# Patient Record
Sex: MALE | URBAN - METROPOLITAN AREA
[De-identification: names, ages, dates, MRNs, and addresses within clinical notes are randomized per-mention and may not be internally consistent; named-entity substitution may affect disease eponyms.]

---

## 2023-10-25 ENCOUNTER — TELEPHONE (OUTPATIENT)
Dept: OTHER | Age: 73
End: 2023-10-25

## 2024-04-02 ENCOUNTER — LAB REQUISITION (OUTPATIENT)
Dept: LAB | Facility: HOSPITAL | Age: 74
End: 2024-04-02
Payer: MEDICARE

## 2024-04-02 DIAGNOSIS — K59.1 FUNCTIONAL DIARRHEA: ICD-10-CM

## 2024-04-02 PROCEDURE — 87493 C DIFF AMPLIFIED PROBE: CPT | Performed by: INTERNAL MEDICINE

## 2024-04-02 PROCEDURE — 87046 STOOL CULTR AEROBIC BACT EA: CPT | Performed by: INTERNAL MEDICINE

## 2024-04-02 PROCEDURE — 87427 SHIGA-LIKE TOXIN AG IA: CPT | Performed by: INTERNAL MEDICINE

## 2024-04-02 PROCEDURE — 87077 CULTURE AEROBIC IDENTIFY: CPT | Performed by: INTERNAL MEDICINE

## 2024-04-02 PROCEDURE — 87045 FECES CULTURE AEROBIC BACT: CPT | Performed by: INTERNAL MEDICINE

## 2024-04-03 LAB — C DIFF TOX B STL QL: NEGATIVE

## 2024-07-22 ENCOUNTER — APPOINTMENT (OUTPATIENT)
Dept: CT IMAGING | Facility: HOSPITAL | Age: 74
End: 2024-07-22
Attending: EMERGENCY MEDICINE
Payer: MEDICARE

## 2024-07-22 ENCOUNTER — HOSPITAL ENCOUNTER (EMERGENCY)
Facility: HOSPITAL | Age: 74
Discharge: HOME | End: 2024-07-22
Attending: EMERGENCY MEDICINE
Payer: MEDICARE

## 2024-07-22 VITALS
HEIGHT: 65 IN | RESPIRATION RATE: 18 BRPM | SYSTOLIC BLOOD PRESSURE: 187 MMHG | WEIGHT: 135 LBS | OXYGEN SATURATION: 98 % | BODY MASS INDEX: 22.49 KG/M2 | DIASTOLIC BLOOD PRESSURE: 92 MMHG | TEMPERATURE: 98 F | HEART RATE: 77 BPM

## 2024-07-22 DIAGNOSIS — S01.511A LIP LACERATION, INITIAL ENCOUNTER: ICD-10-CM

## 2024-07-22 DIAGNOSIS — S09.90XA INJURY OF HEAD, INITIAL ENCOUNTER: Primary | ICD-10-CM

## 2024-07-22 LAB
ANION GAP SERPL CALC-SCNC: 10 MMOL/L (ref 0–18)
BASOPHILS # BLD AUTO: 0.07 X10(3) UL (ref 0–0.2)
BASOPHILS NFR BLD AUTO: 1.5 %
BUN BLD-MCNC: 12 MG/DL (ref 9–23)
BUN/CREAT SERPL: 8.2 (ref 10–20)
CALCIUM BLD-MCNC: 8.4 MG/DL (ref 8.7–10.4)
CHLORIDE SERPL-SCNC: 106 MMOL/L (ref 98–112)
CO2 SERPL-SCNC: 20 MMOL/L (ref 21–32)
CREAT BLD-MCNC: 1.46 MG/DL
DEPRECATED RDW RBC AUTO: 50.1 FL (ref 35.1–46.3)
EGFRCR SERPLBLD CKD-EPI 2021: 50 ML/MIN/1.73M2 (ref 60–?)
EOSINOPHIL # BLD AUTO: 0.14 X10(3) UL (ref 0–0.7)
EOSINOPHIL NFR BLD AUTO: 3.1 %
ERYTHROCYTE [DISTWIDTH] IN BLOOD BY AUTOMATED COUNT: 17.2 % (ref 11–15)
GLUCOSE BLD-MCNC: 356 MG/DL (ref 70–99)
HCT VFR BLD AUTO: 35.7 %
HGB BLD-MCNC: 12.3 G/DL
IMM GRANULOCYTES # BLD AUTO: 0 X10(3) UL (ref 0–1)
IMM GRANULOCYTES NFR BLD: 0 %
LYMPHOCYTES # BLD AUTO: 1.63 X10(3) UL (ref 1–4)
LYMPHOCYTES NFR BLD AUTO: 35.7 %
MCH RBC QN AUTO: 27.7 PG (ref 26–34)
MCHC RBC AUTO-ENTMCNC: 34.5 G/DL (ref 31–37)
MCV RBC AUTO: 80.4 FL
MONOCYTES # BLD AUTO: 0.36 X10(3) UL (ref 0.1–1)
MONOCYTES NFR BLD AUTO: 7.9 %
NEUTROPHILS # BLD AUTO: 2.36 X10 (3) UL (ref 1.5–7.7)
NEUTROPHILS # BLD AUTO: 2.36 X10(3) UL (ref 1.5–7.7)
NEUTROPHILS NFR BLD AUTO: 51.8 %
OSMOLALITY SERPL CALC.SUM OF ELEC: 296 MOSM/KG (ref 275–295)
PLATELET # BLD AUTO: 154 10(3)UL (ref 150–450)
POTASSIUM SERPL-SCNC: 4.8 MMOL/L (ref 3.5–5.1)
RBC # BLD AUTO: 4.44 X10(6)UL
SODIUM SERPL-SCNC: 136 MMOL/L (ref 136–145)
TROPONIN I SERPL HS-MCNC: 4 NG/L
WBC # BLD AUTO: 4.6 X10(3) UL (ref 4–11)

## 2024-07-22 PROCEDURE — 85025 COMPLETE CBC W/AUTO DIFF WBC: CPT | Performed by: EMERGENCY MEDICINE

## 2024-07-22 PROCEDURE — 93005 ELECTROCARDIOGRAM TRACING: CPT

## 2024-07-22 PROCEDURE — 80048 BASIC METABOLIC PNL TOTAL CA: CPT | Performed by: EMERGENCY MEDICINE

## 2024-07-22 PROCEDURE — 12011 RPR F/E/E/N/L/M 2.5 CM/<: CPT

## 2024-07-22 PROCEDURE — 96361 HYDRATE IV INFUSION ADD-ON: CPT

## 2024-07-22 PROCEDURE — 93010 ELECTROCARDIOGRAM REPORT: CPT

## 2024-07-22 PROCEDURE — 70450 CT HEAD/BRAIN W/O DYE: CPT | Performed by: EMERGENCY MEDICINE

## 2024-07-22 PROCEDURE — 99284 EMERGENCY DEPT VISIT MOD MDM: CPT

## 2024-07-22 PROCEDURE — 70486 CT MAXILLOFACIAL W/O DYE: CPT | Performed by: EMERGENCY MEDICINE

## 2024-07-22 PROCEDURE — 99285 EMERGENCY DEPT VISIT HI MDM: CPT

## 2024-07-22 PROCEDURE — 90471 IMMUNIZATION ADMIN: CPT

## 2024-07-22 PROCEDURE — 96360 HYDRATION IV INFUSION INIT: CPT

## 2024-07-22 PROCEDURE — 84484 ASSAY OF TROPONIN QUANT: CPT | Performed by: EMERGENCY MEDICINE

## 2024-07-22 RX ORDER — AMOXICILLIN AND CLAVULANATE POTASSIUM 875; 125 MG/1; MG/1
875 TABLET, FILM COATED ORAL ONCE
Status: COMPLETED | OUTPATIENT
Start: 2024-07-22 | End: 2024-07-22

## 2024-07-22 RX ORDER — LIDOCAINE HYDROCHLORIDE 10 MG/ML
20 INJECTION, SOLUTION EPIDURAL; INFILTRATION; INTRACAUDAL; PERINEURAL ONCE
Status: COMPLETED | OUTPATIENT
Start: 2024-07-22 | End: 2024-07-22

## 2024-07-22 RX ORDER — AMOXICILLIN AND CLAVULANATE POTASSIUM 875; 125 MG/1; MG/1
1 TABLET, FILM COATED ORAL 2 TIMES DAILY
Qty: 10 TABLET | Refills: 0 | Status: ON HOLD | OUTPATIENT
Start: 2024-07-22 | End: 2024-07-28

## 2024-07-23 LAB
ATRIAL RATE: 83 BPM
P AXIS: 62 DEGREES
P-R INTERVAL: 142 MS
Q-T INTERVAL: 386 MS
QRS DURATION: 118 MS
QTC CALCULATION (BEZET): 453 MS
R AXIS: -66 DEGREES
T AXIS: 102 DEGREES
VENTRICULAR RATE: 83 BPM

## 2024-07-23 NOTE — DISCHARGE INSTRUCTIONS
The CT scan of your skull and face today showed abnormalities which may be consistent with low calcium or vitamin D levels in your bone however they recommended follow-up with your primary care doctor to further evaluate for a possible oncologic cause such as myeloma or neoplastic process.

## 2024-07-23 NOTE — ED PROVIDER NOTES
Patient Seen in: [facility] Emergency Department      History     Chief Complaint   Patient presents with    Fall     Stated Complaint:     Subjective:   HPI    75 y/o male w/ DM here after a fall while walking on the sidewalk today.  No preceeding symptoms.  No LOC.  No neck pain or HA.  No CP or dizziness.  Slightly unsteady gait w/ EMS.  No recent illness.  No fever.  No nausea.    Objective:   Past Medical History:    Diabetes (HCC)              History reviewed. No pertinent surgical history.             Social History     Socioeconomic History    Marital status: Single   Tobacco Use    Smoking status: Never    Smokeless tobacco: Never   Vaping Use    Vaping status: Never Used   Substance and Sexual Activity    Alcohol use: Never    Drug use: Never     Social Determinants of Health     Food Insecurity: Patient Declined (3/3/2024)    Received from [facility]    Hunger Vital Sign     Worried About Running Out of Food in the Last Year: Patient declined     Ran Out of Food in the Last Year: Patient declined   Transportation Needs: Patient Declined (3/3/2024)    Received from [facility]    PRAPARE - Transportation     Lack of Transportation (Medical): Patient declined     Lack of Transportation (Non-Medical): Patient declined   Housing Stability: Patient Declined (3/3/2024)    Received from [facility]    Housing Stability Vital Sign     Unable to Pay for Housing in the Last Year: Patient declined     Unstable Housing in the Last Year: Patient declined              Review of Systems    Positive for stated Chief Complaint: Fall    Other systems are as noted in HPI.  Constitutional and vital signs reviewed.      All other systems reviewed and negative except as noted above.    Physical Exam     ED Triage Vitals [07/22/24 1928]   BP (!) 161/86   Pulse 87   Resp 13   Temp 98 °F (36.7 °C)   Temp src Oral   SpO2 98 %   O2 Device        Current Vitals:   Vital  Signs  BP: (!) 161/86  Pulse: 87  Resp: 13  Temp: 98 °F (36.7 °C)  Temp src: Oral    Oxygen Therapy  SpO2: 98 %            Physical Exam    Constitutional: Oriented to person, place, and time.  Appears well-developed. No distress.   Head: Normocephalic.  As below.  Eyes: Conjunctivae are normal. Pupils are equal, round, and reactive to light.   ENT:  No obvious tooth injury/loosening.  Slightly irregular 1 cm inward tracking laceration of the right upper lip that does not grossly involve the vermilion border.  Neck: Normal range of motion. Neck supple. No post midline pain  Cardiovascular: Normal rate, regular rhythm and intact and equal distal pulses.    Pulmonary/Chest: Effort normal. No respiratory distress.   Abdominal: Soft. There is no tenderness. There is no guarding.   Musculoskeletal: Normal range of motion. No edema or tenderness.   Neurological: Alert and oriented to person, place, and time. No gross focal deficits.  No obvious speech abnormality or facial asymmetryl  Skin: Skin is warm and dry.   Psychiatric: Normal mood and affect.  Behavior is normal.   Nursing note and vitals reviewed.    Differential diagnosis includes mechanical fall, hyperglycemia, dehydration, facial fracture/laceration, head injury.      ED Course     Labs Reviewed   BASIC METABOLIC PANEL (8) - Abnormal; Notable for the following components:       Result Value    Glucose 356 (*)     CO2 20.0 (*)     Creatinine 1.46 (*)     BUN/CREA Ratio 8.2 (*)     Calcium, Total 8.4 (*)     Calculated Osmolality 296 (*)     eGFR-Cr 50 (*)     All other components within normal limits   CBC W/ DIFFERENTIAL - Abnormal; Notable for the following components:    HGB 12.3 (*)     HCT 35.7 (*)     RDW-SD 50.1 (*)     RDW 17.2 (*)     All other components within normal limits   TROPONIN I HIGH SENSITIVITY - Normal   CBC WITH DIFFERENTIAL WITH PLATELET    Narrative:     The following orders were created for panel order CBC With Differential With  Platelet.  Procedure                               Abnormality         Status                     ---------                               -----------         ------                     CBC W/ DIFFERENTIAL[[id number]]          Abnormal            Final result                 Please view results for these tests on the individual orders.   RAINBOW DRAW LAVENDER   RAINBOW DRAW LIGHT GREEN     EKG    Rate, intervals and axes as noted on EKG Report.  Rate: 83  Rhythm: Sinus Rhythm  Reading: no gross acute ischemic changes.                 CT BRAIN OR HEAD (75833)    Result Date: 7/22/2024  PROCEDURE: CT BRAIN OR HEAD (CPT=70450), 7/22/2024, 9:15 PM CT FACIAL BONES (CPT=70486), 7/22/2024, 9:15 PM  COMPARISON: None.  INDICATIONS: Weakness, fall.  TECHNIQUE: Helical CT of the head with axial coronal and sagittal reconstructions.  Helical CT of the facial bones with axial sagittal and coronal reconstructions.  CT images were obtained without contrast material.  Automated exposure control for dose reduction was used.  Dose information is transmitted to the ACR (American College of Radiology) NRDR (National Radiology Data Registry) which includes the Dose Index Registry.  FINDINGS CT HEAD:   VENTRICLES: No hydrocephalus.  EXTRA-AXIAL: No extraaxial hemorrhage.  No midline shift or herniation.  PARENCHYMA: No CT evidence of acute or subacute infarct.  No mass.  Mild generalized parenchymal volume loss.  Mild periventricular white matter hypodensity.  Gray-white matter differentiation is maintained.  SOFT TISSUES: No acute abnormality. BONES: No acute fracture.  Heterogeneous osteopenic bone marrow.   FINDINGS CT FACIAL BONES:  BONES: No acute fracture.  Heterogeneous osteopenic bone marrow. SINUSES: Clear. ORBITS: No acute abnormality. MASTOIDS: Clear. EACS: Clear. SOFT TISSUES: No fluid collection or hematoma.          CONCLUSION:   1. No acute intracranial abnormality.  No calvarial fracture.  2. No acute fracture of the facial  bones.  No acute soft tissue injury.  3. Heterogeneous bone marrow which may simply be due to marked osteopenia but metastasis or multiple myeloma is not excluded.    elm-remote     Dictated by (CST): Mango Oglesby MD on 7/22/2024 at 9:55 PM     Finalized by (CST): Mango Oglesby MD on 7/22/2024 at 9:59 PM          CT FACIAL BONES (CPT=70486)    Result Date: 7/22/2024  PROCEDURE: CT BRAIN OR HEAD (CPT=70450), 7/22/2024, 9:15 PM CT FACIAL BONES (CPT=70486), 7/22/2024, 9:15 PM  COMPARISON: None.  INDICATIONS: Weakness, fall.  TECHNIQUE: Helical CT of the head with axial coronal and sagittal reconstructions.  Helical CT of the facial bones with axial sagittal and coronal reconstructions.  CT images were obtained without contrast material.  Automated exposure control for dose reduction was used.  Dose information is transmitted to the ACR (American College of Radiology) NRDR (National Radiology Data Registry) which includes the Dose Index Registry.  FINDINGS CT HEAD:   VENTRICLES: No hydrocephalus.  EXTRA-AXIAL: No extraaxial hemorrhage.  No midline shift or herniation.  PARENCHYMA: No CT evidence of acute or subacute infarct.  No mass.  Mild generalized parenchymal volume loss.  Mild periventricular white matter hypodensity.  Gray-white matter differentiation is maintained.  SOFT TISSUES: No acute abnormality. BONES: No acute fracture.  Heterogeneous osteopenic bone marrow.   FINDINGS CT FACIAL BONES:  BONES: No acute fracture.  Heterogeneous osteopenic bone marrow. SINUSES: Clear. ORBITS: No acute abnormality. MASTOIDS: Clear. EACS: Clear. SOFT TISSUES: No fluid collection or hematoma.          CONCLUSION:   1. No acute intracranial abnormality.  No calvarial fracture.  2. No acute fracture of the facial bones.  No acute soft tissue injury.  3. Heterogeneous bone marrow which may simply be due to marked osteopenia but metastasis or multiple myeloma is not excluded.    elm-remote     Dictated by (CST): Mango Oglesby MD  on 7/22/2024 at 9:55 PM     Finalized by (CST): Mango Oglesby MD on 7/22/2024 at 9:59 PM                  [facility]                                         Medical Decision Making  Patient's workup reassuring.  Blood pressure still little elevated.  No focal deficits.  Laceration repair of the lip as below.  Recommended ice and Tylenol.  Continue his other medications.  Follow-up with his doctor for wound check and come back with any worsening or change.            Procedure: Laceration Repair  Verbal consent was obtained from the patient. The laceration was anesthetized in the usual fashion with 1% plain lidocaine. The wound was cleaned, draped and explored and there were no deep structures involved.  No tendon injury was identified. The wound was repaired with 4 simple interrupted 5-0 Vicryl sutures with loose wound edge approximation. The wound repair was simple. The procedure was performed by myself.  The wound was dressed by emergency department staff.    Problems Addressed:  Injury of head, initial encounter: acute illness or injury  Lip laceration, initial encounter: acute illness or injury    Amount and/or Complexity of Data Reviewed  Labs: ordered. Decision-making details documented in ED Course.  Radiology: ordered and independent interpretation performed. Decision-making details documented in ED Course.     Details: By my review of the noncontrast head CT, there is no obvious evidence of intracranial bleeding, intracranial mass or midline shift.  ECG/medicine tests: ordered and independent interpretation performed. Decision-making details documented in ED Course.    Risk  OTC drugs.  Prescription drug management.  Decision regarding hospitalization.  Minor surgery with no identified risk factors.        Disposition and Plan     Clinical Impression:  1. Injury of head, initial encounter    2. Lip laceration, initial encounter         Disposition:  Discharge  7/22/2024 10:01 pm    Follow-up:  Talia Gibbs  C.PShantelle  [address]  [address]  [facility] [address]  [phone]    Call      We recommend that you schedule follow up care with a primary care provider within the next three months to obtain basic health screening including reassessment of your blood pressure.      Medications Prescribed:  Current Discharge Medication List        START taking these medications    Details   amoxicillin clavulanate 875-125 MG Oral Tab Take 1 tablet by mouth 2 (two) times daily for 5 days.  Qty: 10 tablet, Refills: 0

## 2024-07-23 NOTE — ED INITIAL ASSESSMENT (HPI)
To ED via [facility] for a mechanical fall that occurred on the sidewalk. Per EMS, patient has a laceration to his upper lip and had difficulty walking to the ambulance with EMS. Patient denies blood thinners or LOC.

## 2024-07-27 ENCOUNTER — HOSPITAL ENCOUNTER (OUTPATIENT)
Facility: HOSPITAL | Age: 74
Setting detail: OBSERVATION
Discharge: NURSING, REHAB OR LONG-TERM CARE | End: 2024-08-02
Attending: EMERGENCY MEDICINE | Admitting: INTERNAL MEDICINE
Payer: MEDICARE

## 2024-07-27 ENCOUNTER — APPOINTMENT (OUTPATIENT)
Dept: ULTRASOUND IMAGING | Facility: HOSPITAL | Age: 74
End: 2024-07-27
Attending: EMERGENCY MEDICINE
Payer: MEDICARE

## 2024-07-27 DIAGNOSIS — R53.1 WEAKNESS GENERALIZED: Primary | ICD-10-CM

## 2024-07-27 DIAGNOSIS — R29.6 RECURRENT FALLS: ICD-10-CM

## 2024-07-27 DIAGNOSIS — R74.01 TRANSAMINITIS: ICD-10-CM

## 2024-07-27 LAB
ALBUMIN SERPL-MCNC: 4.2 G/DL (ref 3.2–4.8)
ALBUMIN/GLOB SERPL: 1.4 {RATIO} (ref 1–2)
ALP LIVER SERPL-CCNC: 62 U/L
ALT SERPL-CCNC: 72 U/L
AMMONIA PLAS-MCNC: <10 UMOL/L (ref 11–32)
ANION GAP SERPL CALC-SCNC: 7 MMOL/L (ref 0–18)
AST SERPL-CCNC: 35 U/L (ref ?–34)
BASOPHILS # BLD AUTO: 0.06 X10(3) UL (ref 0–0.2)
BASOPHILS NFR BLD AUTO: 1.4 %
BILIRUB SERPL-MCNC: 1.1 MG/DL (ref 0.2–1.1)
BUN BLD-MCNC: 21 MG/DL (ref 9–23)
BUN/CREAT SERPL: 16.4 (ref 10–20)
CALCIUM BLD-MCNC: 8.9 MG/DL (ref 8.7–10.4)
CHLORIDE SERPL-SCNC: 108 MMOL/L (ref 98–112)
CO2 SERPL-SCNC: 25 MMOL/L (ref 21–32)
CREAT BLD-MCNC: 1.28 MG/DL
DEPRECATED RDW RBC AUTO: 52.1 FL (ref 35.1–46.3)
EGFRCR SERPLBLD CKD-EPI 2021: 58 ML/MIN/1.73M2 (ref 60–?)
EOSINOPHIL # BLD AUTO: 0.13 X10(3) UL (ref 0–0.7)
EOSINOPHIL NFR BLD AUTO: 3.1 %
ERYTHROCYTE [DISTWIDTH] IN BLOOD BY AUTOMATED COUNT: 17.4 % (ref 11–15)
FLUAV + FLUBV RNA SPEC NAA+PROBE: NEGATIVE
FLUAV + FLUBV RNA SPEC NAA+PROBE: NEGATIVE
GLOBULIN PLAS-MCNC: 3.1 G/DL (ref 2–3.5)
GLUCOSE BLD-MCNC: 186 MG/DL (ref 70–99)
GLUCOSE BLDC GLUCOMTR-MCNC: 207 MG/DL (ref 70–99)
HCT VFR BLD AUTO: 35.9 %
HGB BLD-MCNC: 11.8 G/DL
IMM GRANULOCYTES # BLD AUTO: 0.01 X10(3) UL (ref 0–1)
IMM GRANULOCYTES NFR BLD: 0.2 %
LYMPHOCYTES # BLD AUTO: 1.37 X10(3) UL (ref 1–4)
LYMPHOCYTES NFR BLD AUTO: 32.7 %
MCH RBC QN AUTO: 27 PG (ref 26–34)
MCHC RBC AUTO-ENTMCNC: 32.9 G/DL (ref 31–37)
MCV RBC AUTO: 82.2 FL
MONOCYTES # BLD AUTO: 0.53 X10(3) UL (ref 0.1–1)
MONOCYTES NFR BLD AUTO: 12.6 %
NEUTROPHILS # BLD AUTO: 2.09 X10 (3) UL (ref 1.5–7.7)
NEUTROPHILS # BLD AUTO: 2.09 X10(3) UL (ref 1.5–7.7)
NEUTROPHILS NFR BLD AUTO: 50 %
OSMOLALITY SERPL CALC.SUM OF ELEC: 298 MOSM/KG (ref 275–295)
PLATELET # BLD AUTO: 132 10(3)UL (ref 150–450)
POTASSIUM SERPL-SCNC: 4.4 MMOL/L (ref 3.5–5.1)
PROT SERPL-MCNC: 7.3 G/DL (ref 5.7–8.2)
RBC # BLD AUTO: 4.37 X10(6)UL
RSV RNA SPEC NAA+PROBE: NEGATIVE
SARS-COV-2 RNA RESP QL NAA+PROBE: NOT DETECTED
SODIUM SERPL-SCNC: 140 MMOL/L (ref 136–145)
TSI SER-ACNC: 3.49 MIU/ML (ref 0.55–4.78)
WBC # BLD AUTO: 4.2 X10(3) UL (ref 4–11)

## 2024-07-27 PROCEDURE — 76705 ECHO EXAM OF ABDOMEN: CPT | Performed by: EMERGENCY MEDICINE

## 2024-07-27 RX ORDER — AMLODIPINE BESYLATE 5 MG/1
5 TABLET ORAL ONCE
Status: COMPLETED | OUTPATIENT
Start: 2024-07-28 | End: 2024-07-28

## 2024-07-28 ENCOUNTER — APPOINTMENT (OUTPATIENT)
Dept: CT IMAGING | Facility: HOSPITAL | Age: 74
End: 2024-07-28
Attending: Other
Payer: MEDICARE

## 2024-07-28 ENCOUNTER — APPOINTMENT (OUTPATIENT)
Dept: GENERAL RADIOLOGY | Facility: HOSPITAL | Age: 74
End: 2024-07-28
Attending: INTERNAL MEDICINE
Payer: MEDICARE

## 2024-07-28 PROBLEM — R11.10 VOMITING: Status: RESOLVED | Noted: 2017-07-05 | Resolved: 2024-07-28

## 2024-07-28 PROBLEM — R73.9 HYPERGLYCEMIA: Status: RESOLVED | Noted: 2024-03-03 | Resolved: 2024-07-28

## 2024-07-28 PROBLEM — R26.9 ABNORMAL GAIT: Status: RESOLVED | Noted: 2024-03-03 | Resolved: 2024-07-28

## 2024-07-28 PROBLEM — I49.3 MULTIPLE PREMATURE VENTRICULAR COMPLEXES: Status: RESOLVED | Noted: 2022-09-16 | Resolved: 2024-07-28

## 2024-07-28 PROBLEM — I35.0 NONRHEUMATIC AORTIC VALVE STENOSIS: Status: RESOLVED | Noted: 2022-09-16 | Resolved: 2024-07-28

## 2024-07-28 PROBLEM — E11.9 DIABETES MELLITUS (HCC): Status: RESOLVED | Noted: 2024-07-28 | Resolved: 2024-07-28

## 2024-07-28 PROBLEM — G62.9 NEUROPATHY: Status: RESOLVED | Noted: 2017-11-06 | Resolved: 2024-07-28

## 2024-07-28 PROBLEM — R21 RASH: Status: RESOLVED | Noted: 2024-07-28 | Resolved: 2024-07-28

## 2024-07-28 PROBLEM — I15.2 HYPERTENSION ASSOCIATED WITH DIABETES (HCC): Status: RESOLVED | Noted: 2022-09-16 | Resolved: 2024-07-28

## 2024-07-28 PROBLEM — R29.6 RECURRENT FALLS: Status: ACTIVE | Noted: 2024-07-28

## 2024-07-28 PROBLEM — E11.59 HYPERTENSION ASSOCIATED WITH DIABETES (HCC): Status: RESOLVED | Noted: 2022-09-16 | Resolved: 2024-07-28

## 2024-07-28 PROBLEM — R07.9 CHEST PAIN: Status: RESOLVED | Noted: 2022-09-16 | Resolved: 2024-07-28

## 2024-07-28 PROBLEM — C90.00 MULTIPLE MYELOMA (HCC): Chronic | Status: RESOLVED | Noted: 2017-07-06 | Resolved: 2024-07-28

## 2024-07-28 PROBLEM — E11.9 TYPE 2 DIABETES MELLITUS WITHOUT COMPLICATION, WITHOUT LONG-TERM CURRENT USE OF INSULIN (HCC): Status: RESOLVED | Noted: 2017-09-17 | Resolved: 2024-07-28

## 2024-07-28 PROBLEM — I10 BENIGN ESSENTIAL HYPERTENSION: Status: RESOLVED | Noted: 2017-09-17 | Resolved: 2024-07-28

## 2024-07-28 LAB
ATRIAL RATE: 74 BPM
BILIRUB UR QL: NEGATIVE
CHOLEST SERPL-MCNC: 157 MG/DL (ref ?–200)
CLARITY UR: CLEAR
COLOR UR: COLORLESS
EST. AVERAGE GLUCOSE BLD GHB EST-MCNC: 229 MG/DL (ref 68–126)
FOLATE SERPL-MCNC: 21.5 NG/ML (ref 5.4–?)
GLUCOSE BLDC GLUCOMTR-MCNC: 105 MG/DL (ref 70–99)
GLUCOSE BLDC GLUCOMTR-MCNC: 155 MG/DL (ref 70–99)
GLUCOSE BLDC GLUCOMTR-MCNC: 215 MG/DL (ref 70–99)
GLUCOSE BLDC GLUCOMTR-MCNC: 231 MG/DL (ref 70–99)
GLUCOSE BLDC GLUCOMTR-MCNC: 292 MG/DL (ref 70–99)
GLUCOSE UR-MCNC: >1000 MG/DL
HBA1C MFR BLD: 9.6 % (ref ?–5.7)
HDLC SERPL-MCNC: 49 MG/DL (ref 40–59)
HGB UR QL STRIP.AUTO: NEGATIVE
KETONES UR-MCNC: 10 MG/DL
LDLC SERPL CALC-MCNC: 78 MG/DL (ref ?–100)
LEUKOCYTE ESTERASE UR QL STRIP.AUTO: NEGATIVE
MAGNESIUM SERPL-MCNC: 1.9 MG/DL (ref 1.6–2.6)
NITRITE UR QL STRIP.AUTO: NEGATIVE
NONHDLC SERPL-MCNC: 108 MG/DL (ref ?–130)
P AXIS: 61 DEGREES
P-R INTERVAL: 146 MS
PH UR: 6 [PH] (ref 5–8)
PHOSPHATE SERPL-MCNC: 4 MG/DL (ref 2.4–5.1)
PROT UR-MCNC: 11.7 MG/DL (ref ?–14)
PROT UR-MCNC: NEGATIVE MG/DL
Q-T INTERVAL: 388 MS
QRS DURATION: 116 MS
QTC CALCULATION (BEZET): 430 MS
R AXIS: -62 DEGREES
SP GR UR STRIP: 1.02 (ref 1–1.03)
T AXIS: 124 DEGREES
TRIGL SERPL-MCNC: 178 MG/DL (ref 30–149)
UROBILINOGEN UR STRIP-ACNC: NORMAL
VENTRICULAR RATE: 74 BPM
VIT B12 SERPL-MCNC: 415 PG/ML (ref 211–911)
VLDLC SERPL CALC-MCNC: 28 MG/DL (ref 0–30)

## 2024-07-28 PROCEDURE — 72125 CT NECK SPINE W/O DYE: CPT | Performed by: OTHER

## 2024-07-28 PROCEDURE — 72131 CT LUMBAR SPINE W/O DYE: CPT | Performed by: OTHER

## 2024-07-28 PROCEDURE — 99205 OFFICE O/P NEW HI 60 MIN: CPT | Performed by: OTHER

## 2024-07-28 PROCEDURE — 72128 CT CHEST SPINE W/O DYE: CPT | Performed by: OTHER

## 2024-07-28 PROCEDURE — 77075 RADEX OSSEOUS SURVEY COMPL: CPT | Performed by: INTERNAL MEDICINE

## 2024-07-28 RX ORDER — DEXTROSE MONOHYDRATE 25 G/50ML
50 INJECTION, SOLUTION INTRAVENOUS
Status: DISCONTINUED | OUTPATIENT
Start: 2024-07-28 | End: 2024-08-02

## 2024-07-28 RX ORDER — NICOTINE POLACRILEX 4 MG
30 LOZENGE BUCCAL
Status: DISCONTINUED | OUTPATIENT
Start: 2024-07-28 | End: 2024-08-02

## 2024-07-28 RX ORDER — LOSARTAN POTASSIUM 50 MG/1
50 TABLET ORAL DAILY
Status: DISCONTINUED | OUTPATIENT
Start: 2024-07-29 | End: 2024-08-02

## 2024-07-28 RX ORDER — INSULIN DEGLUDEC 100 U/ML
10 INJECTION, SOLUTION SUBCUTANEOUS NIGHTLY
Status: DISCONTINUED | OUTPATIENT
Start: 2024-07-28 | End: 2024-07-29

## 2024-07-28 RX ORDER — AMLODIPINE BESYLATE 5 MG/1
5 TABLET ORAL DAILY
Status: DISCONTINUED | OUTPATIENT
Start: 2024-07-28 | End: 2024-07-28

## 2024-07-28 RX ORDER — HYDRALAZINE HYDROCHLORIDE 20 MG/ML
10 INJECTION INTRAMUSCULAR; INTRAVENOUS EVERY 6 HOURS PRN
Status: DISCONTINUED | OUTPATIENT
Start: 2024-07-28 | End: 2024-08-02

## 2024-07-28 RX ORDER — LOSARTAN POTASSIUM 50 MG/1
50 TABLET ORAL DAILY
COMMUNITY

## 2024-07-28 RX ORDER — PANTOPRAZOLE SODIUM 40 MG/1
40 TABLET, DELAYED RELEASE ORAL
Status: DISCONTINUED | OUTPATIENT
Start: 2024-07-28 | End: 2024-07-28

## 2024-07-28 RX ORDER — LEVOTHYROXINE SODIUM 0.03 MG/1
25 TABLET ORAL
COMMUNITY

## 2024-07-28 RX ORDER — DULOXETIN HYDROCHLORIDE 60 MG/1
60 CAPSULE, DELAYED RELEASE ORAL DAILY
COMMUNITY

## 2024-07-28 RX ORDER — DULOXETIN HYDROCHLORIDE 30 MG/1
60 CAPSULE, DELAYED RELEASE ORAL DAILY
Status: DISCONTINUED | OUTPATIENT
Start: 2024-07-28 | End: 2024-07-28

## 2024-07-28 RX ORDER — LEVOTHYROXINE SODIUM 0.03 MG/1
25 TABLET ORAL
Status: DISCONTINUED | OUTPATIENT
Start: 2024-07-28 | End: 2024-07-28

## 2024-07-28 RX ORDER — DULOXETIN HYDROCHLORIDE 30 MG/1
60 CAPSULE, DELAYED RELEASE ORAL DAILY
Status: DISCONTINUED | OUTPATIENT
Start: 2024-07-29 | End: 2024-08-02

## 2024-07-28 RX ORDER — LEVOTHYROXINE SODIUM 0.03 MG/1
25 TABLET ORAL
Status: DISCONTINUED | OUTPATIENT
Start: 2024-07-29 | End: 2024-08-02

## 2024-07-28 RX ORDER — PANTOPRAZOLE SODIUM 40 MG/1
40 TABLET, DELAYED RELEASE ORAL
Status: DISCONTINUED | OUTPATIENT
Start: 2024-07-29 | End: 2024-08-02

## 2024-07-28 RX ORDER — EMPAGLIFLOZIN 25 MG/1
25 TABLET, FILM COATED ORAL DAILY
COMMUNITY

## 2024-07-28 RX ORDER — ACETAMINOPHEN 325 MG/1
650 TABLET ORAL EVERY 6 HOURS PRN
Status: DISCONTINUED | OUTPATIENT
Start: 2024-07-28 | End: 2024-08-02

## 2024-07-28 RX ORDER — NICOTINE POLACRILEX 4 MG
15 LOZENGE BUCCAL
Status: DISCONTINUED | OUTPATIENT
Start: 2024-07-28 | End: 2024-08-02

## 2024-07-28 RX ORDER — LOSARTAN POTASSIUM 50 MG/1
50 TABLET ORAL DAILY
Status: DISCONTINUED | OUTPATIENT
Start: 2024-07-28 | End: 2024-07-28

## 2024-07-28 NOTE — PLAN OF CARE
Patient alert, increasingly forgetful throughout shift. Tele, no calls. Blood pressure elevated, relief with scheduled meds. Occasionally incontinent, BM today, voiding. XR bones, CT spines done today. ACHS, eating well. Up with assist, does not follow commands well, frequent reorientation needed. Fall precautions in place, frequent rounding performed.     Problem: Diabetes/Glucose Control  Goal: Glucose maintained within prescribed range  Description: INTERVENTIONS:  - Monitor Blood Glucose as ordered  - Assess for signs and symptoms of hyperglycemia and hypoglycemia  - Administer ordered medications to maintain glucose within target range  - Assess barriers to adequate nutritional intake and initiate nutrition consult as needed  - Instruct patient on self management of diabetes  Outcome: Progressing     Problem: PAIN - ADULT  Goal: Verbalizes/displays adequate comfort level or patient's stated pain goal  Description: INTERVENTIONS:  - Encourage pt to monitor pain and request assistance  - Assess pain using appropriate pain scale  - Administer analgesics based on type and severity of pain and evaluate response  - Implement non-pharmacological measures as appropriate and evaluate response  - Consider cultural and social influences on pain and pain management  - Manage/alleviate anxiety  - Utilize distraction and/or relaxation techniques  - Monitor for opioid side effects  - Notify MD/LIP if interventions unsuccessful or patient reports new pain  - Anticipate increased pain with activity and pre-medicate as appropriate  Outcome: Progressing     Problem: SAFETY ADULT - FALL  Goal: Free from fall injury  Description: INTERVENTIONS:  - Assess pt frequently for physical needs  - Identify cognitive and physical deficits and behaviors that affect risk of falls.  - [place] fall precautions as indicated by assessment.  - Educate pt/family on patient safety including physical limitations  - Instruct pt to call for  assistance with activity based on assessment  - Modify environment to reduce risk of injury  - Provide assistive devices as appropriate  - Consider OT/PT consult to assist with strengthening/mobility  - Encourage toileting schedule  Outcome: Progressing

## 2024-07-28 NOTE — PLAN OF CARE
Problem: Patient Centered Care  Goal: Patient preferences are identified and integrated in the patient's plan of care  Description: Interventions:  - Provide timely, complete, and accurate information to patient/family  - Incorporate patient and family knowledge, values, beliefs, and cultural backgrounds into the planning and delivery of care  - Encourage patient/family to participate in care and decision-making at the level they choose  - Honor patient and family perspectives and choices  Outcome: Progressing    Problem: Diabetes/Glucose Control  Goal: Glucose maintained within prescribed range  Description: INTERVENTIONS:  - Monitor Blood Glucose as ordered  - Assess for signs and symptoms of hyperglycemia and hypoglycemia  - Administer ordered medications to maintain glucose within target range  - Assess barriers to adequate nutritional intake and initiate nutrition consult as needed  - Instruct patient on self management of diabetes  Outcome: Progressing    Problem: PAIN - ADULT  Goal: Verbalizes/displays adequate comfort level or patient's stated pain goal  Description: INTERVENTIONS:  - Encourage pt to monitor pain and request assistance  - Assess pain using appropriate pain scale  - Administer analgesics based on type and severity of pain and evaluate response  - Implement non-pharmacological measures as appropriate and evaluate response  - Consider cultural and social influences on pain and pain management  - Manage/alleviate anxiety  - Utilize distraction and/or relaxation techniques  - Monitor for opioid side effects  - Notify MD/LIP if interventions unsuccessful or patient reports new pain  - Anticipate increased pain with activity and pre-medicate as appropriate  Outcome: Progressing     Problem: SAFETY ADULT - FALL  Goal: Free from fall injury  Description: INTERVENTIONS:  - Assess pt frequently for physical needs  - Identify cognitive and physical deficits and behaviors that affect risk of falls.  -  [place] fall precautions as indicated by assessment.  - Educate pt/family on patient safety including physical limitations  - Instruct pt to call for assistance with activity based on assessment  - Modify environment to reduce risk of injury  - Provide assistive devices as appropriate  - Consider OT/PT consult to assist with strengthening/mobility  - Encourage toileting schedule  Outcome: Progressing   Patient admitted to unit overnight, aoX3 (forgetful) with complaints of bilateral leg cramps managed with PO tylenol. Neurology consulted.   Patient BP elevated upon arrival, however did decrease; PRN hydralazine Q6 PRN.  Patient unable to answer questions about home meds during admission.  This RN talked to patient's ex-wife Sammi, who states she will bring in the med list today.  Ex-wife did state patient lives alone and falls frequently, forgets to take his insulin and meds sometimes; social work consult added. PT barak ordered. Tele monitoring (no calls). Patient is incontinent; has primofit on with good output.  Urine sent to lab for analysis and culture. Safety precautions maintained; call light within reach and patient instructed on its use.

## 2024-07-28 NOTE — ED INITIAL ASSESSMENT (HPI)
Pt c/o generalized weakness, sts he hasn't eaten since yesterday, denies particular reason for such.   Pt also w/ sutures to upper lip from fall on 7/22.   Denies specific complaints. Denies fall today.

## 2024-07-28 NOTE — ED QUICK NOTES
Orders for admission, patient is aware of plan and ready to go upstairs. Any questions, please call ED RN Francis at extension [phone].     Patient Covid vaccination status: Fully vaccinated     COVID Test Ordered in ED: SARS-CoV-2/Flu A and B/RSV by PCR ([facility]pert)    COVID Suspicion at Admission: N/A    Running Infusions:      Mental Status/LOC at time of transport: A&Ox4    Other pertinent information:   CIWA score: N/A   NIH score:  N/A

## 2024-07-28 NOTE — ED INITIAL ASSESSMENT (HPI)
Pt a/ox4, respirations unlabored, speech clear. No focal weakness  Hx DM and HTN, per EMS noncompliant w/ meds for both.

## 2024-07-28 NOTE — ED PROVIDER NOTES
Patient Seen in: [facility] Emergency Department    History     Chief Complaint   Patient presents with    Weakness     Stated Complaint: Fall     HPI    73 yo M with PMH DM, HTN presenting with generalized weakness/malaise for some time, worse recently including fall five days ago with grossly unrevealing ED evaluation now with recurrent falls. No vomiting/diarrhea, no CP/SOB, no cough. No abdominal pain, no urinary complaints. No new meds/dosage changes.    Past Medical History:    Diabetes (HCC)       No past surgical history on file.         No family history on file.    Social History     Socioeconomic History    Marital status: Single   Tobacco Use    Smoking status: Never    Smokeless tobacco: Never   Vaping Use    Vaping status: Never Used   Substance and Sexual Activity    Alcohol use: Never    Drug use: Never     Social Determinants of Health     Food Insecurity: Patient Declined (3/3/2024)    Received from [facility]    Hunger Vital Sign     Worried About Running Out of Food in the Last Year: Patient declined     Ran Out of Food in the Last Year: Patient declined   Transportation Needs: Patient Declined (3/3/2024)    Received from [facility]    PRAPARE - Transportation     Lack of Transportation (Medical): Patient declined     Lack of Transportation (Non-Medical): Patient declined   Housing Stability: Patient Declined (3/3/2024)    Received from [facility]    Housing Stability Vital Sign     Unable to Pay for Housing in the Last Year: Patient declined     Unstable Housing in the Last Year: Patient declined       Review of Systems :  Constitutional: As per HPI  HENT: Negative for ear pain and rhinorrhea.  Eyes: Negative for discharge and visual disturbance.   Respiratory: Negative for cough and shortness of breath.    Cardiovascular: Negative for chest pain and palpitations.   Gastrointestinal: Negative for vomiting and abdominal pain.    Genitourinary: Negative for dysuria and hematuria.     Positive for stated complaint: Fall  Other systems are as noted in HPI.  Constitutional and vital signs reviewed.      All other systems reviewed and negative except as noted above.    PSFH elements reviewed from today and agreed except as otherwise stated in HPI.    Physical Exam     ED Triage Vitals [07/27/24 1941]   BP (!) 161/90   Pulse 78   Resp 18   Temp 97.7 °F (36.5 °C)   Temp src Oral   SpO2 98 %   O2 Device None (Room air)       Current:BP (!) 161/90   Pulse 78   Temp 97.7 °F (36.5 °C) (Oral)   Resp 18   Ht 165.1 cm (5' 5\")   Wt 61.7 kg   SpO2 98%   BMI 22.63 kg/m²         Physical Exam   Constitutional: No distress.   HEENT: Dry MM.  Head: Normocephalic.   Eyes: No injection. Pupils midrange and equally reactive. Full/painless extraocular movements.  No proptosis or hyphema.  Neck: Neck supple.  No midline C-spine tenderness/step-off/deformity.  No meningismus.  Cardiovascular: RRR.   Pulmonary/Chest: Effort normal. CTAB.  Abdominal: Soft.  Nontender.  Musculoskeletal: No gross deformity.  Neurological: Alert.  Oriented to person, hospital, year.  Cranial nerves II through XII grossly intact.  Bilateral upper and lower extremities with 5/5 strength proximally and distally.  Skin: Skin is warm.   Psychiatric: Cooperative.  Nursing note and vitals reviewed.        ED Course     Labs Reviewed   COMP METABOLIC PANEL (14) - Abnormal; Notable for the following components:       Result Value    Glucose 186 (*)     Calculated Osmolality 298 (*)     eGFR-Cr 58 (*)     ALT 72 (*)     AST 35 (*)     All other components within normal limits   AMMONIA, PLASMA - Abnormal; Notable for the following components:    Ammonia <10 (*)     All other components within normal limits   HEMOGLOBIN A1C - Abnormal; Notable for the following components:    HgbA1C 9.6 (*)     Estimated Average Glucose 229 (*)     All other components within normal limits   LIPID PANEL -  Abnormal; Notable for the following components:    Triglycerides 178 (*)     All other components within normal limits   POCT GLUCOSE - Abnormal; Notable for the following components:    POC Glucose  207 (*)     All other components within normal limits   POCT GLUCOSE - Abnormal; Notable for the following components:    POC Glucose  155 (*)     All other components within normal limits   CBC W/ DIFFERENTIAL - Abnormal; Notable for the following components:    HGB 11.8 (*)     HCT 35.9 (*)     RDW-SD 52.1 (*)     RDW 17.4 (*)     .0 (*)     All other components within normal limits   TSH W REFLEX TO FREE T4 - Normal   MAGNESIUM - Normal   PHOSPHORUS - Normal   SARS-COV-2/FLU A AND B/RSV BY PCR (GENEXPERT) - Normal    Narrative:     This test is intended for the qualitative detection and differentiation of SARS-CoV-2, influenza A, influenza B, and respiratory syncytial virus (RSV) viral RNA in nasopharyngeal or nares swabs from individuals suspected of respiratory viral infection consistent with COVID-19 by their healthcare provider. Signs and symptoms of respiratory viral infection due to SARS-CoV-2, influenza, and RSV can be similar.    Test performed using the Xpert Xpress SARS-CoV-2/FLU/RSV (real time RT-PCR)  assay on the GeneXpert instrument, [employer], [place] [address].   This test is being used under the Food and Drug Administration's Emergency Use Authorization.    The authorized Fact Sheet for Healthcare Providers for this assay is available upon request from the laboratory.   CBC WITH DIFFERENTIAL WITH PLATELET    Narrative:     The following orders were created for panel order CBC With Differential With Platelet.  Procedure                               Abnormality         Status                     ---------                               -----------         ------                     CBC W/ DIFFERENTIAL[[id number]]          Abnormal            Final result                 Please view results for  these tests on the individual orders.   URINALYSIS, ROUTINE   URINALYSIS WITH CULTURE REFLEX   VITAMIN B12 WITH REFLEX TO MMA   Preliminary Radiology Report  [facility], [facility]  [phone] - Phone    [facility]    NAME: REYNA NAYAK    DATE OF EXAM: 07/27/2024  Patient No:  [id number]  Physician:  BROOKLYN^SWETA ^2  YOB: 1950    Past Medical History (entered by Technologist):    Reason For Exam (entered by Technologist):  FALL  Other Notes (entered by Technologist):     Additional Information (per Vision Radiologist):      US RUQ        IMPRESSION:  No sonographic evidence of cholelithiasis or acute cholecystitis.     Common bile duct measures 6 mm.     Unremarkable sonographic appearance of the liver, right kidney, and visualized pancreas.  No free fluid.        Results faxed/electronically transmitted to the ER and radiology department at 12:35 AM ROLY Man M.D.  This report has been electronically signed and verified by the Radiologist whose name is printed above.  EKG    Rate, intervals and axes as noted on EKG Report.  Rate: 74  Rhythm: Sinus Rhythm  Reading: NSR 74 without SARIKA, unchanged from 7/22/2024           ED Course as of 07/27/24 2356  ------------------------------------------------------------  Time: 07/27 2101  Comment: Requesting water and otherwise without complaints.  ------------------------------------------------------------  Time: 07/27 2349  Comment: ED course grossly nonacute, patient with ongoing gneralized weakness and seeming difficulty with transfer despite IVF for which patient to be admitted for ongoing management.       MDM   DIFFERENTIAL DIAGNOSIS: After history and physical exam differential diagnosis includes but is not limited to anemia, electrolyte/thyroid/glycemic derangement, UTI, hepatobiliary disease.    Pulse ox: 98%:Normal on RA, as independently interpreted by myself    Medical Decision Making  Evaluation for generalized  weakness/difficulty transferring in grossly neurologically intact patient without complaints, s/p recent mechanical fall with recurrent falls. Labs as noted including nonobstructive transaminitis with nonacute US, patient with ongoing difficulty transferring despite IVF - will admit for ongoing monitoring/management with UA pending at time of admission, case d/w on call medicine Dr. Butler for admission.    Problems Addressed:  Recurrent falls: acute illness or injury  Transaminitis: acute illness or injury  Weakness generalized: acute illness or injury    Amount and/or Complexity of Data Reviewed  Independent Historian: EMS     Details: Collateral history obtained from EMS  External Data Reviewed: labs, radiology, ECG and notes.     Details: 7/22/2024 ED notes, labs, imaging, EKG reviewed  Labs: ordered. Decision-making details documented in ED Course.  Radiology: ordered and independent interpretation performed. Decision-making details documented in ED Course.     Details: US without obvious cholelithiasis as independently interpreted by myself  ECG/medicine tests: ordered and independent interpretation performed. Decision-making details documented in ED Course.  Discussion of management or test interpretation with external provider(s): Case d/w on call medicine Dr. Butler for admission    Risk  Prescription drug management.  Decision regarding hospitalization.        I was wearing at minimum a facemask and eye protection throughout this encounter with handwashing performed prior and after patient evaluation without personal hand/facial/oropharyngeal contact and gloves worn throughout encounter. See note and/or contact this provider for further PPE details.        Disposition and Plan     Clinical Impression:  1. Weakness generalized    2. Recurrent falls    3. Transaminitis        Disposition:  Admit    Follow-up:  No follow-up provider specified.    Medications Prescribed:  Current Discharge Medication  List

## 2024-07-28 NOTE — CONSULTS
[facility]  [address] [place] [address], [address]  [facility] [address]  [phone]          NEUROLOGY CONSULTATION NOTE    [facility]  part of [facility]    Report of Consultation  Joselin Graham Patient Status:  Observation     5/15/1950 MRN [id number]    Location [facility] 4W/SW/SE Attending Roberta Butler MD    Hosp Day # 0 PCP No primary care provider on file.      Date of Admission:  2024  Date of Consult:  2024  Reason for Admission/Consultation:  diffuse weakness     Requested by: Roberta Butler MD  _________________________________________________________________________________________  Chief Complaint:   Chief Complaint   Patient presents with    Weakness     HPI:  Joselin Graham is a 74 year old with a past medical history of diabetes, neuropathy, multiple myeloma, hypertension, multiple PVCs, recent admission to outside hospital ([facility]) in 2024 after he presented with ataxia, encephalopathy, and hypertension, deficits in short and long-term memory, difficulty with adherence to his Revlimid who was admitted for diffuse weakness, malaise, fall 5 days ago, recurrent falls, and inability to transfer.    Patient was seen on emergency department on 2024 after he had a fall while walking on the sidewalk.  He had no preceding symptoms per the ER note.  He had a slightly unsteady gait with the paramedics prior to arrival.    Hpi as per pt, chart review, ex-wife and her daughter. His family is in [place]. His ex-wife and her daughter are the family member's [place] the [place] who take care of him.       Endorses positional vertigo worse when he lays down;    3/2024 - when returned from [facility] he did not have his medications, did not recognize his family, did not know his name. Thought he was at [facility].  He self discontinued his PT/OT.      From Thursday pm to Friday pm he fell 3x.  His ex-wife  says he told them he got sick and fell in the kitchen.  He crawled into the bedroom  Went to the bathroom and fell there  Falling forwards;    He struck his right upper lip on monday and fell on the street;      Family asked him to stop driving in march    He is supposed to use a cane but does not  He has numbness in his feet     Friday he urinated in a trash bin  He was incontinent in the living room and bathroom     +neuropathic pain  Hands are numb         Review and summation of prior records  [facility] internal medicine note from [facility]   \"Assessment:  confusion, Generalized weakness, Gait abnormality, Hyperglycemia, Hypertensive, was give Insulin and IV bp meds in ER, Appreciate endocrinology, neurology and hematology. Increase tresiba 16 unts HS and Novolog 7 units TID with meals + ISS. borderline DKA, but now improving as per endo. MRI brain 03/04/2024 : No acute infarct, hemorrhage or mass. If negative for acute abnormalities, then nothing further from our standpoint as per neuro. oral hypoglycemics sulfonylureas, metformin and pioglitazone will be discontinued as OP also as per endo.    Awaiting discharge home pending insulin pen teaching by RN staff.     Multiple myeloma not having achieved remission - Re-staging labs: SPEP/CHRISTIN, UPEP and free light chain , re-start lenalidomide 5 mg/d & denosumab 120 mg monthly at discharge as per heme.     Nonrheumatic aortic valve stenosis   PVCs (premature ventricular contractions)   Hypertension associated with diabetes   Hypothyroidism   Restless leg syndrome   Depression   Insomnia     Plan:    LOS: 5 days          2.  Outside hospital neurology note from 3/3/2024:\"Neurology Initial Consultation Referring Provider: Dr. Gibbs Reason for consult: AMS  HPI:   73 year old male with hx of DM with neuropathy, HTN who presented with AMS. Just returned from [place]. Noted to be altered. Has not had his medication for the last couple of days - losot his  bag.   Some balance isssues. BP was quite elevated. BS was also quite elevated. +PARVEEN as well.   Noted generalized weakness, feels better today. States balance seemed off yesterday - didn't test today. Assessment:  1.) Gait abn - likely related to BP and blood sugars, but need to exclude stroke    Plan:  MRI brain. If negative for acute abnormalities, then nothing further from our standpoint. \"    3. Oncology note \" As you know, this is a pleasant 73-year-old gentleman well   known to our service with a history of multiple myeloma that   was diagnosed back in 2017, status post induction with RVD   lite regimen for 4 cycles followed by autologous stem cell   transplant in 12/2017, and he was placed on maintenance   Revlimid, the dose was decreased to 5 mg daily. The patient   is noncompliant to current treatment and apparently is having   problem remembering details. He was last seen at our office   on 11/29/2023 with stable condition. The patient has been   going back and forth to [facility] and again, he does not remember   the dates of his travel and when he came back to the [facility].   He could not give me an exact date when was his last Revlimid   dose that he took. The patient was admitted through Emergency   Room for evaluation of altered mentation and recently came   from [facility]. There is no fever, chills, nausea or vomiting.   No headache. No pain reported.    PAST MEDICAL HISTORY: As above.    ALLERGIES: NO KNOWN DRUG ALLERGIES.    MEDICATIONS AT HOME:  1. Amlodipine.  2. Amaryl  3. Glucophage.  4. Actos.  5. Protonix and supposedly Revlimid, but again the patient   is noncompliant to medication.    SOCIAL HISTORY: . No smoking. No alcohol. No   illicit drug use. He has no children and it appears that he   has limited social support.     LABORATORY DATA: White cell count 6.1, hemoglobin 10.7,   hematocrit 33.0, platelets 161. BUN is 14, creatinine 1.2.   Liver function test is normal.    ASSESSMENT:  1.  Multiple myeloma IgG lambda stage IIIA   with Durie-[place] staging status post induction   chemotherapy with RVD lite, followed by stem cell   transplant and he was placed on maintenance Revlimid   therapy with questionable compliance, especially most   recently. The patient appears to be in remission since   his last visit to our office on 11/29/2023.    RECOMMENDATIONS:  1. We will restage him with a serum protein electrophoresis  with immunofixation, 24-hour urine collection with   protein electrophoresis in addition to serum free light   chain and LDH.  2. Discussed the case extensively with medical team and   expressed concern about his mental status.\"        ROS:  Pertinent positive and negatives per HPI.  All others were reviewed and negative.    Past Medical History:    Abnormal gait    Benign essential hypertension    Cancer (HCC)    Chest pain    Formatting of this note might be different from the original.   Normal stress test 11/2021      Last Assessment & Plan:    Formatting of this note might be different from the original.   Symptoms are atypical for ischemia   Chest pain is worse with change in position      Diabetes (HCC)    Diabetes mellitus (HCC)    Diabetes mellitus (HCC)    High blood pressure    Hyperglycemia    Hypertension associated with diabetes (HCC)    Last Assessment & Plan:    Formatting of this note might be different from the original.   Blood pressure mildly elevated   Continue same medications for now   Continue to monitor readings      Multiple myeloma (HCC)    Multiple premature ventricular complexes    Formatting of this note might be different from the original.   Normal stress test 11/2021      Echo 8/2021:    1. Left ventricular ejection fraction, by visual estimation, is 60 to 65%.    2. Mild concentric left ventricular hypertrophy.    3. Normal pattern of LV diastolic filling.    4. Mild aortic valve stenosis.         Last Assessment & Plan:    Formatting of this note might  be different fro    Neuropathy    Nonrheumatic aortic valve stenosis    Formatting of this note might be different from the original.   Echo 8/2021:    1. Left ventricular ejection fraction, by visual estimation, is 60 to 65%.    2. Mild concentric left ventricular hypertrophy.    3. Normal pattern of LV diastolic filling.    4. Mild aortic valve stenosis.         Last Assessment & Plan:    Formatting of this note might be different from the original.   Mild aortic mu    Rash    Type 2 diabetes mellitus without complication, without long-term current use of insulin (HCC)    Vomiting       History reviewed. No pertinent surgical history.    History reviewed. No pertinent family history.    Social History     Socioeconomic History    Marital status: Single     Spouse name: Not on file    Number of children: Not on file    Years of education: Not on file    Highest education level: Not on file   Occupational History    Not on file   Tobacco Use    Smoking status: Never    Smokeless tobacco: Never   Vaping Use    Vaping status: Never Used   Substance and Sexual Activity    Alcohol use: Never    Drug use: Never    Sexual activity: Not on file   Other Topics Concern    Not on file   Social History Narrative    Not on file     Social Determinants of Health     Financial Resource Strain: Not on file   Food Insecurity: No Food Insecurity (7/28/2024)    Food Insecurity     Food Insecurity: Never true   Transportation Needs: No Transportation Needs (7/28/2024)    Transportation Needs     Lack of Transportation: No     Car Seat: Not on file   Physical Activity: Not on file   Stress: Not on file   Social Connections: Not on file   Housing Stability: Low Risk  (7/28/2024)    Housing Stability     Housing Instability: No     Housing Instability Emergency: Not on file     Crib or Bassinette: Not on file       Objective:    Last vitals and weight :  Vitals:    07/28/24 1353   BP: 159/89   Pulse: 77   Resp: 18   Temp: 97.8 °F (36.6  °C)     @FLOWCHS(14)@    Exam:  - General: appears older than stated age, cooperative, distracted, and no distress  - CV: Symmetric pulses.   Carotids:   - Pulmonary:No sign of respiratory distress.   Neurologic Exam  - Mental Status: Alert and attentive. Said it was 8/29/2024.  Poor insight into his deficits.  Poor safety awareness.  Cannot provide a history.  His ex-wife and her daughter provide majority of history.  Speech is spontaneous, fluent, and prosodic. Comprehension intact. Repetition intact. Phrase length and rate are normal. No paraphasic errors, neologisms, anomia, acalculia, apraxia, anosognosia, or R/L confusion. No neglect.   - Cranial Nerves: No gaze preference. Visual fields:normal Pupils are 3mm briskly constricting to 2mm and equally round and reactive to light  in a well lit room.  EOMI. No nystagmus. No ptosis. V1-V3 intact B/L to light touch.No pathological facial asymmetry. No flattening of the nasolabial fold. .  Hearing grossly intact.  Tongue midline. No atrophy or fasiculations of the tongue noted. Palate and uvula elevate symmetrically.  Shoulder shrug symmetric.  - Fundoscopic exam:normal w/o hemorrhages, exudates, or papilledema.No attenuation. No pallor.  - Motor:  normal tone, normal bulk. No interosseous wasting. No flattening of hypothenar eminences.       Right Left     Motor Strength   Deltoids 5 5  Triceps 5 4+  Biceps 5 5  Wrist Extensors 5 5   5 5   Hip Flexors 5 5   Knee extensors 5 4+  Knee flexors 3 3 - at a minimum  Plantar flexion 5 5  Dorsiflexion 5 5  Inversion 4+TO 5 5      Pronator drift: No pronator drift   Arm Rolling: No orbiting.   Finger Taps: Finger taps are symmetric in rate and amplitude.    Rapid movements: Rapid/fine movements are symmetric. As expected their dominant hand is slightly faster.   Leg Drift: none   Foot Taps: Foot taps are symmetric.      Asterixis: No asterixis noted.   Tremor:      Reflexes:    C5 C6 C7  L4 S1   R 0 0 0 0 1+   L 0 1+ 1+  0 1+   Adductor Spread: No adductor spread noted.    Frontal release signs:Not assessed.    Jaw Jerk:    Deisy's sign:absent   Nonsustained clonus: Absent   Sustained clonus: Absent   - Sensory:   Light touch: intact  Temperature:  gradient loss in all extremities   Pinprick:   Vibration:  sig gradient loss in LE  Proprioception:      Sensory level:      Romberg:   - Cerebellum: No truncal ataxia. No titubations. No dysmetria, no dysdiadochokinesis. No overshoot.   - Gait/station: Normal gait and station. Symmetric arm swing.   - Toe walking:  - Heel walking:  - Plantar response: flexor bilaterally    Inpatient Medications   pantoprazole  40 mg Oral QAM AC    insulin aspart  1-5 Units Subcutaneous TID CC    amLODIPine  5 mg Oral Daily          hydrALAzine    acetaminophen    glucose **OR** glucose **OR** glucose-vitamin C **OR** dextrose **OR** glucose **OR** glucose **OR** glucose-vitamin C      Home Medications  Current Outpatient Medications   Medication Instructions    AMLODIPINE BESYLATE 5 MG Oral Tab TAKE 1 TABLET BY MOUTH EVERY DAY( FOR HYPERTENSION)    PANTOPRAZOLE SODIUM 40 MG Oral Tab EC TAKE 1 TABLET BY MOUTH EVERY DAY IN THE MORNING ON AN EMPTY STOMACH        Data reviewed  Laboratory Data:  Lab Results   Component Value Date    WBC 4.2 07/27/2024    HGB 11.8 (L) 07/27/2024    HCT 35.9 (L) 07/27/2024    .0 (L) 07/27/2024    CREATSERUM 1.28 07/27/2024    BUN 21 07/27/2024     07/27/2024    K 4.4 07/27/2024     07/27/2024    CO2 25.0 07/27/2024     (H) 07/27/2024    CA 8.9 07/27/2024    ALB 4.2 07/27/2024    ALKPHO 62 07/27/2024    TP 7.3 07/27/2024    AST 35 (H) 07/27/2024    ALT 72 (H) 07/27/2024    TSH 3.489 07/27/2024    MG 1.9 07/27/2024    PHOS 4.0 07/27/2024    B12 415 07/28/2024     Recent Results (from the past 72 hour(s))   POCT Glucose    Collection Time: 07/27/24  7:45 PM   Result Value Ref Range    POC Glucose  207 (H) 70 - 99 mg/dL   EKG 12 Lead    Collection  Time: 07/27/24  7:47 PM   Result Value Ref Range    Ventricular rate 74 BPM    Atrial rate 74 BPM    P-R Interval 146 ms    QRS Duration 116 ms    Q-T Interval 388 ms    QTC Calculation (Bezet) 430 ms    P Axis 61 degrees    R Axis -62 degrees    T Axis 124 degrees   SARS-CoV-2/Flu A and B/RSV by PCR (GeneXpert)    Collection Time: 07/27/24  8:29 PM    Specimen: Nares; Other   Result Value Ref Range    SARS-CoV-2 (COVID-19) - (GeneXpert) Not Detected Not Detected    Influenza A by PCR Negative Negative    Influenza B by PCR Negative Negative    RSV by PCR Negative Negative   Comp Metabolic Panel (14)    Collection Time: 07/27/24  8:38 PM   Result Value Ref Range    Glucose 186 (H) 70 - 99 mg/dL    Sodium 140 136 - 145 mmol/L    Potassium 4.4 3.5 - 5.1 mmol/L    Chloride 108 98 - 112 mmol/L    CO2 25.0 21.0 - 32.0 mmol/L    Anion Gap 7 0 - 18 mmol/L    BUN 21 9 - 23 mg/dL    Creatinine 1.28 0.70 - 1.30 mg/dL    BUN/CREA Ratio 16.4 10.0 - 20.0    Calcium, Total 8.9 8.7 - 10.4 mg/dL    Calculated Osmolality 298 (H) 275 - 295 mOsm/kg    eGFR-Cr 58 (L) >=60 mL/min/1.73m2    ALT 72 (H) 10 - 49 U/L    AST 35 (H) <34 U/L    Alkaline Phosphatase 62 45 - 117 U/L    Bilirubin, Total 1.1 0.2 - 1.1 mg/dL    Total Protein 7.3 5.7 - 8.2 g/dL    Albumin 4.2 3.2 - 4.8 g/dL    Globulin  3.1 2.0 - 3.5 g/dL    A/G Ratio 1.4 1.0 - 2.0   TSH W Reflex To Free T4    Collection Time: 07/27/24  8:38 PM   Result Value Ref Range    TSH 3.489 0.550 - 4.780 mIU/mL   CBC W/ DIFFERENTIAL    Collection Time: 07/27/24  8:38 PM   Result Value Ref Range    WBC 4.2 4.0 - 11.0 x10(3) uL    RBC 4.37 3.80 - 5.80 x10(6)uL    HGB 11.8 (L) 13.0 - 17.5 g/dL    HCT 35.9 (L) 39.0 - 53.0 %    MCV 82.2 80.0 - 100.0 fL    MCH 27.0 26.0 - 34.0 pg    MCHC 32.9 31.0 - 37.0 g/dL    RDW-SD 52.1 (H) 35.1 - 46.3 fL    RDW 17.4 (H) 11.0 - 15.0 %    .0 (L) 150.0 - 450.0 10(3)uL    Neutrophil Absolute Prelim 2.09 1.50 - 7.70 x10 (3) uL    Neutrophil Absolute 2.09  1.50 - 7.70 x10(3) uL    Lymphocyte Absolute 1.37 1.00 - 4.00 x10(3) uL    Monocyte Absolute 0.53 0.10 - 1.00 x10(3) uL    Eosinophil Absolute 0.13 0.00 - 0.70 x10(3) uL    Basophil Absolute 0.06 0.00 - 0.20 x10(3) uL    Immature Granulocyte Absolute 0.01 0.00 - 1.00 x10(3) uL    Neutrophil % 50.0 %    Lymphocyte % 32.7 %    Monocyte % 12.6 %    Eosinophil % 3.1 %    Basophil % 1.4 %    Immature Granulocyte % 0.2 %   Hemoglobin A1C    Collection Time: 07/27/24  8:38 PM   Result Value Ref Range    HgbA1C 9.6 (H) <5.7 %    Estimated Average Glucose 229 (H) 68 - 126 mg/dL   Magnesium    Collection Time: 07/27/24  8:38 PM   Result Value Ref Range    Magnesium 1.9 1.6 - 2.6 mg/dL   Phosphorus    Collection Time: 07/27/24  8:38 PM   Result Value Ref Range    Phosphorus 4.0 2.4 - 5.1 mg/dL   Lipid Panel    Collection Time: 07/27/24  8:38 PM   Result Value Ref Range    Cholesterol, Total 157 <200 mg/dL    HDL Cholesterol 49 40 - 59 mg/dL    Triglycerides 178 (H) 30 - 149 mg/dL    LDL Cholesterol 78 <100 mg/dL    VLDL 28 0 - 30 mg/dL    Non HDL Chol 108 <130 mg/dL   Ammonia, Plasma    Collection Time: 07/27/24 10:19 PM   Result Value Ref Range    Ammonia <10 (L) 11 - 32 umol/L   POCT Glucose    Collection Time: 07/28/24  2:06 AM   Result Value Ref Range    POC Glucose  155 (H) 70 - 99 mg/dL   Urinalysis with Culture Reflex    Collection Time: 07/28/24  6:26 AM    Specimen: Urine, clean catch   Result Value Ref Range    Urine Color Colorless (A) Yellow    Clarity Urine Clear Clear    Spec Gravity 1.018 1.005 - 1.030    Glucose Urine >1000 (A) Normal mg/dL    Bilirubin Urine Negative Negative    Ketones Urine 10 (A) Negative mg/dL    Blood Urine Negative Negative    pH Urine 6.0 5.0 - 8.0    Protein Urine Negative Negative mg/dL    Urobilinogen Urine Normal Normal    Nitrite Urine Negative Negative    Leukocyte Esterase Urine Negative Negative    Microscopic Microscopic not indicated    Vitamin B12 with reflex to MMA     Collection Time: 07/28/24  6:48 AM   Result Value Ref Range    Vitamin B12 415 211 - 911 pg/mL   RAINBOW DRAW LAVENDER    Collection Time: 07/28/24  6:48 AM   Result Value Ref Range    Hold Lavender Auto Resulted    RAINBOW DRAW LIGHT GREEN    Collection Time: 07/28/24  6:48 AM   Result Value Ref Range    Hold Lt Green Auto Resulted    POCT Glucose    Collection Time: 07/28/24  7:19 AM   Result Value Ref Range    POC Glucose  105 (H) 70 - 99 mg/dL   Protein,Total,Urine, Random    Collection Time: 07/28/24 12:17 PM   Result Value Ref Range    Total Protein Urine Random 11.7 <14.0 mg/dL   POCT Glucose    Collection Time: 07/28/24  1:22 PM   Result Value Ref Range    POC Glucose  215 (H) 70 - 99 mg/dL     Lab Results   Component Value Date    LDL 78 07/27/2024    HDL 49 07/27/2024    TRIG 178 07/27/2024    VLDL 28 07/27/2024     HGBA1C:   Lab Results   Component Value Date    A1C 9.6 (H) 07/27/2024     (H) 07/27/2024        Test results/Imaging:   XR BONE SURVEY, COMPLETE (CPT=77075)    Result Date: 7/28/2024  CONCLUSION:  1. Diffuse lytic lesions throughout the axial and visualized appendicular skeleton.  Differential considerations include widespread lytic metastases or multiple myeloma; correlate clinically. 2. No pathologic fracture.    Dictated by (CST): Jean Medellin MD on 7/28/2024 at 1:03 PM     Finalized by (CST): Jean Medellin MD on 7/28/2024 at 1:06 PM          US GALLBLADDER (CPT=76705)    Result Date: 7/28/2024  CONCLUSION:  1.  Normal ultrasound appearance of the gallbladder.  No evidence of acute cholecystitis.  No biliary ductal dilatation. 2.  Echogenic liver compatible with fatty infiltration.   [facility] provided a preliminary report for this examination. This final report agrees with their preliminary findings.   Dictated by (CST): Jayson Au MD on 7/28/2024 at 9:03 AM     Finalized by (CST): Jayson Au MD on 7/28/2024 at 9:04 AM         EKG 12 Lead    Result Date:  7/28/2024  Normal sinus rhythm Left anterior fascicular block LVH with QRS widening ( R in aVL , Wilmer product ) Cannot rule out Septal infarct (cited on or before 22-JUL-2024) ST & T wave abnormality, consider lateral ischemia Abnormal ECG When compared with ECG of 22-JUL-2024 19:32, T wave inversion more evident in Lateral leads Confirmed by VASQUEZ DUPREE, ECTOR ([id number]) on 7/28/2024 12:19:36 PM   CT BRAIN OR HEAD (62194)    Result Date: 7/22/2024  CONCLUSION:   1. No acute intracranial abnormality.  No calvarial fracture.  2. No acute fracture of the facial bones.  No acute soft tissue injury.  3. Heterogeneous bone marrow which may simply be due to marked osteopenia but metastasis or multiple myeloma is not excluded.    elm-remote     Dictated by (CST): Mango Oglesby MD on 7/22/2024 at 9:55 PM     Finalized by (CST): Mango Oglesby MD on 7/22/2024 at 9:59 PM           Performed an independent visualization of CT from 7/22/2024, CT cervical thoracic and lumbar spine from 7/28/2024.  Imaging revealed: Agree with radiology read.    [facility] MRA HEAD  \"HISTORY: Dizziness.     COMPARISON: None     TECHNIQUE: 3D TOF.  Reformatted images were reviewed.     FINDINGS:    The distal cervical and intracranial internal carotid arteries are patent.     The anterior communicating artery is visualized     The anterior and middle cerebral arteries and visualized branches are   patent. There is no sizable posterior communicating artery.     The vertebral arteries are patent.  Right vertebral artery is dominant and   the left vertebral artery is mildly hypoplastic. Both PICA origins are   identified. The basilar, superior cerebellar, and posterior cerebral   arteries are unremarkable.     No significant stenosis or aneurysm is seen. \"    XR survey \"FINDINGS: The bones are demineralized. There are innumerable lytic lesions   throughout the axial and appendicular skeleton compatible with widespread   myelomatous involvement. Tumor burden  appears grossly stable from 4 years   ago. No fracture or pending fracture is suspected. Degenerative changes are   overall mild and preferentially affect the spine. There are scattered   vascular calcifications within the imaged soft tissues. \"         Joselin Graham is a 74 year old with a past medical history of diabetes, neuropathy, multiple myeloma, hypertension, multiple PVCs, recent admission to outside hospital ([facility]) in March 2024 after he presented with ataxia, encephalopathy, and hypertension, deficits in short and long-term memory, difficulty with adherence to his Revlimid who was admitted for diffuse weakness, malaise, fall 5 days ago, recurrent falls, and inability to transfer.  Revlimid is associated with neurotoxicity and neuropathy.    On exam he has limited insight into his deficits, poor safety awareness, and cannot provide a complete history.  He is very interested in being evaluated by rehab services so that he can be discharged.  I stressed to the patient that if he does not go to acute rehab that he will likely continue to fall and ended back in the hospital.  Explained that he was not appropriate to be discharged home at this time.  On exam he has small abrasions to his right upper lip, intact strength, and diffuse hyperreflexia.  He has typical sensory loss for neuropathy.  Etiology of falls is likely multifactorial including sensory ataxia due to peripheral neuropathy.  Based on the CT scans myelopathy is less likely.    Recent mission to [place] for encephalopathy, ataxia, memory deficits and recurrent falls  Differential Diagnosis:  Suspect that he has a sensory ataxia due to his longstanding peripheral neuropathy.  May have underlying neurodegenerative process and myelopathy  Diagnostics:  CT of the CT and L-spine; may need MRIs based on initial imaging.  Will consider lumbar puncture but of low yield.  Folate level  Thiamine level  B12 level  TSH  Therapeutics:  Neuro  checks Q4.   Fall precautions  Delirium precautions  PT OT evaluation  He will likely need acute rehab.  He may need home health services.          Education/Instructions given to: patient   Barriers to Learning:None  Content: Refer to note above. Evaluation/Outcome: Verbalized understanding    This document is not intended to support charting by exception.  Sections left blank in a completed note should be presumed not to have been done.    Disclaimer:   This record was dictated using Dragon software. There may be errors due to voice recognition problems that were not realized and corrected during the completion of the note.            Thank you.  Kaushal Miller D.O.   Vascular & General Neurology  7/28/2024  2:57 PM

## 2024-07-28 NOTE — OCCUPATIONAL THERAPY NOTE
OCCUPATIONAL THERAPY EVALUATION - INPATIENT     Room Number: 461/461-A  Evaluation Date: 7/28/2024  Type of Evaluation: Initial  Presenting Problem: 75 yo M with PMH DM, HTN presenting with generalized weakness/malaise for some time, worse recently including fall five days ago with grossly unrevealing ED evaluation now with recurrent falls    Physician Order: IP Consult to Occupational Therapy  Reason for Therapy: ADL/IADL Dysfunction and Discharge Planning    OCCUPATIONAL THERAPY ASSESSMENT   Patient is a 74 year old male admitted 7/27/2024 for freq falls and generalized weakness.  Prior to admission, patient's baseline is mod I.  Patient is currently functioning below baseline with  functional mobility, functional transfers and self care tasks .  Patient is requiring supervision as a result of the following impairments: decreased functional strength, decreased functional reach, decreased endurance, decreased muscular endurance, and decreased insight to deficits. Occupational Therapy will continue to follow for duration of hospitalization.    Patient will benefit from continued skilled OT Services to promote return to prior level of function and safety with continuous assistance and gradual rehabilitative therapy    PLAN  OT Treatment Plan: Balance activities;Energy conservation/work simplification techniques;ADL training;IADL training;Functional transfer training;UE strengthening/ROM;Endurance training;Cognitive reorientation;Patient/Family education  OT Device Recommendations: TBD    OCCUPATIONAL THERAPY MEDICAL/SOCIAL HISTORY   Problem List  Principal Problem:    Weakness generalized  Active Problems:    Recurrent falls    HOME SITUATION  Type of Home: House  Lives With: Alone  Toilet and Equipment: Standard height toilet  Shower/Tub and Equipment: Tub-shower combo  Other Equipment: Other (Comment) (Walker and wheelchair)  Drives: No    Stairs in Home: pt unable to state  Use of Assistive Device(s): Pt has a  rolling walker and w/c at home    Prior Level of [place]: mod I but ex wife reports multiple falls on a daily basis    SUBJECTIVE  \"I am fine\" - Immediately experiences LOB which req mod A from therapist to recover.     OCCUPATIONAL THERAPY EXAMINATION    OBJECTIVE  Fall Risk: High fall risk    PAIN ASSESSMENT  Ratin      COGNITION  Pt A+0 X3 but impairments in short term memory    VISION  tba      Communication: basic wants and needs    Behavioral/Emotional/Social: calm     RANGE OF MOTION   Upper extremity ROM is within functional limits     STRENGTH ASSESSMENT  Upper extremity strength is within functional limits     COORDINATION  Gross Motor: WFL   Fine Motor: WFL     ACTIVITIES OF DAILY LIVING ASSESSMENT  AM-PAC ‘6-Clicks’ Inpatient Daily Activity Short Form  How much help from another person does the patient currently need…  -   Putting on and taking off regular lower body clothing?: A Lot  -   Bathing (including washing, rinsing, drying)?: A Lot  -   Toileting, which includes using toilet, bedpan or urinal? : A Lot  -   Putting on and taking off regular upper body clothing?: A Little  -   Taking care of personal grooming such as brushing teeth?: A Little  -   Eating meals?: None    AM-PAC Score:  Score: 16  Approx Degree of Impairment: 53.32%  Standardized Score (AM-PAC Scale): 35.96  CMS Modifier (G-Code): CK    FUNCTIONAL TRANSFER ASSESSMENT  Sit to Stand: Edge of Bed  Edge of Bed: Minimal Assist    BED MOBILITY  Rolling: Minimal Assist  Sit to Supine (OT): Minimal Assist       Skilled Therapy Provided:   Role of OT, importance of using FWW, call light, functional transfers, lb dressing and grooming.     EDUCATION PROVIDED  Patient: Role of Occupational Therapy; Plan of Care; Discharge Recommendations; Adaptive Equipment Recommendations; Functional Transfer Techniques; Fall Prevention; Energy Conservation; UE HEP for Strengthening  Patient's Response to Education: Verbalized Understanding; Requires  Further Education    The patient's Approx Degree of Impairment: 53.32% has been calculated based on documentation in the [facility] '6 clicks' Inpatient Daily Activity Short Form.  Research supports that patients with this level of impairment may benefit from [facility]. Pt is not safe to discharge home alone. .  Final disposition will be made by interdisciplinary medical team.     Patient End of Session: Up in chair;Needs met;Call light within reach;RN aware of session/findings;All patient questions and concerns addressed;Alarm set    OT Goals  Patients self stated goal is: to return home     Patient will complete functional transfer with supervision   Comment:     Patient will complete toileting with supervision  Comment:     Patient will tolerate standing for 15 minutes in prep for adls with CGA   Comment:    Patient will complete item retrieval with supervision   Comment:          Goals  on: 8/10/24  Frequency: 3x a week     Patient Evaluation Complexity Level:   Occupational Profile/Medical History LOW - Brief history including review of medical or therapy records    Specific performance deficits impacting engagement in ADL/IADL LOW  1 - 3 performance deficits    Client Assessment/Performance Deficits LOW - No comorbidities nor modifications of tasks    Clinical Decision Making LOW - Analysis of occupational profile, problem-focused assessments, limited treatment options    Overall Complexity LOW     OT Session Time: 25 minutes  Self-Care Home Management:  minutes  Therapeutic Activity: 10 minutes

## 2024-07-29 PROBLEM — E11.65 UNCONTROLLED TYPE 2 DIABETES MELLITUS WITH HYPERGLYCEMIA (HCC): Status: ACTIVE | Noted: 2024-07-29

## 2024-07-29 LAB
ATRIAL RATE: 80 BPM
GLUCOSE BLDC GLUCOMTR-MCNC: 184 MG/DL (ref 70–99)
GLUCOSE BLDC GLUCOMTR-MCNC: 216 MG/DL (ref 70–99)
GLUCOSE BLDC GLUCOMTR-MCNC: 256 MG/DL (ref 70–99)
GLUCOSE BLDC GLUCOMTR-MCNC: 278 MG/DL (ref 70–99)
P AXIS: 45 DEGREES
P-R INTERVAL: 140 MS
PHOSPHATE SERPL-MCNC: 3.3 MG/DL (ref 2.4–5.1)
Q-T INTERVAL: 392 MS
QRS DURATION: 116 MS
QTC CALCULATION (BEZET): 452 MS
R AXIS: -64 DEGREES
T AXIS: 105 DEGREES
T4 FREE SERPL-MCNC: 1.1 NG/DL (ref 0.8–1.7)
TSI SER-ACNC: 5.94 MIU/ML (ref 0.55–4.78)
VENTRICULAR RATE: 80 BPM

## 2024-07-29 PROCEDURE — 99223 1ST HOSP IP/OBS HIGH 75: CPT | Performed by: INTERNAL MEDICINE

## 2024-07-29 RX ORDER — INSULIN DEGLUDEC 100 U/ML
12 INJECTION, SOLUTION SUBCUTANEOUS NIGHTLY
Status: DISCONTINUED | OUTPATIENT
Start: 2024-07-29 | End: 2024-07-30

## 2024-07-29 NOTE — CM/SW NOTE
MYRA completed and uploaded to pending [facility] referral in Aidin.    Assigned FRANKLIN Au to f/up for further DC planning needs.      Lucia Martinez, MSW, LSW [id number]

## 2024-07-29 NOTE — CM/SW NOTE
07/29/24 1400   CM/ Referral Data   Referral Source Physician   Reason for Referral Discharge planning   Informant Spouse/Significant Other;EMR;Clinical Staff Member;Patient   Medical Hx   Does patient have an established PCP? No   Patient Info   Patient's Current Mental Status at Time of Assessment Memory Impairments;Alert;Oriented   Patient's Home Environment Condo/Apt no elevator   Patient lives with Alone   Patient Status Prior to Admission   Independent with ADLs and Mobility Yes   Discharge Needs   Anticipated D/C needs Subacute rehab   Services Requested   Submitted to [facility] Yes   PASRR Level 1 Submitted Yes   Choice of Post-Acute Provider   Informed patient of right to choose their preferred provider Yes     Pt discussed during nursing rounds. Dx weakness, frequent falls at home. Home alone, independent w/o device though patient has fallen repeatedly at home prior to admission. Ex-spouse Resi is only contact that CM is able to reach as of the time of this note. Patient alert and orientated x 3 but has very poor safety awareness. Anticipated therapy need: Gradual Rehabilitative Therapy poor safety awareness. [facility] review pending. Pt stating that he will continue to work with therapy staff and will be agreeable if it's really needed at [place]. [facility] referrals sent in AIDIN. Ex spouse Tone stated that she would assist w/dc planning and [facility] choice if patient needed. Tone's email address is [email or web address]. Ins auth will be needed for [facility] prior to dc.    PASRR level 1 screen completed and uploaded to aidin referral.     Plan: [facility] pending [facility] review, facility choice, ins auth, and medical clearance.    / to remain available for support and/or discharge planning.     SUNNY Hsu    [phone]

## 2024-07-29 NOTE — CONSULTS
[facility]  part of [facility]    Report of Consultation    Joselin Graham Patient Status:  Observation    5/15/1950 MRN [id number]   Location [facility] 4W/SW/SE Attending Roberta Butler MD   Hosp Day # 0 PCP No primary care provider on file.     Date of Admission:  2024  Date of Consult:  24  Reason for Consultation:   Multiple myeloma    History of Present Illness:   Patient is a 74 year old male who was admitted to the hospital for Weakness generalized:    73 yo M with PMH DM, HTN presenting with generalized weakness/malaise for some time, worse recently including fall five days ago with grossly unrevealing ED evaluation now with recurrent falls.   Pt has H/O multiple myeloma being managed by oncologist Dr. Pitts in [facility].  Per Heme/Onc note in Mar 2024, pt has IgG lambda myeloma,  stage Durie Dresden IIIA, with normal FISH diagnosed in 2017, s/p induction RVD-lite x 4 with CR, s/p HCT in 2017 on maintenance Revlimid w using 5 mg/day remaining without progression.       Consult requested for further evaluation and management. Bone survey was performed and shows Diffuse lytic lesions throughout the axial and visualized appendicular skeleton.  Myeloma labs are ordered and pending.   No pathologic fracture. Pt is a poor historian and unable to describe events that led to current hospital admission.          Past Medical History  Past Medical History:    Abnormal gait    Benign essential hypertension    Cancer (HCC)    Chest pain    Formatting of this note might be different from the original.   Normal stress test 2021      Last Assessment & Plan:    Formatting of this note might be different from the original.   Symptoms are atypical for ischemia   Chest pain is worse with change in position      Diabetes (HCC)    Diabetes mellitus (HCC)    Diabetes mellitus (HCC)    High blood pressure    Hyperglycemia    Hypertension associated  with diabetes (HCC)    Last Assessment & Plan:    Formatting of this note might be different from the original.   Blood pressure mildly elevated   Continue same medications for now   Continue to monitor readings      Multiple myeloma (HCC)    Multiple premature ventricular complexes    Formatting of this note might be different from the original.   Normal stress test 11/2021      Echo 8/2021:    1. Left ventricular ejection fraction, by visual estimation, is 60 to 65%.    2. Mild concentric left ventricular hypertrophy.    3. Normal pattern of LV diastolic filling.    4. Mild aortic valve stenosis.         Last Assessment & Plan:    Formatting of this note might be different fro    Neuropathy    Nonrheumatic aortic valve stenosis    Formatting of this note might be different from the original.   Echo 8/2021:    1. Left ventricular ejection fraction, by visual estimation, is 60 to 65%.    2. Mild concentric left ventricular hypertrophy.    3. Normal pattern of LV diastolic filling.    4. Mild aortic valve stenosis.         Last Assessment & Plan:    Formatting of this note might be different from the original.   Mild aortic mu    Rash    Type 2 diabetes mellitus without complication, without long-term current use of insulin (HCC)    Vomiting       Past Surgical History  History reviewed. No pertinent surgical history.    Family History  History reviewed. No pertinent family history.    Social History  Social History     Socioeconomic History    Marital status: Single   Tobacco Use    Smoking status: Never    Smokeless tobacco: Never   Vaping Use    Vaping status: Never Used   Substance and Sexual Activity    Alcohol use: Never    Drug use: Never     Social Determinants of Health     Food Insecurity: No Food Insecurity (7/28/2024)    Food Insecurity     Food Insecurity: Never true   Transportation Needs: No Transportation Needs (7/28/2024)    Transportation Needs     Lack of Transportation: No   Housing Stability: Low  Risk  (7/28/2024)    Housing Stability     Housing Instability: No        Current Medications:  Current Facility-Administered Medications   Medication Dose Route Frequency    insulin degludec (Tresiba) 100 units/mL flextouch 12 Units  12 Units Subcutaneous Nightly    insulin aspart (NovoLOG) 100 Units/mL FlexPen 1-7 Units  1-7 Units Subcutaneous TID CC and HS    insulin aspart (NovoLOG) 100 Units/mL FlexPen 4 Units  4 Units Subcutaneous TID CC    hydrALAzine (Apresoline) 20 mg/mL injection 10 mg  10 mg Intravenous Q6H PRN    acetaminophen (Tylenol) tab 650 mg  650 mg Oral Q6H PRN    glucose (Dex4) 15 GM/59ML oral liquid 15 g  15 g Oral Q15 Min PRN    Or    glucose (Glutose) 40% oral gel 15 g  15 g Oral Q15 Min PRN    Or    glucose-vitamin C (Dex-4) chewable tab 4 tablet  4 tablet Oral Q15 Min PRN    Or    dextrose 50% injection 50 mL  50 mL Intravenous Q15 Min PRN    Or    glucose (Dex4) 15 GM/59ML oral liquid 30 g  30 g Oral Q15 Min PRN    Or    glucose (Glutose) 40% oral gel 30 g  30 g Oral Q15 Min PRN    Or    glucose-vitamin C (Dex-4) chewable tab 8 tablet  8 tablet Oral Q15 Min PRN    DULoxetine (Cymbalta) DR cap 60 mg  60 mg Oral Daily    levothyroxine (Synthroid) tab 25 mcg  25 mcg Oral Before breakfast    losartan (Cozaar) tab 50 mg  50 mg Oral Daily    pantoprazole (Protonix) DR tab 40 mg  40 mg Oral Before breakfast     Medications Prior to Admission   Medication Sig    DULoxetine 60 MG Oral Cap DR Particles Take 1 capsule (60 mg total) by mouth daily.    losartan 50 MG Oral Tab Take 1 tablet (50 mg total) by mouth daily.    Empagliflozin (JARDIANCE) 25 MG Oral Tab Take 25 mg by mouth daily.    levothyroxine 25 MCG Oral Tab Take 1 tablet (25 mcg total) by mouth before breakfast.    PANTOPRAZOLE SODIUM 40 MG Oral Tab EC TAKE 1 TABLET BY MOUTH EVERY DAY IN THE MORNING ON AN EMPTY STOMACH    AMLODIPINE BESYLATE 5 MG Oral Tab TAKE 1 TABLET BY MOUTH EVERY DAY( FOR HYPERTENSION)       Allergies  No Known  Allergies    Review of Systems:    REVIEW OF SYSTEMS    Constitutional Symptoms: Patient reports No fever. No rigors. No weight loss. No night sweats.  HEENT: Patient reports No blurred vision. No double vision. No hearing loss. No mouth sores.  Respiratory: Patient reports No difficulty breathing reported. No wheezing. No cough. No hemoptysis.  Cardiovascular: Patient reports No chest pain. No palpitations. No edema.  Gastrointestinal: Patient reports No nausea. No vomiting. No dysphagia. No heartburn. No abdominal pain. No diarrhea. No constipation.  No melena. No hematochezia.  Genitourinary: Patient reports No hematuria. No dysuria. No urgency. No incontinence.  Musculoskeletal: Patient reports No bone pain. No myalgia. No back pain. No arthralgia. No joint swelling.+ LE weakness  Integumentary: Patient reports No rash. No pruritis.  Neurological: Patient reports No headache. No paralysis. ++ paresthesias. No speech impairment.  Hematologic: Patient reports No excessive or spontaneous bleeding or bruising.  Mental Health: Patient reports No anxiety? no depression? no insomnia? no panic disorder.    Physical Exam:   Blood pressure (!) 137/96, pulse 93, temperature 98.2 °F (36.8 °C), temperature source Oral, resp. rate 19, height 5' 5\" (1.651 m), weight 143 lb 12.8 oz (65.2 kg), SpO2 97%.    PHYSICAL EXAM  General: Well developed, well nourished, in no acute distress. Patient appears stated age.  Skin: No abnormal skin lesions noted.  Head: Normocephalic.  Eyes: Sclerae are anicteric.  Ears, Nose, Throat, and Mouth: Normal oral mucosa and oropharynx.  Neck: Neck is supple. No cervical masses present. Trachea is midline. No thyromegaly.  Lungs: clear to auscultation bilaterally.  Cardiac: normal rate? regular rhythm. S1/S2 without murmurs.  Abdomen: Abdomen is soft, nontender, nondistended, without masses. No hepatomegaly. No splenomegaly. No ascites present. Bowel sounds active.  Hematologic/Lymphatic: No  petechiae. No purpura. No cervical EVANS.  Extremities: No edema. No cyanosis. No digital clubbing. No discoloration.  Musculoskeletal: Normal range of motion. Strength and tone are normal. Back and Spine nontender to palpation and percussion.  Neurologic: memory loss    Results:     Laboratory Data:  Lab Results   Component Value Date    WBC 4.2 07/27/2024    HGB 11.8 (L) 07/27/2024    HCT 35.9 (L) 07/27/2024    .0 (L) 07/27/2024    CREATSERUM 1.28 07/27/2024    BUN 21 07/27/2024     07/27/2024    K 4.4 07/27/2024     07/27/2024    CO2 25.0 07/27/2024     (H) 07/27/2024    CA 8.9 07/27/2024    ALB 4.2 07/27/2024    ALKPHO 62 07/27/2024    TP 7.3 07/27/2024    AST 35 (H) 07/27/2024    ALT 72 (H) 07/27/2024    TSH 3.489 07/27/2024    MG 1.9 07/27/2024    PHOS 4.0 07/27/2024    TROPHS 4 07/22/2024    B12 415 07/28/2024         Imaging:  CT SPINE THORACIC (CPT=72128)    Result Date: 7/28/2024  CONCLUSION:  1. Osseous structures demonstrate diffusely heterogeneous attenuation due to innumerable small lytic lesions compatible with the history of multiple myeloma. 2. No pathologic fracture. 3. Mild multilevel spondylosis.  No associated neural compromise.    Dictated by (CST): Jean Medellin MD on 7/28/2024 at 6:04 PM     Finalized by (CST): Jean Medellin MD on 7/28/2024 at 6:06 PM          CT SPINE LUMBAR (CPT=72131)    Result Date: 7/28/2024  CONCLUSION:  1. Incidental notation of 6 lumbar type vertebrae, which is a developmental variant. 2. Osseous structures demonstrate diffusely heterogeneous attenuation due to innumerable small lytic lesions compatible with the history of multiple myeloma. 3. No pathologic fracture. 4. Mild multilevel spondylosis with greatest involvement of the mid lumbar spine.  Significant levels detailed below:  5. L4-L5:  Mild central vertebral canal stenosis and bilateral neural foraminal narrowing. 6. L5-L6:  Bilateral neural foraminal narrowing.     Dictated  by (CST): Jean Medellin MD on 7/28/2024 at 5:55 PM     Finalized by (CST): Jean Medellin MD on 7/28/2024 at 6:00 PM          CT SPINE CERVICAL (CPT=72125)    Result Date: 7/28/2024  CONCLUSION:  1. Osseous structures demonstrate diffusely heterogeneous attenuation due to innumerable small lytic lesions compatible with the history of multiple myeloma. 2. No pathologic fracture. 3. Mild multilevel spondylosis resulting in mild central vertebral canal stenosis at C4-C5, C5-C6 and C6-C7.  Neural foraminal narrowing is also seen bilaterally at C5-C6.    Dictated by (CST): Jean Medellin MD on 7/28/2024 at 5:18 PM     Finalized by (CST): Jean Medellin MD on 7/28/2024 at 5:22 PM          XR BONE SURVEY, COMPLETE (CPT=77075)    Result Date: 7/28/2024  CONCLUSION:  1. Diffuse lytic lesions throughout the axial and visualized appendicular skeleton.  Differential considerations include widespread lytic metastases or multiple myeloma; correlate clinically. 2. No pathologic fracture.    Dictated by (CST): Jean Medellin MD on 7/28/2024 at 1:03 PM     Finalized by (CST): Jean Medellin MD on 7/28/2024 at 1:06 PM          US GALLBLADDER (CPT=76705)    Result Date: 7/28/2024  CONCLUSION:  1.  Normal ultrasound appearance of the gallbladder.  No evidence of acute cholecystitis.  No biliary ductal dilatation. 2.  Echogenic liver compatible with fatty infiltration.   [facility] provided a preliminary report for this examination. This final report agrees with their preliminary findings.   Dictated by (CST): Jayson Au MD on 7/28/2024 at 9:03 AM     Finalized by (CST): Jayson Au MD on 7/28/2024 at 9:04 AM              Impression:     1.IgG lambda myeloma:  - S/P treatment as noted in HPI.   - Pt is on maintenance Revlimid per primary oncologist.  - Myeloma labs checked on admission and pending.  - Noted bone survey findings.    2. Multiple Falls, LE weakness:  - likely multifactorial in setting of  uncontrolled DM.  - Management per Primary, neurology and Endo.      Thank you for allowing me to participate in the care of your patient.    Nuvia Shafer MD  7/29/2024

## 2024-07-29 NOTE — CONSULTS
[facility]  part of [facility]    Report of Consultation    Joselin Graham Patient Status:  Observation    5/15/1950 MRN [id number]   Location [facility] 4W/SW/SE Attending Roberta Butler MD   Hosp Day # 0 PCP No primary care provider on file.     Date of Admission:  2024   DOS is the same date the note was signed   Consulted by Roberta Butler MD  Reason for Consultation:    Hyperglycemia, uncontrolled DM      History of Present Illness:   Patient is a 74 year old male who was admitted to the hospital for Weakness generalized: Myeloma with diffuse osteolytic bone lesions.        past medical history of diabetes, neuropathy, multiple myeloma, hypertension, multiple PVCs , hypothyroid,     3/2024  admitted to outside hospital and per note had borderline DKA       He has metformin, DPP4i, JIMENEZ and SGLT2i in the chart also. For DM per notes, tresiba 16 unts HS and Novolog 7 units TID with meals + ISS.     Unlikely he was taking meds as rec' - per Rn and family, he lives alone. When I D/w pt he said yes to all those meds but could not say when last dose was. He is oriented x1 - self only          Fell x3   He is supposed to use a cane but does not  He has numbness in his feet          Last A1c value was 9.6% done 2024.    Hypothyroid on LT4 25 mcg/day     Past Medical History  Past Medical History:    Abnormal gait    Benign essential hypertension    Cancer (HCC)    Chest pain    Formatting of this note might be different from the original.   Normal stress test 2021      Last Assessment & Plan:    Formatting of this note might be different from the original.   Symptoms are atypical for ischemia   Chest pain is worse with change in position      Diabetes (HCC)    Diabetes mellitus (HCC)    Diabetes mellitus (HCC)    High blood pressure    Hyperglycemia    Hypertension associated with diabetes (HCC)    Last Assessment & Plan:    Formatting of this note might be  different from the original.   Blood pressure mildly elevated   Continue same medications for now   Continue to monitor readings      Multiple myeloma (HCC)    Multiple premature ventricular complexes    Formatting of this note might be different from the original.   Normal stress test 11/2021      Echo 8/2021:    1. Left ventricular ejection fraction, by visual estimation, is 60 to 65%.    2. Mild concentric left ventricular hypertrophy.    3. Normal pattern of LV diastolic filling.    4. Mild aortic valve stenosis.         Last Assessment & Plan:    Formatting of this note might be different fro    Neuropathy    Nonrheumatic aortic valve stenosis    Formatting of this note might be different from the original.   Echo 8/2021:    1. Left ventricular ejection fraction, by visual estimation, is 60 to 65%.    2. Mild concentric left ventricular hypertrophy.    3. Normal pattern of LV diastolic filling.    4. Mild aortic valve stenosis.         Last Assessment & Plan:    Formatting of this note might be different from the original.   Mild aortic mu    Rash    Type 2 diabetes mellitus without complication, without long-term current use of insulin (HCC)    Vomiting       Past Surgical History  History reviewed. No pertinent surgical history.  Unable to obtain d/t clinical status- confused     Family History  History reviewed. No pertinent family history.  Unable to obtain d/t clinical status- confused   Social History  Social History     Socioeconomic History    Marital status: Single   Tobacco Use    Smoking status: Never    Smokeless tobacco: Never   Vaping Use    Vaping status: Never Used   Substance and Sexual Activity    Alcohol use: Never    Drug use: Never     Social Determinants of Health     Food Insecurity: No Food Insecurity (7/28/2024)    Food Insecurity     Food Insecurity: Never true   Transportation Needs: No Transportation Needs (7/28/2024)    Transportation Needs     Lack of Transportation: No   Housing  Stability: Low Risk  (7/28/2024)    Housing Stability     Housing Instability: No           Current Medications:  Current Facility-Administered Medications   Medication Dose Route Frequency    insulin degludec (Tresiba) 100 units/mL flextouch 12 Units  12 Units Subcutaneous Nightly    insulin aspart (NovoLOG) 100 Units/mL FlexPen 1-7 Units  1-7 Units Subcutaneous TID CC and HS    insulin aspart (NovoLOG) 100 Units/mL FlexPen 4 Units  4 Units Subcutaneous TID CC    hydrALAzine (Apresoline) 20 mg/mL injection 10 mg  10 mg Intravenous Q6H PRN    acetaminophen (Tylenol) tab 650 mg  650 mg Oral Q6H PRN    glucose (Dex4) 15 GM/59ML oral liquid 15 g  15 g Oral Q15 Min PRN    Or    glucose (Glutose) 40% oral gel 15 g  15 g Oral Q15 Min PRN    Or    glucose-vitamin C (Dex-4) chewable tab 4 tablet  4 tablet Oral Q15 Min PRN    Or    dextrose 50% injection 50 mL  50 mL Intravenous Q15 Min PRN    Or    glucose (Dex4) 15 GM/59ML oral liquid 30 g  30 g Oral Q15 Min PRN    Or    glucose (Glutose) 40% oral gel 30 g  30 g Oral Q15 Min PRN    Or    glucose-vitamin C (Dex-4) chewable tab 8 tablet  8 tablet Oral Q15 Min PRN    DULoxetine (Cymbalta) DR cap 60 mg  60 mg Oral Daily    levothyroxine (Synthroid) tab 25 mcg  25 mcg Oral Before breakfast    losartan (Cozaar) tab 50 mg  50 mg Oral Daily    pantoprazole (Protonix) DR tab 40 mg  40 mg Oral Before breakfast     Medications Prior to Admission   Medication Sig    DULoxetine 60 MG Oral Cap DR Particles Take 1 capsule (60 mg total) by mouth daily.    losartan 50 MG Oral Tab Take 1 tablet (50 mg total) by mouth daily.    Empagliflozin (JARDIANCE) 25 MG Oral Tab Take 25 mg by mouth daily.    levothyroxine 25 MCG Oral Tab Take 1 tablet (25 mcg total) by mouth before breakfast.    PANTOPRAZOLE SODIUM 40 MG Oral Tab EC TAKE 1 TABLET BY MOUTH EVERY DAY IN THE MORNING ON AN EMPTY STOMACH    AMLODIPINE BESYLATE 5 MG Oral Tab TAKE 1 TABLET BY MOUTH EVERY DAY( FOR HYPERTENSION)        Allergies  No Known Allergies                Review of Systems:   All other systems are negative other than HPI    Physical Exam:   Vital Signs:  Blood pressure (!) 137/96, pulse 92, temperature 98.2 °F (36.8 °C), temperature source Oral, resp. rate 19, height 5' 5\" (1.651 m), weight 143 lb 12.8 oz (65.2 kg), SpO2 97%.     General: Awake and alert.    HENT: Eye: EOMI, normal lids, no discharge,     Neck: full range of motion  Neck/Thyroid: neck inspection: normal, No scar, No goiter   Lungs: No acute respiratory distress, non-labored respiration. Speaking full sentences  Abdomen:  not obese  MSK: Moves extremities spontaneously. full range of motion in all major joints  Neuro:speech is clear. Awake, alert, no aphasia, no facial asymmetry,   Psych: Orientated to time, place, person & situation,   Skin: Skin is dry, no obvious rashes or lesions      Results:     Laboratory Data:  Lab Results   Component Value Date    WBC 4.2 07/27/2024    HGB 11.8 (L) 07/27/2024    HCT 35.9 (L) 07/27/2024    .0 (L) 07/27/2024    CREATSERUM 1.28 07/27/2024    BUN 21 07/27/2024     07/27/2024    K 4.4 07/27/2024     07/27/2024    CO2 25.0 07/27/2024     (H) 07/27/2024    CA 8.9 07/27/2024    ALB 4.2 07/27/2024    ALKPHO 62 07/27/2024    TP 7.3 07/27/2024    AST 35 (H) 07/27/2024    ALT 72 (H) 07/27/2024    TSH 3.489 07/27/2024    MG 1.9 07/27/2024    PHOS 4.0 07/27/2024    B12 415 07/28/2024         Imaging:  CT SPINE THORACIC (CPT=72128)    Result Date: 7/28/2024  CONCLUSION:  1. Osseous structures demonstrate diffusely heterogeneous attenuation due to innumerable small lytic lesions compatible with the history of multiple myeloma. 2. No pathologic fracture. 3. Mild multilevel spondylosis.  No associated neural compromise.    Dictated by (CST): Jean Medellin MD on 7/28/2024 at 6:04 PM     Finalized by (CST): Jean Medellin MD on 7/28/2024 at 6:06 PM          CT SPINE LUMBAR (CPT=72131)    Result  Date: 7/28/2024  CONCLUSION:  1. Incidental notation of 6 lumbar type vertebrae, which is a developmental variant. 2. Osseous structures demonstrate diffusely heterogeneous attenuation due to innumerable small lytic lesions compatible with the history of multiple myeloma. 3. No pathologic fracture. 4. Mild multilevel spondylosis with greatest involvement of the mid lumbar spine.  Significant levels detailed below:  5. L4-L5:  Mild central vertebral canal stenosis and bilateral neural foraminal narrowing. 6. L5-L6:  Bilateral neural foraminal narrowing.     Dictated by (CST): Jean Medellin MD on 7/28/2024 at 5:55 PM     Finalized by (CST): Jean Medellin MD on 7/28/2024 at 6:00 PM          CT SPINE CERVICAL (CPT=72125)    Result Date: 7/28/2024  CONCLUSION:  1. Osseous structures demonstrate diffusely heterogeneous attenuation due to innumerable small lytic lesions compatible with the history of multiple myeloma. 2. No pathologic fracture. 3. Mild multilevel spondylosis resulting in mild central vertebral canal stenosis at C4-C5, C5-C6 and C6-C7.  Neural foraminal narrowing is also seen bilaterally at C5-C6.    Dictated by (CST): Jean Medellin MD on 7/28/2024 at 5:18 PM     Finalized by (CST): Jean Medellin MD on 7/28/2024 at 5:22 PM          XR BONE SURVEY, COMPLETE (CPT=77075)    Result Date: 7/28/2024  CONCLUSION:  1. Diffuse lytic lesions throughout the axial and visualized appendicular skeleton.  Differential considerations include widespread lytic metastases or multiple myeloma; correlate clinically. 2. No pathologic fracture.    Dictated by (CST): Jean Medellin MD on 7/28/2024 at 1:03 PM     Finalized by (CST): Jean Medellin MD on 7/28/2024 at 1:06 PM          US GALLBLADDER (CPT=76705)    Result Date: 7/28/2024  CONCLUSION:  1.  Normal ultrasound appearance of the gallbladder.  No evidence of acute cholecystitis.  No biliary ductal dilatation. 2.  Echogenic liver compatible with  fatty infiltration.   [facility] provided a preliminary report for this examination. This final report agrees with their preliminary findings.   Dictated by (CST): Jayson Au MD on 7/28/2024 at 9:03 AM     Finalized by (CST): Jayson Au MD on 7/28/2024 at 9:04 AM             Latest Reference Range & Units 07/28/24 13:22 07/28/24 18:21 07/28/24 20:46   POC GLUCOSE 70 - 99 mg/dL 215 (H) 231 (H) 292 (H)   (H): Data is abnormally high  Impression:     Patient Active Problem List   Diagnosis    Weakness generalized    Recurrent falls    Uncontrolled type 2 diabetes mellitus with hyperglycemia (HCC)     Myeloma with diffuse osteolytic bone lesions.   Hypothyroid on LT4 25 mcg   Uncontrolled complicated DM   Confused      . Uncontrolled complicated Diabetes Mellitus Type 2,   - Discussed importance of glycemic control  - Tresiba 12 units subcutaneous daily   - Novolog 4 units subcutaneous TID with meals  - Novolog medium correction scale ACHS   - carb controlled diet if ok to eat  - Hypoglycemia protocol      . Hypothyroid on LT4 25 mcg   - will check TFT     Thank you for allowing me to participate in the care of your patient.  D/w Pt and Rn     Michelle Calvillo MD  7/29/2024

## 2024-07-29 NOTE — PLAN OF CARE
Alert. Orientation fluctuates. Able to state that it is 2024 and that he is in [place] but unable to state where he is specifically and could not state why he was in the hospital. Walked in hallway with PT, unsteady gait. Voiding via primofit. Tolerating diet, blood sugars monitored AC/HS and insulin given per orders. Denies pain. Plan to discharge to rehab when cleared. Bed in lowest position, call light in reach, frequent rounding, nonskid footwear, fall precautions in place.

## 2024-07-29 NOTE — PROGRESS NOTES
[facility]  part of [facility]    Progress Note    Joselin Graham Patient Status:  Observation    5/15/1950 MRN [id number]   Location [facility] 4W/SW/SE Attending Roberta Butler MD   Hosp Day # 0 PCP No primary care provider on file.       Subjective:   Joselin Graham is a(n) 74 year old male Leg weakness. Myeloma with diffuse osteolytic bone lesions.    Objective:     Vitals:    24   BP: 157/84   Pulse:    Resp: 19   Temp: 98 °F (36.7 °C)       Intake/Output Summary (Last 24 hours) at 2024 2344  Last data filed at 2024 1431  Gross per 24 hour   Intake 600 ml   Output 1400 ml   Net -800 ml     Wt Readings from Last 2 Encounters:   24 143 lb 12.8 oz (65.2 kg)   24 135 lb (61.2 kg)       General appearance:  alert, appears stated age, and cooperative  Head: Normocephalic, without obvious abnormality, atraumatic  Eyes: conjunctivae/corneas clear. PERRL, EOM's intact. Fundi benign.  Neck: no adenopathy, no carotid bruit, no JVD, supple, symmetrical, trachea midline, and thyroid not enlarged, symmetric, no tenderness/mass/nodules  Pulmonary: clear to auscultation bilaterally  Cardiovascular: S1, S2 normal, no murmur, click, rub or gallop, regular rate and rhythm  Abdominal: soft, non-tender; bowel sounds normal; no masses,  no organomegaly  Extremities: extremities normal, atraumatic, no cyanosis or edema  Pulses: 2+ and symmetric  Neurologic: Grossly normal  Genital Exam: defer exam    Current Medications:    Current Facility-Administered Medications:     hydrALAzine (Apresoline) 20 mg/mL injection 10 mg, 10 mg, Intravenous, Q6H PRN    acetaminophen (Tylenol) tab 650 mg, 650 mg, Oral, Q6H PRN    glucose (Dex4) 15 GM/59ML oral liquid 15 g, 15 g, Oral, Q15 Min PRN **OR** glucose (Glutose) 40% oral gel 15 g, 15 g, Oral, Q15 Min PRN **OR** glucose-vitamin C (Dex-4) chewable tab 4 tablet, 4 tablet, Oral, Q15 Min PRN **OR** dextrose 50% injection 50 mL, 50 mL,  Intravenous, Q15 Min PRN **OR** glucose (Dex4) 15 GM/59ML oral liquid 30 g, 30 g, Oral, Q15 Min PRN **OR** glucose (Glutose) 40% oral gel 30 g, 30 g, Oral, Q15 Min PRN **OR** glucose-vitamin C (Dex-4) chewable tab 8 tablet, 8 tablet, Oral, Q15 Min PRN    [START ON 7/29/2024] DULoxetine (Cymbalta) DR cap 60 mg, 60 mg, Oral, Daily    [START ON 7/29/2024] levothyroxine (Synthroid) tab 25 mcg, 25 mcg, Oral, Before breakfast    [START ON 7/29/2024] losartan (Cozaar) tab 50 mg, 50 mg, Oral, Daily    [START ON 7/29/2024] pantoprazole (Protonix) DR tab 40 mg, 40 mg, Oral, Before breakfast    insulin degludec (Tresiba) 100 units/mL flextouch 10 Units, 10 Units, Subcutaneous, Nightly    [START ON 7/29/2024] insulin aspart (NovoLOG) 100 Units/mL FlexPen 1-7 Units, 1-7 Units, Subcutaneous, TID CC    Allergies  No Known Allergies    Assessment and Plan:     Weakness generalized      Recurrent falls        July 28, 2024  Patient is stable does not have any new neurologic deficit still is bedbound.  Patient had skeletal survey with multiple osteolytic lesions indicative of myeloma activity.  Urine Bence-Sweeney protein and urine light chains have been ordered.  Hematology consult has been ordered.  Neurologic consultation is pending as well.  Patient is not stable for discharge.  Will discuss with outpatient hematologist.    Results:     Lab Results   Component Value Date    WBC 4.2 07/27/2024    HGB 11.8 (L) 07/27/2024    HCT 35.9 (L) 07/27/2024    .0 (L) 07/27/2024     07/27/2024    K 4.4 07/27/2024     07/27/2024    CO2 25.0 07/27/2024    CREATSERUM 1.28 07/27/2024    BUN 21 07/27/2024     (H) 07/27/2024    CA 8.9 07/27/2024    ALB 4.2 07/27/2024    ALKPHO 62 07/27/2024    BILT 1.1 07/27/2024    TP 7.3 07/27/2024    AST 35 (H) 07/27/2024    ALT 72 (H) 07/27/2024    TSH 3.489 07/27/2024    MG 1.9 07/27/2024    PHOS 4.0 07/27/2024    B12 415 07/28/2024     No results found for this visit on 07/27/24.    CT  SPINE THORACIC (CPT=72128)    Result Date: 7/28/2024  CONCLUSION:  1. Osseous structures demonstrate diffusely heterogeneous attenuation due to innumerable small lytic lesions compatible with the history of multiple myeloma. 2. No pathologic fracture. 3. Mild multilevel spondylosis.  No associated neural compromise.    Dictated by (CST): Jean Medellin MD on 7/28/2024 at 6:04 PM     Finalized by (CST): Jean Medellin MD on 7/28/2024 at 6:06 PM          CT SPINE LUMBAR (CPT=72131)    Result Date: 7/28/2024  CONCLUSION:  1. Incidental notation of 6 lumbar type vertebrae, which is a developmental variant. 2. Osseous structures demonstrate diffusely heterogeneous attenuation due to innumerable small lytic lesions compatible with the history of multiple myeloma. 3. No pathologic fracture. 4. Mild multilevel spondylosis with greatest involvement of the mid lumbar spine.  Significant levels detailed below:  5. L4-L5:  Mild central vertebral canal stenosis and bilateral neural foraminal narrowing. 6. L5-L6:  Bilateral neural foraminal narrowing.     Dictated by (CST): Jean Medellin MD on 7/28/2024 at 5:55 PM     Finalized by (CST): Jean Medellin MD on 7/28/2024 at 6:00 PM          CT SPINE CERVICAL (CPT=72125)    Result Date: 7/28/2024  CONCLUSION:  1. Osseous structures demonstrate diffusely heterogeneous attenuation due to innumerable small lytic lesions compatible with the history of multiple myeloma. 2. No pathologic fracture. 3. Mild multilevel spondylosis resulting in mild central vertebral canal stenosis at C4-C5, C5-C6 and C6-C7.  Neural foraminal narrowing is also seen bilaterally at C5-C6.    Dictated by (CST): Jean Medellin MD on 7/28/2024 at 5:18 PM     Finalized by (CST): Jean Medellin MD on 7/28/2024 at 5:22 PM          XR BONE SURVEY, COMPLETE (CPT=77075)    Result Date: 7/28/2024  CONCLUSION:  1. Diffuse lytic lesions throughout the axial and visualized appendicular skeleton.   Differential considerations include widespread lytic metastases or multiple myeloma; correlate clinically. 2. No pathologic fracture.    Dictated by (CST): Jean Medellin MD on 7/28/2024 at 1:03 PM     Finalized by (CST): Jean Medellin MD on 7/28/2024 at 1:06 PM          US GALLBLADDER (CPT=76705)    Result Date: 7/28/2024  CONCLUSION:  1.  Normal ultrasound appearance of the gallbladder.  No evidence of acute cholecystitis.  No biliary ductal dilatation. 2.  Echogenic liver compatible with fatty infiltration.   [facility] provided a preliminary report for this examination. This final report agrees with their preliminary findings.   Dictated by (CST): Jayson Au MD on 7/28/2024 at 9:03 AM     Finalized by (CST): Jayson Au MD on 7/28/2024 at 9:04 AM         EKG 12 Lead    Result Date: 7/28/2024  Normal sinus rhythm Left axis deviation LVH with QRS widening and repolarization abnormality ( R in aVL , [place] product ) Cannot rule out Septal infarct (cited on or before 22-JUL-2024) Abnormal ECG When compared with ECG of 27-JUL-2024 19:47, Serial changes of evolving Septal infarct Present    EKG 12 Lead    Result Date: 7/28/2024  Normal sinus rhythm Left anterior fascicular block LVH with QRS widening ( R in aVL , [place] product ) Cannot rule out Septal infarct (cited on or before 22-JUL-2024) ST & T wave abnormality, consider lateral ischemia Abnormal ECG When compared with ECG of 22-JUL-2024 19:32, T wave inversion more evident in Lateral leads Confirmed by VASQUEZ DUPREE, ECTOR ([id number]) on 7/28/2024 12:19:36 PM             GIOVANNY HOLT MD  7/28/2024

## 2024-07-29 NOTE — PHYSICAL THERAPY NOTE
PHYSICAL THERAPY EVALUATION - INPATIENT     Room Number: 455/455-A  Evaluation Date: 7/29/2024  Type of Evaluation: Initial   Physician Order: PT Eval and Treat    Presenting Problem: Weakness, recent fall  Co-Morbidities : DM, recent falls  Reason for Therapy: Mobility Dysfunction and Discharge Planning    PHYSICAL THERAPY ASSESSMENT   Patient is a 74 year old male admitted 7/27/2024 for Weakness, unsteady gait recent fall.  Prior to admission, patient's baseline is Indep with all mobility and ADL's lvies alone.  Patient is currently functioning below baseline with bed mobility, transfers, gait, stair negotiation, maintaining seated position, standing prolonged periods, and performing household tasks.  Patient is requiring minimal assist as a result of the following impairments: decreased functional strength, decreased endurance/aerobic capacity, impaired standing balance, impaired motor planning, decreased muscular endurance, and medical status.  Physical Therapy will continue to follow for duration of hospitalization.    Patient will benefit from continued skilled PT Services to promote return to prior level of function and safety with continuous assistance and gradual rehabilitative therapy .    PLAN  PT Treatment Plan: Bed mobility;Body mechanics;Endurance;Energy conservation;Patient education;Gait training;Strengthening;Transfer training;Balance training;Stoop training  Rehab Potential : Good  Frequency (Obs): 3-5x/week    PHYSICAL THERAPY MEDICAL/SOCIAL HISTORY   History related to current admission: The patient is a 74-year-old gentleman originally from [facility], whose primary care doctor is in the [facility]. The patient has a history of frequent falls, and had presented to the emergency room 3 days ago, at which time a CT scan of his head was performed which was unrevealing. Patient was then sent home. Patient lives by himself, and his significant other was not at home at the time of him falling at  home. Patient then had to be extricated from his home by breaking through the door, and then brought to the emergency room. Patient denies any loss of consciousness, but per patient's wife, patient might have urinated as there was urine smell in the kitchen area where the patient had fallen. Patient denies any headache, denies any loss of consciousness, but did report some vertigo, but denied any nystagmus.      Problem List  Principal Problem:    Weakness generalized  Active Problems:    Recurrent falls    Uncontrolled type 2 diabetes mellitus with hyperglycemia (HCC)      HOME SITUATION  Home Situation  Type of Home: House  Home Layout: Able to live on main level;One level  Stairs to Enter : 2  Railing: Yes  Lives With: Alone  Drives: No  Patient Owned Equipment: None     Prior Level of [place]: Lives alone indep with all mobility and ADL's. Recent falls at home.     SUBJECTIVE  I feel ok just weak but no pain.     PHYSICAL THERAPY EXAMINATION   OBJECTIVE  Precautions: Bed/chair alarm  Fall Risk: High fall risk    WEIGHT BEARING RESTRICTION  Weight Bearing Restriction: None                PAIN ASSESSMENT  Ratin          COGNITION  Overall Cognitive Status:  WFL - within functional limits  Decreased safety awareness increased fall risk but unaware he is losing his balance and veering in his path to the L side.   RANGE OF MOTION AND STRENGTH ASSESSMENT  Upper extremity ROM and strength are within functional limits   Lower extremity ROM is within functional limits   Lower extremity strength is within functional limits     BALANCE  Static Sitting: Fair +  Dynamic Sitting: Fair  Static Standing: Poor +  Dynamic Standing: Poor    ACTIVITY TOLERANCE  Pulse: 93  Heart Rate Source: Monitor  Resp: 19  BP: (!) 137/96  BP Location: Right arm  BP Method: Automatic  Patient Position: Lying    O2 WALK       AM-PAC '6-Clicks' INPATIENT SHORT FORM - BASIC MOBILITY  How much difficulty does the patient currently  have...  Patient Difficulty: Turning over in bed (including adjusting bedclothes, sheets and blankets)?: None   Patient Difficulty: Sitting down on and standing up from a chair with arms (e.g., wheelchair, bedside commode, etc.): A Little   Patient Difficulty: Moving from lying on back to sitting on the side of the bed?: A Little   How much help from another person does the patient currently need...   Help from Another: Moving to and from a bed to a chair (including a wheelchair)?: A Little   Help from Another: Need to walk in hospital room?: A Little   Help from Another: Climbing 3-5 steps with a railing?: A Lot     AM-PAC Score:  Raw Score: 18   Approx Degree of Impairment: 46.58%   Standardized Score (AM-PAC Scale): 43.63   CMS Modifier (G-Code): CK    FUNCTIONAL ABILITY STATUS  Functional Mobility/Gait Assessment  Gait Assistance: Minimum assistance  Distance (ft): 100  Assistive Device: Rolling walker  Pattern:  (unsteady gait veering to the L side 2\" in a 10' path retropulsive elevated fall risk > 80% on [place] balance scale)  Rolling: minimal assist  Supine to Sit: minimal assist  Sit to Supine: minimal assist  Sit to Stand: minimal assist    Exercise/Education Provided:  Bed mobility  Body mechanics  Energy conservation  Functional activity tolerated  Gait training  Posture  Strengthening  Lower therapeutic exercise:  Ankle pumps  Heel slides  SLR  Transfer training    Skilled Therapy Provided: Pt ed with bed mobility and transfers with min A with RW. Pt ed with gait progression 100' with RW with Min A with unsteady retropulsive gait. Pt presents with elevated fall risk > 80% on [place] balance scale with use of a RW and staff min A currently indicated. Ongoing skilled PT for balance and functional mobility training is recommended. Therex performed back in bed with bed alarm activated for fall prevention.      The patient's Approx Degree of Impairment: 46.58% has been calculated based on documentation in the  [facility] '6 clicks' Inpatient Basic Mobility Short Form.  Research supports that patients with this level of impairment may benefit from GR with PT, OT.  Final disposition will be made by interdisciplinary medical team.    Patient End of Session: In bed;With  staff;Needs met;Call light within reach;RN aware of session/findings;All patient questions and concerns addressed;Alarm set    CURRENT GOALS  Goals to be met by: 8/15/2024  Patient Goal Patient's self-stated goal is: Return home    Goal #1 Patient is able to demonstrate supine - sit EOB @ level: independent     Goal #1   Current Status    Goal #2 Patient is able to demonstrate transfers Sit to/from Stand at assistance level: modified independent with walker - rolling     Goal #2  Current Status    Goal #3 Patient is able to ambulate 300 feet with assist device: walker - rolling at assistance level: modified independent   Goal #3   Current Status    Goal #4 Patient will negotiate 2 stairs/one curb w/ assistive device and supervision   Goal #4   Current Status    Goal #5 Patient to demonstrate independence with home activity/exercise instructions provided to patient in preparation for discharge.   Goal #5   Current Status    Goal #6    Goal #6  Current Status      Patient Evaluation Complexity Level:  History Low - no personal factors and/or co-morbidities   Examination of body systems Low -  addressing 1-2 elements   Clinical Presentation Low- Stable   Clinical Decision Making  Low Complexity     Gait Training: 15 minutes     [Outpatient] : Outpatient [Ambulatory] : Patient is ambulatory. [THIS CHAMBER HAS BEEN CLEANED / DISINFECTED] : This chamber has been cleaned / disinfected according to local and hospital policy and procedure prior to this treatment. [Patient demonstrated and verbalized proper technique for using air break mask] : Patient demonstrated and verbalized proper technique for using air break mask [Patient educated on the risks of SMOKING prior to HBOT with understanding] : Patient educated on the risks of SMOKING prior to HBOT with understanding [Patient educated on the risks of CONSUMING ALCOHOL prior to HBOT with understanding] : Patient educated on the risks of CONSUMING ALCOHOL prior to HBOT with understanding [100% Cotton] : 100% cotton [Empty all pockets] : empty all pockets [No hair oils, wigs, hairpieces, pins] : no hair oils, wigs, hairpieces, pins  [Pre tx medications] : pre tx medications  [No make-up, creams] : no make-up, creams  [No jewelry] : no jewelry  [No matches, cigarettes, lighters] : no matches, cigarettes, lighters  [Hearing aid removed] : hearing aid removed [Dentures removed] : dentures removed [Ground bracelet on pt's wrist] : ground bracelet on pt's wrist  [Contacts removed] : contacts removed  [Remove nail polish] : remove nail polish  [No reading material] : no reading material  [Bra, undergarments removed] : bra, undergarments removed  [No contraindicated dressings] : no contraindicated dressings [Ground Wire - VISUAL Verification - Intact/Free of Obstruction] : Ground Wire - VISUAL Verification - Intact/Free of Obstruction  [Ground Continuity - Verified < 1 ohm w/ Wrist Strap [place]] : Ground Continuity - Verified < 1 ohm w/ Wrist Strap [place] [Number: ___] : Number: [unfilled] [Diagnosis: ___] : Diagnosis: [unfilled] [____] : Post-Dive: Time - [unfilled] [___] : Post-Dive: Value - [unfilled] mg/dL [Clear all fields] : clear all fields [] : No [FreeTextEntry4] : 100 Min [FreeTextEntry6] : 1432 [FreeTextEntry8] : 2617 [de-identified] : 6136 [de-identified] : 4649 [de-identified] : 9391 [de-identified] : 6373 [de-identified] : 1816 [de-identified] : 1826 [de-identified] : 120 Min

## 2024-07-29 NOTE — PLAN OF CARE
Problem: Patient Centered Care  Goal: Patient preferences are identified and integrated in the patient's plan of care  Description: Interventions:  - Provide timely, complete, and accurate information to patient/family  - Incorporate patient and family knowledge, values, beliefs, and cultural backgrounds into the planning and delivery of care  - Encourage patient/family to participate in care and decision-making at the level they choose  - Honor patient and family perspectives and choices  Outcome: Progressing    Problem: Diabetes/Glucose Control  Goal: Glucose maintained within prescribed range  Description: INTERVENTIONS:  - Monitor Blood Glucose as ordered  - Assess for signs and symptoms of hyperglycemia and hypoglycemia  - Administer ordered medications to maintain glucose within target range  - Assess barriers to adequate nutritional intake and initiate nutrition consult as needed  - Instruct patient on self management of diabetes  Outcome: Progressing    Problem: PAIN - ADULT  Goal: Verbalizes/displays adequate comfort level or patient's stated pain goal  Description: INTERVENTIONS:  - Encourage pt to monitor pain and request assistance  - Assess pain using appropriate pain scale  - Administer analgesics based on type and severity of pain and evaluate response  - Implement non-pharmacological measures as appropriate and evaluate response  - Consider cultural and social influences on pain and pain management  - Manage/alleviate anxiety  - Utilize distraction and/or relaxation techniques  - Monitor for opioid side effects  - Notify MD/LIP if interventions unsuccessful or patient reports new pain  - Anticipate increased pain with activity and pre-medicate as appropriate  Outcome: Progressing     Problem: SAFETY ADULT - FALL  Goal: Free from fall injury  Description: INTERVENTIONS:  - Assess pt frequently for physical needs  - Identify cognitive and physical deficits and behaviors that affect risk of falls.  -  [place] fall precautions as indicated by assessment.  - Educate pt/family on patient safety including physical limitations  - Instruct pt to call for assistance with activity based on assessment  - Modify environment to reduce risk of injury  - Provide assistive devices as appropriate  - Consider OT/PT consult to assist with strengthening/mobility  - Encourage toileting schedule  Outcome: Progressing  No acute changes overnight. Patient is aoX3 (forgetful) with no complaints of pain this shift. VSS (BP runs high at times, PRN hydralazine ordered). Tele monitoring (no calls). Patient is incontinent; has primofit on with good output. Safety precautions maintained; patient moved to room near nurses station for close monitoring.  call light within reach and patient instructed on its use.

## 2024-07-29 NOTE — H&P
[facility]    PATIENT'S NAME: REYNA NAYAK   ATTENDING PHYSICIAN: Roberta Butler MD   PATIENT ACCOUNT#:   [id number]    LOCATION:  [address] [place] [address]  MEDICAL RECORD #:   [id number]       YOB: 1950  ADMISSION DATE:       07/27/2024    HISTORY AND PHYSICAL EXAMINATION    CHIEF COMPLAINT:  Frequent falls, lower extremity weakness, diabetic neuropathy, possible vertigo.    HISTORY OF PRESENT ILLNESS:  The patient is a 74-year-old gentleman originally from [facility], whose primary care doctor is in the [facility].  The patient has a history of frequent falls, and had presented to the emergency room 3 days ago, at which time a CT scan of his head was performed which was unrevealing.  Patient was then sent home.  Patient lives by himself, and his significant other was not at home at the time of him falling at home.  Patient then had to be extricated from his home by breaking through the door, and then brought to the emergency room.  Patient denies any loss of consciousness, but per patient's wife, patient might have urinated as there was urine smell in the kitchen area where the patient had fallen.  Patient denies any headache, denies any loss of consciousness, but did report some vertigo, but denied any nystagmus.    PAST MEDICAL HISTORY:  Relevant for history of hypertension; history of diabetes, he is poorly compliant; history of hyperlipidemia; history of obesity; history of frequent falls; history of diabetic neuropathy, being followed by neurologist in the [facility]; history of previous multiple myeloma.    MEDICATIONS:  Patient's home medications include the following:  Pantoprazole 40 mg orally daily, amlodipine 5 mg orally daily.  Patient also is supposed to be on insulin, but has not been compliant.    ALLERGIES:  No known drug allergies.    FAMILY HISTORY:  Significant for diabetes in parents, but particularly mom.    SOCIAL HISTORY:  No alcohol, no drugs, no tobacco.   Patient lives with family.  Patient has no previous exposure to chemicals.    REVIEW OF SYSTEMS:  A 13-point review of systems was performed and was negative, except as per HPI.      PHYSICAL EXAMINATION:    VITAL SIGNS:  Blood pressure 161/90, pulse 78, respiratory rate 18, temperature 97.7, pulse ox 98%.  HEENT:  No JVD.  No bruit.  No lymphadenopathy.  No pallor.  No icterus.  LUNGS:  Clear to auscultation.  HEART:  S1, S2 heard.  No S3.  No S4.  No murmur.  No rub.  No gallop.  ABDOMEN:  Soft, nontender, nondistended.  Slightly obese.  No hepatosplenomegaly.  EXTREMITIES:  No focal deficit.  NEUROLOGIC:  Sensations are normal.  Patient was not stood up to walk as he felt unsteady.    LABORATORY DATA:  Pertinent lab results:  Sodium of 140, potassium of 4.4, chloride of 108, bicarbonate of 25, BUN of 21, creatinine 1.28.  EGFR of 58.  Phosphorus 4.0, magnesium 1.9, globulin 3.1, ammonia less than 10, albumin is 4.2, TSH is 3.489.  WBC 4.2, hemoglobin 9.8, hematocrit 35.9, platelet count of 132.  Urinalysis was performed.  It was colorless.  Glucose greater than 1000.  SARS-COVID-19 was performed and was negative.  Influenza A and B was negative.  RSV PCR was also negative.  Vitamin B12 is pending.    ASSESSMENT AND PLAN:    1.   Progressive motor weakness of the lower extremities:  Differential diagnosis in this patient includes diabetic motor neuropathy, with also possibly a component of sensory neuropathy.  To better evaluate this, Neurology will be consulted.  2.   Diabetes:  Patient is a very noncompliant diabetic.  Patient previously was on insulin, but according to patient's wife, patient does not take insulin consistently.  Will consider Endocrinology consult as well.  Patient currently is on sliding scale and Accu-Cheks before meals and at bedtime, and will be on a diabetic diet.  3.   Hypertension:  Patient has a history of being on amlodipine and will continue the same.  4.   DVT prophylaxis:  Patient  will be on SCD boots because of low platelets.  5.   GI prophylaxis:  Patient will be on Protonix.    Total time spent seeing patient was 70 minutes, including detailed discussion with patient's wife and patient's niece at the bedside.    Dictated By Roberta Butler MD  d: 07/28/2024 11:55:36  t: 07/29/2024 07:35:11  Job [id number]  MV/

## 2024-07-30 LAB
ALBUMIN SERPL ELPH-MCNC: 3.73 G/DL (ref 3.75–5.21)
ALBUMIN SERPL-MCNC: 4.2 G/DL (ref 3.2–4.8)
ALBUMIN/GLOB SERPL: 1.22 {RATIO} (ref 1–2)
ALBUMIN/GLOB SERPL: 1.4 {RATIO} (ref 1–2)
ALP LIVER SERPL-CCNC: 69 U/L
ALPHA1 GLOB SERPL ELPH-MCNC: 0.24 G/DL (ref 0.19–0.46)
ALPHA2 GLOB SERPL ELPH-MCNC: 0.69 G/DL (ref 0.48–1.05)
ALT SERPL-CCNC: 78 U/L
ANION GAP SERPL CALC-SCNC: 8 MMOL/L (ref 0–18)
AST SERPL-CCNC: 24 U/L (ref ?–34)
B-GLOBULIN SERPL ELPH-MCNC: 0.95 G/DL (ref 0.68–1.23)
BASOPHILS # BLD AUTO: 0.05 X10(3) UL (ref 0–0.2)
BASOPHILS NFR BLD AUTO: 1 %
BILIRUB SERPL-MCNC: 1.2 MG/DL (ref 0.2–1.1)
BUN BLD-MCNC: 23 MG/DL (ref 9–23)
BUN/CREAT SERPL: 19.2 (ref 10–20)
CALCIUM BLD-MCNC: 8.3 MG/DL (ref 8.7–10.4)
CHLORIDE SERPL-SCNC: 107 MMOL/L (ref 98–112)
CO2 SERPL-SCNC: 24 MMOL/L (ref 21–32)
CREAT BLD-MCNC: 1.2 MG/DL
DEPRECATED RDW RBC AUTO: 49.4 FL (ref 35.1–46.3)
EGFRCR SERPLBLD CKD-EPI 2021: 63 ML/MIN/1.73M2 (ref 60–?)
EOSINOPHIL # BLD AUTO: 0.23 X10(3) UL (ref 0–0.7)
EOSINOPHIL NFR BLD AUTO: 4.8 %
ERYTHROCYTE [DISTWIDTH] IN BLOOD BY AUTOMATED COUNT: 17.4 % (ref 11–15)
GAMMA GLOB SERPL ELPH-MCNC: 1.18 G/DL (ref 0.62–1.7)
GLOBULIN PLAS-MCNC: 3.1 G/DL (ref 2–3.5)
GLUCOSE BLD-MCNC: 163 MG/DL (ref 70–99)
GLUCOSE BLDC GLUCOMTR-MCNC: 176 MG/DL (ref 70–99)
GLUCOSE BLDC GLUCOMTR-MCNC: 182 MG/DL (ref 70–99)
GLUCOSE BLDC GLUCOMTR-MCNC: 229 MG/DL (ref 70–99)
GLUCOSE BLDC GLUCOMTR-MCNC: 276 MG/DL (ref 70–99)
HCT VFR BLD AUTO: 39.1 %
HGB BLD-MCNC: 13.3 G/DL
IGA SERPL-MCNC: 441.7 MG/DL (ref 40–350)
IGM SERPL-MCNC: 23.2 MG/DL (ref 50–300)
IMM GRANULOCYTES # BLD AUTO: 0 X10(3) UL (ref 0–1)
IMM GRANULOCYTES NFR BLD: 0 %
IMMUNOGLOBULIN PNL SER-MCNC: 1137 MG/DL (ref 650–1600)
KAPPA LC FREE SER-MCNC: 4.05 MG/DL (ref 0.33–1.94)
KAPPA LC FREE/LAMBDA FREE SER NEPH: 1.36 {RATIO} (ref 0.26–1.65)
LAMBDA LC FREE SERPL-MCNC: 2.99 MG/DL (ref 0.57–2.63)
LYMPHOCYTES # BLD AUTO: 2.37 X10(3) UL (ref 1–4)
LYMPHOCYTES NFR BLD AUTO: 49.4 %
MAGNESIUM SERPL-MCNC: 1.9 MG/DL (ref 1.6–2.6)
MCH RBC QN AUTO: 26.8 PG (ref 26–34)
MCHC RBC AUTO-ENTMCNC: 34 G/DL (ref 31–37)
MCV RBC AUTO: 78.7 FL
MONOCYTES # BLD AUTO: 0.52 X10(3) UL (ref 0.1–1)
MONOCYTES NFR BLD AUTO: 10.8 %
NEUTROPHILS # BLD AUTO: 1.63 X10 (3) UL (ref 1.5–7.7)
NEUTROPHILS # BLD AUTO: 1.63 X10(3) UL (ref 1.5–7.7)
NEUTROPHILS NFR BLD AUTO: 34 %
OSMOLALITY SERPL CALC.SUM OF ELEC: 295 MOSM/KG (ref 275–295)
PLATELET # BLD AUTO: 144 10(3)UL (ref 150–450)
POTASSIUM SERPL-SCNC: 3.3 MMOL/L (ref 3.5–5.1)
PROT SERPL-MCNC: 6.8 G/DL (ref 5.7–8.2)
PROT SERPL-MCNC: 7.3 G/DL (ref 5.7–8.2)
RBC # BLD AUTO: 4.97 X10(6)UL
SODIUM SERPL-SCNC: 139 MMOL/L (ref 136–145)
WBC # BLD AUTO: 4.8 X10(3) UL (ref 4–11)

## 2024-07-30 PROCEDURE — 99233 SBSQ HOSP IP/OBS HIGH 50: CPT | Performed by: INTERNAL MEDICINE

## 2024-07-30 RX ORDER — INSULIN DEGLUDEC 100 U/ML
15 INJECTION, SOLUTION SUBCUTANEOUS NIGHTLY
Status: DISCONTINUED | OUTPATIENT
Start: 2024-07-30 | End: 2024-08-02

## 2024-07-30 NOTE — CM/SW NOTE
[facility].    [facility] list emailed to ex wife per request at   [email or web address].     CM spoke w/ Rasi, she confirmed she is poc for dc planning, pt has no children. She did receive list and is agreeable to call with top 2-3 choices. Aware that ins auth will be needed.    Will need choice and ins auth to proceed    7/31 0830  CM lvm for Rasi requesting [facility] choice.    1445  Second vm left, req [facility] choice    8/1 0830  [facility]   #1 [facility]  #2 [facility]  #3 [facility]    CM reserved [facility] and req [facility] to add to auth    1100  CM rec'd [facility] requesting a call from MD to discuss a psych eval for pts confusion and agitation.    CM sent msg to MD /RN. RN to f/u    1430  CM rec'd notice of P2P request  to discuss rehab [phone] ext [phone], ref case ID #[id number]     RN will notify MD stat    Plan  [facility] pend auth    / to remain available for support and/or discharge planning.     Jessica Mcgill RN    Ext [phone]

## 2024-07-30 NOTE — PLAN OF CARE
Alert. Orientation fluctuates. Walking SBA  with RW, unsteady gait with poor safety awareness. Voiding via primofit. Tolerating diet, blood sugars monitored AC/HS and insulin given per orders. Denies pain. Lip laceration with sutures intact. Plan to discharge to rehab when cleared. Bed in lowest position, call light in reach, frequent rounding, nonskid footwear, fall precautions in place.

## 2024-07-30 NOTE — CONGREGATE LIVING REVIEW
[facility] Living Authorization    The [facility] Review Committee has reviewed this case and the patient IS APPROVED for discharge to a facility for Short Term Skilled once the following procedure is followed:     - The physician discharge instructions (contained within the ANTONIO note for SNF) must inlcude the below appropriate and approved COVID instructions to the facility    For questions regarding [facility] approval process, please contact the CM assigned to the case.  For questions regarding RN discharge workflow, please contact the unit Clinical Leader.

## 2024-07-30 NOTE — PROGRESS NOTES
[facility]  part of [facility]    Progress Note    Joselin Graham Patient Status:  Observation    5/15/1950 MRN [id number]   Location [facility] 4W/SW/SE Attending Roberta Butler MD   Hosp Day # 0 PCP No primary care provider on file.       Subjective:   Joselin Graham is a(n) 74 year old male weakness    Objective:     Vitals:    24   BP: 140/88   Pulse: 86   Resp: 20   Temp: 98.6 °F (37 °C)       Intake/Output Summary (Last 24 hours) at 2024 2354  Last data filed at 2024 2313  Gross per 24 hour   Intake 120 ml   Output 1500 ml   Net -1380 ml     Wt Readings from Last 2 Encounters:   24 143 lb 12.8 oz (65.2 kg)   24 135 lb (61.2 kg)       General appearance:  alert, appears stated age, and cooperative  Head: Normocephalic, without obvious abnormality, atraumatic  Eyes: conjunctivae/corneas clear. PERRL, EOM's intact. Fundi benign.  Neck: no adenopathy, no carotid bruit, no JVD, supple, symmetrical, trachea midline, and thyroid not enlarged, symmetric, no tenderness/mass/nodules  Pulmonary: clear to auscultation bilaterally  Cardiovascular: S1, S2 normal, no murmur, click, rub or gallop, regular rate and rhythm  Abdominal: soft, non-tender; bowel sounds normal; no masses,  no organomegaly  Extremities: extremities normal, atraumatic, no cyanosis or edema  Pulses: 2+ and symmetric  Neurologic: Grossly normal  Genital Exam: defer exam    Current Medications:    Current Facility-Administered Medications:     insulin degludec (Tresiba) 100 units/mL flextouch 12 Units, 12 Units, Subcutaneous, Nightly    insulin aspart (NovoLOG) 100 Units/mL FlexPen 1-7 Units, 1-7 Units, Subcutaneous, TID CC and HS    insulin aspart (NovoLOG) 100 Units/mL FlexPen 4 Units, 4 Units, Subcutaneous, TID CC    hydrALAzine (Apresoline) 20 mg/mL injection 10 mg, 10 mg, Intravenous, Q6H PRN    acetaminophen (Tylenol) tab 650 mg, 650 mg, Oral, Q6H PRN    glucose (Dex4) 15 GM/59ML  oral liquid 15 g, 15 g, Oral, Q15 Min PRN **OR** glucose (Glutose) 40% oral gel 15 g, 15 g, Oral, Q15 Min PRN **OR** glucose-vitamin C (Dex-4) chewable tab 4 tablet, 4 tablet, Oral, Q15 Min PRN **OR** dextrose 50% injection 50 mL, 50 mL, Intravenous, Q15 Min PRN **OR** glucose (Dex4) 15 GM/59ML oral liquid 30 g, 30 g, Oral, Q15 Min PRN **OR** glucose (Glutose) 40% oral gel 30 g, 30 g, Oral, Q15 Min PRN **OR** glucose-vitamin C (Dex-4) chewable tab 8 tablet, 8 tablet, Oral, Q15 Min PRN    DULoxetine (Cymbalta) DR cap 60 mg, 60 mg, Oral, Daily    levothyroxine (Synthroid) tab 25 mcg, 25 mcg, Oral, Before breakfast    losartan (Cozaar) tab 50 mg, 50 mg, Oral, Daily    pantoprazole (Protonix) DR tab 40 mg, 40 mg, Oral, Before breakfast    Allergies  No Known Allergies    Assessment and Plan:     Weakness generalized       Recurrent falls           July 28, 2024  Patient is stable does not have any new neurologic deficit still is bedbound.  Patient had skeletal survey with multiple osteolytic lesions indicative of myeloma activity.  Urine Bence-Sweeney protein and urine light chains have been ordered.  Hematology consult has been ordered.  Neurologic consultation is pending as well.  Patient is not stable for discharge.  Will discuss with outpatient hematologist.    July 29, 2024  Patient is still bedridden and has minimal transfers from bed to chair.  Discussed patient's skeletal survey findings.  Appreciate hematology recommendations.  Does not have a sensory level and unlikely to be spinal cord compression.  Neurological workup is pending including neurochecks every 4 hours fall precautions delirium precautions.  Will consult rehab for possible placement.     Results:     Lab Results   Component Value Date    WBC 4.2 07/27/2024    HGB 11.8 (L) 07/27/2024    HCT 35.9 (L) 07/27/2024    .0 (L) 07/27/2024     07/27/2024    K 4.4 07/27/2024     07/27/2024    CO2 25.0 07/27/2024    CREATSERUM 1.28  07/27/2024    BUN 21 07/27/2024     (H) 07/27/2024    CA 8.9 07/27/2024    ALB 4.2 07/27/2024    ALKPHO 62 07/27/2024    BILT 1.1 07/27/2024    TP 7.3 07/27/2024    AST 35 (H) 07/27/2024    ALT 72 (H) 07/27/2024    T4F 1.1 07/28/2024    TSH 5.944 (H) 07/28/2024    MG 1.9 07/27/2024    PHOS 3.3 07/28/2024    B12 415 07/28/2024     No results found for this visit on 07/27/24.    CT SPINE THORACIC (CPT=72128)    Result Date: 7/28/2024  CONCLUSION:  1. Osseous structures demonstrate diffusely heterogeneous attenuation due to innumerable small lytic lesions compatible with the history of multiple myeloma. 2. No pathologic fracture. 3. Mild multilevel spondylosis.  No associated neural compromise.    Dictated by (CST): Jean Medellin MD on 7/28/2024 at 6:04 PM     Finalized by (CST): Jean Medellin MD on 7/28/2024 at 6:06 PM          CT SPINE LUMBAR (CPT=72131)    Result Date: 7/28/2024  CONCLUSION:  1. Incidental notation of 6 lumbar type vertebrae, which is a developmental variant. 2. Osseous structures demonstrate diffusely heterogeneous attenuation due to innumerable small lytic lesions compatible with the history of multiple myeloma. 3. No pathologic fracture. 4. Mild multilevel spondylosis with greatest involvement of the mid lumbar spine.  Significant levels detailed below:  5. L4-L5:  Mild central vertebral canal stenosis and bilateral neural foraminal narrowing. 6. L5-L6:  Bilateral neural foraminal narrowing.     Dictated by (CST): Jean Medellin MD on 7/28/2024 at 5:55 PM     Finalized by (CST): Jean Medellin MD on 7/28/2024 at 6:00 PM          CT SPINE CERVICAL (CPT=72125)    Result Date: 7/28/2024  CONCLUSION:  1. Osseous structures demonstrate diffusely heterogeneous attenuation due to innumerable small lytic lesions compatible with the history of multiple myeloma. 2. No pathologic fracture. 3. Mild multilevel spondylosis resulting in mild central vertebral canal stenosis at C4-C5,  C5-C6 and C6-C7.  Neural foraminal narrowing is also seen bilaterally at C5-C6.    Dictated by (CST): Jean Medellin MD on 7/28/2024 at 5:18 PM     Finalized by (CST): Jean Medellin MD on 7/28/2024 at 5:22 PM          XR BONE SURVEY, COMPLETE (CPT=77075)    Result Date: 7/28/2024  CONCLUSION:  1. Diffuse lytic lesions throughout the axial and visualized appendicular skeleton.  Differential considerations include widespread lytic metastases or multiple myeloma; correlate clinically. 2. No pathologic fracture.    Dictated by (CST): Jean Medellin MD on 7/28/2024 at 1:03 PM     Finalized by (CST): Jean Medellin MD on 7/28/2024 at 1:06 PM         EKG 12 Lead    Result Date: 7/29/2024  Normal sinus rhythm Left axis deviation LVH with QRS widening and repolarization abnormality ( R in aVL , Wilmer product ) Cannot rule out Septal infarct (cited on or before 22-JUL-2024) Abnormal ECG When compared with ECG of 27-JUL-2024 19:47, Serial changes of evolving Septal infarct Present Confirmed by VASQUEZ FAY, LAUREN ([id number]) on 7/29/2024 6:59:51 AM             GIOVANNY HOLT MD  7/29/2024

## 2024-07-30 NOTE — PLAN OF CARE
Patient is alert to self only. Reoriented as needed. Room air. Vital signs stable. Remote tele in place. Voiding via male purewick. Carb controlled diet. ACHS accuchecks. Denies pain. Call light and personal belongings within reach. Safety precautions in place.     Problem: Patient Centered Care  Goal: Patient preferences are identified and integrated in the patient's plan of care  Description: Interventions:  - What would you like us to know as we care for you? I live alone  - Provide timely, complete, and accurate information to patient/family  - Incorporate patient and family knowledge, values, beliefs, and cultural backgrounds into the planning and delivery of care  - Encourage patient/family to participate in care and decision-making at the level they choose  - Honor patient and family perspectives and choices  Outcome: Progressing     Problem: Diabetes/Glucose Control  Goal: Glucose maintained within prescribed range  Description: INTERVENTIONS:  - Monitor Blood Glucose as ordered  - Assess for signs and symptoms of hyperglycemia and hypoglycemia  - Administer ordered medications to maintain glucose within target range  - Assess barriers to adequate nutritional intake and initiate nutrition consult as needed  - Instruct patient on self management of diabetes  Outcome: Progressing     Problem: Patient/Family Goals  Goal: Patient/Family Long Term Goal  Description: Patient's Long Term Goal: discharge from the hospital    Interventions:  - See additional Care Plan goals for specific interventions  Outcome: Progressing  Goal: Patient/Family Short Term Goal  Description: Patient's Short Term Goal: feel better    Interventions:   - See additional Care Plan goals for specific interventions  Outcome: Progressing     Problem: PAIN - ADULT  Goal: Verbalizes/displays adequate comfort level or patient's stated pain goal  Description: INTERVENTIONS:  - Encourage pt to monitor pain and request assistance  - Assess pain  using appropriate pain scale  - Administer analgesics based on type and severity of pain and evaluate response  - Implement non-pharmacological measures as appropriate and evaluate response  - Consider cultural and social influences on pain and pain management  - Manage/alleviate anxiety  - Utilize distraction and/or relaxation techniques  - Monitor for opioid side effects  - Notify MD/LIP if interventions unsuccessful or patient reports new pain  - Anticipate increased pain with activity and pre-medicate as appropriate  Outcome: Progressing     Problem: SAFETY ADULT - FALL  Goal: Free from fall injury  Description: INTERVENTIONS:  - Assess pt frequently for physical needs  - Identify cognitive and physical deficits and behaviors that affect risk of falls.  - [place] fall precautions as indicated by assessment.  - Educate pt/family on patient safety including physical limitations  - Instruct pt to call for assistance with activity based on assessment  - Modify environment to reduce risk of injury  - Provide assistive devices as appropriate  - Consider OT/PT consult to assist with strengthening/mobility  - Encourage toileting schedule  Outcome: Progressing     Problem: METABOLIC/FLUID AND ELECTROLYTES - ADULT  Goal: Glucose maintained within prescribed range  Description: INTERVENTIONS:  - Monitor Blood Glucose as ordered  - Assess for signs and symptoms of hyperglycemia and hypoglycemia  - Administer ordered medications to maintain glucose within target range  - Assess barriers to adequate nutritional intake and initiate nutrition consult as needed  - Instruct patient on self management of diabetes  Outcome: Progressing     Problem: SKIN/TISSUE INTEGRITY - ADULT  Goal: Incision(s), wounds(s) or drain site(s) healing without S/S of infection  Description: INTERVENTIONS:  - Assess and document risk factors for pressure ulcer development  - Assess and document skin integrity  - Assess and document dressing/incision,  wound bed, drain sites and surrounding tissue  - Implement wound care per orders  - Initiate isolation precautions as appropriate  - Initiate Pressure Ulcer prevention bundle as indicated  Outcome: Progressing     Problem: Impaired Functional Mobility  Goal: Achieve highest/safest level of mobility/gait  Description: Interventions:  - Assess patient's functional ability and stability  - Promote increasing activity/tolerance for mobility and gait  - Educate and engage patient/family in tolerated activity level and precautions  Outcome: Progressing     Problem: Impaired Activities of Daily Living  Goal: Achieve highest/safest level of independence in self care  Description: Interventions:  - Assess ability and encourage patient to participate in ADLs to maximize function  - Promote sitting position while performing ADLs such as feeding, grooming, and bathing  - Educate and encourage patient/family in tolerated functional activity level and precautions during self-care  Outcome: Progressing     Problem: Impaired Cognition  Goal: Patient will exhibit improved attention, thought processing and/or memory  Description: Interventions:  - Minimize distractions in the room when full attention is required  - Allow additional time for processing after asking questions or providing instructions  Outcome: Progressing

## 2024-07-30 NOTE — CONSULTS
[facility]  part of [facility] Patient Status:  Observation    5/15/1950 MRN [id number]   Location Health system 4W/SW/SE Attending Roberta Butler MD   Hosp Day # 0 PCP No primary care provider on file.       CC: Recurrent falls, MM    History of Present Illness:  74-year-old gentleman w/ history of frequent falls, and had presented to the emergency room 3 days ago, at which time a CT scan of his head was performed which was unrevealing. Was found down on the ground, incontinent. W/u significant for his MM. Seen by Heme Patient had skeletal survey with multiple osteolytic lesions indicative of myeloma activity. Urine Bence-Sweeney protein and urine light chains have been ordered. Hematology consult has been ordered. Neurologic consultation is pending . Also being followed by endo for poorly controlled Dn.    Medications Prior to Admission   Medication Sig Dispense Refill Last Dose    DULoxetine 60 MG Oral Cap DR Particles Take 1 capsule (60 mg total) by mouth daily.   Past Week    losartan 50 MG Oral Tab Take 1 tablet (50 mg total) by mouth daily.   Past Week    Empagliflozin (JARDIANCE) 25 MG Oral Tab Take 25 mg by mouth daily.   Past Week    levothyroxine 25 MCG Oral Tab Take 1 tablet (25 mcg total) by mouth before breakfast.   Past Week    PANTOPRAZOLE SODIUM 40 MG Oral Tab EC TAKE 1 TABLET BY MOUTH EVERY DAY IN THE MORNING ON AN EMPTY STOMACH 90 tablet 0 Past Week    AMLODIPINE BESYLATE 5 MG Oral Tab TAKE 1 TABLET BY MOUTH EVERY DAY( FOR HYPERTENSION) 90 tablet 0      No Known Allergies  Past Medical History:    Abnormal gait    Benign essential hypertension    Cancer (HCC)    Chest pain    Formatting of this note might be different from the original.   Normal stress test 2021      Last Assessment & Plan:    Formatting of this note might be different from the original.   Symptoms are atypical for ischemia   Chest pain is worse with change in position       Diabetes (HCC)    Diabetes mellitus (HCC)    Diabetes mellitus (HCC)    High blood pressure    Hyperglycemia    Hypertension associated with diabetes (HCC)    Last Assessment & Plan:    Formatting of this note might be different from the original.   Blood pressure mildly elevated   Continue same medications for now   Continue to monitor readings      Multiple myeloma (HCC)    Multiple premature ventricular complexes    Formatting of this note might be different from the original.   Normal stress test 11/2021      Echo 8/2021:    1. Left ventricular ejection fraction, by visual estimation, is 60 to 65%.    2. Mild concentric left ventricular hypertrophy.    3. Normal pattern of LV diastolic filling.    4. Mild aortic valve stenosis.         Last Assessment & Plan:    Formatting of this note might be different fro    Neuropathy    Nonrheumatic aortic valve stenosis    Formatting of this note might be different from the original.   Echo 8/2021:    1. Left ventricular ejection fraction, by visual estimation, is 60 to 65%.    2. Mild concentric left ventricular hypertrophy.    3. Normal pattern of LV diastolic filling.    4. Mild aortic valve stenosis.         Last Assessment & Plan:    Formatting of this note might be different from the original.   Mild aortic mu    Rash    Type 2 diabetes mellitus without complication, without long-term current use of insulin (HCC)    Vomiting     History reviewed. No pertinent surgical history.  Social History     Socioeconomic History    Marital status: Single     Spouse name: Not on file    Number of children: Not on file    Years of education: Not on file    Highest education level: Not on file   Occupational History    Not on file   Tobacco Use    Smoking status: Never    Smokeless tobacco: Never   Vaping Use    Vaping status: Never Used   Substance and Sexual Activity    Alcohol use: Never    Drug use: Never    Sexual activity: Not on file   Other Topics Concern    Not on file    Social History Narrative    Not on file     Social Determinants of Health     Financial Resource Strain: Not on file   Food Insecurity: No Food Insecurity (7/28/2024)    Food Insecurity     Food Insecurity: Never true   Transportation Needs: No Transportation Needs (7/28/2024)    Transportation Needs     Lack of Transportation: No     Car Seat: Not on file   Physical Activity: Not on file   Stress: Not on file   Social Connections: Not on file   Housing Stability: Low Risk  (7/28/2024)    Housing Stability     Housing Instability: No     Housing Instability Emergency: Not on file     Crib or Bassinette: Not on file     History reviewed. No pertinent family history.    PAIN SCALE: Denies    ACP: Full code     Review of Systems  Pertinent items are noted in HPI.    Physical Exam  Temp:  [98 °F (36.7 °C)-98.6 °F (37 °C)] 98 °F (36.7 °C)  Pulse:  [69-93] 80  Resp:  [19-20] 20  BP: (137-148)/(85-96) 142/85  SpO2:  [95 %-97 %] 97 %  97%    I/O last 3 completed shifts:  In: 120 [P.O.:120]  Out: 1500 [Urine:1500]  No intake/output data recorded.    CVS- S1 S2 RRR no ankle edema b/l le  EXT - WTT ,peripheral pulses are palpable  LN - NPN neck or groin  SKIN - intact  MSK - moving all 4 limbs, no localzing sings.        Previous Function:    Type of Home: House  Home Layout: Able to live on main level;One level  Stairs to Enter : 2  Railing: Yes  Lives With: Alone  Drives: No  Patient Owned Equipment: None     Wife does not live with him.     CURRENT FUNCTION:  AM-PAC Score:  Raw Score: 18   Approx Degree of Impairment: 46.58%   Standardized Score (AM-PAC Scale): 43.63   CMS Modifier (G-Code): CK     FUNCTIONAL ABILITY STATUS  Functional Mobility/Gait Assessment  Gait Assistance: Minimum assistance  Distance (ft): 100  Assistive Device: Rolling walker  Pattern:  (unsteady gait veering to the L side 2\" in a 10' path retropulsive elevated fall risk > 80% on [place] balance scale)  Rolling: minimal assist  Supine to Sit: minimal  assist  Sit to Supine: minimal assist  Sit to Stand: minimal assist    Labs  Lab Results   Component Value Date    WBC 4.8 07/30/2024    HGB 13.3 07/30/2024    HCT 39.1 07/30/2024    MCV 78.7 (L) 07/30/2024    .0 (L) 07/30/2024     No results found for: \"PTT\"  No results found for: \"PROTIME\"  Lab Results   Component Value Date     07/30/2024    K 3.3 (L) 07/30/2024    CO2 24.0 07/30/2024     07/30/2024    BUN 23 07/30/2024       Radiology:  CT SPINE THORACIC (CPT=72128)    Result Date: 7/28/2024  PROCEDURE: CT SPINE THORACIC (CPT=72128)  COMPARISON: [facility], CT SPINE LUMBAR (CPT=72131), 7/28/2024, 4:32 PM.  [facility], CT SPINE CERVICAL (CPT=72125), 7/28/2024, 4:27 PM.  [facility], XR BONE SURVEY, COMPLETE (CPT=77075), 7/28/2024, 12:18 PM.  [facility], CT FACIAL BONES (CPT=70486), 7/22/2024, 9:15 PM.  [facility], CT BRAIN OR HEAD (CPT=70450), 7/22/2024, 9:15 PM.  INDICATIONS: Recurrent falls. Evaluate for myelopathy. History of multiple myeloma. Evaluate for lytic lesions  TECHNIQUE: Multidetector CT images of the thoracic spine were obtained without the infusion of non-ionic intravenous contrast material. Automated exposure control for dose reduction was used. Adjustment of the mA and/or kV was done based on the patient's  size. Iterative reconstruction technique for dose reduction was employed. Dose information was transmitted to the ACR (American College of Radiology) NRDR (National Radiology Data Registry), which includes the Dose Index Registry.   FINDINGS: ALIGNMENT: The thoracic kyphosis is preserved.  The thoracic spine is well aligned. BONES: Innumerable lytic lesions are seen throughout the visualized osseous structures, which demonstrate diffusely heterogeneous attenuation.  No fracture or dislocation. DISC LEVELS: Disc degeneration, disc space narrowing and mild endplate spurring are scattered throughout  the thoracic spine.  No central vertebral canal or neural foraminal stenosis. PARASPINAL AREA: No visible mass.  OTHER: There is no visible swelling of the prevertebral soft tissues.  Moderate calcific atherosclerosis involving the visualized thoracic aorta.  The visualized aspects of both lungs are clear.         CONCLUSION:  1. Osseous structures demonstrate diffusely heterogeneous attenuation due to innumerable small lytic lesions compatible with the history of multiple myeloma. 2. No pathologic fracture. 3. Mild multilevel spondylosis.  No associated neural compromise.    Dictated by (CST): Jean Medellin MD on 7/28/2024 at 6:04 PM     Finalized by (CST): Jean Medellin MD on 7/28/2024 at 6:06 PM          CT SPINE LUMBAR (CPT=72131)    Result Date: 7/28/2024  PROCEDURE: CT SPINE LUMBAR (CPT=72131)  COMPARISON: [facility], CT SPINE CERVICAL (CPT=72125), 7/28/2024, 4:27 PM.  [facility], XR BONE SURVEY, COMPLETE (CPT=77075), 7/28/2024, 12:18 PM.  [facility], CT FACIAL BONES (CPT=70486), 7/22/2024, 9:15 PM.  [facility], CT BRAIN OR HEAD (CPT=70450), 7/22/2024, 9:15 PM.  INDICATIONS: Recurrent falls.  History of multiple myeloma.  TECHNIQUE: Multidetector CT images of the lumbar spine were obtained without the infusion of non-ionic intravenous contrast material. Automated exposure control for dose reduction was used. Adjustment of the mA and/or kV was done based on the patient's size. Iterative reconstruction technique for dose reduction was employed. Dose information was transmitted to the ACR (American College of Radiology) NRDR (National Radiology Data Registry), which includes the Dose Index Registry.   FINDINGS: ALIGNMENT: The lumbar lordosis is preserved.  The lumbar spine is well aligned. BONES: There are 6 non-rib-bearing lumbar type vertebrae.  Innumerable lytic lesions are seen throughout the visualized osseous structures, which  demonstrate diffusely heterogeneous attenuation.  No fracture or dislocation. DISC LEVELS: Disc degeneration and mild disc space narrowing are seen at multiple levels. PARASPINAL AREA: No visible mass.  OTHER: There is no visible swelling of the prevertebral soft tissues.  Severe atherosclerotic calcifications noted.  LUMBAR DISC LEVELS: L1-L2: No significant disc/facet abnormality, spinal stenosis, or foraminal stenosis.  L2-L3: No significant disc/facet abnormality, spinal stenosis, or foraminal stenosis.  L3-L4: Mild generalized circumferential disc bulge.  No central vertebral canal or neural foraminal stenosis. L4-L5: Mild generalized circumferential disc bulge and posterior epidural lipomatosis.  Mild central vertebral canal stenosis and bilateral neural foraminal narrowing. L5-L6: Mild generalized circumferential disc bulge with posterior epidural lipomatosis and mild facet joint hypertrophy, right greater than left.  No central vertebral canal stenosis.  Neural foraminal narrowing bilaterally. L6-S1:  Rudimentary disc space with mild bilateral facet joint hypertrophy.  No central vertebral canal or neural foraminal stenosis.         CONCLUSION:  1. Incidental notation of 6 lumbar type vertebrae, which is a developmental variant. 2. Osseous structures demonstrate diffusely heterogeneous attenuation due to innumerable small lytic lesions compatible with the history of multiple myeloma. 3. No pathologic fracture. 4. Mild multilevel spondylosis with greatest involvement of the mid lumbar spine.  Significant levels detailed below:  5. L4-L5:  Mild central vertebral canal stenosis and bilateral neural foraminal narrowing. 6. L5-L6:  Bilateral neural foraminal narrowing.     Dictated by (CST): Jean Medellin MD on 7/28/2024 at 5:55 PM     Finalized by (CST): Jean Medellin MD on 7/28/2024 at 6:00 PM          CT SPINE CERVICAL (CPT=72125)    Result Date: 7/28/2024  PROCEDURE: CT SPINE CERVICAL (CPT=72125)   COMPARISON: [facility], XR BONE SURVEY, COMPLETE (CPT=77075), 7/28/2024, 12:18 PM.  [facility], CT FACIAL BONES (CPT=70486), 7/22/2024, 9:15 PM.  [facility], CT BRAIN OR HEAD (CPT=70450), 7/22/2024, 9:15 PM.  INDICATIONS: Recurrent falls.  History of multiple myeloma.  TECHNIQUE: Multidetector CT images of the cervical spine were obtained without the infusion of non-ionic intravenous contrast material. Automated exposure control for dose reduction was used. Adjustment of the mA and/or kV was done based on the patient's  size. Iterative reconstruction technique for dose reduction was employed. Dose information was transmitted to the ACR (American College of Radiology) NRDR (National Radiology Data Registry), which includes the Dose Index Registry.   FINDINGS: ALIGNMENT: There is preservation of the normal cervical lordosis.  There is grade I degenerative anterolisthesis of C6 on C7.  The cervical spine is otherwise well aligned. BONES: There is no acute fracture or dislocation.  Innumerable lytic lesions are seen throughout the visualized osseous structures, which demonstrate diffusely heterogeneous attenuation. CRANIOCERVICAL AREA: Normal foramen magnum without Chiari malformation.  CERVICAL DISC LEVELS: There is degeneration between the anterior arch of C1 and the odontoid process. There is no rotational abnormality of the atlantoaxial articulation.  Disc degeneration, loss of disc space height, mild uncovertebral joint enlargement  and mild facet joint hypertrophy are noted throughout the cervical spine.  There is a mild posterior disc protrusion at C4-C5 resulting in mild central vertebral canal stenosis.  There is a mild posterior disc osteophyte complex at C5-C6 resulting in mild central vertebral canal stenosis.  There is neural foraminal narrowing bilaterally at this level.  There is a mild posterior disc protrusion at C6-C7 resulting in mild central vertebral  canal stenosis. PARASPINAL AREA: No visible mass.  OTHER: There is no visible swelling of the prevertebral soft tissues.  Mild atherosclerotic calcifications are noted in the carotid bifurcations.         CONCLUSION:  1. Osseous structures demonstrate diffusely heterogeneous attenuation due to innumerable small lytic lesions compatible with the history of multiple myeloma. 2. No pathologic fracture. 3. Mild multilevel spondylosis resulting in mild central vertebral canal stenosis at C4-C5, C5-C6 and C6-C7.  Neural foraminal narrowing is also seen bilaterally at C5-C6.    Dictated by (CST): Jean Medellin MD on 7/28/2024 at 5:18 PM     Finalized by (CST): Jean Medellin MD on 7/28/2024 at 5:22 PM          XR BONE SURVEY, COMPLETE (CPT=77075)    Result Date: 7/28/2024  PROCEDURE: XR BONE SURVEY, COMPLETE (CPT=77075)  COMPARISON: [facility], CT FACIAL BONES (CPT=70486), 7/22/2024, 9:15 PM.  [facility], CT BRAIN OR HEAD (CPT=70450), 7/22/2024, 9:15 PM.  INDICATIONS: Generalized weakness; myeloma.  TECHNIQUE: Radiographic survey of the axial and appendicular skeleton was performed.   FINDINGS:  SKULL: Innumerable ill-defined lytic lesions are again noted.  The sella turcica is normal in size.  VERTEBRA: Innumerable ill-defined lytic lesions are noted.  RIBS: Innumerable ill-defined lytic lesions are noted.  BONY PELVIS: Innumerable ill-defined lytic lesions are noted.  HUMERI: Innumerable ill-defined lytic lesions are noted.  FEMORA: Innumerable ill-defined lytic lesions are noted.  OTHER:   No pathologic fractures.  Mild spondylotic changes throughout the cervical, thoracic and lumbar spine.  Atherosclerotic calcification in the aortic knob, abdominal aorta and pelvis bilaterally.         CONCLUSION:  1. Diffuse lytic lesions throughout the axial and visualized appendicular skeleton.  Differential considerations include widespread lytic metastases or multiple myeloma; correlate  clinically. 2. No pathologic fracture.    Dictated by (CST): Jean Medellin MD on 7/28/2024 at 1:03 PM     Finalized by (CST): Jean Medellin MD on 7/28/2024 at 1:06 PM          US GALLBLADDER (CPT=76705)    Result Date: 7/28/2024  PROCEDURE: US GALLBLADDER (CPT=76705)  COMPARISON: None.  INDICATIONS: Fall  TECHNIQUE:   The gallbladder was evaluated with grayscale ultrasound.   FINDINGS:  GALLBLADDER:   Gallbladder has a physiologic degree of distention without echogenic shadowing stones, wall thickening, pericholecystic fluid, or sonographic Sanchez's sign. BILE DUCTS:   Normal.  Common bile duct measures 6-mm.  OTHER:   Pancreas is obscured by overlying bowel.  The liver has normal size at 14.3 cm in length.  It is diffusely echogenic.  Normal contour without suspicious hepatic lesion or biliary ductal dilatation.  Visualized right kidney is without hydronephrosis.         CONCLUSION:  1.  Normal ultrasound appearance of the gallbladder.  No evidence of acute cholecystitis.  No biliary ductal dilatation. 2.  Echogenic liver compatible with fatty infiltration.   [facility] provided a preliminary report for this examination. This final report agrees with their preliminary findings.   Dictated by (CST): Jayson Au MD on 7/28/2024 at 9:03 AM     Finalized by (CST): Jayson Au MD on 7/28/2024 at 9:04 AM          CT BRAIN OR HEAD (16802)    Result Date: 7/22/2024  PROCEDURE: CT BRAIN OR HEAD (CPT=70450), 7/22/2024, 9:15 PM CT FACIAL BONES (CPT=70486), 7/22/2024, 9:15 PM  COMPARISON: None.  INDICATIONS: Weakness, fall.  TECHNIQUE: Helical CT of the head with axial coronal and sagittal reconstructions.  Helical CT of the facial bones with axial sagittal and coronal reconstructions.  CT images were obtained without contrast material.  Automated exposure control for dose reduction was used.  Dose information is transmitted to the ACR (American College of Radiology) NRDR (National Radiology Data Registry)  which includes the Dose Index Registry.  FINDINGS CT HEAD:   VENTRICLES: No hydrocephalus.  EXTRA-AXIAL: No extraaxial hemorrhage.  No midline shift or herniation.  PARENCHYMA: No CT evidence of acute or subacute infarct.  No mass.  Mild generalized parenchymal volume loss.  Mild periventricular white matter hypodensity.  Gray-white matter differentiation is maintained.  SOFT TISSUES: No acute abnormality. BONES: No acute fracture.  Heterogeneous osteopenic bone marrow.   FINDINGS CT FACIAL BONES:  BONES: No acute fracture.  Heterogeneous osteopenic bone marrow. SINUSES: Clear. ORBITS: No acute abnormality. MASTOIDS: Clear. EACS: Clear. SOFT TISSUES: No fluid collection or hematoma.          CONCLUSION:   1. No acute intracranial abnormality.  No calvarial fracture.  2. No acute fracture of the facial bones.  No acute soft tissue injury.  3. Heterogeneous bone marrow which may simply be due to marked osteopenia but metastasis or multiple myeloma is not excluded.    elm-remote     Dictated by (CST): Mango Oglesby MD on 7/22/2024 at 9:55 PM     Finalized by (CST): Mango Oglesby MD on 7/22/2024 at 9:59 PM          CT FACIAL BONES (CPT=70486)    Result Date: 7/22/2024  PROCEDURE: CT BRAIN OR HEAD (CPT=70450), 7/22/2024, 9:15 PM CT FACIAL BONES (CPT=70486), 7/22/2024, 9:15 PM  COMPARISON: None.  INDICATIONS: Weakness, fall.  TECHNIQUE: Helical CT of the head with axial coronal and sagittal reconstructions.  Helical CT of the facial bones with axial sagittal and coronal reconstructions.  CT images were obtained without contrast material.  Automated exposure control for dose reduction was used.  Dose information is transmitted to the ACR (American College of Radiology) NRDR (National Radiology Data Registry) which includes the Dose Index Registry.  FINDINGS CT HEAD:   VENTRICLES: No hydrocephalus.  EXTRA-AXIAL: No extraaxial hemorrhage.  No midline shift or herniation.  PARENCHYMA: No CT evidence of acute or subacute  infarct.  No mass.  Mild generalized parenchymal volume loss.  Mild periventricular white matter hypodensity.  Gray-white matter differentiation is maintained.  SOFT TISSUES: No acute abnormality. BONES: No acute fracture.  Heterogeneous osteopenic bone marrow.   FINDINGS CT FACIAL BONES:  BONES: No acute fracture.  Heterogeneous osteopenic bone marrow. SINUSES: Clear. ORBITS: No acute abnormality. MASTOIDS: Clear. EACS: Clear. SOFT TISSUES: No fluid collection or hematoma.          CONCLUSION:   1. No acute intracranial abnormality.  No calvarial fracture.  2. No acute fracture of the facial bones.  No acute soft tissue injury.  3. Heterogeneous bone marrow which may simply be due to marked osteopenia but metastasis or multiple myeloma is not excluded.    elm-remote     Dictated by (CST): Mango Oglesby MD on 7/22/2024 at 9:55 PM     Finalized by (CST): Mango Oglesby MD on 7/22/2024 at 9:59 PM          [unfilled]    Assessment    Patient Active Problem List   Diagnosis    Weakness generalized    Recurrent falls    Uncontrolled type 2 diabetes mellitus with hyperglycemia (HCC)       Plan     Cont PT,OT  Neuropahty - Multifactorial, most likely contributing to falls  Neuro eval pending  DM Mx  Appropriate for skilled care given history.     Thank you for this consultation and allowing me to participate in this patients care.      COY LOU MD    7/30/2024    8:56 AM

## 2024-07-30 NOTE — PROGRESS NOTES
[facility]  part of [facility]    Progress Note    Joselin Graham Patient Status:  Observation    5/15/1950 MRN [id number]   Location [facility] 4W/SW/SE Attending Roberta Butler MD   Hosp Day # 0 PCP No primary care provider on file.     Subjective:   Joselin Graham is a(n) 74 year old male      Discussed with Pt and Rn   BG is high       Objective:   Vital Signs:  Blood pressure 142/85, pulse 80, temperature 98 °F (36.7 °C), temperature source Oral, resp. rate 20, height 5' 5\" (1.651 m), weight 143 lb 12.8 oz (65.2 kg), SpO2 97%.                    General: Awake and alert.    HENT: Eye: EOMI,    Neck/Thyroid: neck inspection: normal   Lungs:  Speaking full sentences  MSK: Moves extremities spontaneously.    Neuro:speech is clear.   Skin: Skin is dry       Assessment and Plan:     Patient Active Problem List   Diagnosis    Weakness generalized    Recurrent falls    Uncontrolled type 2 diabetes mellitus with hyperglycemia (HCC)       Myeloma with diffuse osteolytic bone lesions.   Hypothyroid on LT4 25 mcg   Uncontrolled complicated DM   Confused       . Uncontrolled complicated Diabetes Mellitus Type 2,   - Discussed importance of glycemic control  - Tresiba 12 ->15 units subcutaneous daily   - Novolog 4 ->6 units subcutaneous TID with meals  - Novolog medium correction scale ACHS   - carb controlled diet if ok to eat  - Hypoglycemia protocol        . Hypothyroid on LT4 25 mcg , TSH 5.9      Thank you for allowing me to participate in the care of your patient.  D/w Pt and Rn     Results:     Lab Results   Component Value Date    WBC 4.8 2024    HGB 13.3 2024    HCT 39.1 2024    .0 (L) 2024    CREATSERUM 1.20 2024    BUN 23 2024     2024    K 3.3 (L) 2024     2024    CO2 24.0 2024     (H) 2024    CA 8.3 (L) 2024    ALB 4.2 2024    ALKPHO 69 2024    BILT 1.2 (H) 2024     TP 7.3 07/30/2024    AST 24 07/30/2024    ALT 78 (H) 07/30/2024    T4F 1.1 07/28/2024    TSH 5.944 (H) 07/28/2024    MG 1.9 07/30/2024    PHOS 3.3 07/28/2024    B12 415 07/28/2024       CT SPINE THORACIC (CPT=72128)    Result Date: 7/28/2024  CONCLUSION:  1. Osseous structures demonstrate diffusely heterogeneous attenuation due to innumerable small lytic lesions compatible with the history of multiple myeloma. 2. No pathologic fracture. 3. Mild multilevel spondylosis.  No associated neural compromise.    Dictated by (CST): Jean Medellin MD on 7/28/2024 at 6:04 PM     Finalized by (CST): Jean Medellin MD on 7/28/2024 at 6:06 PM          CT SPINE LUMBAR (CPT=72131)    Result Date: 7/28/2024  CONCLUSION:  1. Incidental notation of 6 lumbar type vertebrae, which is a developmental variant. 2. Osseous structures demonstrate diffusely heterogeneous attenuation due to innumerable small lytic lesions compatible with the history of multiple myeloma. 3. No pathologic fracture. 4. Mild multilevel spondylosis with greatest involvement of the mid lumbar spine.  Significant levels detailed below:  5. L4-L5:  Mild central vertebral canal stenosis and bilateral neural foraminal narrowing. 6. L5-L6:  Bilateral neural foraminal narrowing.     Dictated by (CST): Jean Medellin MD on 7/28/2024 at 5:55 PM     Finalized by (CST): Jean Medellin MD on 7/28/2024 at 6:00 PM          CT SPINE CERVICAL (CPT=72125)    Result Date: 7/28/2024  CONCLUSION:  1. Osseous structures demonstrate diffusely heterogeneous attenuation due to innumerable small lytic lesions compatible with the history of multiple myeloma. 2. No pathologic fracture. 3. Mild multilevel spondylosis resulting in mild central vertebral canal stenosis at C4-C5, C5-C6 and C6-C7.  Neural foraminal narrowing is also seen bilaterally at C5-C6.    Dictated by (CST): Jean Medellin MD on 7/28/2024 at 5:18 PM     Finalized by (CST): Jean Medellin MD on  7/28/2024 at 5:22 PM          XR BONE SURVEY, COMPLETE (CPT=77075)    Result Date: 7/28/2024  CONCLUSION:  1. Diffuse lytic lesions throughout the axial and visualized appendicular skeleton.  Differential considerations include widespread lytic metastases or multiple myeloma; correlate clinically. 2. No pathologic fracture.    Dictated by (CST): Jean Medellin MD on 7/28/2024 at 1:03 PM     Finalized by (CST): Jean Medellin MD on 7/28/2024 at 1:06 PM         EKG 12 Lead    Result Date: 7/29/2024  Normal sinus rhythm Left axis deviation LVH with QRS widening and repolarization abnormality ( R in aVL , Wilmer product ) Cannot rule out Septal infarct (cited on or before 22-JUL-2024) Abnormal ECG When compared with ECG of 27-JUL-2024 19:47, Serial changes of evolving Septal infarct Present Confirmed by VASQUEZ FAY, LAUREN ([id number]) on 7/29/2024 6:59:51 AM           Michelle Calvillo MD  7/30/2024        DOS is the same date the note was signed

## 2024-07-31 LAB
GLUCOSE BLDC GLUCOMTR-MCNC: 130 MG/DL (ref 70–99)
GLUCOSE BLDC GLUCOMTR-MCNC: 152 MG/DL (ref 70–99)
GLUCOSE BLDC GLUCOMTR-MCNC: 196 MG/DL (ref 70–99)
GLUCOSE BLDC GLUCOMTR-MCNC: 209 MG/DL (ref 70–99)
KAPPA LC FREE SER-MCNC: 4.34 MG/DL (ref 0.33–1.94)
KAPPA LC FREE/LAMBDA FREE SER NEPH: 1.46 {RATIO} (ref 0.26–1.65)
LAMBDA LC FREE SERPL-MCNC: 2.97 MG/DL (ref 0.57–2.63)

## 2024-07-31 PROCEDURE — 99233 SBSQ HOSP IP/OBS HIGH 50: CPT | Performed by: INTERNAL MEDICINE

## 2024-07-31 NOTE — PLAN OF CARE
Patient A/Ox3. Confused about place. Remote tele, Room air. No complains of pain. AC/HS. Carb controlled, reg diet tolerated well. Voids via urinal, needs remainder about using the bathroom. Bladder scanned. 1-2 assist , walker, contact guard when ambulating. Unsteady gait. Bed alarm in place. Plan pending, waiting place choice per family. Call light, safety measures in place.   Problem: Patient Centered Care  Goal: Patient preferences are identified and integrated in the patient's plan of care  Description: Interventions:  - What would you like us to know as we care for you? I live alone  - Provide timely, complete, and accurate information to patient/family  - Incorporate patient and family knowledge, values, beliefs, and cultural backgrounds into the planning and delivery of care  - Encourage patient/family to participate in care and decision-making at the level they choose  - Honor patient and family perspectives and choices  Outcome: Progressing

## 2024-07-31 NOTE — PROGRESS NOTES
[facility]  part of [facility]    Progress Note    Joselin Graham Patient Status:  Observation    5/15/1950 MRN [id number]   Location [facility] 4W/SW/SE Attending Roberta Butler MD   Hosp Day # 0 PCP No primary care provider on file.     Subjective:   Joselin Graham is a(n) 74 year old male      BG is better this am  Was eating BRKFST   D/w pt and Rn     Objective:   Vital Signs:  Blood pressure 153/82, pulse 70, temperature 97.6 °F (36.4 °C), temperature source Oral, resp. rate 18, height 5' 5\" (1.651 m), weight 143 lb 12.8 oz (65.2 kg), SpO2 95%.                    General: Awake and alert.    HENT: Eye: EOMI,    Neck/Thyroid: neck inspection: normal   Lungs:  Speaking full sentences  MSK: Moves extremities spontaneously.    Neuro:speech is clear.   Skin: Skin is dry       Assessment and Plan:     Patient Active Problem List   Diagnosis    Weakness generalized    Recurrent falls    Uncontrolled type 2 diabetes mellitus with hyperglycemia (HCC)       Myeloma with diffuse osteolytic bone lesions.   Hypothyroid on LT4 25 mcg   Uncontrolled complicated DM         . Uncontrolled complicated Diabetes Mellitus Type 2,   - Discussed importance of glycemic control  - Tresiba  15 units subcutaneous daily   - Novolog  6 units subcutaneous TID with meals  - Novolog high correction scale ACHS   - carb controlled diet if ok to eat  - Hypoglycemia protocol        . Hypothyroid on LT4 25 mcg , TSH 5.9      Thank you for allowing me to participate in the care of your patient.  D/w Pt and Rn     Results:     Lab Results   Component Value Date    WBC 4.8 2024    HGB 13.3 2024    HCT 39.1 2024    .0 (L) 2024    CREATSERUM 1.20 2024    BUN 23 2024     2024    K 3.3 (L) 2024     2024    CO2 24.0 2024     (H) 2024    CA 8.3 (L) 2024    ALB 4.2 2024    ALKPHO 69 2024    BILT 1.2 (H) 2024     TP 7.3 07/30/2024    AST 24 07/30/2024    ALT 78 (H) 07/30/2024    T4F 1.1 07/28/2024    TSH 5.944 (H) 07/28/2024    MG 1.9 07/30/2024    PHOS 3.3 07/28/2024    B12 415 07/28/2024       No results found.              Michelle Calvillo MD  7/31/2024        DOS is the same date the note was signed

## 2024-07-31 NOTE — CM/SW NOTE
Department  asked to send updates Chana ID case ID #[id number].     OT note sent via portal, per LSW (MS)    Assigned SW/CM to follow up with patient/family on discharge plan.     Lindy Weston DSC

## 2024-07-31 NOTE — OCCUPATIONAL THERAPY NOTE
OCCUPATIONAL THERAPY TREATMENT NOTE - INPATIENT        Room Number: 455/455-A     Presenting Problem: 73 yo M with PMH DM, HTN presenting with generalized weakness/malaise for some time, worse recently including fall five days ago with grossly unrevealing ED evaluation now with recurrent falls    Problem List  Principal Problem:    Weakness generalized  Active Problems:    Recurrent falls    Uncontrolled type 2 diabetes mellitus with hyperglycemia (HCC)      OCCUPATIONAL THERAPY ASSESSMENT   Patient demonstrates good  progress this session, goals remain in progress.    Patient continues to function below baseline with ADLs and fx mobility/transfers.  Contributing factors to remaining limitations include decreased functional strength, decreased endurance, impaired balance, decreased muscular endurance, and decreased safety awareness.  Next session anticipate patient to progress transfers, dynamic standing balance, and functional standing tolerance.  Occupational Therapy will continue to follow patient for duration of hospitalization.    Patient continues to benefit from continued skilled OT services: to promote return to prior level of function and safety with continuous assistance and gradual rehabilitative therapy.     PLAN  OT Treatment Plan: Balance activities;Energy conservation/work simplification techniques;ADL training;IADL training;Functional transfer training;UE strengthening/ROM;Endurance training;Cognitive reorientation;Patient/Family education  OT Device Recommendations: TBD    SUBJECTIVE  Patient agreeable to OT treatment session.    OBJECTIVE  Precautions: None (patient in chair upon therapist arrival w/o alarm)    WEIGHT BEARING RESTRICTION  None    PAIN ASSESSMENT  Ratin    ACTIVITIES OF DAILY LIVING ASSESSMENT  AM-PAC ‘6-Clicks’ Inpatient Daily Activity Short Form  How much help from another person does the patient currently need…  -   Putting on and taking off regular lower body clothing?: A  Little  -   Bathing (including washing, rinsing, drying)?: A Little  -   Toileting, which includes using toilet, bedpan or urinal? : A Little  -   Putting on and taking off regular upper body clothing?: A Little  -   Taking care of personal grooming such as brushing teeth?: A Little  -   Eating meals?: None    AM-PAC Score:  Score: 19  Approx Degree of Impairment: 42.8%  Standardized Score (AM-PAC Scale): 40.22  CMS Modifier (G-Code): CK    BED MOBILITY  Not Tested - patient in chair upon therapist arrival     FUNCTIONAL TRANSFER ASSESSMENT  Sit to Stand: Min assist  Toilet Transfer: CGA  Chair Transfer: CGA  Comments:  Patient unsteady during initial sit>stand transfer.    FUNCTIONAL MOBILITY  CGA for in-room fx mobility using RW  Comments:  Patient benefited from repeat cues for staying within frame of RW.    FUNCTIONAL ADL ASSESSMENT  Grooming: stand-by assist in standing at sink  LB Dressing: contact guard assist - demo'd ability to perform figure-4 with B LE to facilitate task  Toileting: supervision seated    EDUCATION PROVIDED  Patient: Role of Occupational Therapy; Plan of Care; Discharge Recommendations; Functional Transfer Techniques; Fall Prevention; Posture/Positioning; Compensatory ADL Techniques; Proper Body Mechanics  Patient's Response to Education: Verbalized Understanding    The patient's Approx Degree of Impairment: 42.8% has been calculated based on documentation in the [facility] '6 clicks' Inpatient Daily Activity Short Form.  Research supports that patients with this level of impairment may benefit from  OT.  Final disposition will be made by interdisciplinary medical team.    Patient End of Session: Up in chair;Needs met;RN aware of session/findings;Call light within reach;All patient questions and concerns addressed    OT Goals:  Patient's self stated goal is: to return home     Patient will complete functional transfer with supervision   Comment: ongoing    Patient will complete toileting with  supervision  Comment: ongoing    Patient will tolerate standing for 15 minutes in prep for adls with CGA   Comment: ongoing    Patient will complete item retrieval with supervision   Comment: ongoing          Goals  on: 8/10/24  Frequency: 3-5x a week     OT Session Time  Self-Care Home Management: 23 minutes    Marlin Rucker OTR/L  [facility]  #[id number]

## 2024-07-31 NOTE — PLAN OF CARE
Joselin is alert and oriented x4, orientation fluctuates, still confused at times, can answer orientation questions when asked. On remote tele and RA. No complaints of NV. ACHS blood glucose monitoring. Primofit in place. In the morning, canister for primofit was dried and no output, this RN bladder scanned pt and got value of greater than 741 ml in bladder, this RN and PCT assisted pt to bathroom, 1-2x-assist/stand-by with walker contact guard, has unsteady gait, and pt voided 850 ml in urinal. Bed alarm in place. Wound to right upper lip, sutures in place. Pain managed. Updated with patient's brother-in-law about status and will endorse to day shift RN and team regarding patient's brother-in-law concerns and wishes going forward for plan, brother-in-law wishes for LTC placement for patient and to discuss with social work about situation, will relay message to day team to communicate with case management. Plan pending placement and rehab, plan pending course, safety measures in place, call light within reach.      Problem: Patient Centered Care  Goal: Patient preferences are identified and integrated in the patient's plan of care  Description: Interventions:  - What would you like us to know as we care for you? I live alone  - Provide timely, complete, and accurate information to patient/family  - Incorporate patient and family knowledge, values, beliefs, and cultural backgrounds into the planning and delivery of care  - Encourage patient/family to participate in care and decision-making at the level they choose  - Honor patient and family perspectives and choices  Outcome: Progressing     Problem: Diabetes/Glucose Control  Goal: Glucose maintained within prescribed range  Description: INTERVENTIONS:  - Monitor Blood Glucose as ordered  - Assess for signs and symptoms of hyperglycemia and hypoglycemia  - Administer ordered medications to maintain glucose within target range  - Assess barriers to adequate nutritional  intake and initiate nutrition consult as needed  - Instruct patient on self management of diabetes  Outcome: Progressing     Problem: Patient/Family Goals  Goal: Patient/Family Long Term Goal  Description: Patient's Long Term Goal: discharge from the hospital    Interventions:  - See additional Care Plan goals for specific interventions  Outcome: Progressing  Goal: Patient/Family Short Term Goal  Description: Patient's Short Term Goal: feel better    Interventions:   - See additional Care Plan goals for specific interventions  Outcome: Progressing     Problem: PAIN - ADULT  Goal: Verbalizes/displays adequate comfort level or patient's stated pain goal  Description: INTERVENTIONS:  - Encourage pt to monitor pain and request assistance  - Assess pain using appropriate pain scale  - Administer analgesics based on type and severity of pain and evaluate response  - Implement non-pharmacological measures as appropriate and evaluate response  - Consider cultural and social influences on pain and pain management  - Manage/alleviate anxiety  - Utilize distraction and/or relaxation techniques  - Monitor for opioid side effects  - Notify MD/LIP if interventions unsuccessful or patient reports new pain  - Anticipate increased pain with activity and pre-medicate as appropriate  Outcome: Progressing     Problem: SAFETY ADULT - FALL  Goal: Free from fall injury  Description: INTERVENTIONS:  - Assess pt frequently for physical needs  - Identify cognitive and physical deficits and behaviors that affect risk of falls.  - [place] fall precautions as indicated by assessment.  - Educate pt/family on patient safety including physical limitations  - Instruct pt to call for assistance with activity based on assessment  - Modify environment to reduce risk of injury  - Provide assistive devices as appropriate  - Consider OT/PT consult to assist with strengthening/mobility  - Encourage toileting schedule  Outcome: Progressing     Problem:  METABOLIC/FLUID AND ELECTROLYTES - ADULT  Goal: Glucose maintained within prescribed range  Description: INTERVENTIONS:  - Monitor Blood Glucose as ordered  - Assess for signs and symptoms of hyperglycemia and hypoglycemia  - Administer ordered medications to maintain glucose within target range  - Assess barriers to adequate nutritional intake and initiate nutrition consult as needed  - Instruct patient on self management of diabetes  Outcome: Progressing  Goal: Electrolytes maintained within normal limits  Description: INTERVENTIONS:  - Monitor labs and rhythm and assess patient for signs and symptoms of electrolyte imbalances  - Administer electrolyte replacement as ordered  - Monitor response to electrolyte replacements, including rhythm and repeat lab results as appropriate  - Fluid restriction as ordered  - Instruct patient on fluid and nutrition restrictions as appropriate  Outcome: Progressing     Problem: SKIN/TISSUE INTEGRITY - ADULT  Goal: Incision(s), wounds(s) or drain site(s) healing without S/S of infection  Description: INTERVENTIONS:  - Assess and document risk factors for pressure ulcer development  - Assess and document skin integrity  - Assess and document dressing/incision, wound bed, drain sites and surrounding tissue  - Implement wound care per orders  - Initiate isolation precautions as appropriate  - Initiate Pressure Ulcer prevention bundle as indicated  Outcome: Progressing     Problem: Impaired Functional Mobility  Goal: Achieve highest/safest level of mobility/gait  Description: Interventions:  - Assess patient's functional ability and stability  - Promote increasing activity/tolerance for mobility and gait  - Educate and engage patient/family in tolerated activity level and precautions  - Recommend use of total lift for transfers  Outcome: Progressing     Problem: Impaired Activities of Daily Living  Goal: Achieve highest/safest level of independence in self care  Description:  Interventions:  - Assess ability and encourage patient to participate in ADLs to maximize function  - Promote sitting position while performing ADLs such as feeding, grooming, and bathing  - Educate and encourage patient/family in tolerated functional activity level and precautions during self-care  - Provide support under elbow of weak side to prevent shoulder subluxation  Outcome: Progressing     Problem: Impaired Cognition  Goal: Patient will exhibit improved attention, thought processing and/or memory  Description: Interventions:  - Minimize distractions in the room when full attention is required  Outcome: Progressing     Problem: RISK FOR INFECTION - ADULT  Goal: Absence of fever/infection during anticipated neutropenic period  Description: INTERVENTIONS  - Monitor WBC  - Administer growth factors as ordered  - Implement neutropenic guidelines  Outcome: Progressing     Problem: DISCHARGE PLANNING  Goal: Discharge to home or other facility with appropriate resources  Description: INTERVENTIONS:  - Identify barriers to discharge w/pt and caregiver  - Include patient/family/discharge partner in discharge planning  - Arrange for needed discharge resources and transportation as appropriate  - Identify discharge learning needs (meds, wound care, etc)  - Arrange for interpreters to assist at discharge as needed  - Consider post-discharge preferences of patient/family/discharge partner  - Complete POLST form as appropriate  - Assess patient's ability to be responsible for managing their own health  - Refer to Case Management Department for coordinating discharge planning if the patient needs post-hospital services based on physician/LIP order or complex needs related to functional status, cognitive ability or social support system  Outcome: Progressing     Problem: CARDIOVASCULAR - ADULT  Goal: Maintains optimal cardiac output and hemodynamic stability  Description: INTERVENTIONS:  - Monitor vital signs, rhythm, and  trends  - Monitor for bleeding, hypotension and signs of decreased cardiac output  - Evaluate effectiveness of vasoactive medications to optimize hemodynamic stability  - Monitor arterial and/or venous puncture sites for bleeding and/or hematoma  - Assess quality of pulses, skin color and temperature  - Assess for signs of decreased coronary artery perfusion - ex. Angina  - Evaluate fluid balance, assess for edema, trend weights  Outcome: Progressing  Goal: Absence of cardiac arrhythmias or at baseline  Description: INTERVENTIONS:  - Continuous cardiac monitoring, monitor vital signs, obtain 12 lead EKG if indicated  - Evaluate effectiveness of antiarrhythmic and heart rate control medications as ordered  - Initiate emergency measures for life threatening arrhythmias  - Monitor electrolytes and administer replacement therapy as ordered  Outcome: Progressing     Problem: MUSCULOSKELETAL - ADULT  Goal: Return mobility to safest level of function  Description: INTERVENTIONS:  - Assess patient stability and activity tolerance for standing, transferring and ambulating w/ or w/o assistive devices  - Assist with transfers and ambulation using safe patient handling equipment as needed  - Ensure adequate protection for wounds/incisions during mobilization  - Obtain PT/OT consults as needed  - Advance activity as appropriate  - Communicate ordered activity level and limitations with patient/family  Outcome: Progressing  Goal: Maintain proper alignment of affected body part  Description: INTERVENTIONS:  - Support and protect limb and body alignment per provider's orders  - Instruct and reinforce with patient and family use of appropriate assistive device and precautions (e.g. spinal or hip dislocation precautions)  Outcome: Progressing     Problem: NEUROLOGICAL - ADULT  Goal: Achieves stable or improved neurological status  Description: INTERVENTIONS  - Assess for and report changes in neurological status  - Initiate measures  to prevent increased intracranial pressure  - Maintain blood pressure and fluid volume within ordered parameters to optimize cerebral perfusion and minimize risk of hemorrhage  - Monitor temperature, glucose, and sodium. Initiate appropriate interventions as ordered  Outcome: Progressing

## 2024-07-31 NOTE — PHYSICAL THERAPY NOTE
PHYSICAL THERAPY TREATMENT NOTE - INPATIENT     Room Number: 455/455-A       Presenting Problem: Weakness, recent fall  Co-Morbidities : DM, recent falls    Problem List  Principal Problem:    Weakness generalized  Active Problems:    Recurrent falls    Uncontrolled type 2 diabetes mellitus with hyperglycemia (HCC)      PHYSICAL THERAPY ASSESSMENT   Patient demonstrates limited progress this session, goals  updated to reflect patient performance.      Patient is requiring contact guard assist as a result of the following impairments: decreased functional strength, decreased endurance/aerobic capacity, impaired standing balance, impaired motor planning, decreased muscular endurance, cognitive deficits (delayed processing response time and problem solving), and medical status.     Patient continues to function below baseline with bed mobility, transfers, gait, stair negotiation, maintaining seated position, standing prolonged periods, and performing household tasks.  Contributing factors to remaining limitations include decreased functional strength, decreased endurance/aerobic capacity, impaired standing balance, impaired motor planning, and medical status.  Next session anticipate patient to progress bed mobility, transfers, gait, stair negotiation, maintaining seated position, standing prolonged periods, and performing household tasks.  Physical Therapy will continue to follow patient for duration of hospitalization.    Patient continues to benefit from continued skilled PT services: to promote return to prior level of function and safety with continuous assistance and gradual rehabilitative therapy .    PLAN  PT Treatment Plan: Bed mobility;Body mechanics;Endurance;Energy conservation;Patient education;Gait training;Strengthening;Transfer training;Balance training;Stoop training  Frequency (Obs): 3-5x/week    SUBJECTIVE  I feel ok no dizziness just unsteady walking.     OBJECTIVE  Precautions: Bed/chair  alarm    WEIGHT BEARING RESTRICTION                PAIN ASSESSMENT   Ratin          BALANCE  Static Sitting: Fair +  Dynamic Sitting: Fair  Static Standing: Fair -  Dynamic Standing: Poor +    ACTIVITY TOLERANCE  Pulse: 70  Heart Rate Source: Monitor  Resp: 18  BP: 153/82  BP Location: Left arm  BP Method: Automatic  Patient Position: Sitting     O2 WALK       AM-PAC '6-Clicks' INPATIENT SHORT FORM - BASIC MOBILITY  How much difficulty does the patient currently have...  Patient Difficulty: Turning over in bed (including adjusting bedclothes, sheets and blankets)?: None   Patient Difficulty: Sitting down on and standing up from a chair with arms (e.g., wheelchair, bedside commode, etc.): None   Patient Difficulty: Moving from lying on back to sitting on the side of the bed?: A Little   How much help from another person does the patient currently need...   Help from Another: Moving to and from a bed to a chair (including a wheelchair)?: A Little   Help from Another: Need to walk in hospital room?: A Little   Help from Another: Climbing 3-5 steps with a railing?: A Lot     AM-PAC Score:  Raw Score: 19   Approx Degree of Impairment: 41.77%   Standardized Score (AM-PAC Scale): 45.44   CMS Modifier (G-Code): CK    FUNCTIONAL ABILITY STATUS  Functional Mobility/Gait Assessment  Gait Assistance: Contact guard assist  Distance (ft): 150  Assistive Device: Rolling walker  Pattern: Shuffle (unsteady veers to the Left 2\" in a 10\" path,)  Rolling: supervision  Supine to Sit: supervision  Sit to Supine: contact guard assist  Sit to Stand: contact guard assist    Skilled Therapy Provided: Pt ed with bed mobility and transfers with SBA for bed mobility and CGA with RW with transfers. Pt is slightly more retropulsive with initial standing and veers to the L side 2\" in a 10\" path with amb 150' with CGA with shuffling unsteady gait. Therex in chair pt is pleasant and cooperative with PT and motivated to regain maximal mobility  and safety.     The patient's Approx Degree of Impairment: 41.77% has been calculated based on documentation in the [facility] '6 clicks' Inpatient Daily Activity Short Form.  Research supports that patients with this level of impairment may benefit from IP Rehab PT, OT.  Final disposition will be made by interdisciplinary medical team.    THERAPEUTIC EXERCISES  Lower Extremity Ankle pumps  Heel slides  LAQ     Position Sitting & Standing       Patient End of Session: Up in chair;With  staff;Needs met;Call light within reach;RN aware of session/findings;All patient questions and concerns addressed;Alarm set    CURRENT GOALS     Goals to be met by: 8/15/2024  Patient Goal Patient's self-stated goal is: Return home    Goal #1 Patient is able to demonstrate supine - sit EOB @ level: independent      Goal #1   Current Status  SBA   Goal #2 Patient is able to demonstrate transfers Sit to/from Stand at assistance level: modified independent with walker - rolling      Goal #2  Current Status  CGA with RW    Goal #3 Patient is able to ambulate 300 feet with assist device: walker - rolling at assistance level: modified independent   Goal #3   Current Status  CGA with ' unsteady gait veers to the L side with path corrections required from PT   Goal #4 Patient will negotiate 2 stairs/one curb w/ assistive device and supervision   Goal #4   Current Status  Ongoing   Goal #5 Patient to demonstrate independence with home activity/exercise instructions provided to patient in preparation for discharge.   Goal #5   Current Status  Ongoing   Goal #6       Gait Training: 15 minutes  Therapeutic Exercise: 10 minutes

## 2024-07-31 NOTE — PROGRESS NOTES
[facility]  part of [facility]    Progress Note    Joselin Graham Patient Status:  Observation    5/15/1950 MRN [id number]   Location [facility] 4W/SW/SE Attending Roberta Butler MD   Hosp Day # 0 PCP No primary care provider on file.     Subjective:   Joselin Graham is a(n) 74 year old male   No new complaints. Reports persistent neuropathy of legs.    Constitutional Symptoms: Patient reports No fever. No rigors. No weight loss. No night sweats.  HEENT: Patient reports No blurred vision. No double vision. No hearing loss. No mouth sores.  Respiratory: Patient reports No difficulty breathing reported. No wheezing. No cough. No hemoptysis.  Cardiovascular: Patient reports No chest pain. No palpitations. No edema.  Gastrointestinal: Patient reports No nausea. No vomiting. No dysphagia. No heartburn. No abdominal pain. No diarrhea. No constipation.  No melena. No hematochezia.  Genitourinary: Patient reports No hematuria. No dysuria. No urgency. No incontinence.  Musculoskeletal: Patient reports No bone pain. No myalgia. No back pain. No arthralgia. No joint swelling.+ LE weakness  Integumentary: Patient reports No rash. No pruritis.  Neurological: Patient reports No headache. No paralysis. ++ paresthesias. No speech impairment.  Hematologic: Patient reports No excessive or spontaneous bleeding or bruising.  Mental Health: Patient reports No anxiety? no depression? no insomnia? no panic disorder.    Objective:   Blood pressure 125/81, pulse 70, temperature 97.9 °F (36.6 °C), temperature source Oral, resp. rate 18, height 5' 5\" (1.651 m), weight 143 lb 12.8 oz (65.2 kg), SpO2 97%.    General: Well developed, well nourished, in no acute distress. Patient appears stated age.  Skin: No abnormal skin lesions noted.  Head: Normocephalic.  Eyes: Sclerae are anicteric.  Ears, Nose, Throat, and Mouth: Normal oral mucosa and oropharynx.  Neck: Neck is supple. No cervical masses present. Trachea  is midline. No thyromegaly.  Lungs: clear to auscultation bilaterally.  Cardiac: normal rate? regular rhythm. S1/S2 without murmurs.  Abdomen: Abdomen is soft, nontender, nondistended, without masses. No hepatomegaly. No splenomegaly. No ascites present. Bowel sounds active.  Hematologic/Lymphatic: No petechiae. No purpura. No cervical EVANS.  Extremities: No edema. No cyanosis. No digital clubbing. No discoloration.  Musculoskeletal: Normal range of motion. Strength and tone are normal. Back and Spine nontender to palpation and percussion.  Neurologic: memory loss     Results:   Lab Results   Component Value Date    WBC 4.8 07/30/2024    HGB 13.3 07/30/2024    HCT 39.1 07/30/2024    .0 (L) 07/30/2024    CREATSERUM 1.20 07/30/2024    BUN 23 07/30/2024     07/30/2024    K 3.3 (L) 07/30/2024     07/30/2024    CO2 24.0 07/30/2024     (H) 07/30/2024    CA 8.3 (L) 07/30/2024    ALB 4.2 07/30/2024    ALKPHO 69 07/30/2024    BILT 1.2 (H) 07/30/2024    TP 7.3 07/30/2024    AST 24 07/30/2024    ALT 78 (H) 07/30/2024    T4F 1.1 07/28/2024    TSH 5.944 (H) 07/28/2024    MG 1.9 07/30/2024    PHOS 3.3 07/28/2024    TROPHS 4 07/22/2024    B12 415 07/28/2024       No results found.        Assessment & Plan:     1.IgG lambda myeloma:  - S/P treatment as noted in HPI.   - Pt is on maintenance Revlimid per primary oncologist.  - Myeloma labs checked and show no evidence of progression. Discussed results with pt.  - Noted bone survey findings.  - Pt may continue follow up with his oncologist after discharge.     2. Multiple Falls, LE weakness:  - likely multifactorial in setting of uncontrolled DM.  - Management per Primary, neurology and Endo.        Thank you for allowing me to participate in the care of your patient. Will sign off. Please call with questions.        Nuvia Shafer MD  7/31/2024

## 2024-07-31 NOTE — PROGRESS NOTES
[facility]  part of [facility]    Progress Note    Joselin Graham Patient Status:  Observation    5/15/1950 MRN [id number]   Location [facility] 4W/SW/SE Attending Roberta Butler MD   Hosp Day # 0 PCP No primary care provider on file.       Subjective:   Joselin Graham is a(n) 74 year old male myeloma and weakness poorly controlled diabetes.    Objective:     Vitals:    24 1929   BP: 138/80   Pulse: 85   Resp: 18   Temp: 98.2 °F (36.8 °C)       Intake/Output Summary (Last 24 hours) at 2024 2304  Last data filed at 2024 1601  Gross per 24 hour   Intake 360 ml   Output 1200 ml   Net -840 ml     Wt Readings from Last 2 Encounters:   24 143 lb 12.8 oz (65.2 kg)   24 135 lb (61.2 kg)       General appearance:  alert, appears stated age, and cooperative  Head: Normocephalic, without obvious abnormality, atraumatic  Eyes: conjunctivae/corneas clear. PERRL, EOM's intact. Fundi benign.  Neck: no adenopathy, no carotid bruit, no JVD, supple, symmetrical, trachea midline, and thyroid not enlarged, symmetric, no tenderness/mass/nodules  Pulmonary: clear to auscultation bilaterally  Cardiovascular: S1, S2 normal, no murmur, click, rub or gallop, regular rate and rhythm  Abdominal: soft, non-tender; bowel sounds normal; no masses,  no organomegaly  Extremities: extremities normal, atraumatic, no cyanosis or edema  Pulses: 2+ and symmetric  Neurologic: Grossly normal  Genital Exam: defer exam    Current Medications:    Current Facility-Administered Medications:     insulin aspart (NovoLOG) 100 Units/mL FlexPen 6 Units, 6 Units, Subcutaneous, TID CC    insulin aspart (NovoLOG) 100 Units/mL FlexPen 1-11 Units, 1-11 Units, Subcutaneous, TID CC and HS    insulin degludec (Tresiba) 100 units/mL flextouch 15 Units, 15 Units, Subcutaneous, Nightly    hydrALAzine (Apresoline) 20 mg/mL injection 10 mg, 10 mg, Intravenous, Q6H PRN    acetaminophen (Tylenol) tab 650 mg, 650 mg,  Oral, Q6H PRN    glucose (Dex4) 15 GM/59ML oral liquid 15 g, 15 g, Oral, Q15 Min PRN **OR** glucose (Glutose) 40% oral gel 15 g, 15 g, Oral, Q15 Min PRN **OR** glucose-vitamin C (Dex-4) chewable tab 4 tablet, 4 tablet, Oral, Q15 Min PRN **OR** dextrose 50% injection 50 mL, 50 mL, Intravenous, Q15 Min PRN **OR** glucose (Dex4) 15 GM/59ML oral liquid 30 g, 30 g, Oral, Q15 Min PRN **OR** glucose (Glutose) 40% oral gel 30 g, 30 g, Oral, Q15 Min PRN **OR** glucose-vitamin C (Dex-4) chewable tab 8 tablet, 8 tablet, Oral, Q15 Min PRN    DULoxetine (Cymbalta) DR cap 60 mg, 60 mg, Oral, Daily    levothyroxine (Synthroid) tab 25 mcg, 25 mcg, Oral, Before breakfast    losartan (Cozaar) tab 50 mg, 50 mg, Oral, Daily    pantoprazole (Protonix) DR tab 40 mg, 40 mg, Oral, Before breakfast    Allergies  No Known Allergies    Assessment and Plan:       ASSESSMENT AND PLAN:    1.       Progressive motor weakness of the lower extremities:  Differential diagnosis in this patient includes diabetic motor neuropathy, with also possibly a component of sensory neuropathy.  To better evaluate this, Neurology will be consulted.  2.       Diabetes:  Patient is a very noncompliant diabetic.  Patient previously was on insulin, but according to patient's wife, patient does not take insulin consistently.  Will consider Endocrinology consult as well.  Patient currently is on sliding scale and Accu-Cheks before meals and at bedtime, and will be on a diabetic diet.  3.       Hypertension:  Patient has a history of being on amlodipine and will continue the same.  4.       DVT prophylaxis:  Patient will be on SCD boots because of low platelets.  5.       GI prophylaxis:  Patient will be on Protonix.  July 28, 2024  Patient is stable does not have any new neurologic deficit still is bedbound.  Patient had skeletal survey with multiple osteolytic lesions indicative of myeloma activity.  Urine Bence-Sweeney protein and urine light chains have been ordered.   Hematology consult has been ordered.  Neurologic consultation is pending as well.  Patient is not stable for discharge.  Will discuss with outpatient hematologist.    July 29, 2024  Patient is still bedridden and has minimal transfers from bed to chair.  Discussed patient's skeletal survey findings.  Appreciate hematology recommendations.  Does not have a sensory level and unlikely to be spinal cord compression.  Neurological workup is pending including neurochecks every 4 hours fall precautions delirium precautions.  Will consult rehab for possible placement.    July 30, 2024  Patient is stable no new deficits noted.  Had a detailed discussion with Dr. Олег Pitts patient's primary oncologist and [facility].  Per Dr. 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  For the time being patient will get evaluated by rehab and determine placement.    Results:     Lab Results   Component Value Date    WBC 4.8 07/30/2024    HGB 13.3 07/30/2024    HCT 39.1 07/30/2024    .0 (L) 07/30/2024     07/30/2024    K 3.3 (L) 07/30/2024     07/30/2024    CO2 24.0 07/30/2024    CREATSERUM 1.20 07/30/2024    BUN 23 07/30/2024     (H) 07/30/2024    CA 8.3 (L) 07/30/2024    ALB 4.2 07/30/2024    ALKPHO 69 07/30/2024    BILT 1.2 (H) 07/30/2024    TP 7.3 07/30/2024    AST 24 07/30/2024    ALT 78 (H) 07/30/2024    T4F 1.1 07/28/2024    TSH 5.944 (H) 07/28/2024    MG 1.9 07/30/2024    PHOS 3.3 07/28/2024    B12 415 07/28/2024     No results found for this visit on 07/27/24.    No results found.                GIOVANNY HOLT MD  7/30/2024

## 2024-07-31 NOTE — CM/SW NOTE
[employer] auth started via portal.  [employer] case ID #[id number].  Final insurance authorization is pending.    CM/AKILA will continue to follow for authorization.      Lindy Weston, DSC

## 2024-08-01 PROBLEM — E03.9 ACQUIRED HYPOTHYROIDISM: Status: ACTIVE | Noted: 2024-08-01

## 2024-08-01 LAB
ALBUMIN SERPL-MCNC: 3.8 G/DL (ref 3.2–4.8)
ALBUMIN/GLOB SERPL: 1.4 {RATIO} (ref 1–2)
ALP LIVER SERPL-CCNC: 66 U/L
ALT SERPL-CCNC: 75 U/L
ANION GAP SERPL CALC-SCNC: 9 MMOL/L (ref 0–18)
AST SERPL-CCNC: 24 U/L (ref ?–34)
BASOPHILS # BLD AUTO: 0.04 X10(3) UL (ref 0–0.2)
BASOPHILS NFR BLD AUTO: 0.9 %
BILIRUB SERPL-MCNC: 0.7 MG/DL (ref 0.2–1.1)
BUN BLD-MCNC: 19 MG/DL (ref 9–23)
BUN/CREAT SERPL: 15.8 (ref 10–20)
CALCIUM BLD-MCNC: 8.8 MG/DL (ref 8.7–10.4)
CHLORIDE SERPL-SCNC: 108 MMOL/L (ref 98–112)
CO2 SERPL-SCNC: 22 MMOL/L (ref 21–32)
CREAT BLD-MCNC: 1.2 MG/DL
DEPRECATED RDW RBC AUTO: 54 FL (ref 35.1–46.3)
EGFRCR SERPLBLD CKD-EPI 2021: 63 ML/MIN/1.73M2 (ref 60–?)
EOSINOPHIL # BLD AUTO: 0.12 X10(3) UL (ref 0–0.7)
EOSINOPHIL NFR BLD AUTO: 2.7 %
ERYTHROCYTE [DISTWIDTH] IN BLOOD BY AUTOMATED COUNT: 17.8 % (ref 11–15)
GLOBULIN PLAS-MCNC: 2.8 G/DL (ref 2–3.5)
GLUCOSE BLD-MCNC: 161 MG/DL (ref 70–99)
GLUCOSE BLDC GLUCOMTR-MCNC: 113 MG/DL (ref 70–99)
GLUCOSE BLDC GLUCOMTR-MCNC: 139 MG/DL (ref 70–99)
GLUCOSE BLDC GLUCOMTR-MCNC: 282 MG/DL (ref 70–99)
GLUCOSE BLDC GLUCOMTR-MCNC: 300 MG/DL (ref 70–99)
HCT VFR BLD AUTO: 38.9 %
HGB BLD-MCNC: 12.4 G/DL
IMM GRANULOCYTES # BLD AUTO: 0.01 X10(3) UL (ref 0–1)
IMM GRANULOCYTES NFR BLD: 0.2 %
LYMPHOCYTES # BLD AUTO: 1.76 X10(3) UL (ref 1–4)
LYMPHOCYTES NFR BLD AUTO: 39.3 %
MAGNESIUM SERPL-MCNC: 1.9 MG/DL (ref 1.6–2.6)
MCH RBC QN AUTO: 26.6 PG (ref 26–34)
MCHC RBC AUTO-ENTMCNC: 31.9 G/DL (ref 31–37)
MCV RBC AUTO: 83.5 FL
MONOCYTES # BLD AUTO: 0.37 X10(3) UL (ref 0.1–1)
MONOCYTES NFR BLD AUTO: 8.3 %
NEUTROPHILS # BLD AUTO: 2.18 X10 (3) UL (ref 1.5–7.7)
NEUTROPHILS # BLD AUTO: 2.18 X10(3) UL (ref 1.5–7.7)
NEUTROPHILS NFR BLD AUTO: 48.6 %
OSMOLALITY SERPL CALC.SUM OF ELEC: 294 MOSM/KG (ref 275–295)
PHOSPHATE SERPL-MCNC: 3.7 MG/DL (ref 2.4–5.1)
PLATELET # BLD AUTO: 177 10(3)UL (ref 150–450)
POTASSIUM SERPL-SCNC: 3.7 MMOL/L (ref 3.5–5.1)
PROT SERPL-MCNC: 6.6 G/DL (ref 5.7–8.2)
RBC # BLD AUTO: 4.66 X10(6)UL
SODIUM SERPL-SCNC: 139 MMOL/L (ref 136–145)
VITAMIN B1 WHOLE BLD: 85.1 NMOL/L
WBC # BLD AUTO: 4.5 X10(3) UL (ref 4–11)

## 2024-08-01 PROCEDURE — 99233 SBSQ HOSP IP/OBS HIGH 50: CPT | Performed by: INTERNAL MEDICINE

## 2024-08-01 NOTE — PHYSICAL THERAPY NOTE
PHYSICAL THERAPY TREATMENT NOTE - INPATIENT     Room Number: 455/455-A       Presenting Problem: Weakness, recent fall  Co-Morbidities : DM, recent falls    Problem List  Principal Problem:    Weakness generalized  Active Problems:    Recurrent falls    Uncontrolled type 2 diabetes mellitus with hyperglycemia (HCC)    Acquired hypothyroidism    PHYSICAL THERAPY ASSESSMENT   Patient demonstrates fair progress this session, goals  remain in progress.      Patient is requiring contact guard assist and minimal assist as a result of the following impairments: decreased functional strength, impaired dynamic standing balance, cognitive deficits (safety awareness), and medical status.     Patient continues to function below baseline with bed mobility, transfers, gait, and stair negotiation.  Next session anticipate patient to progress bed mobility, transfers, and gait.  Physical Therapy will continue to follow patient for duration of hospitalization.    Patient continues to benefit from continued skilled PT services: to promote return to prior level of function and safety with continuous assistance and gradual rehabilitative therapy .    PLAN  PT Treatment Plan: Bed mobility;Body mechanics;Endurance;Energy conservation;Patient education;Gait training;Strengthening;Transfer training;Balance training;Stoop training  Frequency (Obs): 3-5x/week    SUBJECTIVE  Agreeable to activity.     OBJECTIVE  Precautions: Bed/chair alarm    WEIGHT BEARING RESTRICTION  none    PAIN ASSESSMENT   Ratin    BALANCE  Static Sitting: Good  Dynamic Sitting: Fair +  Static Standing: Fair -  Dynamic Standing: Poor +    AM-PAC '6-Clicks' INPATIENT SHORT FORM - BASIC MOBILITY  How much difficulty does the patient currently have...  Patient Difficulty: Turning over in bed (including adjusting bedclothes, sheets and blankets)?: A Little   Patient Difficulty: Sitting down on and standing up from a chair with arms (e.g., wheelchair, bedside commode,  etc.): A Little   Patient Difficulty: Moving from lying on back to sitting on the side of the bed?: A Little   How much help from another person does the patient currently need...   Help from Another: Moving to and from a bed to a chair (including a wheelchair)?: A Little   Help from Another: Need to walk in hospital room?: A Little   Help from Another: Climbing 3-5 steps with a railing?: A Little     AM-PAC Score:  Raw Score: 18   Approx Degree of Impairment: 46.58%   Standardized Score (AM-PAC Scale): 43.63   CMS Modifier (G-Code): CK    FUNCTIONAL ABILITY STATUS  Functional Mobility/Gait Assessment  Gait Assistance: Minimum assistance;Contact guard assist  Distance (ft): 125  Assistive Device: Rolling walker  Pattern: Shuffle (unsteady gait with decreased and unequal, inconsistent step length and foot clearance; overall no LOB. initially CGA for gait, regressed to min A requiring increased VC to avoid veering to the left with RW and to avoid obstacles in hallway)  Sit to Supine: stand-by assist  Sit to Stand: contact guard assist    Skilled Therapy Provided: Pt received sitting in chair and agreeable to activity. Family members at bedside. Pt with no c/o pain. Demos STS transfer from chair with CGA, with VC given for safe hand placement prior to transfer. Ambulated in hallway with RW and CGA initially, ultimately required min A due to increased veering and requiring VC to correct. No LOB with activity. Returned to room and after short seated rest break, requested to return to bed , CGA to return to supine. Left resting in bed with  bed alarm on, needs within reach, handoff to RN complete.    The patient's Approx Degree of Impairment: 46.58% has been calculated based on documentation in the [facility] '6 clicks' Inpatient Daily Activity Short Form.  Research supports that patients with this level of impairment may benefit from home with HH PT.  Final disposition will be made by interdisciplinary medical  team.    Patient End of Session: Needs met;In bed;Call light within reach;RN aware of session/findings;All patient questions and concerns addressed;Alarm set;Family present    CURRENT GOALS   Goals to be met by: 8/15/2024  Patient Goal Patient's self-stated goal is: Return home    Goal #1 Patient is able to demonstrate supine - sit EOB @ level: independent      Goal #1   Current Status  unmet    Goal #2 Patient is able to demonstrate transfers Sit to/from Stand at assistance level: modified independent with walker - rolling      Goal #2  Current Status  unmet   Goal #3 Patient is able to ambulate 300 feet with assist device: walker - rolling at assistance level: modified independent   Goal #3   Current Status  unmet   Goal #4 Patient will negotiate 2 stairs/one curb w/ assistive device and supervision   Goal #4   Current Status  unmet   Goal #5 Patient to demonstrate independence with home activity/exercise instructions provided to patient in preparation for discharge.   Goal #5   Current Status  unmet   Goal #6        Therapeutic Activity: 23 minutes

## 2024-08-01 NOTE — OCCUPATIONAL THERAPY NOTE
OCCUPATIONAL THERAPY TREATMENT NOTE - INPATIENT        Room Number: 455/455-A  Presenting Problem: 75 yo M with PMH DM, HTN presenting with generalized weakness/malaise for some time, worse recently including fall five days ago with grossly unrevealing ED evaluation now with recurrent falls    Problem List  Principal Problem:    Weakness generalized  Active Problems:    Recurrent falls    Uncontrolled type 2 diabetes mellitus with hyperglycemia (HCC)    Acquired hypothyroidism    OCCUPATIONAL THERAPY ASSESSMENT   Patient demonstrates good  progress this session, goals remain in progress.    Patient continues to function below baseline with ADLs, functional mobility, and transfers. Patient is requiring up to minimal assist for ADLs as a result of the following impairments: decreased functional strength, decreased endurance, impaired balance, impaired motor planning, decreased muscular endurance, decreased insight to deficits, and decreased safety awareness. Next session anticipate patient to progress ADLs, functional mobility, and transfers.  Occupational Therapy will continue to follow patient for duration of hospitalization.    Patient continues to benefit from continued skilled OT services: to promote return to prior level of function and safety with continuous assistance and gradual rehabilitative therapy.     PLAN  OT Treatment Plan: Balance activities;Energy conservation/work simplification techniques;ADL training;IADL training;Functional transfer training;UE strengthening/ROM;Endurance training;Cognitive reorientation;Patient/Family education  OT Device Recommendations: Transfer tub bench (slip-on shoes)    SUBJECTIVE  Patient was pleasant and engaged during session.    OBJECTIVE  Precautions: Bed/chair alarm    WEIGHT BEARING RESTRICTION  None.    PAIN ASSESSMENT  Ratin    ACTIVITIES OF DAILY LIVING ASSESSMENT  AM-PAC ‘6-Clicks’ Inpatient Daily Activity Short Form  How much help from another person does  the patient currently need…  -   Putting on and taking off regular lower body clothing?: A Little  -   Bathing (including washing, rinsing, drying)?: A Little  -   Toileting, which includes using toilet, bedpan or urinal? : A Little  -   Putting on and taking off regular upper body clothing?: None  -   Taking care of personal grooming such as brushing teeth?: A Little  -   Eating meals?: None    AM-PAC Score:  Score: 20  Approx Degree of Impairment: 38.32%  Standardized Score (AM-PAC Scale): 42.03  CMS Modifier (G-Code): CJ    FUNCTIONAL TRANSFER ASSESSMENT  2x Sit to Stand from Chair: contact guard assist w/ RW  Chair Transfer: contact guard assist w/ RW  Stand to Sit at EOB: contact guard assist w/ no device  Comments:  Patient benefited from cues for safe hand placement.     BED MOBILITY  Sit to Supine: contact guard assist  Comments:  Patient required Max A x2 for boosting once supine.    FUNCTIONAL MOBILITY  min assist for hallway fx mobility using RW  Comments:   Patient ambulated w/ RW with CGA at first, but regressed to MIN A; patient ran into objects in his pathway 3-4x and picked his RW off the ground, requiring verbal cues and physical redirection. Patient also ambulated w/o device from chair to bed with CGA x1 and 2nd person for safety.    ACTIVITIES OF DAILY LIVING  LB Dressing: min assist  Comments:   Patient benefited from education on fall prevention strategies, such as LB dressing while seated and performing figure-4 technique. Patient correctly performed figure-4 in supported seating and was able to reach feet. OT recommended slip-on shoes. Patient was also educated on performing figure-4 seated in the shower to prevent falls, and tub-transfer bench was recommended; family was receptive.    EDUCATION PROVIDED  Patient: Role of Occupational Therapy; Plan of Care; Discharge Recommendations; DME Recommendations; Adaptive Equipment Recommendations; Functional Transfer Techniques; Fall Prevention;  Posture/Positioning; Energy Conservation; Compensatory ADL Techniques; Proper Body Mechanics  Patient's Response to Education: Verbalized Understanding; Returned Demonstration; Requires Further Education  Family/Caregiver: Role of Occupational Therapy; Plan of Care; Discharge Recommendations; Adaptive Equipment Recommendations; DME Recommendations  Family/Caregiver's Response to Education: Verbalized Understanding    The patient's Approx Degree of Impairment: 38.32% has been calculated based on documentation in the [facility] '6 clicks' Inpatient Daily Activity Short Form.  Research supports that patients with this level of impairment may benefit from HH.Final disposition will be made by interdisciplinary medical team.    Patient End of Session: In bed;Needs met;Call light within reach;RN aware of session/findings;All patient questions and concerns addressed;Alarm set;Family present    OT Goals:  Patient's self stated goal is: to return home     Patient will complete functional transfer with supervision   Comment: CGA    Patient will complete toileting with supervision  Comment: NT    Patient will tolerate standing for 15 minutes in prep for adls with CGA   Comment: ongoing    Patient will complete item retrieval with supervision   Comment: CGA-min assist          OT Session Time:   Self-Care Home Management: 12 minutes  Therapeutic Activity: 11 minutes    [facility]  OT Student  ___________________________________________    I provided clinical instruction and supervision for the duration of this session and agree with the above documentation.    NARCISA Karimi/L  [facility]  #[id number]

## 2024-08-01 NOTE — PLAN OF CARE
Patient A/Ox3. Confused about place. Remote tele, Room air. No complains of pain. AC/HS. Carb controlled, reg diet tolerated well. Voids via urinal, needs remainder about using the bathroom. . 1 assist , walker, contact guard when ambulating. Unsteady gait. Bed alarm in place. Call light, safety measures in place.     Problem: Patient Centered Care  Goal: Patient preferences are identified and integrated in the patient's plan of care  Description: Interventions:  - What would you like us to know as we care for you? I live alone  - Provide timely, complete, and accurate information to patient/family  - Incorporate patient and family knowledge, values, beliefs, and cultural backgrounds into the planning and delivery of care  - Encourage patient/family to participate in care and decision-making at the level they choose  - Honor patient and family perspectives and choices  Outcome: Progressing

## 2024-08-01 NOTE — PLAN OF CARE
Patient is alert and oriented x3. RA Tele in place. ACHS on a carb controlled idet. Patient confused getting out of bed. Voids via urinal. Bed in lowest position. Frequent rounding. Call light with in reach. All safety measures are in place.      Problem: Patient Centered Care  Goal: Patient preferences are identified and integrated in the patient's plan of care  Description: Interventions:  - What would you like us to know as we care for you? I live alone  - Provide timely, complete, and accurate information to patient/family  - Incorporate patient and family knowledge, values, beliefs, and cultural backgrounds into the planning and delivery of care  - Encourage patient/family to participate in care and decision-making at the level they choose  - Honor patient and family perspectives and choices  8/1/2024 0700 by Brittany Rodriguez RN  Outcome: Progressing  8/1/2024 0101 by Brittany Rodriguez RN  Outcome: Progressing     Problem: Diabetes/Glucose Control  Goal: Glucose maintained within prescribed range  Description: INTERVENTIONS:  - Monitor Blood Glucose as ordered  - Assess for signs and symptoms of hyperglycemia and hypoglycemia  - Administer ordered medications to maintain glucose within target range  - Assess barriers to adequate nutritional intake and initiate nutrition consult as needed  - Instruct patient on self management of diabetes  8/1/2024 0700 by Brittany Rodriguez RN  Outcome: Progressing  8/1/2024 0101 by Brittany Rodriguez RN  Outcome: Progressing     Problem: Patient/Family Goals  Goal: Patient/Family Long Term Goal  Description: Patient's Long Term Goal: discharge from the hospital    Interventions:  - See additional Care Plan goals for specific interventions  8/1/2024 0700 by Brittany Rodriguez RN  Outcome: Progressing  8/1/2024 0101 by Brittany Rodriguez RN  Outcome: Progressing  Goal: Patient/Family Short Term Goal  Description: Patient's Short Term Goal: feel  better    Interventions:   - See additional Care Plan goals for specific interventions  8/1/2024 0700 by Brittany Rodriguez RN  Outcome: Progressing  8/1/2024 0101 by Brittany Rodriguez RN  Outcome: Progressing     Problem: PAIN - ADULT  Goal: Verbalizes/displays adequate comfort level or patient's stated pain goal  Description: INTERVENTIONS:  - Encourage pt to monitor pain and request assistance  - Assess pain using appropriate pain scale  - Administer analgesics based on type and severity of pain and evaluate response  - Implement non-pharmacological measures as appropriate and evaluate response  - Consider cultural and social influences on pain and pain management  - Manage/alleviate anxiety  - Utilize distraction and/or relaxation techniques  - Monitor for opioid side effects  - Notify MD/LIP if interventions unsuccessful or patient reports new pain  - Anticipate increased pain with activity and pre-medicate as appropriate  8/1/2024 0700 by Brittany Rodriguez RN  Outcome: Progressing  8/1/2024 0101 by Brittany Rodriguez RN  Outcome: Progressing     Problem: SAFETY ADULT - FALL  Goal: Free from fall injury  Description: INTERVENTIONS:  - Assess pt frequently for physical needs  - Identify cognitive and physical deficits and behaviors that affect risk of falls.  - [place] fall precautions as indicated by assessment.  - Educate pt/family on patient safety including physical limitations  - Instruct pt to call for assistance with activity based on assessment  - Modify environment to reduce risk of injury  - Provide assistive devices as appropriate  - Consider OT/PT consult to assist with strengthening/mobility  - Encourage toileting schedule  8/1/2024 0700 by Brittany Rodriguez RN  Outcome: Progressing  8/1/2024 0101 by Brittany Rodriguez RN  Outcome: Progressing     Problem: METABOLIC/FLUID AND ELECTROLYTES - ADULT  Goal: Glucose maintained within prescribed range  Description: INTERVENTIONS:  -  Monitor Blood Glucose as ordered  - Assess for signs and symptoms of hyperglycemia and hypoglycemia  - Administer ordered medications to maintain glucose within target range  - Assess barriers to adequate nutritional intake and initiate nutrition consult as needed  - Instruct patient on self management of diabetes  8/1/2024 0700 by Brittany Rodriguez RN  Outcome: Progressing  8/1/2024 0101 by Brittany Rodriguez RN  Outcome: Progressing  Goal: Electrolytes maintained within normal limits  Description: INTERVENTIONS:  - Monitor labs and rhythm and assess patient for signs and symptoms of electrolyte imbalances  - Administer electrolyte replacement as ordered  - Monitor response to electrolyte replacements, including rhythm and repeat lab results as appropriate  - Fluid restriction as ordered  - Instruct patient on fluid and nutrition restrictions as appropriate  8/1/2024 0700 by Brittany Rodriguez RN  Outcome: Progressing  8/1/2024 0101 by Brittany Rodriguez RN  Outcome: Progressing     Problem: SKIN/TISSUE INTEGRITY - ADULT  Goal: Incision(s), wounds(s) or drain site(s) healing without S/S of infection  Description: INTERVENTIONS:  - Assess and document risk factors for pressure ulcer development  - Assess and document skin integrity  - Assess and document dressing/incision, wound bed, drain sites and surrounding tissue  - Implement wound care per orders  - Initiate isolation precautions as appropriate  - Initiate Pressure Ulcer prevention bundle as indicated  8/1/2024 0700 by Brittany Rodriguez RN  Outcome: Progressing  8/1/2024 0101 by Brittany Rodriguez RN  Outcome: Progressing     Problem: Impaired Functional Mobility  Goal: Achieve highest/safest level of mobility/gait  Description: Interventions:  - Assess patient's functional ability and stability  - Promote increasing activity/tolerance for mobility and gait  - Educate and engage patient/family in tolerated activity level and  precautions    8/1/2024 0700 by Brittany Rodriguez RN  Outcome: Progressing  8/1/2024 0101 by Brittany Rodriguez RN  Outcome: Progressing     Problem: Impaired Activities of Daily Living  Goal: Achieve highest/safest level of independence in self care  Description: Interventions:  - Assess ability and encourage patient to participate in ADLs to maximize function  - Promote sitting position while performing ADLs such as feeding, grooming, and bathing  - Educate and encourage patient/family in tolerated functional activity level and precautions during self-care    8/1/2024 0700 by Brittany Rodriguez RN  Outcome: Progressing  8/1/2024 0101 by Brittany Rodriguez RN  Outcome: Progressing     Problem: Impaired Cognition  Goal: Patient will exhibit improved attention, thought processing and/or memory  Description: Interventions:    8/1/2024 0700 by Brittany Rodriguez RN  Outcome: Progressing  8/1/2024 0101 by Brittany Rodriguez RN  Outcome: Progressing     Problem: RISK FOR INFECTION - ADULT  Goal: Absence of fever/infection during anticipated neutropenic period  Description: INTERVENTIONS  - Monitor WBC  - Administer growth factors as ordered  - Implement neutropenic guidelines  8/1/2024 0700 by Brittany Rodriguez RN  Outcome: Progressing  8/1/2024 0101 by Brittany Rodriguez RN  Outcome: Progressing     Problem: DISCHARGE PLANNING  Goal: Discharge to home or other facility with appropriate resources  Description: INTERVENTIONS:  - Identify barriers to discharge w/pt and caregiver  - Include patient/family/discharge partner in discharge planning  - Arrange for needed discharge resources and transportation as appropriate  - Identify discharge learning needs (meds, wound care, etc)  - Arrange for interpreters to assist at discharge as needed  - Consider post-discharge preferences of patient/family/discharge partner  - Complete POLST form as appropriate  - Assess patient's ability to be responsible for  managing their own health  - Refer to Case Management Department for coordinating discharge planning if the patient needs post-hospital services based on physician/LIP order or complex needs related to functional status, cognitive ability or social support system  8/1/2024 0700 by Brittany Rodriguez RN  Outcome: Progressing  8/1/2024 0101 by Brittany Rodriguez RN  Outcome: Progressing     Problem: CARDIOVASCULAR - ADULT  Goal: Maintains optimal cardiac output and hemodynamic stability  Description: INTERVENTIONS:  - Monitor vital signs, rhythm, and trends  - Monitor for bleeding, hypotension and signs of decreased cardiac output  - Evaluate effectiveness of vasoactive medications to optimize hemodynamic stability  - Monitor arterial and/or venous puncture sites for bleeding and/or hematoma  - Assess quality of pulses, skin color and temperature  - Assess for signs of decreased coronary artery perfusion - ex. Angina  - Evaluate fluid balance, assess for edema, trend weights  8/1/2024 0700 by Brittany Rodriguez RN  Outcome: Progressing  8/1/2024 0101 by Brittany Rodriguez RN  Outcome: Progressing  Goal: Absence of cardiac arrhythmias or at baseline  Description: INTERVENTIONS:  - Continuous cardiac monitoring, monitor vital signs, obtain 12 lead EKG if indicated  - Evaluate effectiveness of antiarrhythmic and heart rate control medications as ordered  - Initiate emergency measures for life threatening arrhythmias  - Monitor electrolytes and administer replacement therapy as ordered  8/1/2024 0700 by Brittany Rodriguez RN  Outcome: Progressing  8/1/2024 0101 by Brittany Rodriguez RN  Outcome: Progressing     Problem: MUSCULOSKELETAL - ADULT  Goal: Return mobility to safest level of function  Description: INTERVENTIONS:  - Assess patient stability and activity tolerance for standing, transferring and ambulating w/ or w/o assistive devices  - Assist with transfers and ambulation using safe patient  handling equipment as needed  - Ensure adequate protection for wounds/incisions during mobilization  - Obtain PT/OT consults as needed  - Advance activity as appropriate  - Communicate ordered activity level and limitations with patient/family  8/1/2024 0700 by Brittany Rodriguez RN  Outcome: Progressing  8/1/2024 0101 by Brittany Rodriguez RN  Outcome: Progressing  Goal: Maintain proper alignment of affected body part  Description: INTERVENTIONS:  - Support and protect limb and body alignment per provider's orders  - Instruct and reinforce with patient and family use of appropriate assistive device and precautions (e.g. spinal or hip dislocation precautions)  8/1/2024 0700 by Brittany Rodriguez RN  Outcome: Progressing  8/1/2024 0101 by Brittany Rodriguez RN  Outcome: Progressing     Problem: NEUROLOGICAL - ADULT  Goal: Achieves stable or improved neurological status  Description: INTERVENTIONS  - Assess for and report changes in neurological status  - Initiate measures to prevent increased intracranial pressure  - Maintain blood pressure and fluid volume within ordered parameters to optimize cerebral perfusion and minimize risk of hemorrhage  - Monitor temperature, glucose, and sodium. Initiate appropriate interventions as ordered  8/1/2024 0700 by Brittany Rodriguez RN  Outcome: Progressing  8/1/2024 0101 by Brittany Rodriguez RN  Outcome: Progressing

## 2024-08-01 NOTE — PROGRESS NOTES
[facility]  part of [facility]    Progress Note    Joselin Graham Patient Status:  Observation    5/15/1950 MRN [id number]   Location [facility] 4W/SW/SE Attending Roberta Butler MD   Hosp Day # 0 PCP No primary care provider on file.       Subjective:   Joselin Graham is a(n) 74 year old male weakness.    Objective:     Vitals:    24   BP: (!) 132/94   Pulse: 81   Resp: 18   Temp: 97.8 °F (36.6 °C)       Intake/Output Summary (Last 24 hours) at 2024 2335  Last data filed at 2024 1800  Gross per 24 hour   Intake 1080 ml   Output 1250 ml   Net -170 ml     Wt Readings from Last 2 Encounters:   24 143 lb 12.8 oz (65.2 kg)   24 135 lb (61.2 kg)       General appearance:  alert, appears stated age, and cooperative  Head: Normocephalic, without obvious abnormality, atraumatic  Eyes: conjunctivae/corneas clear. PERRL, EOM's intact. Fundi benign.  Neck: no adenopathy, no carotid bruit, no JVD, supple, symmetrical, trachea midline, and thyroid not enlarged, symmetric, no tenderness/mass/nodules  Pulmonary: clear to auscultation bilaterally  Cardiovascular: S1, S2 normal, no murmur, click, rub or gallop, regular rate and rhythm  Abdominal: soft, non-tender; bowel sounds normal; no masses,  no organomegaly  Extremities: extremities normal, atraumatic, no cyanosis or edema  Pulses: 2+ and symmetric  Neurologic: Grossly normal  Genital Exam: defer exam    Current Medications:    Current Facility-Administered Medications:     insulin aspart (NovoLOG) 100 Units/mL FlexPen 6 Units, 6 Units, Subcutaneous, TID CC    insulin aspart (NovoLOG) 100 Units/mL FlexPen 1-11 Units, 1-11 Units, Subcutaneous, TID CC and HS    insulin degludec (Tresiba) 100 units/mL flextouch 15 Units, 15 Units, Subcutaneous, Nightly    hydrALAzine (Apresoline) 20 mg/mL injection 10 mg, 10 mg, Intravenous, Q6H PRN    acetaminophen (Tylenol) tab 650 mg, 650 mg, Oral, Q6H PRN    glucose (Dex4) 15  GM/59ML oral liquid 15 g, 15 g, Oral, Q15 Min PRN **OR** glucose (Glutose) 40% oral gel 15 g, 15 g, Oral, Q15 Min PRN **OR** glucose-vitamin C (Dex-4) chewable tab 4 tablet, 4 tablet, Oral, Q15 Min PRN **OR** dextrose 50% injection 50 mL, 50 mL, Intravenous, Q15 Min PRN **OR** glucose (Dex4) 15 GM/59ML oral liquid 30 g, 30 g, Oral, Q15 Min PRN **OR** glucose (Glutose) 40% oral gel 30 g, 30 g, Oral, Q15 Min PRN **OR** glucose-vitamin C (Dex-4) chewable tab 8 tablet, 8 tablet, Oral, Q15 Min PRN    DULoxetine (Cymbalta) DR cap 60 mg, 60 mg, Oral, Daily    levothyroxine (Synthroid) tab 25 mcg, 25 mcg, Oral, Before breakfast    losartan (Cozaar) tab 50 mg, 50 mg, Oral, Daily    pantoprazole (Protonix) DR tab 40 mg, 40 mg, Oral, Before breakfast    Allergies  No Known Allergies    Assessment and Plan:        ASSESSMENT AND PLAN:    1.       Progressive motor weakness of the lower extremities:  Differential diagnosis in this patient includes diabetic motor neuropathy, with also possibly a component of sensory neuropathy.  To better evaluate this, Neurology will be consulted.  2.       Diabetes:  Patient is a very noncompliant diabetic.  Patient previously was on insulin, but according to patient's wife, patient does not take insulin consistently.  Will consider Endocrinology consult as well.  Patient currently is on sliding scale and Accu-Cheks before meals and at bedtime, and will be on a diabetic diet.  3.       Hypertension:  Patient has a history of being on amlodipine and will continue the same.  4.       DVT prophylaxis:  Patient will be on SCD boots because of low platelets.  5.       GI prophylaxis:  Patient will be on Protonix.  July 28, 2024  Patient is stable does not have any new neurologic deficit still is bedbound.  Patient had skeletal survey with multiple osteolytic lesions indicative of myeloma activity.  Urine Bence-Sweeney protein and urine light chains have been ordered.  Hematology consult has been  ordered.  Neurologic consultation is pending as well.  Patient is not stable for discharge.  Will discuss with outpatient hematologist.     July 29, 2024  Patient is still bedridden and has minimal transfers from bed to chair.  Discussed patient's skeletal survey findings.  Appreciate hematology recommendations.  Does not have a sensory level and unlikely to be spinal cord compression.  Neurological workup is pending including neurochecks every 4 hours fall precautions delirium precautions.  Will consult rehab for possible placement.     July 30, 2024  Patient is stable no new deficits noted.  Had a detailed discussion with Dr. Олег Pitts patient's primary oncologist and [facility].  Per Dr. Pitts patient always had poorly controlled diabetes with with neuropathy likely secondary to diabetes and unlikely due to light chain disease.  For the time being patient will get evaluated by rehab and determine placement.    July 31, 2024  Discussed with patient likelihood of being placed in rehab patient agrees with the plan and was going to work with case management to select the proper facility.  Workup so far has been unrevealing for any other etiology.  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  Placement is pending.  Results:     Lab Results   Component Value Date    WBC 4.8 07/30/2024    HGB 13.3 07/30/2024    HCT 39.1 07/30/2024    .0 (L) 07/30/2024     07/30/2024    K 3.3 (L) 07/30/2024     07/30/2024    CO2 24.0 07/30/2024    CREATSERUM 1.20 07/30/2024    BUN 23 07/30/2024     (H) 07/30/2024    CA 8.3 (L) 07/30/2024    ALB 4.2 07/30/2024    ALKPHO 69 07/30/2024    BILT 1.2 (H) 07/30/2024    TP 7.3 07/30/2024    AST 24 07/30/2024    ALT 78 (H) 07/30/2024    T4F 1.1 07/28/2024    TSH 5.944 (H) 07/28/2024    MG 1.9 07/30/2024    PHOS 3.3 07/28/2024    B12 415 07/28/2024     No results found for this visit on 07/27/24.    No results found.                GIOVANNY  MD YANET  7/31/2024

## 2024-08-01 NOTE — PROGRESS NOTES
[facility]  part of [facility]    Progress Note    Joselin Graham Patient Status:  Observation    5/15/1950 MRN [id number]   Location [facility] 4W/SW/SE Attending Roberta Butler MD   Hosp Day # 0 PCP No primary care provider on file.     Subjective:   Joselin Graham is a(n) 74 year old male      No complaints today   D/w pt and Rn  still looking into discharge places. [facility]  3 options not   Pt is pleasant but still confused     Objective:   Vital Signs:  Blood pressure 158/82, pulse 68, temperature 97.9 °F (36.6 °C), temperature source Oral, resp. rate 18, height 5' 5\" (1.651 m), weight 143 lb 12.8 oz (65.2 kg), SpO2 97%.                    General: Awake and alert.    HENT: Eye: EOMI,    Neck/Thyroid: neck inspection: normal   Lungs:  Speaking full sentences  MSK: Moves extremities spontaneously.    Neuro:speech is clear.   Skin: Skin is dry       Assessment and Plan:     Patient Active Problem List   Diagnosis    Weakness generalized    Recurrent falls    Uncontrolled type 2 diabetes mellitus with hyperglycemia (HCC)       Myeloma with diffuse osteolytic bone lesions.   Hypothyroid on LT4 25 mcg   Uncontrolled complicated DM          . Uncontrolled complicated Diabetes Mellitus Type 2,   - Discussed importance of glycemic control  - Tresiba  15 units subcutaneous daily   - Novolog  6 units subcutaneous TID with meals  - Novolog high correction scale ACHS   - carb controlled diet if ok to eat  - Hypoglycemia protocol        . Hypothyroid on LT4 25 mcg , TSH 5.9      Thank you for allowing me to participate in the care of your patient.  D/w Pt and Rn     Results:      Latest Reference Range & Units 24 07:37 24 12:12 24 17:33 24 20:41 24 07:56   POC GLUCOSE 70 - 99 mg/dL 130 (H) 196 (H) 209 (H) 152 (H) 113 (H)   (H): Data is abnormally high  Lab Results   Component Value Date    WBC 4.8 2024    HGB 13.3 2024    HCT 39.1 2024    PLT  144.0 (L) 07/30/2024    CREATSERUM 1.20 07/30/2024    BUN 23 07/30/2024     07/30/2024    K 3.3 (L) 07/30/2024     07/30/2024    CO2 24.0 07/30/2024     (H) 07/30/2024    CA 8.3 (L) 07/30/2024    ALB 4.2 07/30/2024    ALKPHO 69 07/30/2024    BILT 1.2 (H) 07/30/2024    TP 7.3 07/30/2024    AST 24 07/30/2024    ALT 78 (H) 07/30/2024    T4F 1.1 07/28/2024    TSH 5.944 (H) 07/28/2024    MG 1.9 07/30/2024    PHOS 3.3 07/28/2024    B12 415 07/28/2024       No results found.              Michelle Calvillo MD  8/1/2024        DOS is the same date the note was signed

## 2024-08-02 VITALS
HEART RATE: 74 BPM | RESPIRATION RATE: 18 BRPM | DIASTOLIC BLOOD PRESSURE: 94 MMHG | BODY MASS INDEX: 23.96 KG/M2 | SYSTOLIC BLOOD PRESSURE: 159 MMHG | TEMPERATURE: 98 F | HEIGHT: 65 IN | OXYGEN SATURATION: 97 % | WEIGHT: 143.81 LBS

## 2024-08-02 LAB
GLUCOSE BLDC GLUCOMTR-MCNC: 165 MG/DL (ref 70–99)
GLUCOSE BLDC GLUCOMTR-MCNC: 220 MG/DL (ref 70–99)

## 2024-08-02 RX ORDER — INSULIN DEGLUDEC 100 U/ML
15 INJECTION, SOLUTION SUBCUTANEOUS NIGHTLY
Qty: 3 ML | Refills: 0 | Status: SHIPPED | OUTPATIENT
Start: 2024-08-02

## 2024-08-02 NOTE — PROGRESS NOTES
[facility]  part of [facility]    Progress Note    Joselin Graham Patient Status:  Observation    5/15/1950 MRN [id number]   Location [facility] 4W/SW/SE Attending Roberta Butler MD   Hosp Day # 0 PCP No primary care provider on file.       Subjective:   Joselin Graham is a(n) 74 year old male with weakness generalized weakness diabetes and multiple myeloma in remission.    Objective:     Vitals:    24   BP: 142/77   Pulse: 72   Resp: 18   Temp: 98 °F (36.7 °C)       Intake/Output Summary (Last 24 hours) at 2024 2319  Last data filed at 2024  Gross per 24 hour   Intake 240 ml   Output 1950 ml   Net -1710 ml     Wt Readings from Last 2 Encounters:   24 143 lb 12.8 oz (65.2 kg)   24 135 lb (61.2 kg)       General appearance:  alert, appears stated age, and cooperative  Head: Normocephalic, without obvious abnormality, atraumatic  Eyes: conjunctivae/corneas clear. PERRL, EOM's intact. Fundi benign.  Neck: no adenopathy, no carotid bruit, no JVD, supple, symmetrical, trachea midline, and thyroid not enlarged, symmetric, no tenderness/mass/nodules  Pulmonary: clear to auscultation bilaterally  Cardiovascular: S1, S2 normal, no murmur, click, rub or gallop, regular rate and rhythm  Abdominal: soft, non-tender; bowel sounds normal; no masses,  no organomegaly  Extremities: extremities normal, atraumatic, no cyanosis or edema  Pulses: 2+ and symmetric  Neurologic: Grossly normal  Genital Exam: defer exam    Current Medications:    Current Facility-Administered Medications:     insulin aspart (NovoLOG) 100 Units/mL FlexPen 6 Units, 6 Units, Subcutaneous, TID CC    insulin aspart (NovoLOG) 100 Units/mL FlexPen 1-11 Units, 1-11 Units, Subcutaneous, TID CC and HS    insulin degludec (Tresiba) 100 units/mL flextouch 15 Units, 15 Units, Subcutaneous, Nightly    hydrALAzine (Apresoline) 20 mg/mL injection 10 mg, 10 mg, Intravenous, Q6H PRN    acetaminophen  (Tylenol) tab 650 mg, 650 mg, Oral, Q6H PRN    glucose (Dex4) 15 GM/59ML oral liquid 15 g, 15 g, Oral, Q15 Min PRN **OR** glucose (Glutose) 40% oral gel 15 g, 15 g, Oral, Q15 Min PRN **OR** glucose-vitamin C (Dex-4) chewable tab 4 tablet, 4 tablet, Oral, Q15 Min PRN **OR** dextrose 50% injection 50 mL, 50 mL, Intravenous, Q15 Min PRN **OR** glucose (Dex4) 15 GM/59ML oral liquid 30 g, 30 g, Oral, Q15 Min PRN **OR** glucose (Glutose) 40% oral gel 30 g, 30 g, Oral, Q15 Min PRN **OR** glucose-vitamin C (Dex-4) chewable tab 8 tablet, 8 tablet, Oral, Q15 Min PRN    DULoxetine (Cymbalta) DR cap 60 mg, 60 mg, Oral, Daily    levothyroxine (Synthroid) tab 25 mcg, 25 mcg, Oral, Before breakfast    losartan (Cozaar) tab 50 mg, 50 mg, Oral, Daily    pantoprazole (Protonix) DR tab 40 mg, 40 mg, Oral, Before breakfast    Allergies  No Known Allergies    Assessment and Plan:        ASSESSMENT AND PLAN:    1.       Progressive motor weakness of the lower extremities:  Differential diagnosis in this patient includes diabetic motor neuropathy, with also possibly a component of sensory neuropathy.  To better evaluate this, Neurology will be consulted.  2.       Diabetes:  Patient is a very noncompliant diabetic.  Patient previously was on insulin, but according to patient's wife, patient does not take insulin consistently.  Will consider Endocrinology consult as well.  Patient currently is on sliding scale and Accu-Cheks before meals and at bedtime, and will be on a diabetic diet.  3.       Hypertension:  Patient has a history of being on amlodipine and will continue the same.  4.       DVT prophylaxis:  Patient will be on SCD boots because of low platelets.  5.       GI prophylaxis:  Patient will be on Protonix.  July 28, 2024  Patient is stable does not have any new neurologic deficit still is bedbound.  Patient had skeletal survey with multiple osteolytic lesions indicative of myeloma activity.  Urine Bence-Sweeney protein and urine  light chains have been ordered.  Hematology consult has been ordered.  Neurologic consultation is pending as well.  Patient is not stable for discharge.  Will discuss with outpatient hematologist.     July 29, 2024  Patient is still bedridden and has minimal transfers from bed to chair.  Discussed patient's skeletal survey findings.  Appreciate hematology recommendations.  Does not have a sensory level and unlikely to be spinal cord compression.  Neurological workup is pending including neurochecks every 4 hours fall precautions delirium precautions.  Will consult rehab for possible placement.     July 30, 2024  Patient is stable no new deficits noted.  Had a detailed discussion with Dr. Олег Pitts patient's primary oncologist and [facility].  Per Dr. Pitts patient always had poorly controlled diabetes with with neuropathy likely secondary to diabetes and unlikely due to light chain disease.  For the time being patient will get evaluated by rehab and determine placement.    July 31, 2024  Discussed with patient likelihood of being placed in rehab patient agrees with the plan and was going to work with case management to select the proper facility.  Workup so far has been unrevealing for any other etiology.  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  Placement is pending.    August 1, 2024  Discussed with [facility].  Peer- Peer.  Patient is accepted to 5 days of acute rehab therapy.  Placement is pending.  Blood sugar control has been good continue current regimen.      Results:     Lab Results   Component Value Date    WBC 4.5 08/01/2024    HGB 12.4 (L) 08/01/2024    HCT 38.9 (L) 08/01/2024    .0 08/01/2024     08/01/2024    K 3.7 08/01/2024     08/01/2024    CO2 22.0 08/01/2024    CREATSERUM 1.20 08/01/2024    BUN 19 08/01/2024     (H) 08/01/2024    CA 8.8 08/01/2024    ALB 3.8 08/01/2024    ALKPHO 66 08/01/2024    BILT 0.7 08/01/2024    TP  6.6 08/01/2024    AST 24 08/01/2024    ALT 75 (H) 08/01/2024    T4F 1.1 07/28/2024    TSH 5.944 (H) 07/28/2024    MG 1.9 08/01/2024    PHOS 3.7 08/01/2024    B12 415 07/28/2024     No results found for this visit on 07/27/24.    No results found.                GIOVANNY HOLT MD  8/1/2024

## 2024-08-02 NOTE — CM/SW NOTE
Ins auth approved for [facility].    RN and facility liaison notified. CM  rejim RN contact MD to discuss dc and obtain order if cleared.    Plan  [facility] done  Rn report # [phone]     / to remain available for support and/or discharge planning.     Jessica Mcgill, RN    Ext [phone]

## 2024-08-02 NOTE — PLAN OF CARE
Patient alert and orientated x 3.  Can be forgetful, patient is fall risk had fall at home sutures in right lip.  Patient is diabetic on 1800 carb controlled diet, RA, had BM today and 0 pain.  Patient will be transferring to rehab facility tomorrow.  Patient has personal belongings and call light within reach, safety measures in place.    Problem: Patient Centered Care  Goal: Patient preferences are identified and integrated in the patient's plan of care  Description: Interventions:  - What would you like us to know as we care for you? I live alone  - Provide timely, complete, and accurate information to patient/family  - Incorporate patient and family knowledge, values, beliefs, and cultural backgrounds into the planning and delivery of care  - Encourage patient/family to participate in care and decision-making at the level they choose  - Honor patient and family perspectives and choices  Outcome: Progressing     Problem: Diabetes/Glucose Control  Goal: Glucose maintained within prescribed range  Description: INTERVENTIONS:  - Monitor Blood Glucose as ordered  - Assess for signs and symptoms of hyperglycemia and hypoglycemia  - Administer ordered medications to maintain glucose within target range  - Assess barriers to adequate nutritional intake and initiate nutrition consult as needed  - Instruct patient on self management of diabetes  Outcome: Progressing     Problem: Patient/Family Goals  Goal: Patient/Family Long Term Goal  Description: Patient's Long Term Goal: discharge from the hospital    Interventions:  - See additional Care Plan goals for specific interventions  Outcome: Progressing  Goal: Patient/Family Short Term Goal  Description: Patient's Short Term Goal: feel better    Interventions:   - See additional Care Plan goals for specific interventions  Outcome: Progressing     Problem: PAIN - ADULT  Goal: Verbalizes/displays adequate comfort level or patient's stated pain goal  Description:  INTERVENTIONS:  - Encourage pt to monitor pain and request assistance  - Assess pain using appropriate pain scale  - Administer analgesics based on type and severity of pain and evaluate response  - Implement non-pharmacological measures as appropriate and evaluate response  - Consider cultural and social influences on pain and pain management  - Manage/alleviate anxiety  - Utilize distraction and/or relaxation techniques  - Monitor for opioid side effects  - Notify MD/LIP if interventions unsuccessful or patient reports new pain  - Anticipate increased pain with activity and pre-medicate as appropriate  Outcome: Progressing     Problem: SAFETY ADULT - FALL  Goal: Free from fall injury  Description: INTERVENTIONS:  - Assess pt frequently for physical needs  - Identify cognitive and physical deficits and behaviors that affect risk of falls.  - [place] fall precautions as indicated by assessment.  - Educate pt/family on patient safety including physical limitations  - Instruct pt to call for assistance with activity based on assessment  - Modify environment to reduce risk of injury  - Provide assistive devices as appropriate  - Consider OT/PT consult to assist with strengthening/mobility  - Encourage toileting schedule  Outcome: Progressing     Problem: METABOLIC/FLUID AND ELECTROLYTES - ADULT  Goal: Glucose maintained within prescribed range  Description: INTERVENTIONS:  - Monitor Blood Glucose as ordered  - Assess for signs and symptoms of hyperglycemia and hypoglycemia  - Administer ordered medications to maintain glucose within target range  - Assess barriers to adequate nutritional intake and initiate nutrition consult as needed  - Instruct patient on self management of diabetes  Outcome: Progressing  Goal: Electrolytes maintained within normal limits  Description: INTERVENTIONS:  - Monitor labs and rhythm and assess patient for signs and symptoms of electrolyte imbalances  - Administer electrolyte replacement  as ordered  - Monitor response to electrolyte replacements, including rhythm and repeat lab results as appropriate  - Fluid restriction as ordered  - Instruct patient on fluid and nutrition restrictions as appropriate  Outcome: Progressing     Problem: SKIN/TISSUE INTEGRITY - ADULT  Goal: Incision(s), wounds(s) or drain site(s) healing without S/S of infection  Description: INTERVENTIONS:  - Assess and document risk factors for pressure ulcer development  - Assess and document skin integrity  - Assess and document dressing/incision, wound bed, drain sites and surrounding tissue  - Implement wound care per orders  - Initiate isolation precautions as appropriate  - Initiate Pressure Ulcer prevention bundle as indicated  Outcome: Progressing     Problem: Impaired Functional Mobility  Goal: Achieve highest/safest level of mobility/gait  Description: Interventions:  - Assess patient's functional ability and stability  - Promote increasing activity/tolerance for mobility and gait  - Educate and engage patient/family in tolerated activity level and precautions    Outcome: Progressing     Problem: Impaired Activities of Daily Living  Goal: Achieve highest/safest level of independence in self care  Description: Interventions:  - Assess ability and encourage patient to participate in ADLs to maximize function  - Promote sitting position while performing ADLs such as feeding, grooming, and bathing  - Educate and encourage patient/family in tolerated functional activity level and precautions during self-care    Outcome: Progressing     Problem: Impaired Cognition  Goal: Patient will exhibit improved attention, thought processing and/or memory  Description: Interventions:    Outcome: Progressing     Problem: RISK FOR INFECTION - ADULT  Goal: Absence of fever/infection during anticipated neutropenic period  Description: INTERVENTIONS  - Monitor WBC  - Administer growth factors as ordered  - Implement neutropenic  guidelines  Outcome: Progressing     Problem: DISCHARGE PLANNING  Goal: Discharge to home or other facility with appropriate resources  Description: INTERVENTIONS:  - Identify barriers to discharge w/pt and caregiver  - Include patient/family/discharge partner in discharge planning  - Arrange for needed discharge resources and transportation as appropriate  - Identify discharge learning needs (meds, wound care, etc)  - Arrange for interpreters to assist at discharge as needed  - Consider post-discharge preferences of patient/family/discharge partner  - Complete POLST form as appropriate  - Assess patient's ability to be responsible for managing their own health  - Refer to Case Management Department for coordinating discharge planning if the patient needs post-hospital services based on physician/LIP order or complex needs related to functional status, cognitive ability or social support system  Outcome: Progressing     Problem: CARDIOVASCULAR - ADULT  Goal: Maintains optimal cardiac output and hemodynamic stability  Description: INTERVENTIONS:  - Monitor vital signs, rhythm, and trends  - Monitor for bleeding, hypotension and signs of decreased cardiac output  - Evaluate effectiveness of vasoactive medications to optimize hemodynamic stability  - Monitor arterial and/or venous puncture sites for bleeding and/or hematoma  - Assess quality of pulses, skin color and temperature  - Assess for signs of decreased coronary artery perfusion - ex. Angina  - Evaluate fluid balance, assess for edema, trend weights  Outcome: Progressing  Goal: Absence of cardiac arrhythmias or at baseline  Description: INTERVENTIONS:  - Continuous cardiac monitoring, monitor vital signs, obtain 12 lead EKG if indicated  - Evaluate effectiveness of antiarrhythmic and heart rate control medications as ordered  - Initiate emergency measures for life threatening arrhythmias  - Monitor electrolytes and administer replacement therapy as  ordered  Outcome: Progressing     Problem: MUSCULOSKELETAL - ADULT  Goal: Return mobility to safest level of function  Description: INTERVENTIONS:  - Assess patient stability and activity tolerance for standing, transferring and ambulating w/ or w/o assistive devices  - Assist with transfers and ambulation using safe patient handling equipment as needed  - Ensure adequate protection for wounds/incisions during mobilization  - Obtain PT/OT consults as needed  - Advance activity as appropriate  - Communicate ordered activity level and limitations with patient/family  Outcome: Progressing  Goal: Maintain proper alignment of affected body part  Description: INTERVENTIONS:  - Support and protect limb and body alignment per provider's orders  - Instruct and reinforce with patient and family use of appropriate assistive device and precautions (e.g. spinal or hip dislocation precautions)  Outcome: Progressing     Problem: NEUROLOGICAL - ADULT  Goal: Achieves stable or improved neurological status  Description: INTERVENTIONS  - Assess for and report changes in neurological status  - Initiate measures to prevent increased intracranial pressure  - Maintain blood pressure and fluid volume within ordered parameters to optimize cerebral perfusion and minimize risk of hemorrhage  - Monitor temperature, glucose, and sodium. Initiate appropriate interventions as ordered  Outcome: Progressing

## 2024-08-02 NOTE — CM/SW NOTE
Department  notified care team of [employer] approval, see below.    Assigned SW/CM to follow up with patient/family on discharge plan.       8/2 -- [place] ID [id number], approved [id number] 8/1 - 8/5.    Lindy Weston, DSC

## 2024-09-20 ENCOUNTER — EXTERNAL LAB (OUTPATIENT)
Dept: HEALTH INFORMATION MANAGEMENT | Facility: OTHER | Age: 74
End: 2024-09-20

## 2024-09-20 LAB
ALBUMIN SERPL-MCNC: 3.8 G/DL (ref 3.2–4.9)
ALP SERPL-CCNC: 66 U/L (ref 34–143)
ALT SERPL-CCNC: 77 U/L (ref 0–42)
AST SERPL-CCNC: 12 U/L (ref 9–35)
BILIRUB SERPL-MCNC: 0.8 MG/DL (ref 0.16–1.3)
BUN SERPL-MCNC: 31 MG/DL (ref 7–28)
CALCIUM SERPL-MCNC: 9.2 MG/DL (ref 8.7–10.5)
CHLORIDE SERPL-SCNC: 107 MEQ/L (ref 99–110)
CO2 SERPL-SCNC: 26 MMOL/L (ref 18–30)
CREAT SERPL-MCNC: 1.31 MG/DL (ref 0.44–1.32)
EGFRCR SERPLBLD CKD-EPI 2021: 53 ML/M/1.73M2
EGFRCR SERPLBLD CKD-EPI 2021: >60 ML/M/1.73M2
GLUCOSE SERPL-MCNC: 124 MG/DL (ref 70–110)
LENGTH OF FAST TIME PATIENT: ABNORMAL H
POTASSIUM SERPL-SCNC: 4 MEQ/L (ref 3.6–5)
PROT SERPL-MCNC: 6.8 G/DL (ref 5.6–8.2)
SODIUM SERPL-SCNC: 142 MEQ/L (ref 138–147)

## 2024-10-04 ENCOUNTER — EXTERNAL LAB (OUTPATIENT)
Dept: HEALTH INFORMATION MANAGEMENT | Facility: OTHER | Age: 74
End: 2024-10-04

## 2024-10-04 LAB — LAB RESULT: NORMAL

## 2024-10-11 ENCOUNTER — EXTERNAL LAB (OUTPATIENT)
Dept: HEALTH INFORMATION MANAGEMENT | Facility: OTHER | Age: 74
End: 2024-10-11

## 2024-10-11 LAB
BASOPHILS # BLD: 0.05 THO/MM3 (ref 0–0.1)
BASOPHILS NFR BLD: 1.4 %
EOSINOPHIL # BLD: 0.37 THO/MM3 (ref 0–0.5)
EOSINOPHIL NFR BLD: 10.6 %
ERYTHROCYTE [DISTWIDTH] IN BLOOD: 15.4 % (ref 11.5–15.2)
HCT VFR BLD CALC: 36.3 % (ref 42–52)
HGB BLD-MCNC: 11.5 G/DL (ref 14–18)
LYMPHOCYTES # BLD: 1.16 THO/MM3 (ref 0.8–3)
LYMPHOCYTES NFR BLD: 33.2 %
MCH RBC QN AUTO: 27.6 PG (ref 27–33)
MCHC RBC AUTO-ENTMCNC: 31.7 G/DL (ref 32–36)
MCV RBC AUTO: 87.1 FL (ref 80–94)
MONOCYTES # BLD: 0.53 THO/MM3 (ref 0.2–1)
MONOCYTES NFR BLD: 15.2 %
NEUTROPHILS # BLD: 1.37 THO/MM3 (ref 1.4–6.8)
NEUTROPHILS NFR BLD: 39.3 %
PLATELET # BLD: 148 THO/MM3 (ref 150–400)
PMV BLD AUTO: 10.6 FL (ref 9.5–13.1)
RBC # BLD: 4.17 MIL/MM3 (ref 4.7–6.1)
WBC # BLD: 3.49 THO/MM3 (ref 4.8–10.8)

## 2024-12-07 ENCOUNTER — EXTERNAL LAB (OUTPATIENT)
Dept: HEALTH INFORMATION MANAGEMENT | Facility: OTHER | Age: 74
End: 2024-12-07

## 2024-12-07 LAB
ALBUMIN SERPL-MCNC: 3.8 G/DL (ref 3.2–4.9)
ALP SERPL-CCNC: 81 U/L (ref 34–143)
ALT SERPL-CCNC: 119 U/L (ref 0–42)
AST SERPL-CCNC: 20 U/L (ref 9–35)
BASOPHILS # BLD: 0.06 THO/MM3 (ref 0–0.1)
BASOPHILS NFR BLD: 2.2 %
BILIRUB SERPL-MCNC: 1.02 MG/DL (ref 0.16–1.3)
BUN SERPL-MCNC: 22 MG/DL (ref 7–28)
CALCIUM SERPL-MCNC: 8.4 MG/DL (ref 8.7–10.5)
CHLORIDE SERPL-SCNC: 106 MEQ/L (ref 99–110)
CO2 SERPL-SCNC: 26 MMOL/L (ref 18–30)
CREAT SERPL-MCNC: 1.35 MG/DL (ref 0.44–1.32)
EGFRCR SERPLBLD CKD-EPI 2021: 51 ML/M/1.73M2
EGFRCR SERPLBLD CKD-EPI 2021: >60 ML/M/1.73M2
EOSINOPHIL # BLD: 0.31 THO/MM3 (ref 0–0.5)
EOSINOPHIL NFR BLD: 11.3 %
ERYTHROCYTE [DISTWIDTH] IN BLOOD: 15.4 % (ref 11.5–15.2)
GLUCOSE SERPL-MCNC: 110 MG/DL (ref 70–110)
HCT VFR BLD CALC: 37.4 % (ref 42–52)
HGB BLD-MCNC: 12.3 G/DL (ref 14–18)
IMM GRANULOCYTES # BLD: 0.01 THO/MM3 (ref 0–0.1)
IMM GRANULOCYTES NFR BLD: 0.4 %
LENGTH OF FAST TIME PATIENT: ABNORMAL H
LYMPHOCYTES # BLD: 1.06 THO/MM3 (ref 0.8–3)
LYMPHOCYTES NFR BLD: 38.7 %
MCH RBC QN AUTO: 29.1 PG (ref 27–33)
MCHC RBC AUTO-ENTMCNC: 32.9 G/DL (ref 32–36)
MCV RBC AUTO: 88.6 FL (ref 80–94)
MONOCYTES # BLD: 0.39 THO/MM3 (ref 0.2–1)
MONOCYTES NFR BLD: 14.2 %
NEUTROPHILS # BLD: 0.91 THO/MM3 (ref 1.4–6.8)
NEUTROPHILS NFR BLD: 33.2 %
PLATELET # BLD: 134 THO/MM3 (ref 150–400)
PMV BLD AUTO: 10 FL (ref 9.5–13.1)
POTASSIUM SERPL-SCNC: 3.7 MEQ/L (ref 3.6–5)
PROT SERPL-MCNC: 6.7 G/DL (ref 5.6–8.2)
RBC # BLD: 4.22 MIL/MM3 (ref 4.7–6.1)
SODIUM SERPL-SCNC: 141 MEQ/L (ref 138–147)
WBC # BLD: 2.74 THO/MM3 (ref 4.8–10.8)

## 2025-01-01 ENCOUNTER — TELEPHONE (OUTPATIENT)
Dept: GASTROENTEROLOGY | Age: 75
End: 2025-01-01

## 2025-01-01 ENCOUNTER — E-ADVICE (OUTPATIENT)
Dept: GASTROENTEROLOGY | Age: 75
End: 2025-01-01

## 2025-02-03 ENCOUNTER — HOSPITAL ENCOUNTER (INPATIENT)
Age: 75
LOS: 2 days | Discharge: NURSING, REHAB OR LONG-TERM CARE | DRG: 418 | End: 2025-02-07
Attending: STUDENT IN AN ORGANIZED HEALTH CARE EDUCATION/TRAINING PROGRAM | Admitting: INTERNAL MEDICINE

## 2025-02-03 ENCOUNTER — APPOINTMENT (OUTPATIENT)
Dept: ULTRASOUND IMAGING | Age: 75
DRG: 418 | End: 2025-02-03
Attending: STUDENT IN AN ORGANIZED HEALTH CARE EDUCATION/TRAINING PROGRAM

## 2025-02-03 ENCOUNTER — APPOINTMENT (OUTPATIENT)
Dept: CT IMAGING | Age: 75
DRG: 418 | End: 2025-02-03
Attending: STUDENT IN AN ORGANIZED HEALTH CARE EDUCATION/TRAINING PROGRAM

## 2025-02-03 DIAGNOSIS — R74.01 TRANSAMINITIS: ICD-10-CM

## 2025-02-03 DIAGNOSIS — E80.6 HYPERBILIRUBINEMIA: Primary | ICD-10-CM

## 2025-02-03 DIAGNOSIS — E87.6 HYPOKALEMIA: ICD-10-CM

## 2025-02-03 LAB
ALBUMIN SERPL-MCNC: 2.7 G/DL (ref 3.4–5)
ALP SERPL-CCNC: 377 UNITS/L (ref 45–117)
ALT SERPL-CCNC: 295 UNITS/L
ANION GAP SERPL CALC-SCNC: 10 MMOL/L (ref 7–19)
APTT PPP: 30 SEC (ref 22–32)
AST SERPL-CCNC: 102 UNITS/L
BASOPHILS # BLD: 0 K/MCL (ref 0–0.3)
BASOPHILS NFR BLD: 1 %
BILIRUB CONJ SERPL-MCNC: 4.5 MG/DL (ref 0–0.2)
BILIRUB SERPL-MCNC: 5.9 MG/DL (ref 0.2–1)
BUN SERPL-MCNC: 15 MG/DL (ref 6–20)
BUN/CREAT SERPL: 13 (ref 7–25)
CALCIUM SERPL-MCNC: 8.9 MG/DL (ref 8.4–10.2)
CHLORIDE SERPL-SCNC: 108 MMOL/L (ref 97–110)
CO2 SERPL-SCNC: 22 MMOL/L (ref 21–32)
CREAT SERPL-MCNC: 1.19 MG/DL (ref 0.67–1.17)
DEPRECATED RDW RBC: 50.3 FL (ref 39–50)
EGFRCR SERPLBLD CKD-EPI 2021: 63 ML/MIN/{1.73_M2}
EOSINOPHIL # BLD: 0.3 K/MCL (ref 0–0.5)
EOSINOPHIL NFR BLD: 10 %
ERYTHROCYTE [DISTWIDTH] IN BLOOD: 16.1 % (ref 11–15)
FASTING DURATION TIME PATIENT: ABNORMAL H
GLUCOSE SERPL-MCNC: 134 MG/DL (ref 70–99)
HCT VFR BLD CALC: 33.9 % (ref 39–51)
HGB BLD-MCNC: 11.3 G/DL (ref 13–17)
IMM GRANULOCYTES # BLD AUTO: 0 K/MCL (ref 0–0.2)
IMM GRANULOCYTES # BLD: 0 %
INR PPP: 1
LIPASE SERPL-CCNC: 95 UNITS/L (ref 15–77)
LYMPHOCYTES # BLD: 1 K/MCL (ref 1–4)
LYMPHOCYTES NFR BLD: 39 %
MCH RBC QN AUTO: 28.5 PG (ref 26–34)
MCHC RBC AUTO-ENTMCNC: 33.3 G/DL (ref 32–36.5)
MCV RBC AUTO: 85.4 FL (ref 78–100)
MONOCYTES # BLD: 0.3 K/MCL (ref 0.3–0.9)
MONOCYTES NFR BLD: 11 %
NEUTROPHILS # BLD: 1 K/MCL (ref 1.8–7.7)
NEUTROPHILS NFR BLD: 39 %
NRBC BLD MANUAL-RTO: 0 /100 WBC
PLATELET # BLD AUTO: 141 K/MCL (ref 140–450)
POTASSIUM SERPL-SCNC: 2.9 MMOL/L (ref 3.4–5.1)
PROT SERPL-MCNC: 7.2 G/DL (ref 6.4–8.2)
PROTHROMBIN TIME: 11.1 SEC (ref 9.7–11.8)
RBC # BLD: 3.97 MIL/MCL (ref 4.5–5.9)
SODIUM SERPL-SCNC: 137 MMOL/L (ref 135–145)
WBC # BLD: 2.6 K/MCL (ref 4.2–11)

## 2025-02-03 PROCEDURE — 74177 CT ABD & PELVIS W/CONTRAST: CPT

## 2025-02-03 PROCEDURE — 10002805 HB CONTRAST AGENT: Performed by: STUDENT IN AN ORGANIZED HEALTH CARE EDUCATION/TRAINING PROGRAM

## 2025-02-03 PROCEDURE — 99285 EMERGENCY DEPT VISIT HI MDM: CPT

## 2025-02-03 PROCEDURE — 83690 ASSAY OF LIPASE: CPT | Performed by: STUDENT IN AN ORGANIZED HEALTH CARE EDUCATION/TRAINING PROGRAM

## 2025-02-03 PROCEDURE — 10002803 HB RX 637: Performed by: STUDENT IN AN ORGANIZED HEALTH CARE EDUCATION/TRAINING PROGRAM

## 2025-02-03 PROCEDURE — 10002807 HB RX 258: Performed by: STUDENT IN AN ORGANIZED HEALTH CARE EDUCATION/TRAINING PROGRAM

## 2025-02-03 PROCEDURE — 96365 THER/PROPH/DIAG IV INF INIT: CPT

## 2025-02-03 PROCEDURE — 10002800 HB RX 250 W HCPCS: Performed by: STUDENT IN AN ORGANIZED HEALTH CARE EDUCATION/TRAINING PROGRAM

## 2025-02-03 PROCEDURE — 76705 ECHO EXAM OF ABDOMEN: CPT

## 2025-02-03 PROCEDURE — 85730 THROMBOPLASTIN TIME PARTIAL: CPT | Performed by: STUDENT IN AN ORGANIZED HEALTH CARE EDUCATION/TRAINING PROGRAM

## 2025-02-03 PROCEDURE — 80076 HEPATIC FUNCTION PANEL: CPT | Performed by: STUDENT IN AN ORGANIZED HEALTH CARE EDUCATION/TRAINING PROGRAM

## 2025-02-03 PROCEDURE — 85025 COMPLETE CBC W/AUTO DIFF WBC: CPT | Performed by: STUDENT IN AN ORGANIZED HEALTH CARE EDUCATION/TRAINING PROGRAM

## 2025-02-03 PROCEDURE — 85610 PROTHROMBIN TIME: CPT | Performed by: STUDENT IN AN ORGANIZED HEALTH CARE EDUCATION/TRAINING PROGRAM

## 2025-02-03 PROCEDURE — 93005 ELECTROCARDIOGRAM TRACING: CPT | Performed by: STUDENT IN AN ORGANIZED HEALTH CARE EDUCATION/TRAINING PROGRAM

## 2025-02-03 PROCEDURE — 80048 BASIC METABOLIC PNL TOTAL CA: CPT | Performed by: STUDENT IN AN ORGANIZED HEALTH CARE EDUCATION/TRAINING PROGRAM

## 2025-02-03 PROCEDURE — 96366 THER/PROPH/DIAG IV INF ADDON: CPT

## 2025-02-03 RX ORDER — POTASSIUM CHLORIDE 1500 MG/1
40 TABLET, EXTENDED RELEASE ORAL ONCE
Status: COMPLETED | OUTPATIENT
Start: 2025-02-03 | End: 2025-02-03

## 2025-02-03 RX ORDER — POTASSIUM CHLORIDE 14.9 MG/ML
20 INJECTION INTRAVENOUS ONCE
Status: COMPLETED | OUTPATIENT
Start: 2025-02-03 | End: 2025-02-03

## 2025-02-03 RX ADMIN — SODIUM CHLORIDE, POTASSIUM CHLORIDE, SODIUM LACTATE AND CALCIUM CHLORIDE 250 ML: 600; 310; 30; 20 INJECTION, SOLUTION INTRAVENOUS at 20:27

## 2025-02-03 RX ADMIN — IOHEXOL 80 ML: 350 INJECTION, SOLUTION INTRAVENOUS at 20:48

## 2025-02-03 RX ADMIN — POTASSIUM CHLORIDE 20 MEQ: 14.9 INJECTION, SOLUTION INTRAVENOUS at 20:16

## 2025-02-03 RX ADMIN — POTASSIUM CHLORIDE 40 MEQ: 1500 TABLET, EXTENDED RELEASE ORAL at 20:18

## 2025-02-03 SDOH — SOCIAL STABILITY: SOCIAL INSECURITY: HOW OFTEN DOES ANYONE, INCLUDING FAMILY AND FRIENDS, INSULT OR TALK DOWN TO YOU?: NEVER

## 2025-02-03 SDOH — SOCIAL STABILITY: SOCIAL INSECURITY: HOW OFTEN DOES ANYONE, INCLUDING FAMILY AND FRIENDS, PHYSICALLY HURT YOU?: NEVER

## 2025-02-03 SDOH — SOCIAL STABILITY: SOCIAL INSECURITY: HOW OFTEN DOES ANYONE, INCLUDING FAMILY AND FRIENDS, SCREAM OR CURSE AT YOU?: NEVER

## 2025-02-03 SDOH — SOCIAL STABILITY: SOCIAL INSECURITY: HOW OFTEN DOES ANYONE, INCLUDING FAMILY AND FRIENDS, THREATEN YOU WITH HARM?: NEVER

## 2025-02-03 ASSESSMENT — PAIN SCALES - GENERAL: PAINLEVEL_OUTOF10: 0

## 2025-02-04 ENCOUNTER — APPOINTMENT (OUTPATIENT)
Dept: MRI IMAGING | Age: 75
DRG: 418 | End: 2025-02-04

## 2025-02-04 LAB
ALBUMIN SERPL-MCNC: 2.5 G/DL (ref 3.4–5)
ALBUMIN/GLOB SERPL: 0.5 {RATIO} (ref 1–2.4)
ALP SERPL-CCNC: 449 UNITS/L (ref 45–117)
ALT SERPL-CCNC: 278 UNITS/L
ANION GAP SERPL CALC-SCNC: 12 MMOL/L (ref 7–19)
APPEARANCE UR: CLEAR
AST SERPL-CCNC: 86 UNITS/L
ATRIAL RATE (BPM): 59
BASOPHILS # BLD: 0 K/MCL (ref 0–0.3)
BASOPHILS NFR BLD: 1 %
BILIRUB SERPL-MCNC: 5.9 MG/DL (ref 0.2–1)
BILIRUB UR QL STRIP: NEGATIVE
BUN SERPL-MCNC: 14 MG/DL (ref 6–20)
BUN/CREAT SERPL: 12 (ref 7–25)
CALCIUM SERPL-MCNC: 8.8 MG/DL (ref 8.4–10.2)
CHLORIDE SERPL-SCNC: 111 MMOL/L (ref 97–110)
CO2 SERPL-SCNC: 18 MMOL/L (ref 21–32)
COLOR UR: YELLOW
CREAT SERPL-MCNC: 1.2 MG/DL (ref 0.67–1.17)
DEPRECATED RDW RBC: 50.6 FL (ref 39–50)
EGFRCR SERPLBLD CKD-EPI 2021: 63 ML/MIN/{1.73_M2}
EOSINOPHIL # BLD: 0.2 K/MCL (ref 0–0.5)
EOSINOPHIL NFR BLD: 9 %
ERYTHROCYTE [DISTWIDTH] IN BLOOD: 16 % (ref 11–15)
FASTING DURATION TIME PATIENT: ABNORMAL H
GLOBULIN SER-MCNC: 4.7 G/DL (ref 2–4)
GLUCOSE BLDC GLUCOMTR-MCNC: 174 MG/DL (ref 70–99)
GLUCOSE BLDC GLUCOMTR-MCNC: 179 MG/DL (ref 70–99)
GLUCOSE BLDC GLUCOMTR-MCNC: 187 MG/DL (ref 70–99)
GLUCOSE SERPL-MCNC: 125 MG/DL (ref 70–99)
GLUCOSE UR STRIP-MCNC: >1000 MG/DL
HBA1C MFR BLD: 6.4 % (ref 4.5–5.6)
HCT VFR BLD CALC: 34.8 % (ref 39–51)
HGB BLD-MCNC: 11.7 G/DL (ref 13–17)
HGB UR QL STRIP: NEGATIVE
KETONES UR STRIP-MCNC: 20 MG/DL
LEUKOCYTE ESTERASE UR QL STRIP: NEGATIVE
LYMPHOCYTES # BLD: 0.7 K/MCL (ref 1–4)
LYMPHOCYTES NFR BLD: 32 %
MCH RBC QN AUTO: 29 PG (ref 26–34)
MCHC RBC AUTO-ENTMCNC: 33.6 G/DL (ref 32–36.5)
MCV RBC AUTO: 86.1 FL (ref 78–100)
MONOCYTES # BLD: 0.2 K/MCL (ref 0.3–0.9)
MONOCYTES NFR BLD: 10 %
NEUTROPHILS # BLD: 1.1 K/MCL (ref 1.8–7.7)
NEUTS BAND NFR BLD: 5 % (ref 0–10)
NEUTS SEG NFR BLD: 43 %
NITRITE UR QL STRIP: NEGATIVE
NRBC BLD MANUAL-RTO: 0 /100 WBC
P AXIS (DEGREES): 48
PH UR STRIP: 5.5 [PH] (ref 5–7)
PLAT MORPH BLD: NORMAL
PLATELET # BLD AUTO: 112 K/MCL (ref 140–450)
POTASSIUM SERPL-SCNC: 4.1 MMOL/L (ref 3.4–5.1)
PR-INTERVAL (MSEC): 144
PROT SERPL-MCNC: 7.2 G/DL (ref 6.4–8.2)
PROT UR STRIP-MCNC: NEGATIVE MG/DL
QRS-INTERVAL (MSEC): 130
QT-INTERVAL (MSEC): 446
QTC: 442
R AXIS (DEGREES): -58
RAINBOW EXTRA TUBES HOLD SPECIMEN: NORMAL
RBC # BLD: 4.04 MIL/MCL (ref 4.5–5.9)
RBC MORPH BLD: NORMAL
REPORT TEXT: NORMAL
SODIUM SERPL-SCNC: 137 MMOL/L (ref 135–145)
SP GR UR STRIP: 1.03 (ref 1–1.03)
T AXIS (DEGREES): 82
UROBILINOGEN UR STRIP-MCNC: 0.2 MG/DL
VENTRICULAR RATE EKG/MIN (BPM): 59
WBC # BLD: 2.2 K/MCL (ref 4.2–11)
WBC MORPH BLD: NORMAL

## 2025-02-04 PROCEDURE — 96372 THER/PROPH/DIAG INJ SC/IM: CPT | Performed by: INTERNAL MEDICINE

## 2025-02-04 PROCEDURE — 80053 COMPREHEN METABOLIC PANEL: CPT | Performed by: INTERNAL MEDICINE

## 2025-02-04 PROCEDURE — G0378 HOSPITAL OBSERVATION PER HR: HCPCS

## 2025-02-04 PROCEDURE — 10002803 HB RX 637: Performed by: INTERNAL MEDICINE

## 2025-02-04 PROCEDURE — 83036 HEMOGLOBIN GLYCOSYLATED A1C: CPT | Performed by: INTERNAL MEDICINE

## 2025-02-04 PROCEDURE — 99222 1ST HOSP IP/OBS MODERATE 55: CPT | Performed by: INTERNAL MEDICINE

## 2025-02-04 PROCEDURE — 74183 MRI ABD W/O CNTR FLWD CNTR: CPT

## 2025-02-04 PROCEDURE — A9585 GADOBUTROL INJECTION: HCPCS

## 2025-02-04 PROCEDURE — 81003 URINALYSIS AUTO W/O SCOPE: CPT | Performed by: INTERNAL MEDICINE

## 2025-02-04 PROCEDURE — 99223 1ST HOSP IP/OBS HIGH 75: CPT | Performed by: INTERNAL MEDICINE

## 2025-02-04 PROCEDURE — 10002800 HB RX 250 W HCPCS: Performed by: INTERNAL MEDICINE

## 2025-02-04 PROCEDURE — 85027 COMPLETE CBC AUTOMATED: CPT | Performed by: INTERNAL MEDICINE

## 2025-02-04 PROCEDURE — 36415 COLL VENOUS BLD VENIPUNCTURE: CPT | Performed by: INTERNAL MEDICINE

## 2025-02-04 PROCEDURE — 82962 GLUCOSE BLOOD TEST: CPT

## 2025-02-04 PROCEDURE — 10002805 HB CONTRAST AGENT

## 2025-02-04 RX ORDER — LOSARTAN POTASSIUM 50 MG/1
50 TABLET ORAL DAILY
COMMUNITY

## 2025-02-04 RX ORDER — ACETAMINOPHEN 325 MG/1
650 TABLET ORAL EVERY 6 HOURS PRN
COMMUNITY

## 2025-02-04 RX ORDER — TRAZODONE HYDROCHLORIDE 50 MG/1
25 TABLET, FILM COATED ORAL NIGHTLY
COMMUNITY

## 2025-02-04 RX ORDER — AMOXICILLIN 250 MG
2 CAPSULE ORAL
COMMUNITY

## 2025-02-04 RX ORDER — DEXTROSE MONOHYDRATE 25 G/50ML
12.5 INJECTION, SOLUTION INTRAVENOUS PRN
Status: DISCONTINUED | OUTPATIENT
Start: 2025-02-04 | End: 2025-02-07 | Stop reason: HOSPADM

## 2025-02-04 RX ORDER — INSULIN LISPRO 100 [IU]/ML
2-5 INJECTION, SOLUTION INTRAVENOUS; SUBCUTANEOUS
COMMUNITY

## 2025-02-04 RX ORDER — BENZONATATE 100 MG/1
100 CAPSULE ORAL 2 TIMES DAILY PRN
COMMUNITY

## 2025-02-04 RX ORDER — ONDANSETRON 4 MG/1
4 TABLET, FILM COATED ORAL EVERY 6 HOURS PRN
COMMUNITY

## 2025-02-04 RX ORDER — INSULIN LISPRO 100 [IU]/ML
8 INJECTION, SOLUTION INTRAVENOUS; SUBCUTANEOUS
COMMUNITY

## 2025-02-04 RX ORDER — MEMANTINE HYDROCHLORIDE 10 MG/1
10 TABLET ORAL 2 TIMES DAILY
Status: DISCONTINUED | OUTPATIENT
Start: 2025-02-05 | End: 2025-02-07 | Stop reason: HOSPADM

## 2025-02-04 RX ORDER — DULOXETIN HYDROCHLORIDE 60 MG/1
60 CAPSULE, DELAYED RELEASE ORAL DAILY
Status: DISCONTINUED | OUTPATIENT
Start: 2025-02-05 | End: 2025-02-07 | Stop reason: HOSPADM

## 2025-02-04 RX ORDER — ONDANSETRON 2 MG/ML
4 INJECTION INTRAMUSCULAR; INTRAVENOUS EVERY 6 HOURS PRN
Status: DISCONTINUED | OUTPATIENT
Start: 2025-02-04 | End: 2025-02-07 | Stop reason: HOSPADM

## 2025-02-04 RX ORDER — DEXTROSE MONOHYDRATE 25 G/50ML
25 INJECTION, SOLUTION INTRAVENOUS PRN
Status: DISCONTINUED | OUTPATIENT
Start: 2025-02-04 | End: 2025-02-07 | Stop reason: HOSPADM

## 2025-02-04 RX ORDER — HYDRALAZINE HYDROCHLORIDE 50 MG/1
25 TABLET, FILM COATED ORAL EVERY 8 HOURS PRN
Status: DISCONTINUED | OUTPATIENT
Start: 2025-02-04 | End: 2025-02-07 | Stop reason: HOSPADM

## 2025-02-04 RX ORDER — MAGNESIUM HYDROXIDE/ALUMINUM HYDROXICE/SIMETHICONE 120; 1200; 1200 MG/30ML; MG/30ML; MG/30ML
30 SUSPENSION ORAL EVERY 4 HOURS PRN
Status: DISCONTINUED | OUTPATIENT
Start: 2025-02-04 | End: 2025-02-07 | Stop reason: HOSPADM

## 2025-02-04 RX ORDER — LEVOTHYROXINE SODIUM 25 UG/1
25 TABLET ORAL
COMMUNITY

## 2025-02-04 RX ORDER — POLYETHYLENE GLYCOL 3350 17 G/17G
17 POWDER, FOR SOLUTION ORAL DAILY PRN
Status: DISCONTINUED | OUTPATIENT
Start: 2025-02-04 | End: 2025-02-07 | Stop reason: HOSPADM

## 2025-02-04 RX ORDER — INSULIN GLARGINE 100 [IU]/ML
15 INJECTION, SOLUTION SUBCUTANEOUS DAILY
COMMUNITY

## 2025-02-04 RX ORDER — MEMANTINE HYDROCHLORIDE 10 MG/1
10 TABLET ORAL 2 TIMES DAILY
COMMUNITY

## 2025-02-04 RX ORDER — PANTOPRAZOLE SODIUM 40 MG/1
40 TABLET, DELAYED RELEASE ORAL DAILY
COMMUNITY

## 2025-02-04 RX ORDER — GADOBUTROL 604.72 MG/ML
10 INJECTION INTRAVENOUS ONCE
Status: COMPLETED | OUTPATIENT
Start: 2025-02-04 | End: 2025-02-04

## 2025-02-04 RX ORDER — LEVOTHYROXINE SODIUM 25 UG/1
25 TABLET ORAL
Status: DISCONTINUED | OUTPATIENT
Start: 2025-02-05 | End: 2025-02-07 | Stop reason: HOSPADM

## 2025-02-04 RX ORDER — AMLODIPINE BESYLATE 5 MG/1
5 TABLET ORAL DAILY
Status: DISCONTINUED | OUTPATIENT
Start: 2025-02-05 | End: 2025-02-07 | Stop reason: HOSPADM

## 2025-02-04 RX ORDER — HYDRALAZINE HYDROCHLORIDE 25 MG/1
25 TABLET, FILM COATED ORAL EVERY 8 HOURS PRN
COMMUNITY

## 2025-02-04 RX ORDER — AMOXICILLIN 250 MG
2 CAPSULE ORAL DAILY PRN
Status: DISCONTINUED | OUTPATIENT
Start: 2025-02-04 | End: 2025-02-07 | Stop reason: HOSPADM

## 2025-02-04 RX ORDER — SIMETHICONE 80 MG
80 TABLET,CHEWABLE ORAL EVERY 12 HOURS PRN
COMMUNITY

## 2025-02-04 RX ORDER — LENALIDOMIDE 5 MG/1
5 CAPSULE ORAL DAILY
COMMUNITY

## 2025-02-04 RX ORDER — NITROGLYCERIN 0.4 MG/1
0.4 TABLET SUBLINGUAL EVERY 5 MIN PRN
Status: DISCONTINUED | OUTPATIENT
Start: 2025-02-04 | End: 2025-02-07 | Stop reason: HOSPADM

## 2025-02-04 RX ORDER — NICOTINE POLACRILEX 4 MG
15 LOZENGE BUCCAL PRN
Status: DISCONTINUED | OUTPATIENT
Start: 2025-02-04 | End: 2025-02-07 | Stop reason: HOSPADM

## 2025-02-04 RX ORDER — AMLODIPINE BESYLATE 5 MG/1
5 TABLET ORAL DAILY
COMMUNITY

## 2025-02-04 RX ORDER — NICOTINE POLACRILEX 4 MG
30 LOZENGE BUCCAL PRN
Status: DISCONTINUED | OUTPATIENT
Start: 2025-02-04 | End: 2025-02-07 | Stop reason: HOSPADM

## 2025-02-04 RX ORDER — TRAMADOL HYDROCHLORIDE 50 MG/1
50 TABLET ORAL EVERY 6 HOURS PRN
Status: DISCONTINUED | OUTPATIENT
Start: 2025-02-04 | End: 2025-02-07 | Stop reason: HOSPADM

## 2025-02-04 RX ORDER — DULOXETIN HYDROCHLORIDE 60 MG/1
60 CAPSULE, DELAYED RELEASE ORAL DAILY
COMMUNITY

## 2025-02-04 RX ADMIN — GADOBUTROL 10 ML: 604.72 INJECTION INTRAVENOUS at 19:29

## 2025-02-04 RX ADMIN — TRAMADOL HYDROCHLORIDE 50 MG: 50 TABLET, COATED ORAL at 23:51

## 2025-02-04 RX ADMIN — INSULIN LISPRO 1 UNITS: 100 INJECTION, SOLUTION INTRAVENOUS; SUBCUTANEOUS at 18:20

## 2025-02-04 RX ADMIN — Medication 3 MG: at 23:51

## 2025-02-04 SDOH — HEALTH STABILITY: GENERAL: BECAUSE OF A PHYSICAL, MENTAL, OR EMOTIONAL CONDITION, DO YOU HAVE DIFFICULTY DOING ERRANDS ALONE?: NO

## 2025-02-04 SDOH — ECONOMIC STABILITY: INCOME INSECURITY: IN THE PAST 12 MONTHS, HAS THE ELECTRIC, GAS, OIL, OR WATER COMPANY THREATENED TO SHUT OFF SERVICE IN YOUR HOME?: NO

## 2025-02-04 SDOH — SOCIAL STABILITY: SOCIAL NETWORK
HOW OFTEN DO YOU SEE OR TALK TO PEOPLE THAT YOU CARE ABOUT AND FEEL CLOSE TO? (FOR EXAMPLE: TALKING TO FRIENDS ON THE PHONE, VISITING FRIENDS OR FAMILY, GOING TO CHURCH OR CLUB MEETINGS): 5 OR MORE TIMES A WEEK

## 2025-02-04 SDOH — ECONOMIC STABILITY: HOUSING INSECURITY: WHAT IS YOUR LIVING SITUATION TODAY?: I HAVE A STEADY PLACE TO LIVE

## 2025-02-04 SDOH — SOCIAL STABILITY: SOCIAL NETWORK: SUPPORT SYSTEMS: FAMILY MEMBERS;FRIENDS

## 2025-02-04 SDOH — ECONOMIC STABILITY: HOUSING INSECURITY: WHAT IS YOUR LIVING SITUATION TODAY?: LONG TERM CARE FACILITY

## 2025-02-04 SDOH — ECONOMIC STABILITY: TRANSPORTATION INSECURITY
IN THE PAST 12 MONTHS, HAS LACK OF RELIABLE TRANSPORTATION KEPT YOU FROM MEDICAL APPOINTMENTS, MEETINGS, WORK OR FROM GETTING THINGS NEEDED FOR DAILY LIVING?: NO

## 2025-02-04 SDOH — ECONOMIC STABILITY: HOUSING INSECURITY: DO YOU HAVE PROBLEMS WITH ANY OF THE FOLLOWING?: NONE OF THE ABOVE

## 2025-02-04 SDOH — ECONOMIC STABILITY: FOOD INSECURITY: WITHIN THE PAST 12 MONTHS, THE FOOD YOU BOUGHT JUST DIDN'T LAST AND YOU DIDN'T HAVE MONEY TO GET MORE.: NEVER TRUE

## 2025-02-04 SDOH — HEALTH STABILITY: PHYSICAL HEALTH: DO YOU HAVE SERIOUS DIFFICULTY WALKING OR CLIMBING STAIRS?: NO

## 2025-02-04 SDOH — ECONOMIC STABILITY: HOUSING INSECURITY: WHAT IS YOUR LIVING SITUATION TODAY?: OTHER FACILITY RESIDENTS

## 2025-02-04 SDOH — HEALTH STABILITY: GENERAL
BECAUSE OF A PHYSICAL, MENTAL, OR EMOTIONAL CONDITION, DO YOU HAVE SERIOUS DIFFICULTY CONCENTRATING, REMEMBERING OR MAKING DECISIONS?: NO

## 2025-02-04 SDOH — ECONOMIC STABILITY: GENERAL

## 2025-02-04 SDOH — ECONOMIC STABILITY: GENERAL: WOULD YOU LIKE HELP WITH ANY OF THE FOLLOWING NEEDS?: I DON'T WANT HELP WITH ANY OF THESE

## 2025-02-04 SDOH — HEALTH STABILITY: PHYSICAL HEALTH: DO YOU HAVE DIFFICULTY DRESSING OR BATHING?: NO

## 2025-02-04 ASSESSMENT — PAIN SCALES - GENERAL
PAINLEVEL_OUTOF10: 0
PAINLEVEL_OUTOF10: 5

## 2025-02-04 ASSESSMENT — ACTIVITIES OF DAILY LIVING (ADL)
RECENT_DECLINE_ADL: NO
ADL_BEFORE_ADMISSION: NEEDS/REQUIRES ASSISTANCE
ADL_SHORT_OF_BREATH: NO
FEEDING: INDEPENDENT
TOILETING: INDEPENDENT
ADL_SCORE: 12
DRESSING: INDEPENDENT
BATHING: INDEPENDENT

## 2025-02-04 ASSESSMENT — ENCOUNTER SYMPTOMS
VOICE CHANGE: 0
SHORTNESS OF BREATH: 0
ABDOMINAL DISTENTION: 0
TROUBLE SWALLOWING: 0
CONSTIPATION: 0
DIARRHEA: 0
ABDOMINAL PAIN: 0
ACTIVITY CHANGE: 0
DIZZINESS: 0
NAUSEA: 0
HEADACHES: 0
RECTAL PAIN: 0
FATIGUE: 0
VOMITING: 0
APPETITE CHANGE: 0
COUGH: 0
UNEXPECTED WEIGHT CHANGE: 0
ANAL BLEEDING: 0
WEAKNESS: 1
BLOOD IN STOOL: 0

## 2025-02-04 ASSESSMENT — PATIENT HEALTH QUESTIONNAIRE - PHQ9
SUM OF ALL RESPONSES TO PHQ9 QUESTIONS 1 AND 2: 0
IS PATIENT ABLE TO COMPLETE PHQ2 OR PHQ9: YES
CLINICAL INTERPRETATION OF PHQ2 SCORE: NO FURTHER SCREENING NEEDED

## 2025-02-04 ASSESSMENT — ORIENTATION MEMORY CONCENTRATION TEST (OMCT)
COUNT BACKWARDS FROM 20 TO 1: 1 ERROR
OMCT SCORE: 7
WHAT MONTH IS IT NOW: CORRECT
OMCT INTERPRETATION: 7-10: MILD COGNITIVE IMPAIRMENT
WHAT TIME IS IT (NO WATCH OR CLOCK): INCORRECT
WHAT YEAR IS IT NOW (MUST BE EXACT): CORRECT
SAY THE MONTHS IN REVERSE ORDER STARTING WITH LAST MONTH: 1 ERROR
REPEAT THE NAME AND ADDRESS I ASKED YOU TO REMEMBER: CORRECT

## 2025-02-04 ASSESSMENT — COGNITIVE AND FUNCTIONAL STATUS - GENERAL
BECAUSE OF A PHYSICAL, MENTAL, OR EMOTIONAL CONDITION, DO YOU HAVE DIFFICULTY DOING ERRANDS ALONE: NO
DO YOU HAVE SERIOUS DIFFICULTY WALKING OR CLIMBING STAIRS: NO
DO YOU HAVE DIFFICULTY DRESSING OR BATHING: NO
BECAUSE OF A PHYSICAL, MENTAL, OR EMOTIONAL CONDITION, DO YOU HAVE SERIOUS DIFFICULTY CONCENTRATING, REMEMBERING OR MAKING DECISIONS: NO

## 2025-02-04 ASSESSMENT — LIFESTYLE VARIABLES
HOW OFTEN DO YOU HAVE A DRINK CONTAINING ALCOHOL: NEVER
ALCOHOL_USE_STATUS: NO OR LOW RISK WITH VALIDATED TOOL
AUDIT-C TOTAL SCORE: 0
HOW MANY STANDARD DRINKS CONTAINING ALCOHOL DO YOU HAVE ON A TYPICAL DAY: 0,1 OR 2
HOW OFTEN DO YOU HAVE 6 OR MORE DRINKS ON ONE OCCASION: NEVER

## 2025-02-05 ENCOUNTER — APPOINTMENT (OUTPATIENT)
Dept: GENERAL RADIOLOGY | Age: 75
DRG: 418 | End: 2025-02-05
Attending: INTERNAL MEDICINE

## 2025-02-05 ENCOUNTER — ANESTHESIA (OUTPATIENT)
Dept: GASTROENTEROLOGY | Age: 75
End: 2025-02-05

## 2025-02-05 ENCOUNTER — APPOINTMENT (OUTPATIENT)
Dept: GASTROENTEROLOGY | Age: 75
DRG: 418 | End: 2025-02-05
Attending: INTERNAL MEDICINE

## 2025-02-05 ENCOUNTER — ANESTHESIA EVENT (OUTPATIENT)
Dept: GASTROENTEROLOGY | Age: 75
End: 2025-02-05

## 2025-02-05 LAB
ALBUMIN SERPL-MCNC: 2.4 G/DL (ref 3.4–5)
ALBUMIN SERPL-MCNC: 2.5 G/DL (ref 3.4–5)
ALBUMIN/GLOB SERPL: 0.6 {RATIO} (ref 1–2.4)
ALP SERPL-CCNC: 416 UNITS/L (ref 45–117)
ALP SERPL-CCNC: 437 UNITS/L (ref 45–117)
ALT SERPL-CCNC: 237 UNITS/L
ALT SERPL-CCNC: 242 UNITS/L
ANION GAP SERPL CALC-SCNC: 11 MMOL/L (ref 7–19)
AST SERPL-CCNC: 48 UNITS/L
AST SERPL-CCNC: 56 UNITS/L
BASOPHILS # BLD: 0.1 K/MCL (ref 0–0.3)
BASOPHILS NFR BLD: 4 %
BILIRUB CONJ SERPL-MCNC: 2.7 MG/DL (ref 0–0.2)
BILIRUB SERPL-MCNC: 4.5 MG/DL (ref 0.2–1)
BILIRUB SERPL-MCNC: 4.6 MG/DL (ref 0.2–1)
BUN SERPL-MCNC: 12 MG/DL (ref 6–20)
BUN/CREAT SERPL: 10 (ref 7–25)
CALCIUM SERPL-MCNC: 8.7 MG/DL (ref 8.4–10.2)
CERULOPLASMIN SERPL-MCNC: 34.2 MG/DL (ref 20–60)
CHLORIDE SERPL-SCNC: 106 MMOL/L (ref 97–110)
CO2 SERPL-SCNC: 21 MMOL/L (ref 21–32)
CREAT SERPL-MCNC: 1.15 MG/DL (ref 0.67–1.17)
DEPRECATED RDW RBC: 49.2 FL (ref 39–50)
EGFRCR SERPLBLD CKD-EPI 2021: 66 ML/MIN/{1.73_M2}
EOSINOPHIL # BLD: 0.2 K/MCL (ref 0–0.5)
EOSINOPHIL NFR BLD: 9 %
ERYTHROCYTE [DISTWIDTH] IN BLOOD: 16 % (ref 11–15)
FASTING DURATION TIME PATIENT: ABNORMAL H
FERRITIN SERPL-MCNC: 188 NG/ML (ref 26–388)
GLOBULIN SER-MCNC: 4.3 G/DL (ref 2–4)
GLUCOSE BLDC GLUCOMTR-MCNC: 147 MG/DL (ref 70–99)
GLUCOSE BLDC GLUCOMTR-MCNC: 150 MG/DL (ref 70–99)
GLUCOSE BLDC GLUCOMTR-MCNC: 162 MG/DL (ref 70–99)
GLUCOSE BLDC GLUCOMTR-MCNC: 167 MG/DL (ref 70–99)
GLUCOSE BLDC GLUCOMTR-MCNC: 188 MG/DL (ref 70–99)
GLUCOSE SERPL-MCNC: 137 MG/DL (ref 70–99)
HAV IGM SER QL: NEGATIVE
HBV SURFACE AB SER QL: NEGATIVE
HCT VFR BLD CALC: 32.8 % (ref 39–51)
HCV AB SER QL: NEGATIVE
HGB BLD-MCNC: 11 G/DL (ref 13–17)
IGG SERPL-MCNC: 1420 MG/DL (ref 700–1600)
LYMPHOCYTES # BLD: 0.8 K/MCL (ref 1–4)
LYMPHOCYTES NFR BLD: 31 %
MCH RBC QN AUTO: 28.6 PG (ref 26–34)
MCHC RBC AUTO-ENTMCNC: 33.5 G/DL (ref 32–36.5)
MCV RBC AUTO: 85.2 FL (ref 78–100)
MONOCYTES # BLD: 0.3 K/MCL (ref 0.3–0.9)
MONOCYTES NFR BLD: 11 %
NEUTROPHILS # BLD: 1 K/MCL (ref 1.8–7.7)
NEUTS BAND NFR BLD: 5 % (ref 0–10)
NEUTS SEG NFR BLD: 38 %
NRBC BLD MANUAL-RTO: 0 /100 WBC
PLAT MORPH BLD: NORMAL
PLATELET # BLD AUTO: 128 K/MCL (ref 140–450)
POTASSIUM SERPL-SCNC: 3.9 MMOL/L (ref 3.4–5.1)
PROT SERPL-MCNC: 6.8 G/DL (ref 6.4–8.2)
PROT SERPL-MCNC: 6.9 G/DL (ref 6.4–8.2)
RBC # BLD: 3.85 MIL/MCL (ref 4.5–5.9)
RBC MORPH BLD: NORMAL
SODIUM SERPL-SCNC: 134 MMOL/L (ref 135–145)
VARIANT LYMPHS NFR BLD: 2 % (ref 0–5)
WBC # BLD: 2.4 K/MCL (ref 4.2–11)
WBC MORPH BLD: NORMAL

## 2025-02-05 PROCEDURE — 86706 HEP B SURFACE ANTIBODY: CPT | Performed by: INTERNAL MEDICINE

## 2025-02-05 PROCEDURE — 86694 HERPES SIMPLEX NES ANTBDY: CPT | Performed by: INTERNAL MEDICINE

## 2025-02-05 PROCEDURE — 80053 COMPREHEN METABOLIC PANEL: CPT

## 2025-02-05 PROCEDURE — 82390 ASSAY OF CERULOPLASMIN: CPT | Performed by: INTERNAL MEDICINE

## 2025-02-05 PROCEDURE — 10002807 HB RX 258: Performed by: INTERNAL MEDICINE

## 2025-02-05 PROCEDURE — 74328 X-RAY BILE DUCT ENDOSCOPY: CPT | Performed by: INTERNAL MEDICINE

## 2025-02-05 PROCEDURE — 10002805 HB CONTRAST AGENT: Performed by: INTERNAL MEDICINE

## 2025-02-05 PROCEDURE — 86381 MITOCHONDRIAL ANTIBODY EACH: CPT | Performed by: INTERNAL MEDICINE

## 2025-02-05 PROCEDURE — 99223 1ST HOSP IP/OBS HIGH 75: CPT

## 2025-02-05 PROCEDURE — 82728 ASSAY OF FERRITIN: CPT | Performed by: INTERNAL MEDICINE

## 2025-02-05 PROCEDURE — 10004451 HB PACU RECOVERY 1ST 30 MINUTES

## 2025-02-05 PROCEDURE — 0F798DZ DILATION OF COMMON BILE DUCT WITH INTRALUMINAL DEVICE, VIA NATURAL OR ARTIFICIAL OPENING ENDOSCOPIC: ICD-10-PCS | Performed by: INTERNAL MEDICINE

## 2025-02-05 PROCEDURE — 10002800 HB RX 250 W HCPCS: Performed by: INTERNAL MEDICINE

## 2025-02-05 PROCEDURE — 13000001 HB PHASE II RECOVERY EA 30 MINUTES

## 2025-02-05 PROCEDURE — 43274 ERCP DUCT STENT PLACEMENT: CPT | Performed by: INTERNAL MEDICINE

## 2025-02-05 PROCEDURE — 10006023 HB SUPPLY 272

## 2025-02-05 PROCEDURE — 43264 ERCP REMOVE DUCT CALCULI: CPT | Performed by: INTERNAL MEDICINE

## 2025-02-05 PROCEDURE — 86803 HEPATITIS C AB TEST: CPT | Performed by: INTERNAL MEDICINE

## 2025-02-05 PROCEDURE — 86787 VARICELLA-ZOSTER ANTIBODY: CPT | Performed by: INTERNAL MEDICINE

## 2025-02-05 PROCEDURE — 10002807 HB RX 258: Performed by: STUDENT IN AN ORGANIZED HEALTH CARE EDUCATION/TRAINING PROGRAM

## 2025-02-05 PROCEDURE — C1769 GUIDE WIRE: HCPCS

## 2025-02-05 PROCEDURE — 36415 COLL VENOUS BLD VENIPUNCTURE: CPT | Performed by: INTERNAL MEDICINE

## 2025-02-05 PROCEDURE — 85027 COMPLETE CBC AUTOMATED: CPT | Performed by: INTERNAL MEDICINE

## 2025-02-05 PROCEDURE — 10002801 HB RX 250 W/O HCPCS: Performed by: STUDENT IN AN ORGANIZED HEALTH CARE EDUCATION/TRAINING PROGRAM

## 2025-02-05 PROCEDURE — 99233 SBSQ HOSP IP/OBS HIGH 50: CPT | Performed by: INTERNAL MEDICINE

## 2025-02-05 PROCEDURE — 86015 ACTIN ANTIBODY EACH: CPT | Performed by: INTERNAL MEDICINE

## 2025-02-05 PROCEDURE — G0378 HOSPITAL OBSERVATION PER HR: HCPCS

## 2025-02-05 PROCEDURE — 0FC98ZZ EXTIRPATION OF MATTER FROM COMMON BILE DUCT, VIA NATURAL OR ARTIFICIAL OPENING ENDOSCOPIC: ICD-10-PCS | Performed by: INTERNAL MEDICINE

## 2025-02-05 PROCEDURE — 86709 HEPATITIS A IGM ANTIBODY: CPT | Performed by: INTERNAL MEDICINE

## 2025-02-05 PROCEDURE — 43237 ENDOSCOPIC US EXAM ESOPH: CPT | Performed by: INTERNAL MEDICINE

## 2025-02-05 PROCEDURE — 10002803 HB RX 637: Performed by: INTERNAL MEDICINE

## 2025-02-05 PROCEDURE — 80076 HEPATIC FUNCTION PANEL: CPT | Performed by: INTERNAL MEDICINE

## 2025-02-05 PROCEDURE — 93005 ELECTROCARDIOGRAM TRACING: CPT | Performed by: INTERNAL MEDICINE

## 2025-02-05 PROCEDURE — 82784 ASSAY IGA/IGD/IGG/IGM EACH: CPT | Performed by: INTERNAL MEDICINE

## 2025-02-05 PROCEDURE — 82962 GLUCOSE BLOOD TEST: CPT

## 2025-02-05 PROCEDURE — 10006031 HB ROOM CHARGE TELEMETRY

## 2025-02-05 PROCEDURE — 10005281 FL INTRAOPERATIVE C ARM WITH REPORT

## 2025-02-05 PROCEDURE — 13000004 HB  ANESTHESIA  GENERAL OUTSIDE OR

## 2025-02-05 PROCEDURE — C1889 IMPLANT/INSERT DEVICE, NOC: HCPCS

## 2025-02-05 PROCEDURE — 13000029 HB GI MAJOR COMPLEX CASE EA ADD MINUTE

## 2025-02-05 PROCEDURE — 10002800 HB RX 250 W HCPCS: Performed by: STUDENT IN AN ORGANIZED HEALTH CARE EDUCATION/TRAINING PROGRAM

## 2025-02-05 PROCEDURE — 86038 ANTINUCLEAR ANTIBODIES: CPT | Performed by: INTERNAL MEDICINE

## 2025-02-05 PROCEDURE — 13000028 HB GI MAJOR COMPLEX CASE S/U + 1ST 15 MIN

## 2025-02-05 RX ORDER — SODIUM CHLORIDE, SODIUM LACTATE, POTASSIUM CHLORIDE, CALCIUM CHLORIDE 600; 310; 30; 20 MG/100ML; MG/100ML; MG/100ML; MG/100ML
INJECTION, SOLUTION INTRAVENOUS CONTINUOUS
Status: DISCONTINUED | OUTPATIENT
Start: 2025-02-05 | End: 2025-02-05

## 2025-02-05 RX ORDER — HYDRALAZINE HYDROCHLORIDE 20 MG/ML
INJECTION INTRAMUSCULAR; INTRAVENOUS PRN
Status: DISCONTINUED | OUTPATIENT
Start: 2025-02-05 | End: 2025-02-05

## 2025-02-05 RX ORDER — CIPROFLOXACIN 2 MG/ML
INJECTION, SOLUTION INTRAVENOUS PRN
Status: DISCONTINUED | OUTPATIENT
Start: 2025-02-05 | End: 2025-02-05

## 2025-02-05 RX ORDER — SODIUM CHLORIDE, SODIUM LACTATE, POTASSIUM CHLORIDE, CALCIUM CHLORIDE 600; 310; 30; 20 MG/100ML; MG/100ML; MG/100ML; MG/100ML
INJECTION, SOLUTION INTRAVENOUS CONTINUOUS PRN
Status: DISCONTINUED | OUTPATIENT
Start: 2025-02-05 | End: 2025-02-05

## 2025-02-05 RX ORDER — LIDOCAINE HYDROCHLORIDE 20 MG/ML
INJECTION, SOLUTION INFILTRATION; PERINEURAL PRN
Status: DISCONTINUED | OUTPATIENT
Start: 2025-02-05 | End: 2025-02-05

## 2025-02-05 RX ORDER — INDOMETHACIN 100 MG
SUPPOSITORY, RECTAL RECTAL PRN
Status: COMPLETED | OUTPATIENT
Start: 2025-02-05 | End: 2025-02-05

## 2025-02-05 RX ORDER — PROPOFOL 10 MG/ML
INJECTION, EMULSION INTRAVENOUS PRN
Status: DISCONTINUED | OUTPATIENT
Start: 2025-02-05 | End: 2025-02-05

## 2025-02-05 RX ORDER — ROCURONIUM BROMIDE 10 MG/ML
INJECTION, SOLUTION INTRAVENOUS PRN
Status: DISCONTINUED | OUTPATIENT
Start: 2025-02-05 | End: 2025-02-05

## 2025-02-05 RX ORDER — INDOCYANINE GREEN AND WATER 25 MG
2.5 KIT INJECTION
Status: COMPLETED | OUTPATIENT
Start: 2025-02-06 | End: 2025-02-06

## 2025-02-05 RX ORDER — ENOXAPARIN SODIUM 100 MG/ML
40 INJECTION SUBCUTANEOUS EVERY 24 HOURS
Status: DISCONTINUED | OUTPATIENT
Start: 2025-02-06 | End: 2025-02-07 | Stop reason: HOSPADM

## 2025-02-05 RX ADMIN — PROPOFOL 110 MG: 10 INJECTION, EMULSION INTRAVENOUS at 15:09

## 2025-02-05 RX ADMIN — ONDANSETRON 4 MG: 2 INJECTION INTRAMUSCULAR; INTRAVENOUS at 15:36

## 2025-02-05 RX ADMIN — MEMANTINE HYDROCHLORIDE 10 MG: 10 TABLET, FILM COATED ORAL at 21:53

## 2025-02-05 RX ADMIN — LIDOCAINE HYDROCHLORIDE 100 ML: 20 INJECTION, SOLUTION INFILTRATION; PERINEURAL at 15:03

## 2025-02-05 RX ADMIN — PROPOFOL 90 MG: 10 INJECTION, EMULSION INTRAVENOUS at 15:03

## 2025-02-05 RX ADMIN — Medication 100 MG: at 15:36

## 2025-02-05 RX ADMIN — Medication 200 MCG: at 15:18

## 2025-02-05 RX ADMIN — IOHEXOL 2 ML: 180 INJECTION INTRAVENOUS at 15:34

## 2025-02-05 RX ADMIN — HYDRALAZINE HYDROCHLORIDE 10 MG: 20 INJECTION, SOLUTION INTRAMUSCULAR; INTRAVENOUS at 15:51

## 2025-02-05 RX ADMIN — MEMANTINE HYDROCHLORIDE 10 MG: 10 TABLET, FILM COATED ORAL at 08:33

## 2025-02-05 RX ADMIN — ROCURONIUM BROMIDE 70 MG: 10 INJECTION INTRAVENOUS at 15:03

## 2025-02-05 RX ADMIN — SODIUM CHLORIDE, POTASSIUM CHLORIDE, SODIUM LACTATE AND CALCIUM CHLORIDE: 600; 310; 30; 20 INJECTION, SOLUTION INTRAVENOUS at 14:53

## 2025-02-05 RX ADMIN — LEVOTHYROXINE SODIUM 25 MCG: 0.03 TABLET ORAL at 06:11

## 2025-02-05 RX ADMIN — SODIUM CHLORIDE, POTASSIUM CHLORIDE, SODIUM LACTATE AND CALCIUM CHLORIDE: 600; 310; 30; 20 INJECTION, SOLUTION INTRAVENOUS at 13:12

## 2025-02-05 RX ADMIN — FENTANYL CITRATE 50 MCG: 50 INJECTION INTRAMUSCULAR; INTRAVENOUS at 15:03

## 2025-02-05 RX ADMIN — CIPROFLOXACIN 400 MG: 400 INJECTION, SOLUTION INTRAVENOUS at 15:32

## 2025-02-05 RX ADMIN — AMLODIPINE BESYLATE 5 MG: 5 TABLET ORAL at 08:33

## 2025-02-05 RX ADMIN — DULOXETINE 60 MG: 60 CAPSULE, DELAYED RELEASE ORAL at 08:33

## 2025-02-05 RX ADMIN — SUGAMMADEX 400 MG: 100 INJECTION, SOLUTION INTRAVENOUS at 15:38

## 2025-02-05 SDOH — SOCIAL STABILITY: SOCIAL INSECURITY: RISK FACTORS: AGE

## 2025-02-05 SDOH — SOCIAL STABILITY: SOCIAL INSECURITY: RISK FACTORS: HEART DISEASE

## 2025-02-05 SDOH — SOCIAL STABILITY: SOCIAL INSECURITY: RISK FACTORS: BMI> 30 (OBESITY)

## 2025-02-05 ASSESSMENT — PAIN SCALES - GENERAL
PAINLEVEL_OUTOF10: 0

## 2025-02-06 ENCOUNTER — ANESTHESIA (OUTPATIENT)
Dept: SURGERY | Age: 75
End: 2025-02-06

## 2025-02-06 ENCOUNTER — ANESTHESIA EVENT (OUTPATIENT)
Dept: SURGERY | Age: 75
End: 2025-02-06

## 2025-02-06 LAB
ALBUMIN SERPL-MCNC: 2.9 G/DL (ref 3.4–5)
ALBUMIN/GLOB SERPL: 0.6 {RATIO} (ref 1–2.4)
ALP SERPL-CCNC: 467 UNITS/L (ref 45–117)
ALT SERPL-CCNC: 328 UNITS/L
ANION GAP SERPL CALC-SCNC: 19 MMOL/L (ref 7–19)
AST SERPL-CCNC: 187 UNITS/L
ATRIAL RATE (BPM): 61
BASOPHILS # BLD: 0.1 K/MCL (ref 0–0.3)
BASOPHILS NFR BLD: 2 %
BILIRUB SERPL-MCNC: 3.6 MG/DL (ref 0.2–1)
BUN SERPL-MCNC: 19 MG/DL (ref 6–20)
BUN/CREAT SERPL: 17 (ref 7–25)
CALCIUM SERPL-MCNC: 8.9 MG/DL (ref 8.4–10.2)
CHLORIDE SERPL-SCNC: 102 MMOL/L (ref 97–110)
CO2 SERPL-SCNC: 16 MMOL/L (ref 21–32)
CREAT SERPL-MCNC: 1.1 MG/DL (ref 0.67–1.17)
DEPRECATED RDW RBC: 53.4 FL (ref 39–50)
DSDNA IGG SERPL IA-ACNC: 1.1 IU/ML
EGFRCR SERPLBLD CKD-EPI 2021: 70 ML/MIN/{1.73_M2}
EOSINOPHIL # BLD: 0.2 K/MCL (ref 0–0.5)
EOSINOPHIL NFR BLD: 7 %
ERYTHROCYTE [DISTWIDTH] IN BLOOD: 16.7 % (ref 11–15)
FASTING DURATION TIME PATIENT: ABNORMAL H
GLOBULIN SER-MCNC: 5.1 G/DL (ref 2–4)
GLUCOSE BLDC GLUCOMTR-MCNC: 127 MG/DL (ref 70–99)
GLUCOSE BLDC GLUCOMTR-MCNC: 146 MG/DL (ref 70–99)
GLUCOSE BLDC GLUCOMTR-MCNC: 152 MG/DL (ref 70–99)
GLUCOSE BLDC GLUCOMTR-MCNC: 316 MG/DL (ref 70–99)
GLUCOSE BLDC GLUCOMTR-MCNC: 345 MG/DL (ref 70–99)
GLUCOSE SERPL-MCNC: 234 MG/DL (ref 70–99)
HCT VFR BLD CALC: 38.9 % (ref 39–51)
HGB BLD-MCNC: 12.8 G/DL (ref 13–17)
LYMPHOCYTES # BLD: 1.3 K/MCL (ref 1–4)
LYMPHOCYTES NFR BLD: 39 %
MCH RBC QN AUTO: 28.9 PG (ref 26–34)
MCHC RBC AUTO-ENTMCNC: 32.9 G/DL (ref 32–36.5)
MCV RBC AUTO: 87.8 FL (ref 78–100)
MITOCHONDRIA M2 IGG SER-ACNC: 1.3 UNITS
MONOCYTES # BLD: 0.2 K/MCL (ref 0.3–0.9)
MONOCYTES NFR BLD: 7 %
NEUTROPHILS # BLD: 1.3 K/MCL (ref 1.8–7.7)
NEUTS SEG NFR BLD: 43 %
NRBC BLD MANUAL-RTO: 0 /100 WBC
NUCLEAR IGG SER IA-RTO: 0.3 RATIO
PLAT MORPH BLD: NORMAL
PLATELET # BLD AUTO: 112 K/MCL (ref 140–450)
POTASSIUM SERPL-SCNC: 4.1 MMOL/L (ref 3.4–5.1)
PR-INTERVAL (MSEC): 120
PROT SERPL-MCNC: 8 G/DL (ref 6.4–8.2)
QRS-INTERVAL (MSEC): 124
QT-INTERVAL (MSEC): 470
QTC: 473
R AXIS (DEGREES): -53
RBC # BLD: 4.43 MIL/MCL (ref 4.5–5.9)
RBC MORPH BLD: NORMAL
REPORT TEXT: NORMAL
SMA AB SER QL IA: NEGATIVE
SODIUM SERPL-SCNC: 133 MMOL/L (ref 135–145)
T AXIS (DEGREES): 61
U1 SNRNP IGG SER IA-ACNC: 1.1 U/ML
VARIANT LYMPHS NFR BLD: 2 % (ref 0–5)
VENTRICULAR RATE EKG/MIN (BPM): 61
VZV IGM SER-ACNC: 0.08 ISR
WBC # BLD: 3.1 K/MCL (ref 4.2–11)
WBC MORPH BLD: NORMAL

## 2025-02-06 PROCEDURE — 97165 OT EVAL LOW COMPLEX 30 MIN: CPT

## 2025-02-06 PROCEDURE — 10002801 HB RX 250 W/O HCPCS

## 2025-02-06 PROCEDURE — 13000099 HB GENERAL ROBOTIC CASE EA ADD MINUTE

## 2025-02-06 PROCEDURE — 97162 PT EVAL MOD COMPLEX 30 MIN: CPT

## 2025-02-06 PROCEDURE — 10002803 HB RX 637: Performed by: INTERNAL MEDICINE

## 2025-02-06 PROCEDURE — 36415 COLL VENOUS BLD VENIPUNCTURE: CPT | Performed by: INTERNAL MEDICINE

## 2025-02-06 PROCEDURE — 10002800 HB RX 250 W HCPCS

## 2025-02-06 PROCEDURE — 47562 LAPAROSCOPIC CHOLECYSTECTOMY: CPT

## 2025-02-06 PROCEDURE — 10006023 HB SUPPLY 272

## 2025-02-06 PROCEDURE — 10006027 HB SUPPLY 278

## 2025-02-06 PROCEDURE — 82962 GLUCOSE BLOOD TEST: CPT

## 2025-02-06 PROCEDURE — 88304 TISSUE EXAM BY PATHOLOGIST: CPT

## 2025-02-06 PROCEDURE — 10004451 HB PACU RECOVERY 1ST 30 MINUTES

## 2025-02-06 PROCEDURE — 80053 COMPREHEN METABOLIC PANEL: CPT

## 2025-02-06 PROCEDURE — 97530 THERAPEUTIC ACTIVITIES: CPT

## 2025-02-06 PROCEDURE — 13000003 HB ANESTHESIA  GENERAL EA ADD MINUTE

## 2025-02-06 PROCEDURE — 10004651 HB RX, NO CHARGE ITEM

## 2025-02-06 PROCEDURE — 99233 SBSQ HOSP IP/OBS HIGH 50: CPT | Performed by: INTERNAL MEDICINE

## 2025-02-06 PROCEDURE — 10006031 HB ROOM CHARGE TELEMETRY

## 2025-02-06 PROCEDURE — 10002807 HB RX 258: Performed by: STUDENT IN AN ORGANIZED HEALTH CARE EDUCATION/TRAINING PROGRAM

## 2025-02-06 PROCEDURE — 96372 THER/PROPH/DIAG INJ SC/IM: CPT | Performed by: INTERNAL MEDICINE

## 2025-02-06 PROCEDURE — 10002801 HB RX 250 W/O HCPCS: Performed by: STUDENT IN AN ORGANIZED HEALTH CARE EDUCATION/TRAINING PROGRAM

## 2025-02-06 PROCEDURE — 10002800 HB RX 250 W HCPCS: Performed by: INTERNAL MEDICINE

## 2025-02-06 PROCEDURE — 10002800 HB RX 250 W HCPCS: Performed by: STUDENT IN AN ORGANIZED HEALTH CARE EDUCATION/TRAINING PROGRAM

## 2025-02-06 PROCEDURE — 13000002 HB ANESTHESIA  GENERAL   S/U + 1ST 15 MIN

## 2025-02-06 PROCEDURE — BF101ZZ FLUOROSCOPY OF BILE DUCTS USING LOW OSMOLAR CONTRAST: ICD-10-PCS

## 2025-02-06 PROCEDURE — 8E0W4CZ ROBOTIC ASSISTED PROCEDURE OF TRUNK REGION, PERCUTANEOUS ENDOSCOPIC APPROACH: ICD-10-PCS

## 2025-02-06 PROCEDURE — 13000098 HB GENERAL ROBOTIC CASE S/U + 1ST 15 MIN

## 2025-02-06 PROCEDURE — 85027 COMPLETE CBC AUTOMATED: CPT | Performed by: INTERNAL MEDICINE

## 2025-02-06 PROCEDURE — 0FT44ZZ RESECTION OF GALLBLADDER, PERCUTANEOUS ENDOSCOPIC APPROACH: ICD-10-PCS

## 2025-02-06 PROCEDURE — 97535 SELF CARE MNGMENT TRAINING: CPT

## 2025-02-06 DEVICE — HEMOLOK L 6 CLIPS/CART
Type: IMPLANTABLE DEVICE | Site: COMMON BILE DUCT | Status: FUNCTIONAL
Brand: WECK

## 2025-02-06 RX ORDER — DROPERIDOL 2.5 MG/ML
0.62 INJECTION, SOLUTION INTRAMUSCULAR; INTRAVENOUS
Status: DISCONTINUED | OUTPATIENT
Start: 2025-02-06 | End: 2025-02-06

## 2025-02-06 RX ORDER — PALONOSETRON 0.05 MG/ML
0.25 INJECTION, SOLUTION INTRAVENOUS
Status: ACTIVE | OUTPATIENT
Start: 2025-02-06 | End: 2025-02-06

## 2025-02-06 RX ORDER — LIDOCAINE HYDROCHLORIDE 20 MG/ML
INJECTION, SOLUTION INFILTRATION; PERINEURAL PRN
Status: DISCONTINUED | OUTPATIENT
Start: 2025-02-06 | End: 2025-02-06

## 2025-02-06 RX ORDER — DEXAMETHASONE SODIUM PHOSPHATE 4 MG/ML
INJECTION, SOLUTION INTRA-ARTICULAR; INTRALESIONAL; INTRAMUSCULAR; INTRAVENOUS; SOFT TISSUE PRN
Status: DISCONTINUED | OUTPATIENT
Start: 2025-02-06 | End: 2025-02-06

## 2025-02-06 RX ORDER — ACETAMINOPHEN 500 MG
1000 TABLET ORAL EVERY 6 HOURS
Status: DISCONTINUED | OUTPATIENT
Start: 2025-02-06 | End: 2025-02-07 | Stop reason: HOSPADM

## 2025-02-06 RX ORDER — ONDANSETRON 2 MG/ML
INJECTION INTRAMUSCULAR; INTRAVENOUS PRN
Status: DISCONTINUED | OUTPATIENT
Start: 2025-02-06 | End: 2025-02-06

## 2025-02-06 RX ORDER — INSULIN GLARGINE 100 [IU]/ML
10 INJECTION, SOLUTION SUBCUTANEOUS NIGHTLY
Status: DISCONTINUED | OUTPATIENT
Start: 2025-02-06 | End: 2025-02-07 | Stop reason: HOSPADM

## 2025-02-06 RX ORDER — HYDRALAZINE HYDROCHLORIDE 20 MG/ML
5 INJECTION INTRAMUSCULAR; INTRAVENOUS EVERY 10 MIN PRN
Status: DISCONTINUED | OUTPATIENT
Start: 2025-02-06 | End: 2025-02-06

## 2025-02-06 RX ORDER — BUPIVACAINE HYDROCHLORIDE 2.5 MG/ML
INJECTION, SOLUTION EPIDURAL; INFILTRATION; INTRACAUDAL PRN
Status: DISCONTINUED | OUTPATIENT
Start: 2025-02-06 | End: 2025-02-06 | Stop reason: HOSPADM

## 2025-02-06 RX ORDER — MEPERIDINE HYDROCHLORIDE 50 MG/ML
12.5 INJECTION INTRAMUSCULAR; INTRAVENOUS; SUBCUTANEOUS EVERY 10 MIN PRN
Status: DISCONTINUED | OUTPATIENT
Start: 2025-02-06 | End: 2025-02-06

## 2025-02-06 RX ORDER — KETOROLAC TROMETHAMINE 15 MG/ML
15 INJECTION, SOLUTION INTRAMUSCULAR; INTRAVENOUS EVERY 6 HOURS SCHEDULED
Status: DISCONTINUED | OUTPATIENT
Start: 2025-02-06 | End: 2025-02-07

## 2025-02-06 RX ORDER — DIPHENHYDRAMINE HCL 25 MG
25 CAPSULE ORAL
Status: ACTIVE | OUTPATIENT
Start: 2025-02-06 | End: 2025-02-06

## 2025-02-06 RX ORDER — TRAMADOL HYDROCHLORIDE 50 MG/1
50 TABLET ORAL EVERY 6 HOURS PRN
Status: DISCONTINUED | OUTPATIENT
Start: 2025-02-06 | End: 2025-02-07 | Stop reason: HOSPADM

## 2025-02-06 RX ORDER — PROPOFOL 10 MG/ML
INJECTION, EMULSION INTRAVENOUS PRN
Status: DISCONTINUED | OUTPATIENT
Start: 2025-02-06 | End: 2025-02-06

## 2025-02-06 RX ORDER — PROCHLORPERAZINE EDISYLATE 5 MG/ML
5 INJECTION INTRAMUSCULAR; INTRAVENOUS
Status: ACTIVE | OUTPATIENT
Start: 2025-02-06 | End: 2025-02-06

## 2025-02-06 RX ORDER — ONDANSETRON 2 MG/ML
4 INJECTION INTRAMUSCULAR; INTRAVENOUS
Status: ACTIVE | OUTPATIENT
Start: 2025-02-06 | End: 2025-02-06

## 2025-02-06 RX ORDER — OXYCODONE HYDROCHLORIDE 5 MG/1
5 TABLET ORAL EVERY 4 HOURS PRN
Status: DISCONTINUED | OUTPATIENT
Start: 2025-02-06 | End: 2025-02-07 | Stop reason: HOSPADM

## 2025-02-06 RX ORDER — ROCURONIUM BROMIDE 10 MG/ML
INJECTION, SOLUTION INTRAVENOUS PRN
Status: DISCONTINUED | OUTPATIENT
Start: 2025-02-06 | End: 2025-02-06

## 2025-02-06 RX ORDER — OXYCODONE HYDROCHLORIDE 5 MG/1
5 TABLET ORAL
Status: ACTIVE | OUTPATIENT
Start: 2025-02-06 | End: 2025-02-06

## 2025-02-06 RX ORDER — ENALAPRILAT 1.25 MG/ML
1.25 INJECTION INTRAVENOUS
Status: DISCONTINUED | OUTPATIENT
Start: 2025-02-06 | End: 2025-02-06

## 2025-02-06 RX ORDER — OXYCODONE HYDROCHLORIDE 5 MG/1
5 TABLET ORAL
Status: DISCONTINUED | OUTPATIENT
Start: 2025-02-06 | End: 2025-02-06

## 2025-02-06 RX ORDER — SODIUM CHLORIDE, SODIUM LACTATE, POTASSIUM CHLORIDE, CALCIUM CHLORIDE 600; 310; 30; 20 MG/100ML; MG/100ML; MG/100ML; MG/100ML
INJECTION, SOLUTION INTRAVENOUS CONTINUOUS PRN
Status: DISCONTINUED | OUTPATIENT
Start: 2025-02-06 | End: 2025-02-06

## 2025-02-06 RX ORDER — HYDROCODONE BITARTRATE AND ACETAMINOPHEN 5; 325 MG/1; MG/1
1 TABLET ORAL
Status: DISCONTINUED | OUTPATIENT
Start: 2025-02-06 | End: 2025-02-06

## 2025-02-06 RX ADMIN — SODIUM CHLORIDE, POTASSIUM CHLORIDE, SODIUM LACTATE AND CALCIUM CHLORIDE: 600; 310; 30; 20 INJECTION, SOLUTION INTRAVENOUS at 07:46

## 2025-02-06 RX ADMIN — KETOROLAC TROMETHAMINE 15 MG: 15 INJECTION, SOLUTION INTRAMUSCULAR; INTRAVENOUS at 12:59

## 2025-02-06 RX ADMIN — FENTANYL CITRATE 50 MCG: 50 INJECTION INTRAMUSCULAR; INTRAVENOUS at 08:29

## 2025-02-06 RX ADMIN — ACETAMINOPHEN 1000 MG: 500 TABLET ORAL at 23:36

## 2025-02-06 RX ADMIN — LIDOCAINE HYDROCHLORIDE 5 ML: 20 INJECTION, SOLUTION INFILTRATION; PERINEURAL at 07:53

## 2025-02-06 RX ADMIN — INSULIN LISPRO 4 UNITS: 100 INJECTION, SOLUTION INTRAVENOUS; SUBCUTANEOUS at 18:19

## 2025-02-06 RX ADMIN — ONDANSETRON 4 MG: 2 INJECTION INTRAMUSCULAR; INTRAVENOUS at 07:59

## 2025-02-06 RX ADMIN — KETOROLAC TROMETHAMINE 15 MG: 15 INJECTION, SOLUTION INTRAMUSCULAR; INTRAVENOUS at 18:19

## 2025-02-06 RX ADMIN — INDOCYANINE GREEN AND WATER 2.5 MG: KIT at 07:15

## 2025-02-06 RX ADMIN — ACETAMINOPHEN 1000 MG: 500 TABLET ORAL at 18:18

## 2025-02-06 RX ADMIN — KETOROLAC TROMETHAMINE 15 MG: 30 INJECTION, SOLUTION INTRAMUSCULAR at 09:16

## 2025-02-06 RX ADMIN — MEMANTINE HYDROCHLORIDE 10 MG: 10 TABLET, FILM COATED ORAL at 21:18

## 2025-02-06 RX ADMIN — CEFAZOLIN 2000 MG: 2 INJECTION, POWDER, FOR SOLUTION INTRAMUSCULAR; INTRAVENOUS at 07:59

## 2025-02-06 RX ADMIN — SUGAMMADEX 200 MG: 100 INJECTION, SOLUTION INTRAVENOUS at 09:18

## 2025-02-06 RX ADMIN — HYDROMORPHONE HYDROCHLORIDE 1 MG: 1 INJECTION, SOLUTION INTRAMUSCULAR; INTRAVENOUS; SUBCUTANEOUS at 08:43

## 2025-02-06 RX ADMIN — ACETAMINOPHEN 1000 MG: 500 TABLET ORAL at 12:59

## 2025-02-06 RX ADMIN — PROPOFOL 200 MG: 10 INJECTION, EMULSION INTRAVENOUS at 07:53

## 2025-02-06 RX ADMIN — FENTANYL CITRATE 50 MCG: 50 INJECTION INTRAMUSCULAR; INTRAVENOUS at 07:48

## 2025-02-06 RX ADMIN — ROCURONIUM BROMIDE 50 MG: 10 INJECTION INTRAVENOUS at 07:53

## 2025-02-06 RX ADMIN — DEXAMETHASONE SODIUM PHOSPHATE 4 MG: 4 INJECTION INTRA-ARTICULAR; INTRALESIONAL; INTRAMUSCULAR; INTRAVENOUS; SOFT TISSUE at 07:59

## 2025-02-06 RX ADMIN — INSULIN GLARGINE 10 UNITS: 100 INJECTION, SOLUTION SUBCUTANEOUS at 21:44

## 2025-02-06 RX ADMIN — KETOROLAC TROMETHAMINE 15 MG: 15 INJECTION, SOLUTION INTRAMUSCULAR; INTRAVENOUS at 23:38

## 2025-02-06 RX ADMIN — HYDRALAZINE HYDROCHLORIDE 25 MG: 50 TABLET ORAL at 23:37

## 2025-02-06 ASSESSMENT — COGNITIVE AND FUNCTIONAL STATUS - GENERAL
HELP NEEDED DRESSING REGULAR UPPER BODY CLOTHING: A LITTLE
DAILY_ACTIVITY_RAW_SCORE: 19
HELP NEEDED DRESSING REGULAR LOWER BODY CLOTHING: A LITTLE
DAILY_ACTIVITY_CONVERTED_SCORE: 40.22
HELP NEEDED FOR BATHING: A LITTLE
HELP NEEDED FOR PERSONAL GROOMING: A LITTLE
HELP NEEDED FOR TOILETING: A LITTLE
BASIC_MOBILITY_RAW_SCORE: 18
BASIC_MOBILITY_CONVERTED_SCORE: 41.05

## 2025-02-06 ASSESSMENT — PAIN SCALES - GENERAL
PAINLEVEL_OUTOF10: 0
PAINLEVEL_OUTOF10: 5
PAINLEVEL_OUTOF10: 0

## 2025-02-06 ASSESSMENT — ACTIVITIES OF DAILY LIVING (ADL): HOME_MANAGEMENT_TIME_ENTRY: 16

## 2025-02-06 ASSESSMENT — ENCOUNTER SYMPTOMS: EXERCISE TOLERANCE: GOOD (>4 METS)

## 2025-02-07 ENCOUNTER — TELEPHONE (OUTPATIENT)
Dept: GASTROENTEROLOGY | Age: 75
End: 2025-02-07

## 2025-02-07 VITALS
DIASTOLIC BLOOD PRESSURE: 90 MMHG | SYSTOLIC BLOOD PRESSURE: 157 MMHG | HEART RATE: 55 BPM | TEMPERATURE: 97.9 F | OXYGEN SATURATION: 99 % | RESPIRATION RATE: 18 BRPM | BODY MASS INDEX: 24.65 KG/M2 | WEIGHT: 139.11 LBS | HEIGHT: 63 IN

## 2025-02-07 LAB
ALBUMIN SERPL-MCNC: 2.6 G/DL (ref 3.4–5)
ALBUMIN/GLOB SERPL: 0.6 {RATIO} (ref 1–2.4)
ALP SERPL-CCNC: 366 UNITS/L (ref 45–117)
ALT SERPL-CCNC: 270 UNITS/L
ANION GAP SERPL CALC-SCNC: 11 MMOL/L (ref 7–19)
AST SERPL-CCNC: 99 UNITS/L
BASOPHILS # BLD: 0 K/MCL (ref 0–0.3)
BASOPHILS NFR BLD: 1 %
BILIRUB SERPL-MCNC: 2.6 MG/DL (ref 0.2–1)
BUN SERPL-MCNC: 22 MG/DL (ref 6–20)
BUN/CREAT SERPL: 17 (ref 7–25)
CALCIUM SERPL-MCNC: 8.9 MG/DL (ref 8.4–10.2)
CHLORIDE SERPL-SCNC: 105 MMOL/L (ref 97–110)
CO2 SERPL-SCNC: 22 MMOL/L (ref 21–32)
CREAT SERPL-MCNC: 1.27 MG/DL (ref 0.67–1.17)
DEPRECATED RDW RBC: 50.6 FL (ref 39–50)
EGFRCR SERPLBLD CKD-EPI 2021: 59 ML/MIN/{1.73_M2}
EOSINOPHIL # BLD: 0.1 K/MCL (ref 0–0.5)
EOSINOPHIL NFR BLD: 3 %
ERYTHROCYTE [DISTWIDTH] IN BLOOD: 16.4 % (ref 11–15)
FASTING DURATION TIME PATIENT: ABNORMAL H
GLOBULIN SER-MCNC: 4.3 G/DL (ref 2–4)
GLUCOSE BLDC GLUCOMTR-MCNC: 177 MG/DL (ref 70–99)
GLUCOSE BLDC GLUCOMTR-MCNC: 234 MG/DL (ref 70–99)
GLUCOSE BLDC GLUCOMTR-MCNC: 300 MG/DL (ref 70–99)
GLUCOSE SERPL-MCNC: 152 MG/DL (ref 70–99)
HCT VFR BLD CALC: 31.8 % (ref 39–51)
HGB BLD-MCNC: 10.7 G/DL (ref 13–17)
IMM GRANULOCYTES # BLD AUTO: 0 K/MCL (ref 0–0.2)
IMM GRANULOCYTES # BLD: 0 %
LYMPHOCYTES # BLD: 1.1 K/MCL (ref 1–4)
LYMPHOCYTES NFR BLD: 31 %
MCH RBC QN AUTO: 28.8 PG (ref 26–34)
MCHC RBC AUTO-ENTMCNC: 33.6 G/DL (ref 32–36.5)
MCV RBC AUTO: 85.5 FL (ref 78–100)
MONOCYTES # BLD: 0.3 K/MCL (ref 0.3–0.9)
MONOCYTES NFR BLD: 10 %
NEUTROPHILS # BLD: 1.9 K/MCL (ref 1.8–7.7)
NEUTROPHILS NFR BLD: 55 %
NRBC BLD MANUAL-RTO: 0 /100 WBC
PLATELET # BLD AUTO: 163 K/MCL (ref 140–450)
POTASSIUM SERPL-SCNC: 3.6 MMOL/L (ref 3.4–5.1)
PROT SERPL-MCNC: 6.9 G/DL (ref 6.4–8.2)
RBC # BLD: 3.72 MIL/MCL (ref 4.5–5.9)
SODIUM SERPL-SCNC: 134 MMOL/L (ref 135–145)
WBC # BLD: 3.4 K/MCL (ref 4.2–11)

## 2025-02-07 PROCEDURE — 10002803 HB RX 637: Performed by: INTERNAL MEDICINE

## 2025-02-07 PROCEDURE — 10002800 HB RX 250 W HCPCS: Performed by: INTERNAL MEDICINE

## 2025-02-07 PROCEDURE — 99024 POSTOP FOLLOW-UP VISIT: CPT | Performed by: NURSE PRACTITIONER

## 2025-02-07 PROCEDURE — 36415 COLL VENOUS BLD VENIPUNCTURE: CPT

## 2025-02-07 PROCEDURE — 80053 COMPREHEN METABOLIC PANEL: CPT

## 2025-02-07 PROCEDURE — 85025 COMPLETE CBC W/AUTO DIFF WBC: CPT | Performed by: INTERNAL MEDICINE

## 2025-02-07 PROCEDURE — 10004651 HB RX, NO CHARGE ITEM

## 2025-02-07 PROCEDURE — 99233 SBSQ HOSP IP/OBS HIGH 50: CPT | Performed by: INTERNAL MEDICINE

## 2025-02-07 PROCEDURE — 96372 THER/PROPH/DIAG INJ SC/IM: CPT | Performed by: INTERNAL MEDICINE

## 2025-02-07 RX ADMIN — INSULIN LISPRO 2 UNITS: 100 INJECTION, SOLUTION INTRAVENOUS; SUBCUTANEOUS at 12:45

## 2025-02-07 RX ADMIN — AMLODIPINE BESYLATE 5 MG: 5 TABLET ORAL at 08:25

## 2025-02-07 RX ADMIN — ACETAMINOPHEN 1000 MG: 500 TABLET ORAL at 12:45

## 2025-02-07 RX ADMIN — INSULIN LISPRO 4 UNITS: 100 INJECTION, SOLUTION INTRAVENOUS; SUBCUTANEOUS at 00:11

## 2025-02-07 RX ADMIN — DULOXETINE 60 MG: 60 CAPSULE, DELAYED RELEASE ORAL at 08:25

## 2025-02-07 RX ADMIN — MEMANTINE HYDROCHLORIDE 10 MG: 10 TABLET, FILM COATED ORAL at 08:25

## 2025-02-07 ASSESSMENT — PAIN SCALES - GENERAL
PAINLEVEL_OUTOF10: 0
PAINLEVEL_OUTOF10: 0

## 2025-02-11 LAB — HSV IGM SER-ACNC: 0.13 OD RATIO

## 2025-02-13 LAB
ASR DISCLAIMER: NORMAL
CASE RPRT: NORMAL
CLINICAL INFO: NORMAL
PATH REPORT.FINAL DX SPEC: NORMAL
PATH REPORT.GROSS SPEC: NORMAL

## 2025-02-20 ENCOUNTER — OFFICE VISIT (OUTPATIENT)
Dept: SURGERY | Age: 75
End: 2025-02-20

## 2025-02-20 VITALS
OXYGEN SATURATION: 100 % | BODY MASS INDEX: 22.3 KG/M2 | SYSTOLIC BLOOD PRESSURE: 120 MMHG | HEART RATE: 66 BPM | HEIGHT: 65 IN | WEIGHT: 133.82 LBS | DIASTOLIC BLOOD PRESSURE: 82 MMHG

## 2025-02-20 DIAGNOSIS — Z90.49 S/P LAPAROSCOPIC CHOLECYSTECTOMY: Primary | ICD-10-CM

## 2025-02-20 PROCEDURE — 99024 POSTOP FOLLOW-UP VISIT: CPT

## 2025-02-20 ASSESSMENT — PAIN SCALES - GENERAL: PAINLEVEL: 0

## 2025-04-15 ENCOUNTER — TELEPHONE (OUTPATIENT)
Dept: GASTROENTEROLOGY | Age: 75
End: 2025-04-15

## 2025-04-15 DIAGNOSIS — Z46.89 ENCOUNTER FOR REMOVAL OF BILIARY STENT: Primary | ICD-10-CM

## 2025-04-15 DIAGNOSIS — K80.50 CALCULUS OF BILE DUCT WITHOUT CHOLECYSTITIS AND WITHOUT OBSTRUCTION: ICD-10-CM

## 2025-04-16 ENCOUNTER — TELEPHONE (OUTPATIENT)
Dept: GASTROENTEROLOGY | Age: 75
End: 2025-04-16

## 2025-04-28 ENCOUNTER — APPOINTMENT (OUTPATIENT)
Dept: CT IMAGING | Facility: HOSPITAL | Age: 75
End: 2025-04-28
Attending: EMERGENCY MEDICINE
Payer: MEDICARE

## 2025-04-28 ENCOUNTER — APPOINTMENT (OUTPATIENT)
Dept: GENERAL RADIOLOGY | Facility: HOSPITAL | Age: 75
End: 2025-04-28
Attending: EMERGENCY MEDICINE
Payer: MEDICARE

## 2025-04-28 ENCOUNTER — HOSPITAL ENCOUNTER (INPATIENT)
Facility: HOSPITAL | Age: 75
LOS: 8 days | Discharge: NURSING, REHAB OR LONG-TERM CARE | End: 2025-05-06
Attending: EMERGENCY MEDICINE | Admitting: HOSPITALIST
Payer: MEDICARE

## 2025-04-28 DIAGNOSIS — K04.7 DENTAL INFECTION: ICD-10-CM

## 2025-04-28 DIAGNOSIS — N17.9 ACUTE KIDNEY INJURY: Primary | ICD-10-CM

## 2025-04-28 DIAGNOSIS — E16.2 HYPOGLYCEMIA: ICD-10-CM

## 2025-04-28 PROBLEM — E87.20 METABOLIC ACIDOSIS: Status: ACTIVE | Noted: 2025-04-28

## 2025-04-28 PROBLEM — D69.6 THROMBOCYTOPENIA: Status: ACTIVE | Noted: 2025-04-28

## 2025-04-28 LAB
ALBUMIN SERPL-MCNC: 4.1 G/DL (ref 3.2–4.8)
ALP LIVER SERPL-CCNC: 109 U/L (ref 45–117)
ALT SERPL-CCNC: 101 U/L (ref 10–49)
ANION GAP SERPL CALC-SCNC: 17 MMOL/L (ref 0–18)
AST SERPL-CCNC: 20 U/L (ref ?–34)
BASOPHILS # BLD AUTO: 0.05 X10(3) UL (ref 0–0.2)
BASOPHILS NFR BLD AUTO: 1.9 %
BILIRUB DIRECT SERPL-MCNC: 0.2 MG/DL (ref ?–0.3)
BILIRUB SERPL-MCNC: 0.5 MG/DL (ref 0.2–1.1)
BUN BLD-MCNC: 86 MG/DL (ref 9–23)
BUN/CREAT SERPL: 19.8 (ref 10–20)
CALCIUM BLD-MCNC: 8.2 MG/DL (ref 8.7–10.4)
CHLORIDE SERPL-SCNC: 110 MMOL/L (ref 98–112)
CO2 SERPL-SCNC: 12 MMOL/L (ref 21–32)
CREAT BLD-MCNC: 4.34 MG/DL (ref 0.7–1.3)
DEPRECATED RDW RBC AUTO: 49.4 FL (ref 35.1–46.3)
EGFRCR SERPLBLD CKD-EPI 2021: 13 ML/MIN/1.73M2 (ref 60–?)
EOSINOPHIL # BLD AUTO: 0.08 X10(3) UL (ref 0–0.7)
EOSINOPHIL NFR BLD AUTO: 3 %
ERYTHROCYTE [DISTWIDTH] IN BLOOD BY AUTOMATED COUNT: 15.5 % (ref 11–15)
EST. AVERAGE GLUCOSE BLD GHB EST-MCNC: 163 MG/DL (ref 68–126)
GLUCOSE BLD-MCNC: 90 MG/DL (ref 70–99)
GLUCOSE BLDC GLUCOMTR-MCNC: 156 MG/DL (ref 70–99)
GLUCOSE BLDC GLUCOMTR-MCNC: 160 MG/DL (ref 70–99)
GLUCOSE BLDC GLUCOMTR-MCNC: 173 MG/DL (ref 70–99)
GLUCOSE BLDC GLUCOMTR-MCNC: 242 MG/DL (ref 70–99)
GLUCOSE BLDC GLUCOMTR-MCNC: 45 MG/DL (ref 70–99)
GLUCOSE BLDC GLUCOMTR-MCNC: 87 MG/DL (ref 70–99)
HBA1C MFR BLD: 7.3 % (ref ?–5.7)
HCT VFR BLD AUTO: 40.1 % (ref 39–53)
HGB BLD-MCNC: 13.2 G/DL (ref 13–17.5)
IMM GRANULOCYTES # BLD AUTO: 0.01 X10(3) UL (ref 0–1)
IMM GRANULOCYTES NFR BLD: 0.4 %
LACTATE SERPL-SCNC: 1.1 MMOL/L (ref 0.5–2)
LYMPHOCYTES # BLD AUTO: 0.78 X10(3) UL (ref 1–4)
LYMPHOCYTES NFR BLD AUTO: 28.9 %
MCH RBC QN AUTO: 28.6 PG (ref 26–34)
MCHC RBC AUTO-ENTMCNC: 32.9 G/DL (ref 31–37)
MCV RBC AUTO: 87 FL (ref 80–100)
MONOCYTES # BLD AUTO: 0.27 X10(3) UL (ref 0.1–1)
MONOCYTES NFR BLD AUTO: 10 %
NEUTROPHILS # BLD AUTO: 1.51 X10 (3) UL (ref 1.5–7.7)
NEUTROPHILS # BLD AUTO: 1.51 X10(3) UL (ref 1.5–7.7)
NEUTROPHILS NFR BLD AUTO: 55.8 %
OSMOLALITY SERPL CALC.SUM OF ELEC: 314 MOSM/KG (ref 275–295)
PLATELET # BLD AUTO: 112 10(3)UL (ref 150–450)
POTASSIUM SERPL-SCNC: 4.9 MMOL/L (ref 3.5–5.1)
PROT SERPL-MCNC: 7.1 G/DL (ref 5.7–8.2)
RBC # BLD AUTO: 4.61 X10(6)UL (ref 3.8–5.8)
SODIUM SERPL-SCNC: 139 MMOL/L (ref 136–145)
WBC # BLD AUTO: 2.7 X10(3) UL (ref 4–11)

## 2025-04-28 PROCEDURE — 74176 CT ABD & PELVIS W/O CONTRAST: CPT | Performed by: EMERGENCY MEDICINE

## 2025-04-28 PROCEDURE — 99223 1ST HOSP IP/OBS HIGH 75: CPT | Performed by: INTERNAL MEDICINE

## 2025-04-28 PROCEDURE — 71045 X-RAY EXAM CHEST 1 VIEW: CPT | Performed by: EMERGENCY MEDICINE

## 2025-04-28 PROCEDURE — 99223 1ST HOSP IP/OBS HIGH 75: CPT | Performed by: HOSPITALIST

## 2025-04-28 PROCEDURE — 70486 CT MAXILLOFACIAL W/O DYE: CPT | Performed by: EMERGENCY MEDICINE

## 2025-04-28 RX ORDER — AMLODIPINE BESYLATE 10 MG/1
10 TABLET ORAL DAILY
COMMUNITY
Start: 2025-04-27

## 2025-04-28 RX ORDER — ACETAMINOPHEN 500 MG
500 TABLET ORAL EVERY 4 HOURS PRN
Status: DISCONTINUED | OUTPATIENT
Start: 2025-04-28 | End: 2025-04-28

## 2025-04-28 RX ORDER — POTASSIUM CHLORIDE 1500 MG/1
20 TABLET, EXTENDED RELEASE ORAL DAILY
COMMUNITY

## 2025-04-28 RX ORDER — LENALIDOMIDE 5 MG/1
5 CAPSULE ORAL DAILY
COMMUNITY
Start: 2024-03-09

## 2025-04-28 RX ORDER — METOPROLOL TARTRATE 1 MG/ML
5 INJECTION, SOLUTION INTRAVENOUS EVERY 6 HOURS PRN
Status: DISCONTINUED | OUTPATIENT
Start: 2025-04-28 | End: 2025-05-06

## 2025-04-28 RX ORDER — HYDROMORPHONE HYDROCHLORIDE 1 MG/ML
0.2 INJECTION, SOLUTION INTRAMUSCULAR; INTRAVENOUS; SUBCUTANEOUS EVERY 2 HOUR PRN
Status: DISCONTINUED | OUTPATIENT
Start: 2025-04-28 | End: 2025-05-06

## 2025-04-28 RX ORDER — NICOTINE POLACRILEX 4 MG
15 LOZENGE BUCCAL
Status: DISCONTINUED | OUTPATIENT
Start: 2025-04-28 | End: 2025-05-06

## 2025-04-28 RX ORDER — HYDROMORPHONE HYDROCHLORIDE 1 MG/ML
0.4 INJECTION, SOLUTION INTRAMUSCULAR; INTRAVENOUS; SUBCUTANEOUS EVERY 2 HOUR PRN
Status: DISCONTINUED | OUTPATIENT
Start: 2025-04-28 | End: 2025-05-06

## 2025-04-28 RX ORDER — DEXTROSE MONOHYDRATE 25 G/50ML
50 INJECTION, SOLUTION INTRAVENOUS ONCE
Status: COMPLETED | OUTPATIENT
Start: 2025-04-28 | End: 2025-04-28

## 2025-04-28 RX ORDER — ONDANSETRON 2 MG/ML
4 INJECTION INTRAMUSCULAR; INTRAVENOUS EVERY 6 HOURS PRN
Status: DISCONTINUED | OUTPATIENT
Start: 2025-04-28 | End: 2025-05-06

## 2025-04-28 RX ORDER — HEPARIN SODIUM 5000 [USP'U]/ML
5000 INJECTION, SOLUTION INTRAVENOUS; SUBCUTANEOUS EVERY 12 HOURS SCHEDULED
Status: DISCONTINUED | OUTPATIENT
Start: 2025-04-28 | End: 2025-05-06

## 2025-04-28 RX ORDER — HYDROMORPHONE HYDROCHLORIDE 1 MG/ML
0.8 INJECTION, SOLUTION INTRAMUSCULAR; INTRAVENOUS; SUBCUTANEOUS EVERY 2 HOUR PRN
Status: DISCONTINUED | OUTPATIENT
Start: 2025-04-28 | End: 2025-05-06

## 2025-04-28 RX ORDER — IBUPROFEN 600 MG/1
600 TABLET, FILM COATED ORAL EVERY 6 HOURS
COMMUNITY
Start: 2025-04-22 | End: 2025-05-06

## 2025-04-28 RX ORDER — DEXTROSE MONOHYDRATE AND SODIUM CHLORIDE 5; .45 G/100ML; G/100ML
INJECTION, SOLUTION INTRAVENOUS CONTINUOUS
Status: CANCELLED | OUTPATIENT
Start: 2025-04-28

## 2025-04-28 RX ORDER — ACETAMINOPHEN 500 MG
1000 TABLET ORAL EVERY 4 HOURS PRN
Status: DISCONTINUED | OUTPATIENT
Start: 2025-04-28 | End: 2025-04-29

## 2025-04-28 RX ORDER — METOCLOPRAMIDE HYDROCHLORIDE 5 MG/ML
5 INJECTION INTRAMUSCULAR; INTRAVENOUS EVERY 8 HOURS PRN
Status: DISCONTINUED | OUTPATIENT
Start: 2025-04-28 | End: 2025-05-06

## 2025-04-28 RX ORDER — DEXTROSE MONOHYDRATE 25 G/50ML
INJECTION, SOLUTION INTRAVENOUS
Status: COMPLETED
Start: 2025-04-28 | End: 2025-04-28

## 2025-04-28 RX ORDER — DEXTROSE MONOHYDRATE 25 G/50ML
50 INJECTION, SOLUTION INTRAVENOUS
Status: DISCONTINUED | OUTPATIENT
Start: 2025-04-28 | End: 2025-05-06

## 2025-04-28 RX ORDER — NICOTINE POLACRILEX 4 MG
30 LOZENGE BUCCAL
Status: DISCONTINUED | OUTPATIENT
Start: 2025-04-28 | End: 2025-05-06

## 2025-04-28 RX ORDER — MEMANTINE HYDROCHLORIDE 10 MG/1
10 TABLET ORAL 2 TIMES DAILY
COMMUNITY

## 2025-04-28 RX ORDER — AMOXICILLIN 500 MG/1
500 CAPSULE ORAL 3 TIMES DAILY
COMMUNITY
Start: 2025-04-22 | End: 2025-05-06

## 2025-04-28 NOTE — ED PROVIDER NOTES
Patient Seen in: [facility] Emergency Department      History     Chief Complaint   Patient presents with    Dental Problem     Stated Complaint: Tooth pain    Subjective:    74-year-old male with history of dementia, diabetes, hypertension here from an office because he is having significant discomfort in the left mouth after having some dental work a week ago.  He is not exactly sure what they did if they did an extraction or not.  It is more painful to open his mouth.  He is not a good historian.  Does not know if he has had fever.  No vomiting or diarrhea.  No focal weakness          History of Present Illness               Objective:     Past Medical History:    Abnormal gait    Benign essential hypertension    Cancer (HCC)    Chest pain    Formatting of this note might be different from the original.   Normal stress test 11/2021      Last Assessment & Plan:    Formatting of this note might be different from the original.   Symptoms are atypical for ischemia   Chest pain is worse with change in position      Diabetes (HCC)    Diabetes mellitus (HCC)    Diabetes mellitus (HCC)    High blood pressure    Hyperglycemia    Hypertension associated with diabetes (HCC)    Last Assessment & Plan:    Formatting of this note might be different from the original.   Blood pressure mildly elevated   Continue same medications for now   Continue to monitor readings      Multiple myeloma (HCC)    Multiple premature ventricular complexes    Formatting of this note might be different from the original.   Normal stress test 11/2021      Echo 8/2021:    1. Left ventricular ejection fraction, by visual estimation, is 60 to 65%.    2. Mild concentric left ventricular hypertrophy.    3. Normal pattern of LV diastolic filling.    4. Mild aortic valve stenosis.         Last Assessment & Plan:    Formatting of this note might be different fro    Neuropathy    Nonrheumatic aortic valve stenosis    Formatting of this note might be  different from the original.   Echo 8/2021:    1. Left ventricular ejection fraction, by visual estimation, is 60 to 65%.    2. Mild concentric left ventricular hypertrophy.    3. Normal pattern of LV diastolic filling.    4. Mild aortic valve stenosis.         Last Assessment & Plan:    Formatting of this note might be different from the original.   Mild aortic mu    Rash    Type 2 diabetes mellitus without complication, without long-term current use of insulin (HCC)    Vomiting              History reviewed. No pertinent surgical history.             Social History     Socioeconomic History    Marital status: Single   Tobacco Use    Smoking status: Never    Smokeless tobacco: Never   Vaping Use    Vaping status: Never Used   Substance and Sexual Activity    Alcohol use: Never    Drug use: Never     Social Drivers of Health     Food Insecurity: Low Risk  (2/4/2025)    Received from [facility]    Food Insecurity     Within the past 12 months, you worried that your food would run out before you got money to buy more.  : Never true     Within the past 12 months, the food you bought just didn't last and you didn't have money to get more. : Never true   Transportation Needs: Not At Risk (2/4/2025)    Received from [facility]    Transportation Needs     In the past 12 months, has lack of reliable transportation kept you from medical appointments, meetings, work or from getting things needed for daily living? : No   Housing Stability: Low Risk  (7/28/2024)    Housing Stability     Housing Instability: No                                Physical Exam     ED Triage Vitals [04/28/25 1202]   BP 99/65   Pulse 78   Resp 22   Temp 97.3 °F (36.3 °C)   Temp src Temporal   SpO2 100 %   O2 Device None (Room air)       Current Vitals:   Vital Signs  BP: 126/55  Pulse: 70  Resp: 26  Temp: 97.3 °F (36.3 °C)  Temp src: Temporal  MAP (mmHg): 76    Oxygen Therapy  SpO2: 99 %  O2 Device: None (Room  air)        Physical Exam  HENT:      Head: Normocephalic.      Right Ear: External ear normal.      Left Ear: External ear normal.      Nose: Nose normal.      Mouth/Throat:      Mouth: Mucous membranes are dry.      Comments: Significant  mucosal granulation left side of the mouth and tongue.  No bleeding from the mouth.  Tender to palpation.  Able to open his mouth a couple centimeters  Eyes:      Extraocular Movements: Extraocular movements intact.   Cardiovascular:      Rate and Rhythm: Normal rate and regular rhythm.   Abdominal:      Palpations: Abdomen is soft.      Tenderness: There is no abdominal tenderness.   Neurological:      Mental Status: He is alert.           Physical Exam                ED Course     Labs Reviewed   CBC WITH DIFFERENTIAL WITH PLATELET - Abnormal; Notable for the following components:       Result Value    WBC 2.7 (*)     RDW-SD 49.4 (*)     RDW 15.5 (*)     .0 (*)     Lymphocyte Absolute 0.78 (*)     All other components within normal limits   BASIC METABOLIC PANEL (8) - Abnormal; Notable for the following components:    CO2 12.0 (*)     BUN 86 (*)     Creatinine 4.34 (*)     Calcium, Total 8.2 (*)     Calculated Osmolality 314 (*)     eGFR-Cr 13 (*)     All other components within normal limits   HEPATIC FUNCTION PANEL (7) - Abnormal; Notable for the following components:     (*)     All other components within normal limits   POCT GLUCOSE - Abnormal; Notable for the following components:    POC Glucose  45 (*)     All other components within normal limits   POCT GLUCOSE - Abnormal; Notable for the following components:    POC Glucose  242 (*)     All other components within normal limits   LACTIC ACID, PLASMA - Normal   POCT GLUCOSE - Normal   SCAN SLIDE   URINALYSIS WITH CULTURE REFLEX   RAINBOW DRAW LAVENDER   RAINBOW DRAW LIGHT GREEN   RAINBOW DRAW BLUE   BLOOD CULTURE   BLOOD CULTURE       ED Course as of 04/28/25  1631  ------------------------------------------------------------  Time: 04/28 1629  Comment: Blood sugar was low.  Dextrose given.  Labs independently interpreted by me.  Patient has thrombocytopenia and leukopenia.  He had leukopenia a couple months ago.  Lactate normal, acute kidney injury, bicarb is low, hepatic profile normal  ------------------------------------------------------------  Time: 04/28 1629  Comment: CT abdomen pelvis shows no hydronephrosis but some other findings.  All the CAT scans were independently interpreted by me.  CT facial bones did not show any drainable abscess but some dental inflammation.  I reviewed radiology reports.  Discussed with ENT, nephrology and hospitalist for admission.     Results                                 MDM      CT ABDOMEN+PELVIS(CPT=74176)  Result Date: 4/28/2025  CONCLUSION:   1. No hydronephrosis or urinary calculus. 2. Mild multifocal colonic bowel wall thickening with scattered areas of liquefied stool in the large intestine.  These findings may reflect mild colitis 3. Moderate distal colonic and rectal stool burden, which can be seen in the setting of constipation.  4. Distal esophageal wall thickening, which may be secondary to gastroesophageal reflux disease or esophagitis. 5. Postoperative changes from cholecystectomy with a well-positioned common bile duct stent. 6. A 1.0 cm left adrenal nodule, which is not significantly changed when compared to 07/28/2024 and is favored to reflect an adenoma. 7. Stable 4.8 cm ovoid soft tissue density mass adjacent to the right quique of diaphragm.  This mass is incompletely characterized and may reflect a peripheral nerve sheath tumor or other etiologies 8. Triple-vessel coronary artery calcifications. 9. Subtle ground-glass opacities in the left lower lobe with a 6 mm left lower lobe pulmonary nodule.  These findings are favored to be secondary to infectious/inflammatory etiologies.  Per Fleischner guidelines, an  optional CT chest in 12 months can be obtained in a high risk patient to monitor for stability.  10. Diffusely demineralized osseous structures with associated multifocal lucent lesions, which likely reflects sequela of multiple myeloma. 11. Lesser incidental findings described above.    Dictated by (CST): Javad Earl MD on 4/28/2025 at 2:01 PM     Finalized by (CST): Javad Earl MD on 4/28/2025 at 2:17 PM          CT FACIAL BONES (CPT=70486)  Result Date: 4/28/2025  CONCLUSION:  1. Interval extraction changes at tooth # 17 (left mandibular 3rd molar).  Small foci of subperiosteal gas and fluid surrounding both the lingual and buccal cortical surface of the left mandible, which likely relate to subperiosteal phlegmon.  There is also inflammatory stranding throughout the left  space with reactive myositis of the left platysma muscle.  No associated well-defined/drainable soft tissue collection to suggest mature abscess on this noncontrast exam. 2. History of multiple myeloma.  Innumerable small lytic foci throughout the imaged calvarium, skull base, maxillofacial bones, and upper cervical spine.  Findings are very similar in comparison to prior exam from July, 2024 and likely relate to sequelae  of myeloma. 3. Stable low-grade chronic left maxillary sinusitis. 4. Lesser incidental findings as above.   elm-remote  Dictated by (CST): Juwan Valdez MD on 4/28/2025 at 1:18 PM     Finalized by (CST): Juwan Valdez MD on 4/28/2025 at 1:25 PM              Admission disposition: 4/28/2025  2:07 PM           Medical Decision Making  Differential diagnosis could include was not limited to abscess, dental infection, oral infection, dehydration, sepsis, UTI, pneumonia, renal failure, electrolyte disturbance and many other possibilities.  Extensive workup in progress.    Amount and/or Complexity of Data Reviewed  External Data Reviewed: notes.     Details: I did review discharge summary from [facility]  facility 2 months ago.  [facility] more about his current lab work and diagnoses  Labs: ordered. Decision-making details documented in ED Course.  Radiology: ordered and independent interpretation performed. Decision-making details documented in ED Course.  Discussion of management or test interpretation with external provider(s): See ED course.  Patient hydrated.  Discussed with specialist.  Dextrose given.    Risk  Decision regarding hospitalization.  Risk Details: See HPI for risks        Disposition and Plan     Clinical Impression:  1. Acute kidney injury    2. Dental infection    3. Hypoglycemia         Disposition:  Admit  4/28/2025  2:07 pm    Follow-up:  No follow-up provider specified.  We recommend that you schedule follow up care with a primary care provider within the next three months to obtain basic health screening including reassessment of your blood pressure.      Medications Prescribed:  Current Discharge Medication List          Supplementary Documentation:         Hospital Problems       Present on Admission           ICD-10-CM Noted POA    Acute kidney injury (HCC) N17.9 4/28/2025 Yes    Acute renal failure (ARF) (HCC) N17.9 4/28/2025 Yes    Metabolic acidosis E87.20 4/28/2025 Yes    Thrombocytopenia (HCC) D69.6 4/28/2025 Yes

## 2025-04-28 NOTE — ED QUICK NOTES
Orders for admission, patient is aware of plan and ready to go upstairs. Any questions, please call ED RN Carlee at extension [phone].     Patient Covid vaccination status: Fully vaccinated     COVID Test Ordered in ED: None    COVID Suspicion at Admission: N/A    Running Infusions: Medication Infusions[1]     Mental Status/LOC at time of transport: AOx3    Other pertinent information:   CIWA score: N/A   NIH score:  N/A             [1]

## 2025-04-28 NOTE — ED INITIAL ASSESSMENT (HPI)
Patient arrives with reports of having tooth extracted approx 1 week ago. C/o mouth pain/swelling. Denies fever/SOB. Patient was sent by MD. ASHER at bedside.

## 2025-04-28 NOTE — H&P
[facility]    PATIENT'S NAME: REYNA NAYAK   ATTENDING PHYSICIAN: Humble Wilkinson MD   PATIENT ACCOUNT#:   [id number]    LOCATION:  [address]  MEDICAL RECORD #:   [id number]       YOB: 1950  ADMISSION DATE:       04/28/2025    HISTORY AND PHYSICAL EXAMINATION    CHIEF COMPLAINT:  Acute kidney injury and severe dental infection.    HISTORY OF PRESENT ILLNESS:  The patient is a 74-year-old [place] male who had a tooth extraction of tooth #17 a few days ago and ever since he has been having swelling and pain in the left mandibular area with poor oral intake.  Today he was brought into the emergency department for evaluation.  CBC showed white blood cell count of 2.7, platelet count 112, mild lymphopenia suggestive of possible viral infection.  Blood cultures and lactic acid were obtained.  Chemistry showed GFR of 13 which is way below his baseline, BUN and creatinine of 86 and 4.34, bicarb 12.  Liver function tests were unremarkable.  CT scan of abdomen and pelvis showed no hydronephrosis, multifocal colonic wall thickening with scattered area of liquified stool, moderate distal colonic and rectal stool burden, distal esophageal wall thickening, no other clear acute findings.  CT scan of the facial area showed interval extraction of tooth #17, small foci of super-ostial gas and fluid surrounding both the lingual and buccal cortical surfaces of the left mandible which may likely be related to subperiosteal phlegmon.  There is also acute inflammatory stranding throughout the left  space with reactive myositis in the left platysma muscle.  No collection of fluid or abscess.  The patient has a history of multiple myeloma.  Innumerable small lytic foci throughout the imaged calvarium skull base, maxillofacial bones, and upper cervical spine; findings very similar to prior imaging studies.  Chest x-ray showed no acute findings.    PAST MEDICAL HISTORY:  Treated multiple  myeloma, dementia, hypertension, diabetes mellitus type 2, generalized osteoarthritis.    PAST SURGICAL HISTORY:  Unobtainable.    MEDICATIONS:  Please see medication reconciliation list.     FAMILY HISTORY:  Positive for hypertension and diabetes mellitus type 2.    SOCIAL HISTORY:  No tobacco, alcohol, or drug use.  Lives with his family.    REVIEW OF SYSTEMS:  Poor oral intake since dental extraction with left facial swelling.  Loose bowel movements and poor oral intake for the last few days and generalized weakness.  No recorded fever or chills.  Other 12-point review of systems is negative.        PHYSICAL EXAMINATION:    GENERAL:  Alert.  Able to follow simple commands.  Fatigued.  No acute distress.  VITAL SIGNS:  Temperature 97.3, pulse 80, respiratory rate 22, blood pressure 99/65, pulse ox 99% on room air.  HEENT:  Atraumatic.  Oropharynx clear.  Dry mucous membranes.  Necrotic changes noted at the left tongue and left-sided buccal mucosa.  The patient is not able to open his mouth fully for a full exam.  Tenderness to palpation at the left mandibular angle and left platysma muscle.    NECK:  Supple.  Trachea midline otherwise.  LUNGS:  Clear to auscultation bilaterally.  Normal respiratory effort.    HEART:  Regular rate and rhythm.  S1 and S2 auscultated.  No murmur.  ABDOMEN:  Soft, nondistended.  No tenderness.  Positive bowel sounds.  EXTREMITIES:  No peripheral edema, clubbing, or cyanosis.  NEUROLOGIC:  No clear focal defect.    ASSESSMENT:    1.   Left dental infection, post tooth extraction, with periosteal changes and inflammatory changes and myositis at the platysma.  2.   Acute kidney injury and severe dehydration.  3.   Diabetes mellitus type 2.  4.   Dementia.    PLAN:  The patient will be admitted to general medical floor.  Hold all his oral medications.  Start him on IV fluids.  Monitor Accu-Cheks.  N.p.o.  Aspiration precautions.  Monitor hemodynamic status.  Obtain Nephrology and ENT  consults.  Further recommendations to follow.      Dictated By Duy Mera MD  d: 04/28/2025 14:47:45  t: 04/28/2025 15:20:07  [facility] [id number]  FB/    cc: Humble Wilkinson MD

## 2025-04-28 NOTE — ED QUICK NOTES
Updated patients ex wife, Sammi. Patient okay with this RN giving update.     Sammi provided patients caregiver name and number. Hany [phone].

## 2025-04-29 PROBLEM — M27.2 ODONTOGENIC INFECTION OF JAW: Status: ACTIVE | Noted: 2025-04-29

## 2025-04-29 PROBLEM — K14.3 BROWN HAIRY TONGUE: Status: ACTIVE | Noted: 2025-04-29

## 2025-04-29 LAB
ALBUMIN SERPL-MCNC: 3.4 G/DL (ref 3.2–4.8)
ALBUMIN/GLOB SERPL: 1.3 {RATIO} (ref 1–2)
ALP LIVER SERPL-CCNC: 92 U/L (ref 45–117)
ALT SERPL-CCNC: 79 U/L (ref 10–49)
ANION GAP SERPL CALC-SCNC: 15 MMOL/L (ref 0–18)
AST SERPL-CCNC: 23 U/L (ref ?–34)
BASOPHILS # BLD: 0.01 X10(3) UL (ref 0–0.2)
BASOPHILS NFR BLD: 1 %
BILIRUB SERPL-MCNC: 0.9 MG/DL (ref 0.2–1.1)
BILIRUB UR QL: NEGATIVE
BUN BLD-MCNC: 63 MG/DL (ref 9–23)
BUN/CREAT SERPL: 24.2 (ref 10–20)
CALCIUM BLD-MCNC: 7.3 MG/DL (ref 8.7–10.4)
CHLORIDE SERPL-SCNC: 114 MMOL/L (ref 98–112)
CLARITY UR: CLEAR
CO2 SERPL-SCNC: 16 MMOL/L (ref 21–32)
CREAT BLD-MCNC: 2.6 MG/DL (ref 0.7–1.3)
CREAT UR-SCNC: 54.1 MG/DL
CREAT UR-SCNC: 54.1 MG/DL
DEPRECATED RDW RBC AUTO: 47.4 FL (ref 35.1–46.3)
EGFRCR SERPLBLD CKD-EPI 2021: 25 ML/MIN/1.73M2 (ref 60–?)
EOSINOPHIL # BLD: 0.1 X10(3) UL (ref 0–0.7)
EOSINOPHIL NFR BLD: 7 %
ERYTHROCYTE [DISTWIDTH] IN BLOOD BY AUTOMATED COUNT: 15 % (ref 11–15)
GLOBULIN PLAS-MCNC: 2.7 G/DL (ref 2–3.5)
GLUCOSE BLD-MCNC: 92 MG/DL (ref 70–99)
GLUCOSE BLDC GLUCOMTR-MCNC: 117 MG/DL (ref 70–99)
GLUCOSE BLDC GLUCOMTR-MCNC: 122 MG/DL (ref 70–99)
GLUCOSE BLDC GLUCOMTR-MCNC: 141 MG/DL (ref 70–99)
GLUCOSE BLDC GLUCOMTR-MCNC: 163 MG/DL (ref 70–99)
GLUCOSE BLDC GLUCOMTR-MCNC: 99 MG/DL (ref 70–99)
GLUCOSE UR-MCNC: >1000 MG/DL
HCT VFR BLD AUTO: 34.3 % (ref 39–53)
HGB BLD-MCNC: 11.6 G/DL (ref 13–17.5)
LEUKOCYTE ESTERASE UR QL STRIP.AUTO: NEGATIVE
LYMPHOCYTES NFR BLD: 0.24 X10(3) UL (ref 1–4)
LYMPHOCYTES NFR BLD: 13 %
MAGNESIUM SERPL-MCNC: 2.5 MG/DL (ref 1.6–2.6)
MCH RBC QN AUTO: 28.9 PG (ref 26–34)
MCHC RBC AUTO-ENTMCNC: 33.8 G/DL (ref 31–37)
MCV RBC AUTO: 85.5 FL (ref 80–100)
MICROALBUMIN UR-MCNC: 2.1 MG/DL
MICROALBUMIN/CREAT 24H UR-RTO: 38.8 UG/MG (ref ?–30)
MONOCYTES # BLD: 0.08 X10(3) UL (ref 0.1–1)
MONOCYTES NFR BLD: 6 %
MORPHOLOGY: NORMAL
NEUTROPHILS # BLD AUTO: 0.75 X10 (3) UL (ref 1.5–7.7)
NEUTROPHILS NFR BLD: 60 %
NEUTS BAND NFR BLD: 9 %
NEUTS HYPERSEG # BLD: 0.97 X10(3) UL (ref 1.5–7.7)
NITRITE UR QL STRIP.AUTO: NEGATIVE
OSMOLALITY SERPL CALC.SUM OF ELEC: 318 MOSM/KG (ref 275–295)
PH UR: 5 [PH] (ref 5–8)
PHOSPHATE SERPL-MCNC: 4.4 MG/DL (ref 2.4–5.1)
PLATELET # BLD AUTO: 85 10(3)UL (ref 150–450)
PLATELET MORPHOLOGY: NORMAL
PLATELETS.RETICULATED NFR BLD AUTO: 4 % (ref 0–7)
POTASSIUM SERPL-SCNC: 3.6 MMOL/L (ref 3.5–5.1)
PROT SERPL-MCNC: 6.1 G/DL (ref 5.7–8.2)
PROT UR-MCNC: 20 MG/DL
RBC # BLD AUTO: 4.01 X10(6)UL (ref 3.8–5.8)
SODIUM SERPL-SCNC: 145 MMOL/L (ref 136–145)
SODIUM SERPL-SCNC: 66 MMOL/L
SP GR UR STRIP: 1.01 (ref 1–1.03)
TOTAL CELLS COUNTED BLD: 100
TSI SER-ACNC: 0.85 UIU/ML (ref 0.55–4.78)
UROBILINOGEN UR STRIP-ACNC: NORMAL
VARIANT LYMPHS NFR BLD MANUAL: 4 % (ref ?–4)
WBC # BLD AUTO: 1.4 X10(3) UL (ref 4–11)

## 2025-04-29 PROCEDURE — 99222 1ST HOSP IP/OBS MODERATE 55: CPT | Performed by: OTOLARYNGOLOGY

## 2025-04-29 PROCEDURE — 99233 SBSQ HOSP IP/OBS HIGH 50: CPT | Performed by: INTERNAL MEDICINE

## 2025-04-29 PROCEDURE — 99233 SBSQ HOSP IP/OBS HIGH 50: CPT | Performed by: HOSPITALIST

## 2025-04-29 RX ORDER — VALACYCLOVIR HYDROCHLORIDE 500 MG/1
1000 TABLET, FILM COATED ORAL DAILY
Status: DISCONTINUED | OUTPATIENT
Start: 2025-04-29 | End: 2025-05-03

## 2025-04-29 RX ORDER — CHLORHEXIDINE GLUCONATE ORAL RINSE 1.2 MG/ML
15 SOLUTION DENTAL 4 TIMES DAILY
Status: DISCONTINUED | OUTPATIENT
Start: 2025-04-29 | End: 2025-05-06

## 2025-04-29 RX ORDER — ACETAMINOPHEN 10 MG/ML
1000 INJECTION, SOLUTION INTRAVENOUS EVERY 6 HOURS PRN
Status: DISCONTINUED | OUTPATIENT
Start: 2025-04-29 | End: 2025-05-01

## 2025-04-29 NOTE — PROGRESS NOTES
Patient arrived to the floor at 1843 PM. I took vital signs and checked his BG. Relief RN started admission.

## 2025-04-29 NOTE — PROGRESS NOTES
[facility]  Nephrology Daily Progress Note    Joselin Graham  [id number]  74 year old      HPI:   Joselin Graham is a 74 year old male.  Lying in bed and looks comfortable.  States he is hungry.  Jaw pain seems better.       ROS:     Constitutional:  Negative for decreased activity, fever, irritability and lethargy  Endocrine:  Negative for abnormal sleep patterns, increased activity, polydipsia and polyphagia  Cardiovascular:  Negative for cool extremity and irregular heartbeat/palpitations  Gastrointestinal:  Negative for abdominal pain, constipation, decreased appetite, diarrhea and vomiting  Genitourinary:  Negative for dysuria and hematuria  Hema/Lymph:  Negative for easy bleeding and easy bruising  Integumentary:  Negative for pruritus and rash  Musculoskeletal:  Negative for bone/joint symptoms  Neurological:  Negative for gait disturbance  Psychiatric:  Negative for inappropriate interaction and psychiatric symptoms  Respiratory:  Negative for cough, dyspnea and wheezing      PHYSICAL EXAM:   Temp:  [97.7 °F (36.5 °C)-100.6 °F (38.1 °C)] 97.7 °F (36.5 °C)  Pulse:  [] 73  Resp:  [17-26] 20  BP: ()/() 130/68  SpO2:  [92 %-100 %] 100 %  Patient Weight for the past 72 hrs:   Weight   04/28/25 1202 133 lb (60.3 kg)   04/28/25 2300 119 lb 12.8 oz (54.3 kg)       Constitutional: appears well hydrated alert and responsive no acute distress noted  Neck/Thyroid: neck is supple without adenopathy  Lymphatic: no abnormal cervical, supraclavicular or axillary adenopathy is noted  Respiratory: normal to inspection lungs are clear to auscultation bilaterally normal respiratory effort  Cardiovascular: regular rate and rhythm no murmurs, gallups, or rubs  Abdomen: soft non-tender non-distended no organomegaly noted no masses  Musculoskeletal: full ROM all extremities good strength  no deformities  Extremities: no edema, cyanosis, or clubbing  Neurological: exam appropriate for age reflexes and  motor skills appropriate for age    Labs:  Lab Results   Component Value Date    WBC 1.4 04/29/2025    HGB 11.6 04/29/2025    HCT 34.3 04/29/2025    PLT 85.0 04/29/2025    CREATSERUM 2.60 04/29/2025    BUN 63 04/29/2025     04/29/2025    K 3.6 04/29/2025     04/29/2025    CO2 16.0 04/29/2025    GLU 92 04/29/2025    CA 7.3 04/29/2025    ALB 3.4 04/29/2025    ALKPHO 92 04/29/2025    BILT 0.9 04/29/2025    TP 6.1 04/29/2025    AST 23 04/29/2025    ALT 79 04/29/2025    TSH 0.847 04/29/2025    MG 2.5 04/29/2025    PHOS 4.4 04/29/2025     Recent Labs   Lab 04/28/25  1212 04/29/25  0537   WBC 2.7* 1.4*   HGB 13.2 11.6*   HCT 40.1 34.3*   .0* 85.0*     Recent Labs   Lab 04/28/25  1212 04/29/25  0537   GLU 90 92   BUN 86* 63*   CREATSERUM 4.34* 2.60*   CA 8.2* 7.3*   ALB 4.1 3.4    145   K 4.9 3.6    114*   CO2 12.0* 16.0*   ALKPHO 109 92   AST 20 23   * 79*   BILT 0.5 0.9   TP 7.1 6.1   PHOS  --  4.4       Imaging  CT ABDOMEN+PELVIS(CPT=74176)  Result Date: 4/28/2025  CONCLUSION:   1. No hydronephrosis or urinary calculus. 2. Mild multifocal colonic bowel wall thickening with scattered areas of liquefied stool in the large intestine.  These findings may reflect mild colitis 3. Moderate distal colonic and rectal stool burden, which can be seen in the setting of constipation.  4. Distal esophageal wall thickening, which may be secondary to gastroesophageal reflux disease or esophagitis. 5. Postoperative changes from cholecystectomy with a well-positioned common bile duct stent. 6. A 1.0 cm left adrenal nodule, which is not significantly changed when compared to 07/28/2024 and is favored to reflect an adenoma. 7. Stable 4.8 cm ovoid soft tissue density mass adjacent to the right quique of diaphragm.  This mass is incompletely characterized and may reflect a peripheral nerve sheath tumor or other etiologies 8. Triple-vessel coronary artery calcifications. 9. Subtle ground-glass opacities in  the left lower lobe with a 6 mm left lower lobe pulmonary nodule.  These findings are favored to be secondary to infectious/inflammatory etiologies.  Per Fleischner guidelines, an optional CT chest in 12 months can be obtained in a high risk patient to monitor for stability.  10. Diffusely demineralized osseous structures with associated multifocal lucent lesions, which likely reflects sequela of multiple myeloma. 11. Lesser incidental findings described above.    Dictated by (CST): Javad Earl MD on 4/28/2025 at 2:01 PM     Finalized by (CST): Javad Earl MD on 4/28/2025 at 2:17 PM          CT FACIAL BONES (CPT=70486)  Result Date: 4/28/2025  CONCLUSION:  1. Interval extraction changes at tooth # 17 (left mandibular 3rd molar).  Small foci of subperiosteal gas and fluid surrounding both the lingual and buccal cortical surface of the left mandible, which likely relate to subperiosteal phlegmon.  There is also inflammatory stranding throughout the left  space with reactive myositis of the left platysma muscle.  No associated well-defined/drainable soft tissue collection to suggest mature abscess on this noncontrast exam. 2. History of multiple myeloma.  Innumerable small lytic foci throughout the imaged calvarium, skull base, maxillofacial bones, and upper cervical spine.  Findings are very similar in comparison to prior exam from July, 2024 and likely relate to sequelae  of myeloma. 3. Stable low-grade chronic left maxillary sinusitis. 4. Lesser incidental findings as above.   elm-remote  Dictated by (CST): Juwan Valdez MD on 4/28/2025 at 1:18 PM     Finalized by (CST): Juwan Valdez MD on 4/28/2025 at 1:25 PM              Medications:  Current Hospital Medications[1]    Allergies:  Allergies[2]    Input/Output:    Intake/Output Summary (Last 24 hours) at 4/29/2025 1444  Last data filed at 4/29/2025 1100  Gross per 24 hour   Intake 1766.67 ml   Output 1000 ml   Net 766.67 ml           ASSESSMENT/PLAN:   Assessment   Problem List[3]    UO 1000 ml last shift.  BUN/Creat better 63.2.60.  CO2 improving to 16.  Still NPO and will continue IVF until po intake is adequate.  Discussed with caretaker at bedside.               4/29/2025  Marbin Salcedo MD               [1]   Current Facility-Administered Medications:     melatonin tab 6 mg, 6 mg, Oral, Nightly PRN    acetaminophen (Ofirmev) 10 mg/mL infusion premix 1,000 mg, 1,000 mg, Intravenous, Q6H PRN    ampicillin-sulbactam (Unasyn) 3 g in sodium chloride 0.9% 100mL IVPB-ADD, 3 g, Intravenous, q12h    [Held by provider] heparin (Porcine) 5000 UNIT/ML injection 5,000 Units, 5,000 Units, Subcutaneous, 2 times per day    ondansetron (Zofran) 4 MG/2ML injection 4 mg, 4 mg, Intravenous, Q6H PRN    metoclopramide (Reglan) 5 mg/mL injection 5 mg, 5 mg, Intravenous, Q8H PRN    HYDROmorphone (Dilaudid) 1 MG/ML injection 0.2 mg, 0.2 mg, Intravenous, Q2H PRN **OR** HYDROmorphone (Dilaudid) 1 MG/ML injection 0.4 mg, 0.4 mg, Intravenous, Q2H PRN **OR** HYDROmorphone (Dilaudid) 1 MG/ML injection 0.8 mg, 0.8 mg, Intravenous, Q2H PRN    glucose (Dex4) 15 GM/59ML oral liquid 15 g, 15 g, Oral, Q15 Min PRN **OR** glucose (Glutose) 40% oral gel 15 g, 15 g, Oral, Q15 Min PRN **OR** glucose-vitamin C (Dex-4) chewable tab 4 tablet, 4 tablet, Oral, Q15 Min PRN **OR** dextrose 50% injection 50 mL, 50 mL, Intravenous, Q15 Min PRN **OR** glucose (Dex4) 15 GM/59ML oral liquid 30 g, 30 g, Oral, Q15 Min PRN **OR** glucose (Glutose) 40% oral gel 30 g, 30 g, Oral, Q15 Min PRN **OR** glucose-vitamin C (Dex-4) chewable tab 8 tablet, 8 tablet, Oral, Q15 Min PRN    insulin regular human (Novolin R, Humulin R) 100 UNIT/ML injection 1-7 Units, 1-7 Units, Subcutaneous, 4 times per day    sodium bicarbonate 150 mEq in dextrose 5% 1,000 mL infusion, 150 mEq, Intravenous, Continuous    metoprolol (Lopressor) 5 mg/5mL injection 5 mg, 5 mg, Intravenous, Q6H PRN  [2] No Known Allergies  [3]    Patient Active Problem List  Diagnosis    Weakness generalized    Recurrent falls    Uncontrolled type 2 diabetes mellitus with hyperglycemia (HCC)    Acquired hypothyroidism    Thrombocytopenia (HCC)    Acute renal failure (ARF) (HCC)    Acute kidney injury    Metabolic acidosis    Dental infection    Hypoglycemia    Acute kidney failure    Odontogenic infection of jaw    Brown hairy tongue

## 2025-04-29 NOTE — CONSULTS
[facility]  part of [facility]      Consult     Joselin Graham Patient Status:  Inpatient    5/15/1950 MRN [id number]   Location [facility] 5SW/SE Attending Zenia Martel MD   Hosp Day # 1 PCP No primary care provider on file.     Referring Provider: Dr. Jaja ASHER  Reason for Consultation: abnormal tongue appearance    Subjective:    Joselin Graham is a 74 year old male who presented to the ED due to facial and oral cavity pain after a dental extraction about 1 week ago. CT facial bones without contrast showed a phlegmon along the left mandible without fluid collection for drainage. He has a history of mulitple myeloma. Patient additionally has a history of dementia and is a poor historian. He was also found to be in acute rental failure on presentation, so only a without contrast CT could be performed. .     History/Other:      Past Medical History:Past Medical History[1]     Past Surgical History: Past Surgical History[2]    Social History:  reports that he has never smoked. He has never used smokeless tobacco. He reports that he does not drink alcohol and does not use drugs.    Family History: Family History[3]    Allergies: Allergies[4]    Medications:  Medications Ordered Prior to Encounter[5]    Review of Systems:   Review of systems was completed.  Pertinent positives and negatives noted in the HPI.    Objective:     /68 (BP Location: Right arm)   Pulse 73   Temp 97.7 °F (36.5 °C) (Oral)   Resp 20   Ht 5' 5\" (1.651 m)   Wt 119 lb 12.8 oz (54.3 kg)   SpO2 100%   BMI 19.94 kg/m²   EXAMINATION:  I washed my hands with an alcohol-based hand gel prior to examination  Constitutional:   --Vitals: Blood pressure 130/68, pulse 73, temperature 97.7 °F (36.5 °C), temperature source Oral, resp. rate 20, height 5' 5\" (1.651 m), weight 119 lb 12.8 oz (54.3 kg), SpO2 100%.  General: no apparent distress, patient is not cooperative with examination  Respiratory: No stridor,  stertor or increased work of breathing  ENT:  --OC/OP: 2cm trismus. Difficult oral cavity exam due to trismus and lack of cooperation. Poor dentition. Tongue with a brown coated appearance. Does not appear actively infected or necrotic. No active bleeding. Swelling and tenderness over the mandible on the left. Floor of mouth is soft.   --Neck: Swelling and tenderness over the parotid/masseter on the left without gross fluctuance. No palpable cervical lymphadenopathy, no thyromegaly.     Results:    Labs:  Laboratory data reviewed.      Selected labs - last 24 hours:  Endo  Lytes  Renal   Glu 92  Na 145 Ca 7.3  BUN 63   POC Gluc  117  K 3.6 PO4 4.4  Cr 2.60   A1c -  Cl 114 Mg 2.5  eGFR 25   TSH 0.847  CO2 16.0         LFT  CBC  Other   AST 23  WBC 1.4  PTT - Procal -   ALT 79  Hb 11.6  INR - CRP -   APk 92  Hct 34.3  Trop - D dim -   T aliyah 0.9  PLT 85.0  pBNP -  BNP -  - Ferritin  -   Prot 6.1    CK  - Lactate  1.1   Alb 3.4    LDL  - COVID  -       Imaging: Imaging data reviewed in Epic.  CT facial bone w/o contrast 4/28/2025  CONCLUSION:   1. Interval extraction changes at tooth # 17 (left mandibular 3rd molar).  Small foci of subperiosteal gas and fluid surrounding both the lingual and buccal cortical surface of the left mandible, which likely relate to subperiosteal phlegmon.  There is also inflammatory stranding throughout the left  space with reactive myositis of the left platysma muscle.  No associated well-defined/drainable soft tissue collection to suggest mature abscess on this noncontrast exam.   2. History of multiple myeloma.  Innumerable small lytic foci throughout the imaged calvarium, skull base, maxillofacial bones, and upper cervical spine.  Findings are very similar in comparison to prior exam from July, 2024 and likely relate to sequelae    of myeloma.   3. Stable low-grade chronic left maxillary sinusitis.   4. Lesser incidental findings as above.     Assessment & Plan:    #Odontogenic  infection along the mandible and  spaces - no drainable fluid collection  #Abnormal tongue appearance; this does not appear to be an acute finding     -Recommend ID consultation and starting IV antibiotics for infection after dental extraction   -Consider repeat imaging and oral surgery consultation if no improvement in 48 hours   -Currently there does not appear to be any impending risk of airway obstruction   -For the tongue, recommend outpatient evaluation and can consider biopsy. This could be a chronic appearance related to poor oral hygiene. It does not appear to be related to acute infection, necrosis, or cancer on this limited exam.     Plan of care discussed with Dr. Delonte Vargas MD  4/29/2025    Supplementary Documentation:                            [1]   Past Medical History:   Abnormal gait    Benign essential hypertension    Cancer (HCC)    Chest pain    Formatting of this note might be different from the original.   Normal stress test 11/2021      Last Assessment & Plan:    Formatting of this note might be different from the original.   Symptoms are atypical for ischemia   Chest pain is worse with change in position      Diabetes (HCC)    Diabetes mellitus (HCC)    Diabetes mellitus (HCC)    High blood pressure    Hyperglycemia    Hypertension associated with diabetes (HCC)    Last Assessment & Plan:    Formatting of this note might be different from the original.   Blood pressure mildly elevated   Continue same medications for now   Continue to monitor readings      Multiple myeloma (HCC)    Multiple premature ventricular complexes    Formatting of this note might be different from the original.   Normal stress test 11/2021      Echo 8/2021:    1. Left ventricular ejection fraction, by visual estimation, is 60 to 65%.    2. Mild concentric left ventricular hypertrophy.    3. Normal pattern of LV diastolic filling.    4. Mild aortic valve stenosis.         Last  Assessment & Plan:    Formatting of this note might be different fro    Neuropathy    Nonrheumatic aortic valve stenosis    Formatting of this note might be different from the original.   Echo 8/2021:    1. Left ventricular ejection fraction, by visual estimation, is 60 to 65%.    2. Mild concentric left ventricular hypertrophy.    3. Normal pattern of LV diastolic filling.    4. Mild aortic valve stenosis.         Last Assessment & Plan:    Formatting of this note might be different from the original.   Mild aortic mu    Rash    Type 2 diabetes mellitus without complication, without long-term current use of insulin (HCC)    Vomiting   [2] History reviewed. No pertinent surgical history.  [3] No family history on file.  [4] No Known Allergies  [5]   No current facility-administered medications on file prior to encounter.     Current Outpatient Medications on File Prior to Encounter   Medication Sig Dispense Refill    Lenalidomide 5 MG Oral Cap Take 5 mg by mouth daily.      memantine 10 MG Oral Tab Take 1 tablet (10 mg total) by mouth 2 (two) times daily.      amoxicillin 500 MG Oral Cap Take 1 capsule (500 mg total) by mouth 3 (three) times daily. TAKE WITH FOOD AND WATER      ibuprofen 600 MG Oral Tab Take 1 tablet (600 mg total) by mouth every 6 (six) hours.      potassium chloride 20 MEQ Oral Tab CR Take 1 tablet (20 mEq total) by mouth daily.      amLODIPine 10 MG Oral Tab Take 1 tablet (10 mg total) by mouth daily.      insulin degludec 100 units/mL Subcutaneous Solution Pen-injector Inject 15 Units into the skin nightly. 3 mL 0    insulin aspart 100 Units/mL Subcutaneous Solution Pen-injector Inject 6 Units into the skin 3 (three) times daily with meals. 3 mL 0    Insulin Pen Needle 32G X 4 MM Does not apply Misc May substitute if not on insurance formulary 100 each 5    DULoxetine 60 MG Oral Cap DR Particles Take 1 capsule (60 mg total) by mouth in the morning.      losartan 50 MG Oral Tab Take 1 tablet (50  mg total) by mouth in the morning.      Empagliflozin (JARDIANCE) 25 MG Oral Tab Take 1 tablet (25 mg total) by mouth in the morning.      levothyroxine 25 MCG Oral Tab Take 1 tablet (25 mcg total) by mouth before breakfast.

## 2025-04-29 NOTE — PROGRESS NOTES
[facility]  part of [facility]    Progress Note    Joselin Graham Patient Status:  Inpatient    5/15/1950 MRN [id number]   Location [facility] 5SW/SE Attending Zenia Martel MD   Hosp Day # 1 PCP No primary care provider on file.     Chief complaint: jaw pains  Subjective:     Hungry, per CG he seems a little bit better with decr swelling, able to open mouth more  No high fevers  No sob    CG at bedside    Objective:   Blood pressure 130/68, pulse 73, temperature 97.7 °F (36.5 °C), temperature source Oral, resp. rate 20, height 5' 5\" (1.651 m), weight 119 lb 12.8 oz (54.3 kg), SpO2 100%.    GEN: NAD  HEENT: conjunctivae/corneas clear. PERRL, EOM's intact. Left facial swelling, +trismus  No drooling, no stridor  Neck: no adenopathy, no carotid bruit, no JVD, supple  Pulmonary:  clear to auscultation bilaterally, no wheezing  Cardiovascular: S1, S2 normal, no murmur, click, rub or gallop, regular rate and rhythm  Abdominal: soft, non-tender; bowel sounds normal; no masses,  no organomegaly  Extremities: no cyanosis or edema  Pulses: palpable and symmetric  Skin: No rashes or lesions  Neurologic: Alert and oriented X 3,    Psychiatric: calm, cooperative    Assessment and Plan:     Acute renal failure.    -This is probably multifactorial in etiology in part secondary to decrease oral intake since his dental procedure was done.  The patient was borderline hypotensive when he has arrived.  The patient was  on losartan and also was taking Motrin prior to admission.    Also with a significant metabolic acidosis.  -nephrology consulted, d/w Dr. Salcedo  - Improving with IV fluids  -Labs in the morning    Left mandibular pain, status post recent dental procedures.  Exact details not totally clear.  -CT with concern for phlegmonous changes  - ENT consulted from ER, d/w Dr. Whitney, no indications for acute surgical intervention  -cont Abx  - On IV Unasyn  - ID consult  - repeat CT in few days  or if improvement stalls to reassess for abscess formation  -swallow eval      History of multiple myeloma.  Pancytopenia  Thrombocytopenia  - Hold heparin subcu  -CBC in the morning    Adult-onset diabetes mellitus.  A1c 7.3  At home on Jardiance and insulin  -On sliding scale insulin now, oral intake poor    H/o Hypertension.  On admission hypotensive  -Home medications on hold  -Now BP stable, possibly restart low-dose of amlodipine if needed  -Monitor    Hypothyroidism  - Restart levothyroxine once able    Dementia.-->  Supportive    Dispo: Pending progress        Supplementary Documentation:   DVT Mechanical Prophylaxis:        DVT Pharmacologic Prophylaxis   Medication    [Held by provider] heparin (Porcine) 5000 UNIT/ML injection 5,000 Units                Code Status: Full Code  Jean: External urinary catheter in place  Jean Duration (in days):   Central line:    KUSH: 5/1/2025        **Certification      PHYSICIAN Certification of Need for Inpatient Hospitalization - Initial Certification    Patient will require inpatient services that will reasonably be expected to span two midnight's based on the clinical documentation in H+P.   Based on patients current state of illness, I anticipate that, after discharge, patient will require TBD.                Chart reviewed, including current vitals, notes, labs and imaging  Pertinent past medical records reviewed  Labs ordered and medications adjusted as outlined above  Home medications reconciliation completed  Coordinate care with care team/consultants  Discussed with patient and family at bedside results of tests, management plan as outlined above, and the need for ongoing hospitalization  D/w RN     MDM high  severe acute illness/or exacerbation of chronic illness posing a threat to life, IV medications, requiring close monitoring in hospital.       Results:     Lab Results   Component Value Date    WBC 1.4 (L) 04/29/2025    HGB 11.6 (L) 04/29/2025    HCT 34.3  (L) 04/29/2025    PLT 85.0 (L) 04/29/2025    CREATSERUM 2.60 (H) 04/29/2025    BUN 63 (H) 04/29/2025     04/29/2025    K 3.6 04/29/2025     (H) 04/29/2025    CO2 16.0 (L) 04/29/2025    GLU 92 04/29/2025    CA 7.3 (L) 04/29/2025    ALB 3.4 04/29/2025    ALKPHO 92 04/29/2025    BILT 0.9 04/29/2025    TP 6.1 04/29/2025    AST 23 04/29/2025    ALT 79 (H) 04/29/2025    T4F 1.1 07/28/2024    TSH 5.944 (H) 07/28/2024    MG 2.5 04/29/2025    PHOS 4.4 04/29/2025    B12 415 07/28/2024       CT ABDOMEN+PELVIS(CPT=74176)  Result Date: 4/28/2025  CONCLUSION:   1. No hydronephrosis or urinary calculus. 2. Mild multifocal colonic bowel wall thickening with scattered areas of liquefied stool in the large intestine.  These findings may reflect mild colitis 3. Moderate distal colonic and rectal stool burden, which can be seen in the setting of constipation.  4. Distal esophageal wall thickening, which may be secondary to gastroesophageal reflux disease or esophagitis. 5. Postoperative changes from cholecystectomy with a well-positioned common bile duct stent. 6. A 1.0 cm left adrenal nodule, which is not significantly changed when compared to 07/28/2024 and is favored to reflect an adenoma. 7. Stable 4.8 cm ovoid soft tissue density mass adjacent to the right quique of diaphragm.  This mass is incompletely characterized and may reflect a peripheral nerve sheath tumor or other etiologies 8. Triple-vessel coronary artery calcifications. 9. Subtle ground-glass opacities in the left lower lobe with a 6 mm left lower lobe pulmonary nodule.  These findings are favored to be secondary to infectious/inflammatory etiologies.  Per Fleischner guidelines, an optional CT chest in 12 months can be obtained in a high risk patient to monitor for stability.  10. Diffusely demineralized osseous structures with associated multifocal lucent lesions, which likely reflects sequela of multiple myeloma. 11. Lesser incidental findings described  above.    Dictated by (CST): Javad Earl MD on 4/28/2025 at 2:01 PM     Finalized by (CST): Javad Earl MD on 4/28/2025 at 2:17 PM          CT FACIAL BONES (CPT=70486)  Result Date: 4/28/2025  CONCLUSION:  1. Interval extraction changes at tooth # 17 (left mandibular 3rd molar).  Small foci of subperiosteal gas and fluid surrounding both the lingual and buccal cortical surface of the left mandible, which likely relate to subperiosteal phlegmon.  There is also inflammatory stranding throughout the left  space with reactive myositis of the left platysma muscle.  No associated well-defined/drainable soft tissue collection to suggest mature abscess on this noncontrast exam. 2. History of multiple myeloma.  Innumerable small lytic foci throughout the imaged calvarium, skull base, maxillofacial bones, and upper cervical spine.  Findings are very similar in comparison to prior exam from July, 2024 and likely relate to sequelae  of myeloma. 3. Stable low-grade chronic left maxillary sinusitis. 4. Lesser incidental findings as above.   elm-remote  Dictated by (CST): Juwan Valdez MD on 4/28/2025 at 1:18 PM     Finalized by (CST): Juwan Valdez MD on 4/28/2025 at 1:25 PM                  ZACH GATES MD  4/29/2025

## 2025-04-29 NOTE — CONSULTS
[facility]    PATIENT'S NAME: REYNA NAYAK   ATTENDING PHYSICIAN: Duy Mera MD   CONSULTING PHYSICIAN: Marbin Salcedo MD   PATIENT ACCOUNT#:   [id number]    LOCATION:  [address] [place] [address]  MEDICAL RECORD #:   [id number]       YOB: 1950  ADMISSION DATE:       04/28/2025      CONSULT DATE:  04/28/2025    REPORT OF CONSULTATION      HISTORY OF PRESENT ILLNESS:  The patient is a 74-year-old male with a history of adult-onset diabetes mellitus, hypertension, chronic dementia, multiple myeloma, status post recent cholecystectomy who had some dental work done about 1 week or so ago.  The patient is a poor historian and the specific details are not totally clear.  It is not exactly certain what type of procedure he had done.  In any event, the patient continued to have significant pain in his left mandible.  As a result, he was sent to the ER for further workup and evaluation.  When the patient arrived, he was noted to have a BUN and creatinine of 86 and 4.34, respectively, with a CO2 of 12 and an anion gap of 17.  As a result, renal consultation has now been called.    Reviewing recent laboratory studies shows that in February 2025, he was admitted to an outside hospital for acute cholecystitis.  He underwent laparoscopic cholecystectomy during that hospitalization.  His creatinine at time of discharge on February 7, 2025, was 1.27 with an estimated GFR of 59 mL/minute.    The patient states that he has been taking ibuprofen.  Again, history is sketchy, and it is hard to know exactly how much he was taking, but presumably at least daily during the last week or so because of this left jaw pain.     MEDICATIONS:  His other medications currently include amlodipine, Cymbalta, Jardiance, insulin, levothyroxine, losartan 50 mg daily, and Protonix.      ALLERGIES:  There are no known allergies.    FAMILY HISTORY:  Not totally clear.    SOCIAL HISTORY:  Currently nonsmoker.    REVIEW OF SYSTEMS:  The  patient otherwise states he is doing well without any chest pain or shortness of breath.  No obvious fevers or chills.  He does feel as though he has been making less urine and does admit to decreased oral intake since he had his dental work performed.  A 10-point review of systems is otherwise unremarkable.      PHYSICAL EXAMINATION:    GENERAL:  The patient is awake and comfortable at rest.  He will communicate, but again history is not really reliable.  VITAL SIGNS:  Blood pressure 99/65 when the patient initially arrived.  He has been given some IV fluids and now his blood pressure is 117/82, with a pulse of 86, respirations were 17, temperature 98 degrees, and O2 saturation was 99% on room air.    HEENT:  Mucous membranes slightly dry.  Skin turgor was mildly diminished.  NECK:  Supple without JVD or lymphadenopathy.  LUNGS:  Clear to auscultation and percussion.  HEART:  Regular rate and rhythm with an S4, but no other gallops, murmurs, or rubs.  ABDOMEN:  Soft, flat, nontender without organomegaly, masses, or bruits.  EXTREMITIES:  No edema.    LABORATORY DATA:  As stated above.  White count was 2.7 with a platelet count of a 112, hemoglobin was 13.2.  Urine studies are all pending.      He did have a chest x-ray that was unremarkable.      CT scan of the abdomen revealed normal kidneys and bladder.  He also had a CT scan of the facial bones.  There was evidence for small lytic lesions consistent with multiple myeloma.  There is some evidence for a subperiosteal phlegmon, but no clear-cut abscess was identified.    IMPRESSION:  The patient is a 74-year-old male now with:    1.   Acute renal failure.  This is probably multifactorial in etiology in part secondary to decrease oral intake since his dental procedure was done.  The patient was borderline hypotensive when he has arrived.  The patient was  on losartan and also was taking Motrin prior to admission.  Also with a significant metabolic acidosis.  2.    Left mandibular pain, status post recent dental procedures.  Exact details not totally clear.  3.   History of multiple myeloma.  4.   Adult-onset diabetes mellitus.  5.   Hypertension.  6.   Chronic dementia.    PLAN:   Hold Losartan. Obtain urinalysis along with random urine sodium, creatinine, and urine for microalbumin.  Will follow with I's and O's, daily weights, and serial chemistries.  Start IV fluids with sodium bicarbonate to help to correct the metabolic acidosis.  ENT consultation has been ordered.    Dictated By Marbin Salcedo MD  d: 04/28/2025 19:05:39  t: 04/28/2025 19:12:16  Job [id number]  [facility]/

## 2025-04-30 ENCOUNTER — APPOINTMENT (OUTPATIENT)
Dept: PICC SERVICES | Facility: HOSPITAL | Age: 75
End: 2025-04-30
Attending: HOSPITALIST
Payer: MEDICARE

## 2025-04-30 LAB
ALBUMIN SERPL-MCNC: 3.2 G/DL (ref 3.2–4.8)
ANION GAP SERPL CALC-SCNC: 17 MMOL/L (ref 0–18)
BASOPHILS # BLD: 0.03 X10(3) UL (ref 0–0.2)
BASOPHILS NFR BLD: 1 %
BUN BLD-MCNC: 37 MG/DL (ref 9–23)
BUN/CREAT SERPL: 21.1 (ref 10–20)
CALCIUM BLD-MCNC: 7.1 MG/DL (ref 8.7–10.4)
CHLORIDE SERPL-SCNC: 109 MMOL/L (ref 98–112)
CO2 SERPL-SCNC: 23 MMOL/L (ref 21–32)
CREAT BLD-MCNC: 1.75 MG/DL (ref 0.7–1.3)
DEPRECATED RDW RBC AUTO: 46.8 FL (ref 35.1–46.3)
EGFRCR SERPLBLD CKD-EPI 2021: 40 ML/MIN/1.73M2 (ref 60–?)
EOSINOPHIL # BLD: 0.11 X10(3) UL (ref 0–0.7)
EOSINOPHIL NFR BLD: 4 %
ERYTHROCYTE [DISTWIDTH] IN BLOOD BY AUTOMATED COUNT: 15.3 % (ref 11–15)
GLUCOSE BLD-MCNC: 157 MG/DL (ref 70–99)
GLUCOSE BLDC GLUCOMTR-MCNC: 161 MG/DL (ref 70–99)
GLUCOSE BLDC GLUCOMTR-MCNC: 203 MG/DL (ref 70–99)
GLUCOSE BLDC GLUCOMTR-MCNC: 207 MG/DL (ref 70–99)
GLUCOSE BLDC GLUCOMTR-MCNC: 212 MG/DL (ref 70–99)
GLUCOSE BLDC GLUCOMTR-MCNC: 214 MG/DL (ref 70–99)
HCT VFR BLD AUTO: 33.9 % (ref 39–53)
HGB BLD-MCNC: 11.6 G/DL (ref 13–17.5)
LYMPHOCYTES NFR BLD: 1.06 X10(3) UL (ref 1–4)
LYMPHOCYTES NFR BLD: 36 %
MAGNESIUM SERPL-MCNC: 2.3 MG/DL (ref 1.6–2.6)
MCH RBC QN AUTO: 28.8 PG (ref 26–34)
MCHC RBC AUTO-ENTMCNC: 34.2 G/DL (ref 31–37)
MCV RBC AUTO: 84.1 FL (ref 80–100)
METAMYELOCYTES # BLD: 0.08 X10(3) UL (ref ?–0.01)
METAMYELOCYTES NFR BLD: 3 %
MONOCYTES # BLD: 0.42 X10(3) UL (ref 0.1–1)
MONOCYTES NFR BLD: 15 %
MORPHOLOGY: NORMAL
MYELOCYTES # BLD: 0.03 X10(3) UL (ref ?–0.01)
MYELOCYTES NFR BLD: 1 %
NEUTROPHILS # BLD AUTO: 1.11 X10 (3) UL (ref 1.5–7.7)
NEUTROPHILS NFR BLD: 24 %
NEUTS BAND NFR BLD: 14 %
NEUTS HYPERSEG # BLD: 1.06 X10(3) UL (ref 1.5–7.7)
OSMOLALITY SERPL CALC.SUM OF ELEC: 320 MOSM/KG (ref 275–295)
PHOSPHATE SERPL-MCNC: 4 MG/DL (ref 2.4–5.1)
PLATELET # BLD AUTO: 91 10(3)UL (ref 150–450)
PLATELET MORPHOLOGY: NORMAL
PLATELETS.RETICULATED NFR BLD AUTO: 4.3 % (ref 0–7)
POTASSIUM SERPL-SCNC: 3.4 MMOL/L (ref 3.5–5.1)
RBC # BLD AUTO: 4.03 X10(6)UL (ref 3.8–5.8)
SODIUM SERPL-SCNC: 149 MMOL/L (ref 136–145)
TOTAL CELLS COUNTED BLD: 100
VARIANT LYMPHS NFR BLD MANUAL: 2 % (ref ?–4)
WBC # BLD AUTO: 2.8 X10(3) UL (ref 4–11)

## 2025-04-30 PROCEDURE — 99233 SBSQ HOSP IP/OBS HIGH 50: CPT | Performed by: HOSPITALIST

## 2025-04-30 PROCEDURE — 99232 SBSQ HOSP IP/OBS MODERATE 35: CPT | Performed by: INTERNAL MEDICINE

## 2025-04-30 RX ORDER — SODIUM CHLORIDE 450 MG/100ML
INJECTION, SOLUTION INTRAVENOUS CONTINUOUS
Status: DISCONTINUED | OUTPATIENT
Start: 2025-04-30 | End: 2025-05-01

## 2025-04-30 NOTE — CONSULTS
.[facility], [facility]  TEL: [phone]  FAX: [phone]    Joselin Graham Patient Status:  Inpatient    5/15/1950 MRN [id number]   Location [facility] 5SW/SE Attending Zenia Martel MD   Hosp Day # 1 PCP No primary care provider on file.     Reason for Consultation:  Left facial swelling.    History of Present Illness:  Joselin Graham is a a(n) 74 year old male.  Who had a dental extraction 3 days prior.  Start developing some swelling.  Poor p.o. intake.  Came in through the ED.  With low platelet and white count.  White count 2.7.  Concern for left facial swelling.  With PARVEEN.  CT of the face with small focus of superoposterior gas and fluid surrounding lingual and buccal cortical surfaces.  On my exam, patient has unilateral coating of the tongue as well as some ulcers on the hard palate on the left side.  Also possible crusted ulcerations on the lip.  Nothing around the eye.  History provided by caregiver    History:  Past Medical History[1]  Past Surgical History[2]  Family History[3]   reports that he has never smoked. He has never used smokeless tobacco. He reports that he does not drink alcohol and does not use drugs.    Allergies:  Allergies[4]    Medications:  Current Hospital Medications[5]    Review of Systems:    A comprehensive 10 point review of systems was completed.  Pertinent positives and negatives noted in the the HPI.    Physical Exam:    General: No acute distress. Alert and oriented x 3.  Able to follow commands.  Vital signs: Blood pressure 156/58, pulse 86, temperature 98.7 °F (37.1 °C), temperature source Oral, resp. rate 20, height 5' 5\" (1.651 m), weight 119 lb 12.8 oz (54.3 kg), SpO2 97%.  HEENT: Dry mucous Amfexa.  Left tongue coated.  Ulcerations on the hard palate left side.  Also some crusted ulcerations on the lip left side.  No eye involvement.  Extraocular muscles are intact.  Neck: No lymphadenopathy.  No JVD. No carotid  bruits.  Respiratory: Clear to auscultation bilaterally.  No wheezes. No rhonchi.  Cardiovascular: S1, S2.  Regular rate and rhythm.  No murmurs.  Abdomen: Soft, nontender, nondistended.  Positive bowel sounds.  Musculoskeletal: Full range of motion of all extremities.  No swelling noted.  Integument: No lesions. No erythema.  Psychiatric: Appropriate mood and affect.  Neurologic: No focal neurological deficits.    Laboratory Data:  Lab Results   Component Value Date    WBC 1.4 04/29/2025    HGB 11.6 04/29/2025    HCT 34.3 04/29/2025    PLT 85.0 04/29/2025    CREATSERUM 2.60 04/29/2025    BUN 63 04/29/2025     04/29/2025    K 3.6 04/29/2025     04/29/2025    CO2 16.0 04/29/2025    GLU 92 04/29/2025    CA 7.3 04/29/2025    ALB 3.4 04/29/2025    ALKPHO 92 04/29/2025    BILT 0.9 04/29/2025    TP 6.1 04/29/2025    AST 23 04/29/2025    ALT 79 04/29/2025    TSH 0.847 04/29/2025    MG 2.5 04/29/2025    PHOS 4.4 04/29/2025    PGLU 163 04/29/2025       Microbiologic Data:     Reviewed in EMR    Imaging:  CT Scan    Imaging results reviewed     Impression:  Problem List[6]      ASSESSMENT:    #Facial swelling.  Following tooth extraction.  Initial concern for cellulitis and abscess.  No abscess on CT.  With concern for zoster with tongue and palate involvement as well as lip.  With secondary cellulitis.  Following dental extraction.  Could have concomitant bacterial process.  Polymicrobial.  With leukopenia on Unasyn.    #Acute kidney injury.      PLAN:  Switch Unasyn to ertapenem to monitor white count.  At valacyclovir for possible zoster.  Follow facial swelling.  May be able to transition to oral antibiotics but will follow progress.  Follow creatinine.      Thank you for allowing me to participate in the care of your patient.    Doroteo Major MD  4/29/2025  7:20 PM        [1]   Past Medical History:   Abnormal gait    Benign essential hypertension    Cancer (HCC)    Chest pain    Formatting of this note  might be different from the original.   Normal stress test 11/2021      Last Assessment & Plan:    Formatting of this note might be different from the original.   Symptoms are atypical for ischemia   Chest pain is worse with change in position      Diabetes (HCC)    Diabetes mellitus (HCC)    Diabetes mellitus (HCC)    High blood pressure    Hyperglycemia    Hypertension associated with diabetes (HCC)    Last Assessment & Plan:    Formatting of this note might be different from the original.   Blood pressure mildly elevated   Continue same medications for now   Continue to monitor readings      Multiple myeloma (HCC)    Multiple premature ventricular complexes    Formatting of this note might be different from the original.   Normal stress test 11/2021      Echo 8/2021:    1. Left ventricular ejection fraction, by visual estimation, is 60 to 65%.    2. Mild concentric left ventricular hypertrophy.    3. Normal pattern of LV diastolic filling.    4. Mild aortic valve stenosis.         Last Assessment & Plan:    Formatting of this note might be different fro    Neuropathy    Nonrheumatic aortic valve stenosis    Formatting of this note might be different from the original.   Echo 8/2021:    1. Left ventricular ejection fraction, by visual estimation, is 60 to 65%.    2. Mild concentric left ventricular hypertrophy.    3. Normal pattern of LV diastolic filling.    4. Mild aortic valve stenosis.         Last Assessment & Plan:    Formatting of this note might be different from the original.   Mild aortic mu    Rash    Type 2 diabetes mellitus without complication, without long-term current use of insulin (HCC)    Vomiting   [2] History reviewed. No pertinent surgical history.  [3] No family history on file.  [4] No Known Allergies  [5]   Current Facility-Administered Medications:     melatonin tab 6 mg, 6 mg, Oral, Nightly PRN    acetaminophen (Ofirmev) 10 mg/mL infusion premix 1,000 mg, 1,000 mg, Intravenous, Q6H PRN     [Held by provider] valACYclovir (Valtrex) tab 1,000 mg, 1,000 mg, Oral, Daily    ertapenem (Invanz) 500 mg in sodium chloride 0.9% 100 mL IVPB, 500 mg, Intravenous, Daily    meropenem (Merrem) 500 mg in sodium chloride 0.9% 100mL IVPB-TABBY, 500 mg, Intravenous, Q12H    chlorhexidine gluconate (Peridex) 0.12 % oral solution 15 mL, 15 mL, Mouth/Throat, QID    acyclovir (Zovirax) 550 mg in sodium chloride 0.9% 100 mL IVPB, 10 mg/kg, Intravenous, Q24H    [Held by provider] heparin (Porcine) 5000 UNIT/ML injection 5,000 Units, 5,000 Units, Subcutaneous, 2 times per day    ondansetron (Zofran) 4 MG/2ML injection 4 mg, 4 mg, Intravenous, Q6H PRN    metoclopramide (Reglan) 5 mg/mL injection 5 mg, 5 mg, Intravenous, Q8H PRN    HYDROmorphone (Dilaudid) 1 MG/ML injection 0.2 mg, 0.2 mg, Intravenous, Q2H PRN **OR** HYDROmorphone (Dilaudid) 1 MG/ML injection 0.4 mg, 0.4 mg, Intravenous, Q2H PRN **OR** HYDROmorphone (Dilaudid) 1 MG/ML injection 0.8 mg, 0.8 mg, Intravenous, Q2H PRN    glucose (Dex4) 15 GM/59ML oral liquid 15 g, 15 g, Oral, Q15 Min PRN **OR** glucose (Glutose) 40% oral gel 15 g, 15 g, Oral, Q15 Min PRN **OR** glucose-vitamin C (Dex-4) chewable tab 4 tablet, 4 tablet, Oral, Q15 Min PRN **OR** dextrose 50% injection 50 mL, 50 mL, Intravenous, Q15 Min PRN **OR** glucose (Dex4) 15 GM/59ML oral liquid 30 g, 30 g, Oral, Q15 Min PRN **OR** glucose (Glutose) 40% oral gel 30 g, 30 g, Oral, Q15 Min PRN **OR** glucose-vitamin C (Dex-4) chewable tab 8 tablet, 8 tablet, Oral, Q15 Min PRN    insulin regular human (Novolin R, Humulin R) 100 UNIT/ML injection 1-7 Units, 1-7 Units, Subcutaneous, 4 times per day    sodium bicarbonate 150 mEq in dextrose 5% 1,000 mL infusion, 150 mEq, Intravenous, Continuous    metoprolol (Lopressor) 5 mg/5mL injection 5 mg, 5 mg, Intravenous, Q6H PRN  [6]   Patient Active Problem List  Diagnosis    Weakness generalized    Recurrent falls    Uncontrolled type 2 diabetes mellitus with hyperglycemia  (HCC)    Acquired hypothyroidism    Thrombocytopenia (HCC)    Acute renal failure (ARF) (HCC)    Acute kidney injury    Metabolic acidosis    Dental infection    Hypoglycemia    Acute kidney failure    Odontogenic infection of jaw    Brown hairy tongue

## 2025-04-30 NOTE — PROGRESS NOTES
[facility]  part of [facility]  Hospitalist Progress Note     Joselin Graham Patient Status:  Inpatient    5/15/1950  74 year old CSN [id number]   Location 544/544-A Attending Catracho Tapia MD   Hosp Day # 2 PCP No primary care provider on file.     Assessment & Plan:   ----------------------------------  Left face cellulitis.  With phlegmon but no drainable collection.  Significant improvement in the last 24 hours.  - Continue IV meropenem, acyclovir  - ID following  - ENT evaluation appreciated, no surgical intervention recommended  - Advance diet  - Consider repeat imaging    PARVEEN.  Almost back to baseline.  - Repeat MP in the morning  - Encourage p.o. intake, IV fluids    Other problems  History of multiple myeloma  Hypertension  Hypothyroidism  Dementia    Supplementary Documentation:   DVT Mechanical Prophylaxis:        DVT Pharmacologic Prophylaxis   Medication    [Held by provider] heparin (Porcine) 5000 UNIT/ML injection 5,000 Units                Code Status: Full Code  Jean: External urinary catheter in place  Jean Duration (in days):   Central line:    KUSH: 2025      I personally reviewed the available laboratories, imaging including. I discussed/will discuss the case with consultants. I ordered laboratories and/or radiographic studies. I adjusted medications as detailed above.  Medical decision making high, risk is high.    Subjective:   ----------------------------------  Feeling much better today.  Able to open mouth more.  No problem swallowing secretions.  No ear pain or hearing loss.      Objective:   Chief Complaint:   Chief Complaint   Patient presents with    Dental Problem     ----------------------------------  Temp:  [97.6 °F (36.4 °C)-98.3 °F (36.8 °C)] 98.3 °F (36.8 °C)  Pulse:  [74-93] 93  Resp:  [18-20] 20  BP: (135-151)/(56-82) 146/82  SpO2:  [95 %-98 %] 98 %  Gen: A+Ox3.  No distress.   HEENT: Some crusted lesions on the left bottom lip.  Some erythema, no  white patches in the left buccal membrane.  Able to open jaw significantly better than previously.  No significant lymphadenopathy.  CV: RRR, S1S2, and intact distal pulses. No gallop, rub, murmur.  Pulm: Effort and breath sounds normal. No distress, wheezes, rales, rhonchi.  Abd: Soft, NTND, BS normal, no mass, no HSM, no rebound/guarding.   Neuro: Normal reflexes, CN. Sensory/motor exams grossly normal deficit.   MS: No joint effusions.  No peripheral edema.  Skin: Skin is warm and dry. No rashes, erythema, diaphoresis.   Psych: Normal mood and affect. Calm, cooperative    Labs:  Lab Results   Component Value Date    HGB 11.6 (L) 04/30/2025    WBC 2.8 (L) 04/30/2025    PLT 91.0 (L) 04/30/2025     (H) 04/30/2025    K 3.4 (L) 04/30/2025    CREATSERUM 1.75 (H) 04/30/2025    AST 23 04/29/2025    ALT 79 (H) 04/29/2025           Scheduled Medications[1]  PRN Medications[2]             [1]    acyclovir  10 mg/kg Intravenous Q12H    [Held by provider] valACYclovir  1,000 mg Oral Daily    ertapenem  500 mg Intravenous Daily    meropenem  500 mg Intravenous Q12H    chlorhexidine gluconate  15 mL Mouth/Throat QID    [Held by provider] heparin  5,000 Units Subcutaneous 2 times per day    insulin regular human  1-7 Units Subcutaneous 4 times per day   [2]   melatonin    acetaminophen    ondansetron    metoclopramide    HYDROmorphone **OR** HYDROmorphone **OR** HYDROmorphone    glucose **OR** glucose **OR** glucose-vitamin C **OR** dextrose **OR** glucose **OR** glucose **OR** glucose-vitamin C    metoprolol

## 2025-04-30 NOTE — DISCHARGE INSTRUCTIONS
Sometimes managing your health at home requires assistance.  The [facility] team has recognized your preference to use [facility].  They can be reached by phone at [phone].  The fax number for your reference is [phone].  A representative from the home health agency will contact you or your family to schedule your first visit.

## 2025-04-30 NOTE — CM/SW NOTE
04/30/25 1200   CM/SW Referral Data   Referral Source Social Work (self-referral)   Reason for Referral Discharge planning   Informant Patient   Medical Hx   Does patient have an established PCP? Yes  (Federico Sykes MD)   Significant Past Medical/Mental Health Hx DMll; HTN; dementia   Patient Info   Advanced directives? No   Patient's Current Mental Status at Time of Assessment Memory Impairments   Patient's Home Environment House   Number of Levels in Home 2  (Able to live on main floor)   Number of Stair in Home 2 SARIKA   Patient lives with Alone  (caregiver)   Patient Status Prior to Admission   Independent with ADLs and Mobility No   Pt. requires assistance with Housework;Driving;Meals;Bathing;Ambulating;Dressing;Medications;Feeding;Toileting   Services in place prior to admission DME/Supplies at home;[facility]   Type of DME/Supplies Wheeled Walker   [facility] Provider Private Duty   half-way Care hours per day 24   half-way Care days per week 7   Discharge Needs   Anticipated D/C needs Home health care   Choice of Post-Acute Provider   Informed patient of right to choose their preferred provider Yes   List of appropriate post-acute services provided to patient/family with quality data No - Declined list   Patient/family choice [facility]     SW self-referred to this case for discharge planning.    Pt admitted for facial swelling post tooth extraction.  Pt developed PARVEEN.  ENT/ID following.    Pt being followed by SLP- cleared for pureed diet.    Anticipated therapy need: Home with Home Healthcare.    AKILA sent referrals in Aidin for RN/PT/SLP.  Face to face entered/uploaded.    AKILA met with pt and caregiver Marquez bedside.    AKILA confirmed address on file.  PCP is Federico Sykes MD.    Pt is from home with Marquez who is his 24-7 CG.      Pt is ambulatory with a RW at baseline.  Pt requires assistance with all ADLs (dementia).    Pt has no hx with HHC or rehab.    AKILA  explained [facility] service to pt.  All questions answered.    Pt agreeable to [facility].  Liaison Ebonie aware of choice and discharge.    PLAN: DC home w/ 24/7 caregiver and [facility]    / to remain available for support and/or discharge planning.     Quin KEMP, LSW  Discharge Planner

## 2025-04-30 NOTE — PROGRESS NOTES
.[facility], [facility]  TEL: [phone]  FAX: [phone]    Joselin Graham Patient Status:  Inpatient    5/15/1950 MRN [id number]   Location [facility] 5SW/SE Attending Zenia Martel MD   Hosp Day # 2 PCP No primary care provider on file.     Reason for Consultation:  Left facial swelling.      Interval history:  Patient states swelling is a bit better.  He certainly looks more alert.  Still difficult to swallow.  Acyclovir was changed to IV.      History of Present Illness:  Joselin Graham is a a(n) 74 year old male.  Who had a dental extraction 3 days prior.  Start developing some swelling.  Poor p.o. intake.  Came in through the ED.  With low platelet and white count.  White count 2.7.  Concern for left facial swelling.  With PARVEEN.  CT of the face with small focus of superoposterior gas and fluid surrounding lingual and buccal cortical surfaces.  On my exam, patient has unilateral coating of the tongue as well as some ulcers on the hard palate on the left side.  Also possible crusted ulcerations on the lip.  Nothing around the eye.  History provided by caregiver    History:  Past Medical History[1]  Past Surgical History[2]  Family History[3]   reports that he has never smoked. He has never used smokeless tobacco. He reports that he does not drink alcohol and does not use drugs.    Allergies:  Allergies[4]    Medications:  Current Hospital Medications[5]    Review of Systems:    A comprehensive 10 point review of systems was completed.  Pertinent positives and negatives noted in the the HPI.    Physical Exam:    General: No acute distress. Alert and oriented x 3.  Able to follow commands.  Vital signs: Blood pressure 146/82, pulse 93, temperature 98.3 °F (36.8 °C), temperature source Oral, resp. rate 20, height 5' 5\" (1.651 m), weight 119 lb 4.8 oz (54.1 kg), SpO2 98%.  HEENT: Dry mucous Amfexa.  Left tongue coated.  Ulcerations on the hard palate left side.  Also some  crusted ulcerations on the lip left side.  No eye involvement.  Extraocular muscles are intact.  Neck: No lymphadenopathy.  No JVD. No carotid bruits.  Respiratory: Clear to auscultation bilaterally.  No wheezes. No rhonchi.  Cardiovascular: S1, S2.  Regular rate and rhythm.  No murmurs.  Abdomen: Soft, nontender, nondistended.  Positive bowel sounds.  Musculoskeletal: Full range of motion of all extremities.  No swelling noted.  Integument: No lesions. No erythema.  Psychiatric: Appropriate mood and affect.  Neurologic: No focal neurological deficits.    Laboratory Data:  Lab Results   Component Value Date    WBC 2.8 04/30/2025    HGB 11.6 04/30/2025    HCT 33.9 04/30/2025    PLT 91.0 04/30/2025    CREATSERUM 1.75 04/30/2025    BUN 37 04/30/2025     04/30/2025    K 3.4 04/30/2025     04/30/2025    CO2 23.0 04/30/2025     04/30/2025    CA 7.1 04/30/2025    ALB 3.2 04/30/2025    MG 2.3 04/30/2025    PHOS 4.0 04/30/2025    PGLU 203 04/30/2025       Microbiologic Data:     Reviewed in EMR    Imaging:  CT Scan    Imaging results reviewed     Impression:  Problem List[6]      ASSESSMENT:    #Facial swelling.  Following tooth extraction.  Initial concern for cellulitis and abscess.  No abscess on CT.  With concern for zoster with tongue and palate involvement as well as lip.  With secondary cellulitis.  Following dental extraction.  Could have concomitant bacterial process.  Polymicrobial.  With leukopenia on Unasyn.    #Acute kidney injury.      PLAN:  Maintain ertapenem.  Continue IV acyclovir but follow creatinine closely.  When able, transition to oral valacyclovir.  May be able to transition ertapenem to oral antibiotic stepdown therapy if continues to improve dramatically.  Discussed with caregiver at bedside.  Discussed with nursing    Thank you for allowing me to participate in the care of your patient.    Doroteo Major MD  4/29/2025  7:20 PM        [1]   Past Medical History:   Abnormal gait     Benign essential hypertension    Cancer (HCC)    Chest pain    Formatting of this note might be different from the original.   Normal stress test 11/2021      Last Assessment & Plan:    Formatting of this note might be different from the original.   Symptoms are atypical for ischemia   Chest pain is worse with change in position      Diabetes (HCC)    Diabetes mellitus (HCC)    Diabetes mellitus (HCC)    High blood pressure    Hyperglycemia    Hypertension associated with diabetes (HCC)    Last Assessment & Plan:    Formatting of this note might be different from the original.   Blood pressure mildly elevated   Continue same medications for now   Continue to monitor readings      Multiple myeloma (HCC)    Multiple premature ventricular complexes    Formatting of this note might be different from the original.   Normal stress test 11/2021      Echo 8/2021:    1. Left ventricular ejection fraction, by visual estimation, is 60 to 65%.    2. Mild concentric left ventricular hypertrophy.    3. Normal pattern of LV diastolic filling.    4. Mild aortic valve stenosis.         Last Assessment & Plan:    Formatting of this note might be different fro    Neuropathy    Nonrheumatic aortic valve stenosis    Formatting of this note might be different from the original.   Echo 8/2021:    1. Left ventricular ejection fraction, by visual estimation, is 60 to 65%.    2. Mild concentric left ventricular hypertrophy.    3. Normal pattern of LV diastolic filling.    4. Mild aortic valve stenosis.         Last Assessment & Plan:    Formatting of this note might be different from the original.   Mild aortic mu    Rash    Type 2 diabetes mellitus without complication, without long-term current use of insulin (HCC)    Vomiting   [2] History reviewed. No pertinent surgical history.  [3] No family history on file.  [4] No Known Allergies  [5]   Current Facility-Administered Medications:     acyclovir (Zovirax) 550 mg in sodium chloride  0.9% 100 mL IVPB, 10 mg/kg, Intravenous, Q12H    potassium chloride 40 mEq in 250mL sodium chloride 0.9% IVPB premix, 40 mEq, Intravenous, Once    sodium bicarbonate 150 mEq in dextrose 5% 1,000 mL infusion, 150 mEq, Intravenous, Continuous    melatonin tab 6 mg, 6 mg, Oral, Nightly PRN    acetaminophen (Ofirmev) 10 mg/mL infusion premix 1,000 mg, 1,000 mg, Intravenous, Q6H PRN    [Held by provider] valACYclovir (Valtrex) tab 1,000 mg, 1,000 mg, Oral, Daily    ertapenem (Invanz) 500 mg in sodium chloride 0.9% 100 mL IVPB, 500 mg, Intravenous, Daily    meropenem (Merrem) 500 mg in sodium chloride 0.9% 100mL IVPB-TABBY, 500 mg, Intravenous, Q12H    chlorhexidine gluconate (Peridex) 0.12 % oral solution 15 mL, 15 mL, Mouth/Throat, QID    [Held by provider] heparin (Porcine) 5000 UNIT/ML injection 5,000 Units, 5,000 Units, Subcutaneous, 2 times per day    ondansetron (Zofran) 4 MG/2ML injection 4 mg, 4 mg, Intravenous, Q6H PRN    metoclopramide (Reglan) 5 mg/mL injection 5 mg, 5 mg, Intravenous, Q8H PRN    HYDROmorphone (Dilaudid) 1 MG/ML injection 0.2 mg, 0.2 mg, Intravenous, Q2H PRN **OR** HYDROmorphone (Dilaudid) 1 MG/ML injection 0.4 mg, 0.4 mg, Intravenous, Q2H PRN **OR** HYDROmorphone (Dilaudid) 1 MG/ML injection 0.8 mg, 0.8 mg, Intravenous, Q2H PRN    glucose (Dex4) 15 GM/59ML oral liquid 15 g, 15 g, Oral, Q15 Min PRN **OR** glucose (Glutose) 40% oral gel 15 g, 15 g, Oral, Q15 Min PRN **OR** glucose-vitamin C (Dex-4) chewable tab 4 tablet, 4 tablet, Oral, Q15 Min PRN **OR** dextrose 50% injection 50 mL, 50 mL, Intravenous, Q15 Min PRN **OR** glucose (Dex4) 15 GM/59ML oral liquid 30 g, 30 g, Oral, Q15 Min PRN **OR** glucose (Glutose) 40% oral gel 30 g, 30 g, Oral, Q15 Min PRN **OR** glucose-vitamin C (Dex-4) chewable tab 8 tablet, 8 tablet, Oral, Q15 Min PRN    insulin regular human (Novolin R, Humulin R) 100 UNIT/ML injection 1-7 Units, 1-7 Units, Subcutaneous, 4 times per day    metoprolol (Lopressor) 5 mg/5mL  injection 5 mg, 5 mg, Intravenous, Q6H PRN  [6]   Patient Active Problem List  Diagnosis    Weakness generalized    Recurrent falls    Uncontrolled type 2 diabetes mellitus with hyperglycemia (HCC)    Acquired hypothyroidism    Thrombocytopenia (HCC)    Acute renal failure (ARF) (HCC)    Acute kidney injury    Metabolic acidosis    Dental infection    Hypoglycemia    Acute kidney failure    Odontogenic infection of jaw    Brown hairy tongue

## 2025-04-30 NOTE — PROGRESS NOTES
[facility]  Nephrology Daily Progress Note    Joselin Graham  [id number]  74 year old      HPI:   Joselin Graham is a 74 year old male.  Overall is feeling better.  Jaw pain improved.  On thickened liquids and drinking them pretty well.  Seems more awake and alert as well.      ROS:     Constitutional:  Negative for decreased activity, fever, irritability and lethargy  Endocrine:  Negative for abnormal sleep patterns, increased activity, polydipsia and polyphagia  Cardiovascular:  Negative for cool extremity and irregular heartbeat/palpitations  Gastrointestinal:  Negative for abdominal pain, constipation, decreased appetite, diarrhea and vomiting  Genitourinary:  Negative for dysuria and hematuria  Hema/Lymph:  Negative for easy bleeding and easy bruising  Integumentary:  Negative for pruritus and rash  Musculoskeletal:  Negative for bone/joint symptoms  Neurological:  Negative for gait disturbance  Psychiatric:  Negative for inappropriate interaction and psychiatric symptoms  Respiratory:  Negative for cough, dyspnea and wheezing      PHYSICAL EXAM:   Temp:  [97.6 °F (36.4 °C)-98.3 °F (36.8 °C)] 98.3 °F (36.8 °C)  Pulse:  [74-93] 93  Resp:  [18-20] 20  BP: (135-151)/(56-82) 146/82  SpO2:  [95 %-98 %] 98 %  Patient Weight for the past 72 hrs:   Weight   04/28/25 1202 133 lb (60.3 kg)   04/28/25 2300 119 lb 12.8 oz (54.3 kg)   04/30/25 0640 119 lb 4.8 oz (54.1 kg)       Constitutional: appears well hydrated alert and responsive no acute distress noted  Neck/Thyroid: neck is supple without adenopathy  Lymphatic: no abnormal cervical, supraclavicular or axillary adenopathy is noted  Respiratory: normal to inspection lungs are clear to auscultation bilaterally normal respiratory effort  Cardiovascular: regular rate and rhythm no murmurs, gallups, or rubs  Abdomen: soft non-tender non-distended no organomegaly noted no masses  Musculoskeletal: full ROM all extremities good strength  no  deformities  Extremities: no edema, cyanosis, or clubbing  Neurological: exam appropriate for age reflexes and motor skills appropriate for age    Labs:  Lab Results   Component Value Date    WBC 2.8 04/30/2025    HGB 11.6 04/30/2025    HCT 33.9 04/30/2025    PLT 91.0 04/30/2025    CREATSERUM 1.75 04/30/2025    BUN 37 04/30/2025     04/30/2025    K 3.4 04/30/2025     04/30/2025    CO2 23.0 04/30/2025     04/30/2025    CA 7.1 04/30/2025    ALB 3.2 04/30/2025    MG 2.3 04/30/2025    PHOS 4.0 04/30/2025     Recent Labs   Lab 04/28/25  1212 04/29/25  0537 04/30/25  0622   WBC 2.7* 1.4* 2.8*   HGB 13.2 11.6* 11.6*   HCT 40.1 34.3* 33.9*   .0* 85.0* 91.0*     Recent Labs   Lab 04/28/25  1212 04/29/25  0537 04/30/25  0622   GLU 90 92 157*   BUN 86* 63* 37*   CREATSERUM 4.34* 2.60* 1.75*   CA 8.2* 7.3* 7.1*   ALB 4.1 3.4 3.2    145 149*   K 4.9 3.6 3.4*    114* 109   CO2 12.0* 16.0* 23.0   ALKPHO 109 92  --    AST 20 23  --    * 79*  --    BILT 0.5 0.9  --    TP 7.1 6.1  --    PHOS  --  4.4 4.0       Imaging  CT ABDOMEN+PELVIS(CPT=74176)  Result Date: 4/28/2025  CONCLUSION:   1. No hydronephrosis or urinary calculus. 2. Mild multifocal colonic bowel wall thickening with scattered areas of liquefied stool in the large intestine.  These findings may reflect mild colitis 3. Moderate distal colonic and rectal stool burden, which can be seen in the setting of constipation.  4. Distal esophageal wall thickening, which may be secondary to gastroesophageal reflux disease or esophagitis. 5. Postoperative changes from cholecystectomy with a well-positioned common bile duct stent. 6. A 1.0 cm left adrenal nodule, which is not significantly changed when compared to 07/28/2024 and is favored to reflect an adenoma. 7. Stable 4.8 cm ovoid soft tissue density mass adjacent to the right quique of diaphragm.  This mass is incompletely characterized and may reflect a peripheral nerve sheath tumor or  other etiologies 8. Triple-vessel coronary artery calcifications. 9. Subtle ground-glass opacities in the left lower lobe with a 6 mm left lower lobe pulmonary nodule.  These findings are favored to be secondary to infectious/inflammatory etiologies.  Per Fleischner guidelines, an optional CT chest in 12 months can be obtained in a high risk patient to monitor for stability.  10. Diffusely demineralized osseous structures with associated multifocal lucent lesions, which likely reflects sequela of multiple myeloma. 11. Lesser incidental findings described above.    Dictated by (CST): Javad Earl MD on 4/28/2025 at 2:01 PM     Finalized by (CST): Javad Earl MD on 4/28/2025 at 2:17 PM          CT FACIAL BONES (CPT=70486)  Result Date: 4/28/2025  CONCLUSION:  1. Interval extraction changes at tooth # 17 (left mandibular 3rd molar).  Small foci of subperiosteal gas and fluid surrounding both the lingual and buccal cortical surface of the left mandible, which likely relate to subperiosteal phlegmon.  There is also inflammatory stranding throughout the left  space with reactive myositis of the left platysma muscle.  No associated well-defined/drainable soft tissue collection to suggest mature abscess on this noncontrast exam. 2. History of multiple myeloma.  Innumerable small lytic foci throughout the imaged calvarium, skull base, maxillofacial bones, and upper cervical spine.  Findings are very similar in comparison to prior exam from July, 2024 and likely relate to sequelae  of myeloma. 3. Stable low-grade chronic left maxillary sinusitis. 4. Lesser incidental findings as above.   [email or web address]  Dictated by (CST): Juwan Valdez MD on 4/28/2025 at 1:18 PM     Finalized by (CST): Juwan Valdez MD on 4/28/2025 at 1:25 PM              Medications:  Current Hospital Medications[1]    Allergies:  Allergies[2]    Input/Output:    Intake/Output Summary (Last 24 hours) at 4/30/2025 0800  Last data filed at  4/30/2025 1318  Gross per 24 hour   Intake 460 ml   Output 1400 ml   Net -940 ml          ASSESSMENT/PLAN:   Assessment   Problem List[3]    Urine output good at 1600 mL.  Creatinine better down to 1.75.  Potassium 3.4 and will replace.  CO2 now normal at 23.  Sodium up to 149.  Will switch IV fluids to half-normal saline and start to wean off.  Discussed with caretaker at the bedside.             4/30/2025  Marbin Salcedo MD               [1]   Current Facility-Administered Medications:     acyclovir (Zovirax) 550 mg in sodium chloride 0.9% 100 mL IVPB, 10 mg/kg, Intravenous, Q12H    sodium bicarbonate 150 mEq in dextrose 5% 1,000 mL infusion, 150 mEq, Intravenous, Continuous    melatonin tab 6 mg, 6 mg, Oral, Nightly PRN    acetaminophen (Ofirmev) 10 mg/mL infusion premix 1,000 mg, 1,000 mg, Intravenous, Q6H PRN    [Held by provider] valACYclovir (Valtrex) tab 1,000 mg, 1,000 mg, Oral, Daily    ertapenem (Invanz) 500 mg in sodium chloride 0.9% 100 mL IVPB, 500 mg, Intravenous, Daily    meropenem (Merrem) 500 mg in sodium chloride 0.9% 100mL IVPB-TABBY, 500 mg, Intravenous, Q12H    chlorhexidine gluconate (Peridex) 0.12 % oral solution 15 mL, 15 mL, Mouth/Throat, QID    [Held by provider] heparin (Porcine) 5000 UNIT/ML injection 5,000 Units, 5,000 Units, Subcutaneous, 2 times per day    ondansetron (Zofran) 4 MG/2ML injection 4 mg, 4 mg, Intravenous, Q6H PRN    metoclopramide (Reglan) 5 mg/mL injection 5 mg, 5 mg, Intravenous, Q8H PRN    HYDROmorphone (Dilaudid) 1 MG/ML injection 0.2 mg, 0.2 mg, Intravenous, Q2H PRN **OR** HYDROmorphone (Dilaudid) 1 MG/ML injection 0.4 mg, 0.4 mg, Intravenous, Q2H PRN **OR** HYDROmorphone (Dilaudid) 1 MG/ML injection 0.8 mg, 0.8 mg, Intravenous, Q2H PRN    glucose (Dex4) 15 GM/59ML oral liquid 15 g, 15 g, Oral, Q15 Min PRN **OR** glucose (Glutose) 40% oral gel 15 g, 15 g, Oral, Q15 Min PRN **OR** glucose-vitamin C (Dex-4) chewable tab 4 tablet, 4 tablet, Oral, Q15 Min PRN **OR**  dextrose 50% injection 50 mL, 50 mL, Intravenous, Q15 Min PRN **OR** glucose (Dex4) 15 GM/59ML oral liquid 30 g, 30 g, Oral, Q15 Min PRN **OR** glucose (Glutose) 40% oral gel 30 g, 30 g, Oral, Q15 Min PRN **OR** glucose-vitamin C (Dex-4) chewable tab 8 tablet, 8 tablet, Oral, Q15 Min PRN    insulin regular human (Novolin R, Humulin R) 100 UNIT/ML injection 1-7 Units, 1-7 Units, Subcutaneous, 4 times per day    metoprolol (Lopressor) 5 mg/5mL injection 5 mg, 5 mg, Intravenous, Q6H PRN  [2] No Known Allergies  [3]   Patient Active Problem List  Diagnosis    Weakness generalized    Recurrent falls    Uncontrolled type 2 diabetes mellitus with hyperglycemia (HCC)    Acquired hypothyroidism    Thrombocytopenia (HCC)    Acute renal failure (ARF) (HCC)    Acute kidney injury    Metabolic acidosis    Dental infection    Hypoglycemia    Acute kidney failure    Odontogenic infection of jaw    Brown hairy tongue

## 2025-04-30 NOTE — PHYSICAL THERAPY NOTE
PHYSICAL THERAPY EVALUATION - INPATIENT     Room Number: 544/544-A  Evaluation Date: 4/30/2025  Type of Evaluation: Initial   Physician Order: PT Eval and Treat    Presenting Problem: PARVEEN (jaw infection)  Co-Morbidities : Dental infection, falls, DM, PARVEEN  Reason for Therapy: Mobility Dysfunction and Discharge Planning    PHYSICAL THERAPY ASSESSMENT   Patient is a 74 year old male admitted 4/28/2025 for Acute kidney injury, jaw infection.  Prior to admission, patient's baseline is lives at home with 24 hr CG assist for all mobility and ADL's with a RW.  Patient is currently functioning below baseline with bed mobility, transfers, gait, maintaining seated position, standing prolonged periods, and performing household tasks.  Patient is requiring moderate assist as a result of the following impairments: decreased functional strength, decreased endurance/aerobic capacity, impaired standing balance, impaired coordination, impaired motor planning, decreased muscular endurance, and medical status.  Physical Therapy will continue to follow for duration of hospitalization.    Patient will benefit from continued skilled PT Services at discharge to promote prior level of function.  Anticipate patient will return home with home health PT.    PLAN DURING HOSPITALIZATION  Nursing Mobility Recommendation : 1 Assist  PT Device Recommendation: Rolling walker  PT Treatment Plan: Bed mobility, Body mechanics, Energy conservation, Endurance, Patient education, Family education, Gait training, Range of motion, Strengthening, Transfer training, Balance training  Rehab Potential : Good  Frequency (Obs): 3-5x/week     PHYSICAL THERAPY MEDICAL/SOCIAL HISTORY   History related to current admission: Acute renal failure.    -This is probably multifactorial in etiology in part secondary to decrease oral intake since his dental procedure was done.  The patient was borderline hypotensive when he has arrived.  The patient was  on losartan and also  was taking Motrin prior to admission.    Also with a significant metabolic acidosis.  -nephrology consulted, d/w Dr. Salcedo  - Improving with IV fluids  -Labs in the morning     Left mandibular pain, status post recent dental procedures.  Exact details not totally clear.  -CT with concern for phlegmonous changes  - ENT consulted from ER, d/w Dr. Whitney, no indications for acute surgical intervention  -cont Abx  - On IV Unasyn  - ID consult  - repeat CT in few days or if improvement stalls to reassess for abscess formation  -swallow eval        History of multiple myeloma.  Pancytopenia  Thrombocytopenia  - Hold heparin subcu  -CBC in the morning     Adult-onset diabetes mellitus.  A1c 7.3  At home on Jardiance and insulin  -On sliding scale insulin now, oral intake poor     H/o Hypertension.  On admission hypotensive  -Home medications on hold  -Now BP stable, possibly restart low-dose of amlodipine if needed  -Monitor     Hypothyroidism  - Restart levothyroxine once able     Dementia.-->  Supportive     Problem List  Principal Problem:    Acute kidney injury  Active Problems:    Thrombocytopenia (HCC)    Acute renal failure (ARF) (HCC)    Metabolic acidosis    Dental infection    Hypoglycemia    Acute kidney failure    Odontogenic infection of jaw    Brown hairy tongue      HOME SITUATION  Type of Home: House  Home Layout: One level, Able to live on main level                     Lives With: Caregiver 24 hours    Drives: No         Prior Level of [place]: Assist with all mobility and ADL's with RW and 24 hr caregiver assist in the home.     SUBJECTIVE  I feel ok just a little weakness but I do feel like I can eat and drink something.     PHYSICAL THERAPY EXAMINATION   OBJECTIVE  Precautions: Bed/chair alarm  Fall Risk: High fall risk    WEIGHT BEARING RESTRICTION       PAIN ASSESSMENT  Rating: Unable to rate          COGNITION  Overall Cognitive Status:  WFL - within functional limits    RANGE OF MOTION AND  STRENGTH ASSESSMENT  Upper extremity ROM and strength are within functional limits   Lower extremity ROM is within functional limits   Lower extremity strength is within functional limits 4/5    BALANCE  Static Sitting: Fair  Dynamic Sitting: Fair -  Static Standing: Poor +  Dynamic Standing: Poor +       AM-PAC '6-Clicks' INPATIENT SHORT FORM - BASIC MOBILITY  How much difficulty does the patient currently have...  Patient Difficulty: Turning over in bed (including adjusting bedclothes, sheets and blankets)?: A Little   Patient Difficulty: Sitting down on and standing up from a chair with arms (e.g., wheelchair, bedside commode, etc.): A Little   Patient Difficulty: Moving from lying on back to sitting on the side of the bed?: A Lot   How much help from another person does the patient currently need...   Help from Another: Moving to and from a bed to a chair (including a wheelchair)?: A Lot   Help from Another: Need to walk in hospital room?: A Lot   Help from Another: Climbing 3-5 steps with a railing?: Total     AM-PAC Score:  Raw Score: 13   Approx Degree of Impairment: 64.91%   Standardized Score (AM-PAC Scale): 36.74   CMS Modifier (G-Code): CL    FUNCTIONAL ABILITY STATUS  Functional Mobility/Gait Assessment  Gait Assistance: Moderate assistance  Distance (ft): 5  Assistive Device: Rolling walker  Pattern: Shuffle (unsteady gait)  Rolling: moderate assist  Supine to Sit: moderate assist  Sit to Supine: moderate assist  Sit to Stand: moderate assist    Exercise/Education Provided:  Bed mobility  Body mechanics  Energy conservation  Functional activity tolerated  Gait training  Posture  Strengthening  Lower therapeutic exercise:  Ankle pumps  Heel slides  LAQ  Transfer training    Skilled Therapy Provided: Pt ed with bed mobility and transfers with mod A from supine to sit at the EOB intermittent min A when pt gives a maximal effort. Pt ed with transfers with mod A with RW with SPT to a chair 5 shuffling steps.  Pt ed with therex x 10 reps in chair for LE strength and HEP.     The patient's Approx Degree of Impairment: 64.91% has been calculated based on documentation in the [facility] '6 clicks' Inpatient Basic Mobility Short Form.  Research supports that patients with this level of impairment may benefit from [facility] PT with 24 hr CG assist.    Final disposition will be made by interdisciplinary medical team.    Patient End of Session: Up in chair, With  staff, Needs met, Call light within reach, RN aware of session/findings, All patient questions and concerns addressed, Alarm set    CURRENT GOALS  Goals to be met by: 5/15/2025  Patient Goal Patient's self-stated goal is: Return home   Goal #1 Patient is able to demonstrate supine - sit EOB @ level: independent     Goal #1   Current Status    Goal #2 Patient is able to demonstrate transfers Sit to/from Stand at assistance level: modified independent with walker - rolling     Goal #2  Current Status    Goal #3 Patient is able to ambulate 150 feet with assist device: walker - rolling at assistance level: modified independent   Goal #3   Current Status    Goal #4 Patient will negotiate 2 stairs/one curb w/ assistive device and supervision   Goal #4   Current Status    Goal #5 Patient to demonstrate independence with home activity/exercise instructions provided to patient in preparation for discharge.   Goal #5   Current Status    Goal #6    Goal #6  Current Status      Patient Evaluation Complexity Level:  History Low - no personal factors and/or co-morbidities   Examination of body systems Low -  addressing 1-2 elements   Clinical Presentation Low- Stable   Clinical Decision Making  Low Complexity     Gait Training: 10 minutes

## 2025-04-30 NOTE — SLP NOTE
ADULT SWALLOWING EVALUATION    ASSESSMENT    ASSESSMENT/OVERALL IMPRESSION:  PPE REQUIRED. THIS THERAPIST WORE GLOVES AND GOWN FOR DURATION OF EVALUATION. HANDS WASHED UPON ENTRANCE/EXIT.    Per MD H&P, \"The patient is a 74-year-old [place] male who had a tooth extraction of tooth #17 a few days ago and ever since he has been having swelling and pain in the left mandibular area with poor oral intake. Today he was brought into the emergency department for evaluation.\"    SLP BSSE orders received and acknowledged. A swallow evaluation warranted per \"difficulty swallowing\". Pt afebrile with clear/weak vocal quality, on room air, with oxygen saturation at 97%. Pt with no hx of dysphagia at [facility].   Pt positioned 90 degrees in bedside chair, alert/cooperative/caregiver present. Pt with complaints of pain, RN aware. Oral motor examination revealed reduced strength, ROM, and rate of motion. Pt presented with trials of puree, mildly thick liquids via cup and thin liquids via straw. Pt with adequate oral acceptance and bilabial seal across all trials. Pt with reduced bolus formation/propulsion. Pt's swallow response appears delayed with reduced hyolaryngeal elevation/excursion. Immediate cough observed with thin liquids via straw. No clinical signs of aspiration (e.g., immediate/delayed throat clear, immediate/delayed cough, wet vocal quality, increased O2 effort) observed across puree and mildly thick liquid trials. 4/28 CXR indicates \"Normal heart size, clear lungs, normal pleura . Oxygen status remained stable t/o the entire evaluation.     At this time, pt presents with mild/moderate oral dysphagia and probable pharyngeal dysfunction. Recommend a puree diet and mildly thick liquids with strict adherence to safe swallowing compensatory strategies. Results and recommendations reviewed with RN, pt, and caregiver. Pt/pt's caregiver v/u to all results/recommendations. Recommendations remain written on whiteboard. SLP  collaborated with RN for MD diet orders.     PLAN: SLP to f/u x2-3 meal assessments, monitor imaging, and VFSS if clinically indicated       RECOMMENDATIONS   Diet Recommendations - Solids: Puree  Diet Recommendations - Liquids: Nectar thick liquids/ Mildly thick                       Aspiration Precautions: Upright position, Slow rate, Small bites, Small sips, No straw  Medication Administration Recommendations: Crushed in puree  Treatment Plan/Recommendations: Aspiration precautions    HISTORY   MEDICAL HISTORY  Reason for Referral:  (difficulty swallowing)    Problem List  Principal Problem:    Acute kidney injury  Active Problems:    Thrombocytopenia (HCC)    Acute renal failure (ARF) (HCC)    Metabolic acidosis    Dental infection    Hypoglycemia    Acute kidney failure    Odontogenic infection of jaw    Brown hairy tongue      Past Medical History  Past Medical History[1]    Prior Living Situation: Home with support  Diet Prior to Admission: Unknown  Precautions: Aspiration    Patient/Family Goals: did not state    SWALLOWING HISTORY  Current Diet Consistency: NPO  Dysphagia History: none  Imaging Results: 4/28 CT FACIAL BONES  CONCLUSION:   1. Interval extraction changes at tooth # 17 (left mandibular 3rd molar).  Small foci of subperiosteal gas and fluid surrounding both the lingual and buccal cortical surface of the left mandible, which likely relate to subperiosteal phlegmon.  There is   also inflammatory stranding throughout the left  space with reactive myositis of the left platysma muscle.  No associated well-defined/drainable soft tissue collection to suggest mature abscess on this noncontrast exam.   2. History of multiple myeloma.  Innumerable small lytic foci throughout the imaged calvarium, skull base, maxillofacial bones, and upper cervical spine.  Findings are very similar in comparison to prior exam from July, 2024 and likely relate to sequelae    of myeloma.   3. Stable low-grade  chronic left maxillary sinusitis.   4. Lesser incidental findings as above.       4/28 CT ABDOMEN PELVIS  CONCLUSION:   1. No hydronephrosis or urinary calculus.   2. Mild multifocal colonic bowel wall thickening with scattered areas of liquefied stool in the large intestine.  These findings may reflect mild colitis   3. Moderate distal colonic and rectal stool burden, which can be seen in the setting of constipation.    4. Distal esophageal wall thickening, which may be secondary to gastroesophageal reflux disease or esophagitis.   5. Postoperative changes from cholecystectomy with a well-positioned common bile duct stent.   6. A 1.0 cm left adrenal nodule, which is not significantly changed when compared to 07/28/2024 and is favored to reflect an adenoma.   7. Stable 4.8 cm ovoid soft tissue density mass adjacent to the right quique of diaphragm.  This mass is incompletely characterized and may reflect a peripheral nerve sheath tumor or other etiologies   8. Triple-vessel coronary artery calcifications.   9. Subtle ground-glass opacities in the left lower lobe with a 6 mm left lower lobe pulmonary nodule.  These findings are favored to be secondary to infectious/inflammatory etiologies.  Per Fleischner guidelines, an optional CT chest in 12 months can be   obtained in a high risk patient to monitor for stability.    10. Diffusely demineralized osseous structures with associated multifocal lucent lesions, which likely reflects sequela of multiple myeloma.   11. Lesser incidental findings described above.      SUBJECTIVE       OBJECTIVE   ORAL MOTOR EXAMINATION  Dentition: Natural  Symmetry: Reduced left facial  Strength: Overall reduced     Range of Motion: Overall reduced  Rate of Motion: Reduced    Voice Quality: Clear, Weak  Respiratory Status: Unlabored  Consistencies Trialed: Thin liquids, Nectar thick liquids, Puree  Method of Presentation: Self presentation, Straw, Spoon, Cup  Patient Positioned: Upright,  Midline, Bedside chair    Oral Phase of Swallow: Impaired  Bolus Retrieval: Intact  Bilabial Seal: Intact  Bolus Formation: Impaired  Bolus Propulsion: Impaired     Retention: Impaired    Pharyngeal Phase of Swallow: Appears Impaired  Laryngeal Elevation Timing: Appears impaired  Laryngeal Elevation Strength: Appears impaired  Laryngeal Elevation Coordination: Appears impaired  (Please note: Silent aspiration cannot be evaluated clinically. Videofluoroscopic Swallow Study is required to rule-out silent aspiration.)    Esophageal Phase of Swallow: Complaints consistent with possible esophageal involvement  Comments: NA          GOALS  Goal #1 The patient will tolerate puree consistency and mildly thick liquids without overt signs or symptoms of aspiration with 100 % accuracy over 1-2 session(s).  In Progress   Goal #2 The patient/family/caregiver will demonstrate understanding and implementation of aspiration precautions and swallow strategies independently over 1-2 session(s).    In Progress   Goal #3 The patient will tolerate trial upgrade of soft/hard solid consistency and thin liquids without overt signs or symptoms of aspiration with 100 % accuracy over 1-2 session(s).  In Progress   Goal #4 The patient will utilize compensatory strategies as outlined by  BSSE (clinical evaluation) including Slow rate, Small bites, Small sips, No straws, Upright 90 degrees, Upright 90 degrees 30 mins after meal, Eliminate distractions with PRN assistance 100 % of the time across 2 sessions.    In Progress     FOLLOW UP  Treatment Plan/Recommendations: Aspiration precautions  Duration: 1 week  Follow Up Needed (Documentation Required): Yes  SLP Follow-up Date: 05/01/25    Thank you for your referral.   If you have any questions, please contact PAMELA Rosario M.S. CCC-SLP  Speech Language Pathologist  Phone Number [phone]. [phone]         [1]   Past Medical History:   Abnormal gait    Benign essential hypertension     Cancer (HCC)    Chest pain    Formatting of this note might be different from the original.   Normal stress test 11/2021      Last Assessment & Plan:    Formatting of this note might be different from the original.   Symptoms are atypical for ischemia   Chest pain is worse with change in position      Diabetes (HCC)    Diabetes mellitus (HCC)    Diabetes mellitus (HCC)    High blood pressure    Hyperglycemia    Hypertension associated with diabetes (HCC)    Last Assessment & Plan:    Formatting of this note might be different from the original.   Blood pressure mildly elevated   Continue same medications for now   Continue to monitor readings      Multiple myeloma (HCC)    Multiple premature ventricular complexes    Formatting of this note might be different from the original.   Normal stress test 11/2021      Echo 8/2021:    1. Left ventricular ejection fraction, by visual estimation, is 60 to 65%.    2. Mild concentric left ventricular hypertrophy.    3. Normal pattern of LV diastolic filling.    4. Mild aortic valve stenosis.         Last Assessment & Plan:    Formatting of this note might be different fro    Neuropathy    Nonrheumatic aortic valve stenosis    Formatting of this note might be different from the original.   Echo 8/2021:    1. Left ventricular ejection fraction, by visual estimation, is 60 to 65%.    2. Mild concentric left ventricular hypertrophy.    3. Normal pattern of LV diastolic filling.    4. Mild aortic valve stenosis.         Last Assessment & Plan:    Formatting of this note might be different from the original.   Mild aortic mu    Rash    Type 2 diabetes mellitus without complication, without long-term current use of insulin (HCC)    Vomiting

## 2025-04-30 NOTE — HOME CARE LIAISON
Received referral via [facility] for Home Health services. Spoke w/ patient and his caregiver who is agreeable with [facility]. Contact information placed on AVS.

## 2025-05-01 LAB
ALBUMIN SERPL-MCNC: 3.2 G/DL (ref 3.2–4.8)
ANION GAP SERPL CALC-SCNC: 14 MMOL/L (ref 0–18)
BASOPHILS # BLD AUTO: 0.03 X10(3) UL (ref 0–0.2)
BASOPHILS NFR BLD AUTO: 1 %
BUN BLD-MCNC: 33 MG/DL (ref 9–23)
BUN/CREAT SERPL: 22.4 (ref 10–20)
CALCIUM BLD-MCNC: 7.3 MG/DL (ref 8.7–10.4)
CHLORIDE SERPL-SCNC: 111 MMOL/L (ref 98–112)
CO2 SERPL-SCNC: 28 MMOL/L (ref 21–32)
CREAT BLD-MCNC: 1.47 MG/DL (ref 0.7–1.3)
DEPRECATED RDW RBC AUTO: 51.7 FL (ref 35.1–46.3)
EGFRCR SERPLBLD CKD-EPI 2021: 49 ML/MIN/1.73M2 (ref 60–?)
EOSINOPHIL # BLD AUTO: 0.05 X10(3) UL (ref 0–0.7)
EOSINOPHIL NFR BLD AUTO: 1.7 %
ERYTHROCYTE [DISTWIDTH] IN BLOOD BY AUTOMATED COUNT: 15.8 % (ref 11–15)
GLUCOSE BLD-MCNC: 158 MG/DL (ref 70–99)
GLUCOSE BLDC GLUCOMTR-MCNC: 135 MG/DL (ref 70–99)
GLUCOSE BLDC GLUCOMTR-MCNC: 184 MG/DL (ref 70–99)
GLUCOSE BLDC GLUCOMTR-MCNC: 237 MG/DL (ref 70–99)
GLUCOSE BLDC GLUCOMTR-MCNC: 237 MG/DL (ref 70–99)
HCT VFR BLD AUTO: 36.6 % (ref 39–53)
HGB BLD-MCNC: 11.9 G/DL (ref 13–17.5)
IMM GRANULOCYTES # BLD AUTO: 0.02 X10(3) UL (ref 0–1)
IMM GRANULOCYTES NFR BLD: 0.7 %
LYMPHOCYTES # BLD AUTO: 1.31 X10(3) UL (ref 1–4)
LYMPHOCYTES NFR BLD AUTO: 43.5 %
MAGNESIUM SERPL-MCNC: 2.3 MG/DL (ref 1.6–2.6)
MCH RBC QN AUTO: 29 PG (ref 26–34)
MCHC RBC AUTO-ENTMCNC: 32.5 G/DL (ref 31–37)
MCV RBC AUTO: 89.1 FL (ref 80–100)
MONOCYTES # BLD AUTO: 0.39 X10(3) UL (ref 0.1–1)
MONOCYTES NFR BLD AUTO: 13 %
NEUTROPHILS # BLD AUTO: 1.21 X10 (3) UL (ref 1.5–7.7)
NEUTROPHILS # BLD AUTO: 1.21 X10(3) UL (ref 1.5–7.7)
NEUTROPHILS NFR BLD AUTO: 40.1 %
OSMOLALITY SERPL CALC.SUM OF ELEC: 327 MOSM/KG (ref 275–295)
PHOSPHATE SERPL-MCNC: 3 MG/DL (ref 2.4–5.1)
PLATELET # BLD AUTO: 110 10(3)UL (ref 150–450)
POTASSIUM SERPL-SCNC: 3.3 MMOL/L (ref 3.5–5.1)
POTASSIUM SERPL-SCNC: 3.3 MMOL/L (ref 3.5–5.1)
RBC # BLD AUTO: 4.11 X10(6)UL (ref 3.8–5.8)
SODIUM SERPL-SCNC: 153 MMOL/L (ref 136–145)
WBC # BLD AUTO: 3 X10(3) UL (ref 4–11)

## 2025-05-01 PROCEDURE — 99233 SBSQ HOSP IP/OBS HIGH 50: CPT | Performed by: HOSPITALIST

## 2025-05-01 PROCEDURE — 99232 SBSQ HOSP IP/OBS MODERATE 35: CPT | Performed by: INTERNAL MEDICINE

## 2025-05-01 RX ORDER — HYDROCODONE BITARTRATE AND ACETAMINOPHEN 5; 325 MG/1; MG/1
1 TABLET ORAL EVERY 4 HOURS PRN
Refills: 0 | Status: DISCONTINUED | OUTPATIENT
Start: 2025-05-01 | End: 2025-05-06

## 2025-05-01 RX ORDER — DEXTROSE MONOHYDRATE 50 MG/ML
INJECTION, SOLUTION INTRAVENOUS CONTINUOUS
Status: DISCONTINUED | OUTPATIENT
Start: 2025-05-01 | End: 2025-05-03

## 2025-05-01 RX ORDER — ACETAMINOPHEN 325 MG/1
650 TABLET ORAL EVERY 4 HOURS PRN
Status: DISCONTINUED | OUTPATIENT
Start: 2025-05-01 | End: 2025-05-06

## 2025-05-01 NOTE — SLP NOTE
SPEECH DAILY NOTE - INPATIENT    ASSESSMENT & PLAN   ASSESSMENT  PPE REQUIRED. THIS THERAPIST WORE GLOVES AND GOWN FOR DURATION OF SESSION. HANDS WASHED UPON ENTRANCE/EXIT.    SLP f/u for ongoing dysphagia tx/meal assessment per recommendations of puree/mildly thick liquids per BSE. RN reports pt tolerates diet and medication well with no overt clinical s/s aspiration. Pt denies any swallowing challenges.     Pt positioned upright in bed, alert/cooperative/caregiver present. Pt afebrile, tolerating room air with oxygen status 98% prior to the start of oral trials. SLP reviewed aspiration precautions and safe swallowing compensatory strategies with the patient. Safe swallow guidelines remain written on the white board in purple. Patient and caregiver v/u. Provided 1:1 assistance, pt tolerates puree and mildly thick liquids via tsp with no overt clinical signs/symptoms of aspiration. Oxygen status remained stable t/o the entire session. Recommend remain on current diet with adherence to aspiration precautions and swallow strategies.    SLP to f/u with meal assessment x2-3, monitor  CXR, and VFSS if any overt CSA and/or decline in CXR. RN alerted with results and recommendations.     MOST RECENT CXR 4/28  Findings and impression:  Normal heart size, clear lungs, normal pleura        Diet Recommendations - Solids: Puree  Diet Recommendations - Liquids: Nectar thick liquids/ Mildly thick     Aspiration Precautions: Upright position, Slow rate, Small bites, Small sips, No straw  Medication Administration Recommendations: Crushed in puree    Patient Experiencing Pain: Yes  Pain Rating: Unable to Rate     Pain Control: IV meds  Nursing Aware of Pain?: Yes    Treatment Plan  Treatment Plan/Recommendations: Aspiration precautions    Interdisciplinary Communication: Plan posted at bedside          GOALS  Goal #1 The patient will tolerate puree consistency and mildly thick liquids without overt signs or symptoms of aspiration  with 100 % accuracy over 1-2 session(s).  In Progress   Goal #2 The patient/family/caregiver will demonstrate understanding and implementation of aspiration precautions and swallow strategies independently over 1-2 session(s).    In Progress   Goal #3 The patient will tolerate trial upgrade of soft/hard solid consistency and thin liquids without overt signs or symptoms of aspiration with 100 % accuracy over 1-2 session(s).  In Progress   Goal #4 The patient will utilize compensatory strategies as outlined by  BSSE (clinical evaluation) including Slow rate, Small bites, Small sips, No straws, Upright 90 degrees, Upright 90 degrees 30 mins after meal, Eliminate distractions with PRN assistance 100 % of the time across 2 sessions.    In Progress     FOLLOW UP  Follow Up Needed (Documentation Required): Yes  SLP Follow-up Date: 05/02/25  Duration: 1 week    Session: 1    If you have any questions, please contact PAMELA Rosario M.S. CCC-SLP  Speech Language Pathologist  Phone Number [phone]

## 2025-05-01 NOTE — PROGRESS NOTES
.[facility], [facility]  TEL: [phone]  FAX: [phone]    Joselin Graham Patient Status:  Inpatient    5/15/1950 MRN [id number]   Location [facility] 5SW/SE Attending Zenia Martel MD   Hosp Day # 3 PCP No primary care provider on file.     Reason for Consultation:  Left facial swelling.      Interval history:  Swelling continues to improve.  Discussed with Dr. Tapia.  Has some purulent drainage from his left ear.  No headaches.      History of Present Illness:  Joselin Graham is a a(n) 74 year old male.  Who had a dental extraction 3 days prior.  Start developing some swelling.  Poor p.o. intake.  Came in through the ED.  With low platelet and white count.  White count 2.7.  Concern for left facial swelling.  With PARVEEN.  CT of the face with small focus of superoposterior gas and fluid surrounding lingual and buccal cortical surfaces.  On my exam, patient has unilateral coating of the tongue as well as some ulcers on the hard palate on the left side.  Also possible crusted ulcerations on the lip.  Nothing around the eye.  History provided by caregiver    History:  Past Medical History[1]  Past Surgical History[2]  Family History[3]   reports that he has never smoked. He has never used smokeless tobacco. He reports that he does not drink alcohol and does not use drugs.    Allergies:  Allergies[4]    Medications:  Current Hospital Medications[5]    Review of Systems:    A comprehensive 10 point review of systems was completed.  Pertinent positives and negatives noted in the the HPI.    Physical Exam:    General: No acute distress. Alert and oriented x 3.  Able to follow commands.  Vital signs: Blood pressure (!) 137/95, pulse 96, temperature 98.2 °F (36.8 °C), temperature source Oral, resp. rate 18, height 5' 5\" (1.651 m), weight 119 lb 4.8 oz (54.1 kg), SpO2 97%.  HEENT: Dry mucous Amfexa.  Swelling is overall better.  More discrete unilateral irruption involving the side of his  face including preauricular area, around the ear, lip tongue and upper palate left side.  Used otoscope to examine the ear.  There is some purulence in the ear.  No pain with palpation over the mastoid.  Extraocular muscles are intact.  Neck: No lymphadenopathy.  No JVD. No carotid bruits.  Respiratory: Clear to auscultation bilaterally.  No wheezes. No rhonchi.  Cardiovascular: S1, S2.  Regular rate and rhythm.  No murmurs.  Abdomen: Soft, nontender, nondistended.  Positive bowel sounds.  Musculoskeletal: Full range of motion of all extremities.  No swelling noted.  Integument: No lesions. No erythema.  Psychiatric: Appropriate mood and affect.  Neurologic: No focal neurological deficits.    Laboratory Data:  Lab Results   Component Value Date    WBC 3.0 05/01/2025    HGB 11.9 05/01/2025    HCT 36.6 05/01/2025    .0 05/01/2025    CREATSERUM 1.47 05/01/2025    BUN 33 05/01/2025     05/01/2025    K 3.3 05/01/2025    K 3.3 05/01/2025     05/01/2025    CO2 28.0 05/01/2025     05/01/2025    CA 7.3 05/01/2025    ALB 3.2 05/01/2025    MG 2.3 05/01/2025    PHOS 3.0 05/01/2025    PGLU 237 05/01/2025       Microbiologic Data:     Reviewed in EMR    Imaging:  CT Scan    Imaging results reviewed     Impression:  Problem List[6]      ASSESSMENT:    #Facial swelling.  Following tooth extraction.  Initial concern for cellulitis and abscess.  No abscess on CT.  With concern for zoster with tongue and palate involvement as well as lip.  With secondary cellulitis.  Following dental extraction.  Could have concomitant bacterial process.  Polymicrobial.  With leukopenia on Unasyn.    #Acute kidney injury.      PLAN:  Continue meropenem for now.  When he is able to swallow, will transition IV acyclovir to valacyclovir.  Will get MRI.  If no otomastoiditis, consider oral stepdown therapy for both oral antibiotics and antiviral therapy.  Discussed with Dr. Willie Major MD  4/29/2025  7:20 PM        [1]    Past Medical History:   Abnormal gait    Benign essential hypertension    Cancer (HCC)    Chest pain    Formatting of this note might be different from the original.   Normal stress test 11/2021      Last Assessment & Plan:    Formatting of this note might be different from the original.   Symptoms are atypical for ischemia   Chest pain is worse with change in position      Diabetes (HCC)    Diabetes mellitus (HCC)    Diabetes mellitus (HCC)    High blood pressure    Hyperglycemia    Hypertension associated with diabetes (HCC)    Last Assessment & Plan:    Formatting of this note might be different from the original.   Blood pressure mildly elevated   Continue same medications for now   Continue to monitor readings      Multiple myeloma (HCC)    Multiple premature ventricular complexes    Formatting of this note might be different from the original.   Normal stress test 11/2021      Echo 8/2021:    1. Left ventricular ejection fraction, by visual estimation, is 60 to 65%.    2. Mild concentric left ventricular hypertrophy.    3. Normal pattern of LV diastolic filling.    4. Mild aortic valve stenosis.         Last Assessment & Plan:    Formatting of this note might be different fro    Neuropathy    Nonrheumatic aortic valve stenosis    Formatting of this note might be different from the original.   Echo 8/2021:    1. Left ventricular ejection fraction, by visual estimation, is 60 to 65%.    2. Mild concentric left ventricular hypertrophy.    3. Normal pattern of LV diastolic filling.    4. Mild aortic valve stenosis.         Last Assessment & Plan:    Formatting of this note might be different from the original.   Mild aortic mu    Rash    Type 2 diabetes mellitus without complication, without long-term current use of insulin (HCC)    Vomiting   [2] History reviewed. No pertinent surgical history.  [3] No family history on file.  [4] No Known Allergies  [5]   Current Facility-Administered Medications:      HYDROcodone-acetaminophen (Norco) 5-325 MG per tab 1 tablet, 1 tablet, Oral, Q4H PRN    acetaminophen (Tylenol) tab 650 mg, 650 mg, Oral, Q4H PRN    dextrose 5% infusion, , Intravenous, Continuous    acyclovir (Zovirax) 550 mg in sodium chloride 0.9% 100 mL IVPB, 10 mg/kg, Intravenous, Q12H    insulin aspart (NovoLOG) 100 Units/mL FlexPen 1-5 Units, 1-5 Units, Subcutaneous, TID CC and HS    melatonin tab 6 mg, 6 mg, Oral, Nightly PRN    [Held by provider] valACYclovir (Valtrex) tab 1,000 mg, 1,000 mg, Oral, Daily    meropenem (Merrem) 500 mg in sodium chloride 0.9% 100mL IVPB-TABBY, 500 mg, Intravenous, Q12H    chlorhexidine gluconate (Peridex) 0.12 % oral solution 15 mL, 15 mL, Mouth/Throat, QID    [Held by provider] heparin (Porcine) 5000 UNIT/ML injection 5,000 Units, 5,000 Units, Subcutaneous, 2 times per day    ondansetron (Zofran) 4 MG/2ML injection 4 mg, 4 mg, Intravenous, Q6H PRN    metoclopramide (Reglan) 5 mg/mL injection 5 mg, 5 mg, Intravenous, Q8H PRN    HYDROmorphone (Dilaudid) 1 MG/ML injection 0.2 mg, 0.2 mg, Intravenous, Q2H PRN **OR** HYDROmorphone (Dilaudid) 1 MG/ML injection 0.4 mg, 0.4 mg, Intravenous, Q2H PRN **OR** HYDROmorphone (Dilaudid) 1 MG/ML injection 0.8 mg, 0.8 mg, Intravenous, Q2H PRN    glucose (Dex4) 15 GM/59ML oral liquid 15 g, 15 g, Oral, Q15 Min PRN **OR** glucose (Glutose) 40% oral gel 15 g, 15 g, Oral, Q15 Min PRN **OR** glucose-vitamin C (Dex-4) chewable tab 4 tablet, 4 tablet, Oral, Q15 Min PRN **OR** dextrose 50% injection 50 mL, 50 mL, Intravenous, Q15 Min PRN **OR** glucose (Dex4) 15 GM/59ML oral liquid 30 g, 30 g, Oral, Q15 Min PRN **OR** glucose (Glutose) 40% oral gel 30 g, 30 g, Oral, Q15 Min PRN **OR** glucose-vitamin C (Dex-4) chewable tab 8 tablet, 8 tablet, Oral, Q15 Min PRN    metoprolol (Lopressor) 5 mg/5mL injection 5 mg, 5 mg, Intravenous, Q6H PRN  [6]   Patient Active Problem List  Diagnosis    Weakness generalized    Recurrent falls    Uncontrolled type 2  diabetes mellitus with hyperglycemia (HCC)    Acquired hypothyroidism    Thrombocytopenia (HCC)    Acute renal failure (ARF) (HCC)    Acute kidney injury    Metabolic acidosis    Dental infection    Hypoglycemia    Acute kidney failure    Odontogenic infection of jaw    Brown hairy tongue

## 2025-05-01 NOTE — PROGRESS NOTES
[facility]  Nephrology Daily Progress Note    Joselin Graham  [id number]  74 year old      HPI:   Joselin Graham is a 74 year old male.  Overall is feeling better.  Jaw pain has improved.  Looks comfortable at rest.  On room air.      ROS:     Constitutional:  Negative for decreased activity, fever, irritability and lethargy  Endocrine:  Negative for abnormal sleep patterns, increased activity, polydipsia and polyphagia  Cardiovascular:  Negative for cool extremity and irregular heartbeat/palpitations  Gastrointestinal:  Negative for abdominal pain, constipation, decreased appetite, diarrhea and vomiting  Genitourinary:  Negative for dysuria and hematuria  Hema/Lymph:  Negative for easy bleeding and easy bruising  Integumentary:  Negative for pruritus and rash  Musculoskeletal:  Negative for bone/joint symptoms  Neurological:  Negative for gait disturbance  Psychiatric:  Negative for inappropriate interaction and psychiatric symptoms  Respiratory:  Negative for cough, dyspnea and wheezing      PHYSICAL EXAM:   Temp:  [97.9 °F (36.6 °C)-98.4 °F (36.9 °C)] 98.2 °F (36.8 °C)  Pulse:  [61-96] 96  Resp:  [16-18] 18  BP: (137-150)/(65-95) 137/95  SpO2:  [96 %-98 %] 97 %  Patient Weight for the past 72 hrs:   Weight   04/28/25 2300 119 lb 12.8 oz (54.3 kg)   04/30/25 0640 119 lb 4.8 oz (54.1 kg)       Constitutional: appears well hydrated alert and responsive no acute distress noted  Neck/Thyroid: neck is supple without adenopathy  Lymphatic: no abnormal cervical, supraclavicular or axillary adenopathy is noted  Respiratory: normal to inspection lungs are clear to auscultation bilaterally normal respiratory effort  Cardiovascular: regular rate and rhythm no murmurs, gallups, or rubs  Abdomen: soft non-tender non-distended no organomegaly noted no masses  Musculoskeletal: full ROM all extremities good strength  no deformities  Extremities: no edema, cyanosis, or clubbing  Neurological: exam appropriate for age  reflexes and motor skills appropriate for age    Labs:  Lab Results   Component Value Date    WBC 3.0 05/01/2025    HGB 11.9 05/01/2025    HCT 36.6 05/01/2025    .0 05/01/2025    CREATSERUM 1.47 05/01/2025    BUN 33 05/01/2025     05/01/2025    K 3.3 05/01/2025    K 3.3 05/01/2025     05/01/2025    CO2 28.0 05/01/2025     05/01/2025    CA 7.3 05/01/2025    ALB 3.2 05/01/2025    MG 2.3 05/01/2025    PHOS 3.0 05/01/2025     Recent Labs   Lab 04/29/25  0537 04/30/25  0622 05/01/25  0620   WBC 1.4* 2.8* 3.0*   HGB 11.6* 11.6* 11.9*   HCT 34.3* 33.9* 36.6*   PLT 85.0* 91.0* 110.0*     Recent Labs   Lab 04/28/25  1212 04/29/25  0537 04/30/25  0622 05/01/25  0620   GLU 90 92 157* 158*   BUN 86* 63* 37* 33*   CREATSERUM 4.34* 2.60* 1.75* 1.47*   CA 8.2* 7.3* 7.1* 7.3*   ALB 4.1 3.4 3.2 3.2    145 149* 153*   K 4.9 3.6 3.4* 3.3*  3.3*    114* 109 111   CO2 12.0* 16.0* 23.0 28.0   ALKPHO 109 92  --   --    AST 20 23  --   --    * 79*  --   --    BILT 0.5 0.9  --   --    TP 7.1 6.1  --   --    PHOS  --  4.4 4.0 3.0       Imaging  No results found.      Medications:  Current Hospital Medications[1]    Allergies:  Allergies[2]    Input/Output:    Intake/Output Summary (Last 24 hours) at 5/1/2025 1702  Last data filed at 5/1/2025 1626  Gross per 24 hour   Intake 798.4 ml   Output 900 ml   Net -101.6 ml          ASSESSMENT/PLAN:   Assessment   Problem List[3]    Urine output 1400 mL.  Creatinine continues to improve down to 1.47.  Replace potassium.  Sodium though is now up to 153.  On thickened liquids and oral fluid intake is suboptimal.  Will switch to D5W.  Encourage p.o. intake.  Repeat labs in AM.  Discussed with caretaker at the bedside.             5/1/2025  Marbin Salcedo MD               [1]   Current Facility-Administered Medications:     HYDROcodone-acetaminophen (Norco) 5-325 MG per tab 1 tablet, 1 tablet, Oral, Q4H PRN    acetaminophen (Tylenol) tab 650 mg, 650 mg, Oral,  Q4H PRN    dextrose 5% infusion, , Intravenous, Continuous    acyclovir (Zovirax) 550 mg in sodium chloride 0.9% 100 mL IVPB, 10 mg/kg, Intravenous, Q12H    insulin aspart (NovoLOG) 100 Units/mL FlexPen 1-5 Units, 1-5 Units, Subcutaneous, TID CC and HS    melatonin tab 6 mg, 6 mg, Oral, Nightly PRN    [Held by provider] valACYclovir (Valtrex) tab 1,000 mg, 1,000 mg, Oral, Daily    meropenem (Merrem) 500 mg in sodium chloride 0.9% 100mL IVPB-TABBY, 500 mg, Intravenous, Q12H    chlorhexidine gluconate (Peridex) 0.12 % oral solution 15 mL, 15 mL, Mouth/Throat, QID    heparin (Porcine) 5000 UNIT/ML injection 5,000 Units, 5,000 Units, Subcutaneous, 2 times per day    ondansetron (Zofran) 4 MG/2ML injection 4 mg, 4 mg, Intravenous, Q6H PRN    metoclopramide (Reglan) 5 mg/mL injection 5 mg, 5 mg, Intravenous, Q8H PRN    HYDROmorphone (Dilaudid) 1 MG/ML injection 0.2 mg, 0.2 mg, Intravenous, Q2H PRN **OR** HYDROmorphone (Dilaudid) 1 MG/ML injection 0.4 mg, 0.4 mg, Intravenous, Q2H PRN **OR** HYDROmorphone (Dilaudid) 1 MG/ML injection 0.8 mg, 0.8 mg, Intravenous, Q2H PRN    glucose (Dex4) 15 GM/59ML oral liquid 15 g, 15 g, Oral, Q15 Min PRN **OR** glucose (Glutose) 40% oral gel 15 g, 15 g, Oral, Q15 Min PRN **OR** glucose-vitamin C (Dex-4) chewable tab 4 tablet, 4 tablet, Oral, Q15 Min PRN **OR** dextrose 50% injection 50 mL, 50 mL, Intravenous, Q15 Min PRN **OR** glucose (Dex4) 15 GM/59ML oral liquid 30 g, 30 g, Oral, Q15 Min PRN **OR** glucose (Glutose) 40% oral gel 30 g, 30 g, Oral, Q15 Min PRN **OR** glucose-vitamin C (Dex-4) chewable tab 8 tablet, 8 tablet, Oral, Q15 Min PRN    metoprolol (Lopressor) 5 mg/5mL injection 5 mg, 5 mg, Intravenous, Q6H PRN  [2] No Known Allergies  [3]   Patient Active Problem List  Diagnosis    Weakness generalized    Recurrent falls    Uncontrolled type 2 diabetes mellitus with hyperglycemia (HCC)    Acquired hypothyroidism    Thrombocytopenia (HCC)    Acute renal failure (ARF) (HCC)    Acute  kidney injury    Metabolic acidosis    Dental infection    Hypoglycemia    Acute kidney failure    Odontogenic infection of jaw    Brown hairy tongue

## 2025-05-01 NOTE — PROGRESS NOTES
Rollator:   A Rollator has ordered to assist Joselin Graham with activities of daily living. Joselin Graham presents with the following impairments decreased strength, functional mobility, trunk control, endurance, balance due to increased weakness from orofacial infection and mobility impairments. It is in my opinion a Rollator walker will assist patient in activities of daily living. The patient is able to safely use the rollator walker The patient's functional mobility deficit can be sufficiently resolved with the use of a Rollator walker

## 2025-05-01 NOTE — PLAN OF CARE
Problem: Patient Centered Care  Goal: Patient preferences are identified and integrated in the patient's plan of care  Description: Interventions:- What would you like us to know as we care for you? - Provide timely, complete, and accurate information to patient/family- Incorporate patient and family knowledge, values, beliefs, and cultural backgrounds into the planning and delivery of care- Encourage patient/family to participate in care and decision-making at the level they choose- Honor patient and family perspectives and choices  Outcome: Progressing     Problem: PAIN - ADULT  Goal: Verbalizes/displays adequate comfort level or patient's stated pain goal  Description: INTERVENTIONS:- Encourage pt to monitor pain and request assistance- Assess pain using appropriate pain scale- Administer analgesics based on type and severity of pain and evaluate response- Implement non-pharmacological measures as appropriate and evaluate response- Consider cultural and social influences on pain and pain management- Manage/alleviate anxiety- Utilize distraction and/or relaxation techniques- Monitor for opioid side effects- Notify MD/LIP if interventions unsuccessful or patient reports new pain- Anticipate increased pain with activity and pre-medicate as appropriate  Outcome: Progressing     Problem: RISK FOR INFECTION - ADULT  Goal: Absence of fever/infection during anticipated neutropenic period  Description: INTERVENTIONS- Monitor WBC- Administer growth factors as ordered- Implement neutropenic guidelines  Outcome: Progressing     Problem: SAFETY ADULT - FALL  Goal: Free from fall injury  Description: INTERVENTIONS:- Assess pt frequently for physical needs- Identify cognitive and physical deficits and behaviors that affect risk of falls.- Winifrede fall precautions as indicated by assessment.- Educate pt/family on patient safety including physical limitations- Instruct pt to call for assistance with activity based on  assessment- Modify environment to reduce risk of injury- Provide assistive devices as appropriate- Consider OT/PT consult to assist with strengthening/mobility- Encourage toileting schedule  Outcome: Progressing     Problem: DISCHARGE PLANNING  Goal: Discharge to home or other facility with appropriate resources  Description: INTERVENTIONS:- Identify barriers to discharge w/pt and caregiver- Include patient/family/discharge partner in discharge planning- Arrange for needed discharge resources and transportation as appropriate- Identify discharge learning needs (meds, wound care, etc)- Arrange for interpreters to assist at discharge as needed- Consider post-discharge preferences of patient/family/discharge partner- Complete POLST form as appropriate- Assess patient's ability to be responsible for managing their own health- Refer to Case Management Department for coordinating discharge planning if the patient needs post-hospital services based on physician/LIP order or complex needs related to functional status, cognitive ability or social support system  Outcome: Progressing

## 2025-05-01 NOTE — CM/SW NOTE
Tigre received secure chat from [facility] roman Loomis that Pt is beng set up with visiting .     Roman also mentioned family wanting a walker for Pt. Tigre sent DME referral and messaged MD for verbiage.    Rollator:   A Rollator has ordered to assist Joselin Graham with activities of daily living. Joselin Graham presents with the following impairments decreased strength, functional mobility, trunk control, endurance, balance due to increased weakness and mobility impairments. It is in my opinion a Rollator walker will assist patient in activities of daily living. The patient is able to safely use the rollator walker The patient's functional mobility deficit can be sufficiently resolved with the use of a Rollator walker      Plan: Home with [facility] and DME pending verbiage    SW to remain available for support and/or discharge planning.    Kemi Nava MSW, LSW   [id number]

## 2025-05-01 NOTE — PROGRESS NOTES
[facility]  part of [facility]  Hospitalist Progress Note     Joselin Graham Patient Status:  Inpatient    5/15/1950  74 year old CSN [id number]   Location 544/544-A Attending Catracho Tapia MD   Hosp Day # 3 PCP No primary care provider on file.     Assessment & Plan:   ----------------------------------  Left face cellulitis.  With phlegmon but no drainable collection.  Significant improvement in the last 24 hours.  - Continue IV meropenem, acyclovir  - ID following  - ENT evaluation appreciated, no surgical intervention recommended  - Advance diet  - Consider repeat imaging    PARVEEN.  Almost back to baseline.  - Repeat MP in the morning  - Encourage p.o. intake, IV fluids    Hypernatremia. Slightly worse  - Defer to renal    Other problems  History of multiple myeloma  Hypertension  Hypothyroidism  Dementia    Supplementary Documentation:   DVT Mechanical Prophylaxis:        DVT Pharmacologic Prophylaxis   Medication    [Held by provider] heparin (Porcine) 5000 UNIT/ML injection 5,000 Units                Code Status: Full Code  Jean: External urinary catheter in place  Jean Duration (in days):   Central line:    KUSH: 2025      I personally reviewed the available laboratories, imaging including. I discussed/will discuss the case with consultants. I ordered laboratories and/or radiographic studies. I adjusted medications as detailed above.  Medical decision making high, risk is high.    Subjective:   ----------------------------------  Feeling better again. Pain improving. Not eating that well. Walking      Objective:   Chief Complaint:   Chief Complaint   Patient presents with    Dental Problem     ----------------------------------  Temp:  [97.9 °F (36.6 °C)-98.4 °F (36.9 °C)] 98.4 °F (36.9 °C)  Pulse:  [61-93] 80  Resp:  [16-20] 18  BP: (145-150)/(65-95) 145/95  SpO2:  [96 %-98 %] 98 %  Gen: A+Ox3.  No distress.   HEENT: Some crusted lesions on the left bottom lip.  Some erythema, no  white patches in the left buccal membrane.  Able to open jaw significantly better than previously.  No significant lymphadenopathy.  CV: RRR, S1S2, and intact distal pulses. No gallop, rub, murmur.  Pulm: Effort and breath sounds normal. No distress, wheezes, rales, rhonchi.  Abd: Soft, NTND, BS normal, no mass, no HSM, no rebound/guarding.   Neuro: Normal reflexes, CN. Sensory/motor exams grossly normal deficit.   MS: No joint effusions.  No peripheral edema.  Skin: Skin is warm and dry. No rashes, erythema, diaphoresis.   Psych: Normal mood and affect. Calm, cooperative    Labs:  Lab Results   Component Value Date    HGB 11.9 (L) 05/01/2025    WBC 3.0 (L) 05/01/2025    .0 (L) 05/01/2025     (H) 05/01/2025    K 3.3 (L) 05/01/2025    K 3.3 (L) 05/01/2025    CREATSERUM 1.47 (H) 05/01/2025    AST 23 04/29/2025    ALT 79 (H) 04/29/2025           Scheduled Medications[1]  PRN Medications[2]             [1]    potassium chloride  40 mEq Intravenous Once    acyclovir  10 mg/kg Intravenous Q12H    insulin aspart  1-5 Units Subcutaneous TID CC and HS    [Held by provider] valACYclovir  1,000 mg Oral Daily    meropenem  500 mg Intravenous Q12H    chlorhexidine gluconate  15 mL Mouth/Throat QID    [Held by provider] heparin  5,000 Units Subcutaneous 2 times per day   [2]   HYDROcodone-acetaminophen    acetaminophen    melatonin    ondansetron    metoclopramide    HYDROmorphone **OR** HYDROmorphone **OR** HYDROmorphone    glucose **OR** glucose **OR** glucose-vitamin C **OR** dextrose **OR** glucose **OR** glucose **OR** glucose-vitamin C    metoprolol

## 2025-05-02 ENCOUNTER — APPOINTMENT (OUTPATIENT)
Dept: MRI IMAGING | Facility: HOSPITAL | Age: 75
End: 2025-05-02
Attending: HOSPITALIST
Payer: MEDICARE

## 2025-05-02 LAB
ANION GAP SERPL CALC-SCNC: 12 MMOL/L (ref 0–18)
BUN BLD-MCNC: 17 MG/DL (ref 9–23)
BUN/CREAT SERPL: 13.9 (ref 10–20)
CALCIUM BLD-MCNC: 7.6 MG/DL (ref 8.7–10.4)
CHLORIDE SERPL-SCNC: 110 MMOL/L (ref 98–112)
CO2 SERPL-SCNC: 24 MMOL/L (ref 21–32)
CREAT BLD-MCNC: 1.22 MG/DL (ref 0.7–1.3)
EGFRCR SERPLBLD CKD-EPI 2021: 61 ML/MIN/1.73M2 (ref 60–?)
GLUCOSE BLD-MCNC: 197 MG/DL (ref 70–99)
GLUCOSE BLDC GLUCOMTR-MCNC: 185 MG/DL (ref 70–99)
GLUCOSE BLDC GLUCOMTR-MCNC: 242 MG/DL (ref 70–99)
GLUCOSE BLDC GLUCOMTR-MCNC: 253 MG/DL (ref 70–99)
GLUCOSE BLDC GLUCOMTR-MCNC: 272 MG/DL (ref 70–99)
OSMOLALITY SERPL CALC.SUM OF ELEC: 309 MOSM/KG (ref 275–295)
POTASSIUM SERPL-SCNC: 3.4 MMOL/L (ref 3.5–5.1)
POTASSIUM SERPL-SCNC: 3.4 MMOL/L (ref 3.5–5.1)
SODIUM SERPL-SCNC: 146 MMOL/L (ref 136–145)

## 2025-05-02 PROCEDURE — 99233 SBSQ HOSP IP/OBS HIGH 50: CPT | Performed by: HOSPITALIST

## 2025-05-02 PROCEDURE — 70551 MRI BRAIN STEM W/O DYE: CPT | Performed by: HOSPITALIST

## 2025-05-02 PROCEDURE — 99232 SBSQ HOSP IP/OBS MODERATE 35: CPT | Performed by: INTERNAL MEDICINE

## 2025-05-02 RX ORDER — HYDRALAZINE HYDROCHLORIDE 20 MG/ML
10 INJECTION INTRAMUSCULAR; INTRAVENOUS EVERY 4 HOURS PRN
Status: DISCONTINUED | OUTPATIENT
Start: 2025-05-02 | End: 2025-05-06

## 2025-05-02 RX ORDER — DULOXETIN HYDROCHLORIDE 60 MG/1
60 CAPSULE, DELAYED RELEASE ORAL DAILY
Status: DISCONTINUED | OUTPATIENT
Start: 2025-05-02 | End: 2025-05-06

## 2025-05-02 RX ORDER — MEMANTINE HYDROCHLORIDE 10 MG/1
10 TABLET ORAL 2 TIMES DAILY
Status: DISCONTINUED | OUTPATIENT
Start: 2025-05-02 | End: 2025-05-06

## 2025-05-02 RX ORDER — LEVOTHYROXINE SODIUM 25 UG/1
25 TABLET ORAL
Status: DISCONTINUED | OUTPATIENT
Start: 2025-05-03 | End: 2025-05-06

## 2025-05-02 RX ORDER — AMLODIPINE BESYLATE 10 MG/1
10 TABLET ORAL DAILY
Status: DISCONTINUED | OUTPATIENT
Start: 2025-05-02 | End: 2025-05-06

## 2025-05-02 RX ORDER — LOSARTAN POTASSIUM 50 MG/1
50 TABLET ORAL DAILY
Status: DISCONTINUED | OUTPATIENT
Start: 2025-05-03 | End: 2025-05-06

## 2025-05-02 NOTE — DIETARY NOTE
ADULT NUTRITION INITIAL ASSESSMENT      Pt is at moderate nutrition risk.  Pt meets moderate malnutrition criteria.      RECOMMENDATIONS TO MD: See nutrition intervention for ONS (oral nutrition supplements)    ADMITTING DIAGNOSIS:  Hypoglycemia [E16.2]  Dental infection [K04.7]  Acute kidney injury [N17.9]  PERTINENT PAST MEDICAL HISTORY: Past Medical History[1]    PATIENT STATUS: Initial 05/02/25:  Patient (pt) identified at Nutrition risk due to poor po and unintentional wt loss  after RD's chart review triggered from from <22 kg/m2 BMI.   Diagnosis & PMH above including Mulitple Myeloma,   Other Medical findings:  Hypernatremia improving. Left face cellulitis. Swallow eval noted.   Upon visit, pt lying in the bed. Po poor 25-50%, average meal intake 31% in the past 2 days. Pt was NPO on 4/30. Pt reports decreased appetite in the past 3-4 weeks from typical intake of 2 full meals/day  down to 1/2 a meal/day --suspect from dental infection with prior inability to masticate food well. Wt eval:    17# or 12% severe wt loss in the past 3-4 weeks based on reported usual wt of 140# ( consistent with emr wt hx of 143#), compared to current wt of 123#. Nutrition findings:    Encouraged pt to increase po in 3 meals/day.  Agree with the current diet . And, the provision for Oral Nutrition supplements (ONS) is indicated to support the pt’s increasing  nutritional needs in the setting of Acute Moderate Malnutrition.       FOOD/NUTRITION:  Appetite: Poor  Current meal intake:      25-50%   Po poor 25-50%, average meal intake 31% in the past 2 days. Pt was NPO on 4/30.  Intake Meeting Needs:  No, but oral nutrition supplements (ONS) to maximize    Percent Meals Eaten (last 6 days)       Date/Time Percent Meals Eaten (%)    04/29/25 1700 0 %    04/30/25 0749 0 %     Percent Meals Eaten (%): pt NPO at 04/30/25 0749    04/30/25 1220 0 %     Percent Meals Eaten (%): pt npo at 04/30/25 1220    05/01/25 1024 35 %    05/01/25 1454 15  %    05/02/25 0816 25 %    05/02/25 1340 50 %           Food Allergies: No Known Food Allergies (NKFA)  Cultural/Ethnic/Buddhism Preferences: None    GASTROINTESTINAL: +BM 5/2 Formed brown large stool x 1 and Swallow evaluation noted    MEDICATIONS: reviewed  IV D5 42 ml/hr.   Medication Infusions[2]   Scheduled Medications[3]  LABS: reviewed K replacement of 40 meq given.   Last A1c value was 7.3% done 4/28/2025.     Recent Labs     04/30/25  0622 05/01/25  0620 05/02/25  0516   * 158* 197*   BUN 37* 33* 17   CREATSERUM 1.75* 1.47* 1.22   CA 7.1* 7.3* 7.6*   MG 2.3 2.3  --    * 153* 146*   K 3.4* 3.3*  3.3* 3.4*  3.4*    111 110   CO2 23.0 28.0 24.0   PHOS 4.0 3.0  --    OSMOCALC 320* 327* 309*       WEIGHT HISTORY:    Patient Weight(s) for the past 336 hrs:   Weight   05/02/25 1300 56 kg (123 lb 8 oz)   04/30/25 0640 54.1 kg (119 lb 4.8 oz)   04/28/25 2300 54.3 kg (119 lb 12.8 oz)   04/28/25 1202 60.3 kg (133 lb)     Wt Readings from Last 10 Encounters:   05/02/25 56 kg (123 lb 8 oz)   07/28/24 65.2 kg (143 lb 12.8 oz)   07/22/24 61.2 kg (135 lb)       ANTHROPOMETRICS:  HT: 165.1 cm (5' 5\")  Wt Readings from Last 1 Encounters:   05/02/25 56 kg (123 lb 8 oz)      Last weight: Likely accurate  BMI: Body mass index is 20.55 kg/m².  Dosing Weight: 56 kg - taken on 5/2 , utilized for anthropometric calculations  BMI CLASSIFICATION: <22 considered underweight for advanced age (>65)  IBW/lbs (Calculated) Male: 136 lbs                         90% IBW  Usual Body Wt: 140 lbs  per pt, 143# in emr     88% UBW      NUTRITION RELATED PHYSICAL FINDINGS:  - Nutrition Focused Physical Exam (NFPE): mild depletion body fat triceps region and mild depletion muscle mass temple region, clavicle region, and dorsal lema region  - Fluid Accumulation: none . See RN documentation for details  - Skin Integrity: intact. See RN documentation for details      CRITERIA FOR MALNUTRITION DIAGNOSIS:   Criteria for  non-severe malnutrition diagnosis: acute illness/injury related to wt loss 5% in 1 month, energy intake less than 75% for greater than 7 days, body fat mild depletion, and muscle mass mild depletion.    NUTRITION DIAGNOSIS/PROBLEM:   Moderate Malnutrition related to Acute - Physiological causes resulting in anorexia or diminished intake  as evidenced by reported significant decreased in po intake <50% compared to usual intake of 2 full meals/day, 12% severe wt loss in the past 3-4 weeks, and Low BMI for older adults.     NUTRITION INTERVENTION:     NUTRITION PRESCRIPTION:   Estimated Nutrition needs: --dosing wt of 56 kg - wt taken on 5/2  Calories: 9002-0332 calories/day (30-33 calories per kg Dosing wt)  Protein: 67-73 g protein/day (1.2-1.3 g protein/kg Dosing wt)    - Diet:       Procedures    Carbohydrate controlled diet 1800 kcal/60 grams; Fluid Consistency: Thin Liquids ; Texture Consistency: Soft / Easy to Chew ; Is Patient on Accuchecks? Yes; Is Patient on Suicide Precautions? No; Misc Restriction: No Straw     - Nutrition Care Plan: Encouraged increased PO intake, Initiated ONS (oral nutritional supplements), and Educated patient/family on ways to increase oral intake  - ONS (Oral Nutrition Supplements)/Meals/Snacks: Glucerna (220 calories/ 10 g protein each) BID Strawberry   - Vitamin and mineral supplements: none  - Feeding assistance: meal set up  - Nutrition education: Discussed importance of overall  consistent increase nutrition intake to limit wt loss  - Coordination of nutrition care: collaboration with other providers  - Discharge and transfer of nutrition care to new setting or provider: monitor plans      MONITOR AND EVALUATE/NUTRITION GOALS:  - Food and Nutrient Intake:    Monitor: adequacy of PO intake and adequacy of supplement intake  - Food and Nutrient Administration:   Monitor: N/A  - Anthropometric Measurement:    Monitor weight  - Nutrition Goals:    halt wt loss, regain wt as able, PO  and supplement greater than 75% of needs, labs within acceptable limits, minimize lean body mass loss, support body systems, and improved GI status      RD to follow up    Cecilia Suarez RD, LDN, [facility]  Clinical Dietitian  [phone]               [1]   Past Medical History:   Abnormal gait    Benign essential hypertension    Cancer (HCC)    Chest pain    Formatting of this note might be different from the original.   Normal stress test 11/2021      Last Assessment & Plan:    Formatting of this note might be different from the original.   Symptoms are atypical for ischemia   Chest pain is worse with change in position      Diabetes (HCC)    Diabetes mellitus (HCC)    Diabetes mellitus (HCC)    High blood pressure    Hyperglycemia    Hypertension associated with diabetes (HCC)    Last Assessment & Plan:    Formatting of this note might be different from the original.   Blood pressure mildly elevated   Continue same medications for now   Continue to monitor readings      Multiple myeloma (HCC)    Multiple premature ventricular complexes    Formatting of this note might be different from the original.   Normal stress test 11/2021      Echo 8/2021:    1. Left ventricular ejection fraction, by visual estimation, is 60 to 65%.    2. Mild concentric left ventricular hypertrophy.    3. Normal pattern of LV diastolic filling.    4. Mild aortic valve stenosis.         Last Assessment & Plan:    Formatting of this note might be different fro    Neuropathy    Nonrheumatic aortic valve stenosis    Formatting of this note might be different from the original.   Echo 8/2021:    1. Left ventricular ejection fraction, by visual estimation, is 60 to 65%.    2. Mild concentric left ventricular hypertrophy.    3. Normal pattern of LV diastolic filling.    4. Mild aortic valve stenosis.         Last Assessment & Plan:    Formatting of this note might be different from the original.   Mild aortic mu    Rash    Type 2 diabetes mellitus  without complication, without long-term current use of insulin (HCC)    Vomiting   [2]    dextrose 42 mL/hr at 05/02/25 1451   [3]    amLODIPine  10 mg Oral Daily    DULoxetine  60 mg Oral Daily    [START ON 5/3/2025] levothyroxine  25 mcg Oral Before breakfast    [START ON 5/3/2025] losartan  50 mg Oral Daily    memantine  10 mg Oral BID    acyclovir  10 mg/kg Intravenous Q12H    insulin aspart  1-5 Units Subcutaneous TID CC and HS    [Held by provider] valACYclovir  1,000 mg Oral Daily    meropenem  500 mg Intravenous Q12H    chlorhexidine gluconate  15 mL Mouth/Throat QID    heparin  5,000 Units Subcutaneous 2 times per day

## 2025-05-02 NOTE — PLAN OF CARE
Problem: Patient Centered Care  Goal: Patient preferences are identified and integrated in the patient's plan of care  Description: Interventions:- What would you like us to know as we care for you? - Provide timely, complete, and accurate information to patient/family- Incorporate patient and family knowledge, values, beliefs, and cultural backgrounds into the planning and delivery of care- Encourage patient/family to participate in care and decision-making at the level they choose- Honor patient and family perspectives and choices  Outcome: Progressing     Problem: PAIN - ADULT  Goal: Verbalizes/displays adequate comfort level or patient's stated pain goal  Description: INTERVENTIONS:- Encourage pt to monitor pain and request assistance- Assess pain using appropriate pain scale- Administer analgesics based on type and severity of pain and evaluate response- Implement non-pharmacological measures as appropriate and evaluate response- Consider cultural and social influences on pain and pain management- Manage/alleviate anxiety- Utilize distraction and/or relaxation techniques- Monitor for opioid side effects- Notify MD/LIP if interventions unsuccessful or patient reports new pain- Anticipate increased pain with activity and pre-medicate as appropriate  Outcome: Progressing     Problem: RISK FOR INFECTION - ADULT  Goal: Absence of fever/infection during anticipated neutropenic period  Description: INTERVENTIONS- Monitor WBC- Administer growth factors as ordered- Implement neutropenic guidelines  Outcome: Progressing     Problem: SAFETY ADULT - FALL  Goal: Free from fall injury  Description: INTERVENTIONS:- Assess pt frequently for physical needs- Identify cognitive and physical deficits and behaviors that affect risk of falls.- [place] fall precautions as indicated by assessment.- Educate pt/family on patient safety including physical limitations- Instruct pt to call for assistance with activity based on  assessment- Modify environment to reduce risk of injury- Provide assistive devices as appropriate- Consider OT/PT consult to assist with strengthening/mobility- Encourage toileting schedule  Outcome: Progressing     Problem: DISCHARGE PLANNING  Goal: Discharge to home or other facility with appropriate resources  Description: INTERVENTIONS:- Identify barriers to discharge w/pt and caregiver- Include patient/family/discharge partner in discharge planning- Arrange for needed discharge resources and transportation as appropriate- Identify discharge learning needs (meds, wound care, etc)- Arrange for interpreters to assist at discharge as needed- Consider post-discharge preferences of patient/family/discharge partner- Complete POLST form as appropriate- Assess patient's ability to be responsible for managing their own health- Refer to Case Management Department for coordinating discharge planning if the patient needs post-hospital services based on physician/LIP order or complex needs related to functional status, cognitive ability or social support system  Outcome: Progressing

## 2025-05-02 NOTE — PROGRESS NOTES
[facility]  part of [facility]  Hospitalist Progress Note     Joselin Graham Patient Status:  Inpatient    5/15/1950  74 year old CSN [id number]   Location 544/544-A Attending Catracho Tapia MD   Hosp Day # 4 PCP No primary care provider on file.     Assessment & Plan:   ----------------------------------  Left face cellulitis.  With phlegmon but no drainable collection.  Significant improvement in the last 48 hours.  - Continue IV meropenem, acyclovir  - ID following  - ENT evaluation appreciated, no surgical intervention recommended  - Advance diet  - MRI pend  - Speech reevaluation    PARVEEN.  back to baseline.  - Repeat MP in the morning  - Encourage p.o. intake, IV fluids    Hypernatremia.  Improving  - Defer to renal  - Continue D5 at 42    Other problems  History of multiple myeloma  Hypertension  Hypothyroidism  Dementia    Supplementary Documentation:   DVT Mechanical Prophylaxis:        DVT Pharmacologic Prophylaxis   Medication    heparin (Porcine) 5000 UNIT/ML injection 5,000 Units                Code Status: Full Code  Jean: External urinary catheter in place  Jean Duration (in days):   Central line:    KUSH: 5/3/2025      I personally reviewed the available laboratories, imaging including. I discussed/will discuss the case with consultants. I ordered laboratories and/or radiographic studies. I adjusted medications as detailed above.  Medical decision making high, risk is high.    Subjective:   ----------------------------------  Feeling better again. Pain improving.  Eating better      Objective:   Chief Complaint:   Chief Complaint   Patient presents with    Dental Problem     ----------------------------------  Temp:  [97.7 °F (36.5 °C)-98.2 °F (36.8 °C)] 97.7 °F (36.5 °C)  Pulse:  [63-80] 76  Resp:  [16-18] 18  BP: (143-164)/(63-95) 143/89  SpO2:  [94 %-97 %] 97 %  Gen: A+Ox3.  No distress.   HEENT: Some crusted lesions on the left bottom lip, healing nicely.  Some erythema,  improving white patches in the left buccal membrane.  Able to open jaw significantly better than previously.  No significant lymphadenopathy.  CV: RRR, S1S2, and intact distal pulses. No gallop, rub, murmur.  Pulm: Effort and breath sounds normal. No distress, wheezes, rales, rhonchi.  Abd: Soft, NTND, BS normal, no mass, no HSM, no rebound/guarding.   Neuro: Normal reflexes, CN. Sensory/motor exams grossly normal deficit.   MS: No joint effusions.  No peripheral edema.  Skin: Skin is warm and dry. No rashes, erythema, diaphoresis.   Psych: Normal mood and affect. Calm, cooperative    Labs:  Lab Results   Component Value Date    HGB 11.9 (L) 05/01/2025    WBC 3.0 (L) 05/01/2025    .0 (L) 05/01/2025     (H) 05/02/2025    K 3.4 (L) 05/02/2025    K 3.4 (L) 05/02/2025    CREATSERUM 1.22 05/02/2025    AST 23 04/29/2025    ALT 79 (H) 04/29/2025           Scheduled Medications[1]  PRN Medications[2]             [1]    acyclovir  10 mg/kg Intravenous Q12H    insulin aspart  1-5 Units Subcutaneous TID CC and HS    [Held by provider] valACYclovir  1,000 mg Oral Daily    meropenem  500 mg Intravenous Q12H    chlorhexidine gluconate  15 mL Mouth/Throat QID    heparin  5,000 Units Subcutaneous 2 times per day   [2]   HYDROcodone-acetaminophen    acetaminophen    melatonin    ondansetron    metoclopramide    HYDROmorphone **OR** HYDROmorphone **OR** HYDROmorphone    glucose **OR** glucose **OR** glucose-vitamin C **OR** dextrose **OR** glucose **OR** glucose **OR** glucose-vitamin C    metoprolol

## 2025-05-02 NOTE — PLAN OF CARE
Patients diet upgraded to soft easy chew with thin liquids. Tolerating new diet. Moved stand/pivot from bed to rolling chair.     Problem: Patient Centered Care  Goal: Patient preferences are identified and integrated in the patient's plan of care  Description: Interventions:- What would you like us to know as we care for you? From home with caregiver. - Provide timely, complete, and accurate information to patient/family- Incorporate patient and family knowledge, values, beliefs, and cultural backgrounds into the planning and delivery of care- Encourage patient/family to participate in care and decision-making at the level they choose- Honor patient and family perspectives and choices  Outcome: Progressing     Problem: PAIN - ADULT  Goal: Verbalizes/displays adequate comfort level or patient's stated pain goal  Description: INTERVENTIONS:- Encourage pt to monitor pain and request assistance- Assess pain using appropriate pain scale- Administer analgesics based on type and severity of pain and evaluate response- Implement non-pharmacological measures as appropriate and evaluate response- Consider cultural and social influences on pain and pain management- Manage/alleviate anxiety- Utilize distraction and/or relaxation techniques- Monitor for opioid side effects- Notify MD/LIP if interventions unsuccessful or patient reports new pain- Anticipate increased pain with activity and pre-medicate as appropriate  Outcome: Progressing     Problem: RISK FOR INFECTION - ADULT  Goal: Absence of fever/infection during anticipated neutropenic period  Description: INTERVENTIONS- Monitor WBC- Administer growth factors as ordered- Implement neutropenic guidelines  Outcome: Progressing     Problem: SAFETY ADULT - FALL  Goal: Free from fall injury  Description: INTERVENTIONS:- Assess pt frequently for physical needs- Identify cognitive and physical deficits and behaviors that affect risk of falls.- [place] fall precautions as  indicated by assessment.- Educate pt/family on patient safety including physical limitations- Instruct pt to call for assistance with activity based on assessment- Modify environment to reduce risk of injury- Provide assistive devices as appropriate- Consider OT/PT consult to assist with strengthening/mobility- Encourage toileting schedule  Outcome: Progressing     Problem: DISCHARGE PLANNING  Goal: Discharge to home or other facility with appropriate resources  Description: INTERVENTIONS:- Identify barriers to discharge w/pt and caregiver- Include patient/family/discharge partner in discharge planning- Arrange for needed discharge resources and transportation as appropriate- Identify discharge learning needs (meds, wound care, etc)- Arrange for interpreters to assist at discharge as needed- Consider post-discharge preferences of patient/family/discharge partner- Complete POLST form as appropriate- Assess patient's ability to be responsible for managing their own health- Refer to Case Management Department for coordinating discharge planning if the patient needs post-hospital services based on physician/LIP order or complex needs related to functional status, cognitive ability or social support system  Outcome: Progressing

## 2025-05-02 NOTE — CM/SW NOTE
Per AKILA note dated 5/1, family requesting new rollator.AKILA sent DME referral to [facility] in [place]. Per Rhea at [facility], pt does not qualify for new rollator since patient new wheelchair last year. Likely PO abx at dc per ID.    Plan: Home w/24/7 CG services with [facility] pending medical clearance.    Roe Velasco, SANDRINEN    [phone]

## 2025-05-02 NOTE — PROGRESS NOTES
[facility]  Nephrology Daily Progress Note    Joselin Graham  [id number]  74 year old      HPI:   Joselin Graham is a 74 year old male.  Overall doing well.  Passed swallowing eval and now on thin liquids.      ROS:     Constitutional:  Negative for decreased activity, fever, irritability and lethargy  Endocrine:  Negative for abnormal sleep patterns, increased activity, polydipsia and polyphagia  Cardiovascular:  Negative for cool extremity and irregular heartbeat/palpitations  Gastrointestinal:  Negative for abdominal pain, constipation, decreased appetite, diarrhea and vomiting  Genitourinary:  Negative for dysuria and hematuria  Hema/Lymph:  Negative for easy bleeding and easy bruising  Integumentary:  Negative for pruritus and rash  Musculoskeletal:  Negative for bone/joint symptoms  Neurological:  Negative for gait disturbance  Psychiatric:  Negative for inappropriate interaction and psychiatric symptoms  Respiratory:  Negative for cough, dyspnea and wheezing      PHYSICAL EXAM:   Temp:  [97.7 °F (36.5 °C)-98.2 °F (36.8 °C)] 97.7 °F (36.5 °C)  Pulse:  [63-80] 76  Resp:  [16-18] 18  BP: (143-164)/(63-95) 143/89  SpO2:  [94 %-97 %] 97 %  Patient Weight for the past 72 hrs:   Weight   04/30/25 0640 119 lb 4.8 oz (54.1 kg)       Constitutional: appears well hydrated alert and responsive no acute distress noted  Neck/Thyroid: neck is supple without adenopathy  Lymphatic: no abnormal cervical, supraclavicular or axillary adenopathy is noted  Respiratory: normal to inspection lungs are clear to auscultation bilaterally normal respiratory effort  Cardiovascular: regular rate and rhythm no murmurs, gallups, or rubs  Abdomen: soft non-tender non-distended no organomegaly noted no masses  Musculoskeletal: full ROM all extremities good strength  no deformities  Extremities: no edema, cyanosis, or clubbing  Neurological: exam appropriate for age reflexes and motor skills appropriate for age    Labs:  Lab Results    Component Value Date    CREATSERUM 1.22 05/02/2025    BUN 17 05/02/2025     05/02/2025    K 3.4 05/02/2025    K 3.4 05/02/2025     05/02/2025    CO2 24.0 05/02/2025     05/02/2025    CA 7.6 05/02/2025     Recent Labs   Lab 04/29/25  0537 04/30/25  0622 05/01/25  0620   WBC 1.4* 2.8* 3.0*   HGB 11.6* 11.6* 11.9*   HCT 34.3* 33.9* 36.6*   PLT 85.0* 91.0* 110.0*     Recent Labs   Lab 04/28/25  1212 04/29/25  0537 04/30/25  0622 05/01/25  0620 05/02/25  0516   GLU 90 92 157* 158* 197*   BUN 86* 63* 37* 33* 17   CREATSERUM 4.34* 2.60* 1.75* 1.47* 1.22   CA 8.2* 7.3* 7.1* 7.3* 7.6*   ALB 4.1 3.4 3.2 3.2  --     145 149* 153* 146*   K 4.9 3.6 3.4* 3.3*  3.3* 3.4*  3.4*    114* 109 111 110   CO2 12.0* 16.0* 23.0 28.0 24.0   ALKPHO 109 92  --   --   --    AST 20 23  --   --   --    * 79*  --   --   --    BILT 0.5 0.9  --   --   --    TP 7.1 6.1  --   --   --    PHOS  --  4.4 4.0 3.0  --        Imaging  No results found.      Medications:  Current Hospital Medications[1]    Allergies:  Allergies[2]    Input/Output:    Intake/Output Summary (Last 24 hours) at 5/2/2025 1446  Last data filed at 5/2/2025 1340  Gross per 24 hour   Intake 2320 ml   Output 2750 ml   Net -430 ml          ASSESSMENT/PLAN:   Assessment   Problem List[3]    UO 1250 ml and creatine down to 1.22.  Hypernatremia better 146.  Replace K and will wean off D5W.  Discussed with caretaker and RN.  Home soon              5/2/2025  Marbin Salcedo MD               [1]   Current Facility-Administered Medications:     HYDROcodone-acetaminophen (Norco) 5-325 MG per tab 1 tablet, 1 tablet, Oral, Q4H PRN    acetaminophen (Tylenol) tab 650 mg, 650 mg, Oral, Q4H PRN    dextrose 5% infusion, , Intravenous, Continuous    acyclovir (Zovirax) 550 mg in sodium chloride 0.9% 100 mL IVPB, 10 mg/kg, Intravenous, Q12H    insulin aspart (NovoLOG) 100 Units/mL FlexPen 1-5 Units, 1-5 Units, Subcutaneous, TID CC and HS    melatonin tab 6 mg,  6 mg, Oral, Nightly PRN    [Held by provider] valACYclovir (Valtrex) tab 1,000 mg, 1,000 mg, Oral, Daily    meropenem (Merrem) 500 mg in sodium chloride 0.9% 100mL IVPB-TABBY, 500 mg, Intravenous, Q12H    chlorhexidine gluconate (Peridex) 0.12 % oral solution 15 mL, 15 mL, Mouth/Throat, QID    heparin (Porcine) 5000 UNIT/ML injection 5,000 Units, 5,000 Units, Subcutaneous, 2 times per day    ondansetron (Zofran) 4 MG/2ML injection 4 mg, 4 mg, Intravenous, Q6H PRN    metoclopramide (Reglan) 5 mg/mL injection 5 mg, 5 mg, Intravenous, Q8H PRN    HYDROmorphone (Dilaudid) 1 MG/ML injection 0.2 mg, 0.2 mg, Intravenous, Q2H PRN **OR** HYDROmorphone (Dilaudid) 1 MG/ML injection 0.4 mg, 0.4 mg, Intravenous, Q2H PRN **OR** HYDROmorphone (Dilaudid) 1 MG/ML injection 0.8 mg, 0.8 mg, Intravenous, Q2H PRN    glucose (Dex4) 15 GM/59ML oral liquid 15 g, 15 g, Oral, Q15 Min PRN **OR** glucose (Glutose) 40% oral gel 15 g, 15 g, Oral, Q15 Min PRN **OR** glucose-vitamin C (Dex-4) chewable tab 4 tablet, 4 tablet, Oral, Q15 Min PRN **OR** dextrose 50% injection 50 mL, 50 mL, Intravenous, Q15 Min PRN **OR** glucose (Dex4) 15 GM/59ML oral liquid 30 g, 30 g, Oral, Q15 Min PRN **OR** glucose (Glutose) 40% oral gel 30 g, 30 g, Oral, Q15 Min PRN **OR** glucose-vitamin C (Dex-4) chewable tab 8 tablet, 8 tablet, Oral, Q15 Min PRN    metoprolol (Lopressor) 5 mg/5mL injection 5 mg, 5 mg, Intravenous, Q6H PRN  [2] No Known Allergies  [3]   Patient Active Problem List  Diagnosis    Weakness generalized    Recurrent falls    Uncontrolled type 2 diabetes mellitus with hyperglycemia (HCC)    Acquired hypothyroidism    Thrombocytopenia (HCC)    Acute renal failure (ARF) (HCC)    Acute kidney injury    Metabolic acidosis    Dental infection    Hypoglycemia    Acute kidney failure    Odontogenic infection of jaw    Brown hairy tongue

## 2025-05-02 NOTE — SLP NOTE
SPEECH DAILY NOTE - INPATIENT    ASSESSMENT & PLAN   ASSESSMENT  PPE REQUIRED. THIS THERAPIST WORE GLOVES FOR DURATION OF SESSION. HANDS WASHED UPON ENTRANCE/EXIT.    SLP f/u for ongoing dysphagia tx/meal assessment per recommendations of puree/mildly thick liquids per BSE. RN reports pt tolerates diet and medication well with no overt clinical s/s aspiration. Pt denies any swallowing challenges.     Pt positioned upright in bed, alert/cooperative. Pt afebrile, tolerating room air with oxygen status 97% prior to the start of oral trials. SLP reviewed aspiration precautions and safe swallowing compensatory strategies with the patient. Safe swallow guidelines remain written on the white board in purple. Patient v/u. Provided no assistance, pt tolerates puree and mildly thick liquids via cup with no overt clinical signs/symptoms of aspiration. Pt presented with trials of hard solids and thin liquids via cup. Pt with adequate oral acceptance and bilabial seal. Pt with intact bite, prolonged mastication, and delayed A/P transfer. Pharyngeal swallow response appears delayed with reduced laryngeal elevation/excursion. No clinical signs of aspiration (e.g., immediate/delayed throat clear, immediate/delayed cough, wet vocal quality, increased O2 effort) observed across all trials. Oxygen status remained stable t/o the entire session. Recommend upgrade to soft easy to chew/thin liquids with adherence to aspiration precautions and swallow strategies.     SLP to f/u with meal assessment x1-2, monitor  CXR, and VFSS if any overt CSA and/or decline in CXR. RN alerted with results and recommendations.     MOST RECENT CXR 4/28  Findings and impression:  Normal heart size, clear lungs, normal pleura        Diet Recommendations - Solids: Soft/ Easy to chew  Diet Recommendations - Liquids: Thin Liquids     Aspiration Precautions: Upright position, Slow rate, Small bites, Small sips, No straw  Medication Administration Recommendations:   (as tolerated)    Patient Experiencing Pain: Yes  Pain Rating: Unable to Rate     Pain Control: IV meds  Nursing Aware of Pain?: Yes    Treatment Plan  Treatment Plan/Recommendations: Aspiration precautions    Interdisciplinary Communication: Discussed with RN          GOALS  Goal #1 The patient will tolerate soft easy to chew consistency and thin liquids without overt signs or symptoms of aspiration with 100 % accuracy over 1-2 session(s).  Revised 5/2   Goal #2 The patient/family/caregiver will demonstrate understanding and implementation of aspiration precautions and swallow strategies independently over 1-2 session(s).    In Progress   Goal #3 The patient will tolerate trial upgrade of regular consistency and thin liquids without overt signs or symptoms of aspiration with 100 % accuracy over 1-2 session(s).  Revised 5/2   Goal #4 The patient will utilize compensatory strategies as outlined by  BSSE (clinical evaluation) including Slow rate, Small bites, Small sips, No straws, Upright 90 degrees, Upright 90 degrees 30 mins after meal, Eliminate distractions with PRN assistance 100 % of the time across 2 sessions.    In Progress     FOLLOW UP  Follow Up Needed (Documentation Required): Yes  SLP Follow-up Date: 05/03/25  Duration: 1 week    Session: 2    If you have any questions, please contact PAMELA Rosario M.S. CCC-SLP  Speech Language Pathologist  Phone Number Ext. [phone]

## 2025-05-02 NOTE — PLAN OF CARE
Problem: ALTERED NUTRIENT INTAKE - ADULT  Goal: Nutrient intake appropriate for improving, restoring or maintaining nutritional needs  Description: INTERVENTIONS:- Assess nutritional status and recommend course of action- Monitor oral intake, labs, and treatment plans- Recommend appropriate diets, oral nutritional supplements, and vitamin/mineral supplements-- Provide specific nutrition education as appropriate  Outcome: Progressing

## 2025-05-02 NOTE — PLAN OF CARE
Problem: Patient Centered Care  Goal: Patient preferences are identified and integrated in the patient's plan of care  Description: Interventions:- Provide timely, complete, and accurate information to patient/family- Incorporate patient and family knowledge, values, beliefs, and cultural backgrounds into the planning and delivery of care- Encourage patient/family to participate in care and decision-making at the level they choose- Honor patient and family perspectives and choices  Outcome: Progressing     Problem: PAIN - ADULT  Goal: Verbalizes/displays adequate comfort level or patient's stated pain goal  Description: INTERVENTIONS:- Encourage pt to monitor pain and request assistance- Assess pain using appropriate pain scale- Administer analgesics based on type and severity of pain and evaluate response- Implement non-pharmacological measures as appropriate and evaluate response- Consider cultural and social influences on pain and pain management- Manage/alleviate anxiety- Utilize distraction and/or relaxation techniques- Monitor for opioid side effects- Notify MD/LIP if interventions unsuccessful or patient reports new pain- Anticipate increased pain with activity and pre-medicate as appropriate  Outcome: Progressing     Problem: SAFETY ADULT - FALL  Goal: Free from fall injury  Description: INTERVENTIONS:- Assess pt frequently for physical needs- Identify cognitive and physical deficits and behaviors that affect risk of falls.- [place] fall precautions as indicated by assessment.- Educate pt/family on patient safety including physical limitations- Instruct pt to call for assistance with activity based on assessment- Modify environment to reduce risk of injury- Provide assistive devices as appropriate- Consider OT/PT consult to assist with strengthening/mobility- Encourage toileting schedule  Outcome: Progressing     Problem: DISCHARGE PLANNING  Goal: Discharge to home or other facility with appropriate  resources  Description: INTERVENTIONS:- Identify barriers to discharge w/pt and caregiver- Include patient/family/discharge partner in discharge planning- Arrange for needed discharge resources and transportation as appropriate- Identify discharge learning needs (meds, wound care, etc)- Arrange for interpreters to assist at discharge as needed- Consider post-discharge preferences of patient/family/discharge partner- Complete POLST form as appropriate- Assess patient's ability to be responsible for managing their own health- Refer to Case Management Department for coordinating discharge planning if the patient needs post-hospital services based on physician/LIP order or complex needs related to functional status, cognitive ability or social support system  Outcome: Progressing

## 2025-05-03 PROBLEM — B02.9 HERPES ZOSTER WITHOUT COMPLICATION: Status: ACTIVE | Noted: 2025-05-03

## 2025-05-03 PROBLEM — H60.90 OTITIS EXTERNA: Status: ACTIVE | Noted: 2025-05-03

## 2025-05-03 PROBLEM — H66.012 NON-RECURRENT ACUTE SUPPURATIVE OTITIS MEDIA OF LEFT EAR WITH SPONTANEOUS RUPTURE OF TYMPANIC MEMBRANE: Status: ACTIVE | Noted: 2025-05-03

## 2025-05-03 LAB
ALBUMIN SERPL-MCNC: 3.1 G/DL (ref 3.2–4.8)
ANION GAP SERPL CALC-SCNC: 12 MMOL/L (ref 0–18)
BASOPHILS # BLD AUTO: 0.01 X10(3) UL (ref 0–0.2)
BASOPHILS NFR BLD AUTO: 0.4 %
BUN BLD-MCNC: 20 MG/DL (ref 9–23)
BUN/CREAT SERPL: 18.3 (ref 10–20)
CALCIUM BLD-MCNC: 7.5 MG/DL (ref 8.7–10.4)
CHLORIDE SERPL-SCNC: 103 MMOL/L (ref 98–112)
CO2 SERPL-SCNC: 26 MMOL/L (ref 21–32)
CREAT BLD-MCNC: 1.09 MG/DL (ref 0.7–1.3)
DEPRECATED RDW RBC AUTO: 47.9 FL (ref 35.1–46.3)
EGFRCR SERPLBLD CKD-EPI 2021: 70 ML/MIN/1.73M2 (ref 60–?)
EOSINOPHIL # BLD AUTO: 0.07 X10(3) UL (ref 0–0.7)
EOSINOPHIL NFR BLD AUTO: 2.7 %
ERYTHROCYTE [DISTWIDTH] IN BLOOD BY AUTOMATED COUNT: 15.2 % (ref 11–15)
GLUCOSE BLD-MCNC: 176 MG/DL (ref 70–99)
GLUCOSE BLDC GLUCOMTR-MCNC: 151 MG/DL (ref 70–99)
GLUCOSE BLDC GLUCOMTR-MCNC: 233 MG/DL (ref 70–99)
GLUCOSE BLDC GLUCOMTR-MCNC: 271 MG/DL (ref 70–99)
GLUCOSE BLDC GLUCOMTR-MCNC: 271 MG/DL (ref 70–99)
HCT VFR BLD AUTO: 31.5 % (ref 39–53)
HGB BLD-MCNC: 10.6 G/DL (ref 13–17.5)
IMM GRANULOCYTES # BLD AUTO: 0 X10(3) UL (ref 0–1)
IMM GRANULOCYTES NFR BLD: 0 %
LYMPHOCYTES # BLD AUTO: 1.15 X10(3) UL (ref 1–4)
LYMPHOCYTES NFR BLD AUTO: 44.2 %
MAGNESIUM SERPL-MCNC: 1.5 MG/DL (ref 1.6–2.6)
MAGNESIUM SERPL-MCNC: 2.2 MG/DL (ref 1.6–2.6)
MCH RBC QN AUTO: 28.8 PG (ref 26–34)
MCHC RBC AUTO-ENTMCNC: 33.7 G/DL (ref 31–37)
MCV RBC AUTO: 85.6 FL (ref 80–100)
MONOCYTES # BLD AUTO: 0.28 X10(3) UL (ref 0.1–1)
MONOCYTES NFR BLD AUTO: 10.8 %
NEUTROPHILS # BLD AUTO: 1.09 X10 (3) UL (ref 1.5–7.7)
NEUTROPHILS # BLD AUTO: 1.09 X10(3) UL (ref 1.5–7.7)
NEUTROPHILS NFR BLD AUTO: 41.9 %
OSMOLALITY SERPL CALC.SUM OF ELEC: 299 MOSM/KG (ref 275–295)
PHOSPHATE SERPL-MCNC: 3.7 MG/DL (ref 2.4–5.1)
PLATELET # BLD AUTO: 116 10(3)UL (ref 150–450)
POTASSIUM SERPL-SCNC: 2.9 MMOL/L (ref 3.5–5.1)
POTASSIUM SERPL-SCNC: 2.9 MMOL/L (ref 3.5–5.1)
POTASSIUM SERPL-SCNC: 3.6 MMOL/L (ref 3.5–5.1)
RBC # BLD AUTO: 3.68 X10(6)UL (ref 3.8–5.8)
SODIUM SERPL-SCNC: 141 MMOL/L (ref 136–145)
WBC # BLD AUTO: 2.6 X10(3) UL (ref 4–11)

## 2025-05-03 PROCEDURE — 99232 SBSQ HOSP IP/OBS MODERATE 35: CPT | Performed by: INTERNAL MEDICINE

## 2025-05-03 PROCEDURE — 99233 SBSQ HOSP IP/OBS HIGH 50: CPT | Performed by: HOSPITALIST

## 2025-05-03 PROCEDURE — 99232 SBSQ HOSP IP/OBS MODERATE 35: CPT | Performed by: OTOLARYNGOLOGY

## 2025-05-03 RX ORDER — CIPROFLOXACIN AND DEXAMETHASONE 3; 1 MG/ML; MG/ML
4 SUSPENSION/ DROPS AURICULAR (OTIC) 2 TIMES DAILY
Qty: 7.5 ML | Refills: 0 | Status: SHIPPED | OUTPATIENT
Start: 2025-05-03 | End: 2025-05-17

## 2025-05-03 RX ORDER — POTASSIUM CHLORIDE 1500 MG/1
40 TABLET, EXTENDED RELEASE ORAL EVERY 4 HOURS
Status: DISCONTINUED | OUTPATIENT
Start: 2025-05-03 | End: 2025-05-03

## 2025-05-03 RX ORDER — CIPROFLOXACIN AND DEXAMETHASONE 3; 1 MG/ML; MG/ML
4 SUSPENSION/ DROPS AURICULAR (OTIC) 2 TIMES DAILY
Status: DISCONTINUED | OUTPATIENT
Start: 2025-05-03 | End: 2025-05-06

## 2025-05-03 RX ORDER — VALACYCLOVIR HYDROCHLORIDE 500 MG/1
1000 TABLET, FILM COATED ORAL 2 TIMES DAILY
Status: DISCONTINUED | OUTPATIENT
Start: 2025-05-03 | End: 2025-05-06

## 2025-05-03 RX ORDER — POTASSIUM CHLORIDE 1500 MG/1
40 TABLET, EXTENDED RELEASE ORAL ONCE
Status: DISCONTINUED | OUTPATIENT
Start: 2025-05-03 | End: 2025-05-03

## 2025-05-03 RX ORDER — MAGNESIUM SULFATE HEPTAHYDRATE 40 MG/ML
2 INJECTION, SOLUTION INTRAVENOUS ONCE
Status: COMPLETED | OUTPATIENT
Start: 2025-05-03 | End: 2025-05-03

## 2025-05-03 RX ORDER — MAGNESIUM OXIDE 400 MG/1
800 TABLET ORAL ONCE
Status: COMPLETED | OUTPATIENT
Start: 2025-05-03 | End: 2025-05-03

## 2025-05-03 NOTE — PROGRESS NOTES
.[facility]  TEL: [phone]  FAX: [phone]    Joselin Graham Patient Status:  Inpatient    5/15/1950 MRN [id number]   Location [facility] 5SW/SE Attending Zenia Martel MD   Hosp Day # 5 PCP No primary care provider on file.     Subjective:  ROS reviewed. Feels much better. Tolerating PO.    History of Present Illness:  Joselin Graham is a a(n) 74 year old male.  Who had a dental extraction 3 days prior.  Start developing some swelling.  Poor p.o. intake.  Came in through the ED.  With low platelet and white count.  White count 2.7.  Concern for left facial swelling.  With PARVEEN.  CT of the face with small focus of superoposterior gas and fluid surrounding lingual and buccal cortical surfaces.  On my exam, patient has unilateral coating of the tongue as well as some ulcers on the hard palate on the left side.  Also possible crusted ulcerations on the lip.  Nothing around the eye.  History provided by caregiver    History:  Past Medical History[1]  Past Surgical History[2]  Family History[3]   reports that he has never smoked. He has never used smokeless tobacco. He reports that he does not drink alcohol and does not use drugs.    Allergies:  Allergies[4]    Medications:  Current Hospital Medications[5]    Review of Systems:    A comprehensive 10 point review of systems was completed.  Pertinent positives and negatives noted in the the HPI.    Physical Exam:    General: No acute distress. Alert and oriented x 3.  Able to follow commands.  Vital signs: Blood pressure 156/66, pulse 93, temperature 98.4 °F (36.9 °C), temperature source Oral, resp. rate 16, height 5' 5\" (1.651 m), weight 121 lb 1.6 oz (54.9 kg), SpO2 95%.  HEENT: Dry mucous membranes, dried drainage in left ear. No pain with palpation over the mastoid. Crusting over lesions on face and oral mucousa  Extraocular muscles are intact.  Neck: No lymphadenopathy.  No JVD. No carotid bruits.  Respiratory: Clear  to auscultation bilaterally.  No wheezes. No rhonchi.  Cardiovascular: S1, S2.  Regular rate and rhythm.  No murmurs.  Abdomen: Soft, nontender, nondistended.  Positive bowel sounds.  Musculoskeletal: Full range of motion of all extremities.  No swelling noted.  Integument: No lesions. No erythema.  Psychiatric: Appropriate mood and affect.  Neurologic: No focal neurological deficits.    Laboratory Data:  Lab Results   Component Value Date    WBC 2.6 05/03/2025    HGB 10.6 05/03/2025    HCT 31.5 05/03/2025    .0 05/03/2025    CREATSERUM 1.09 05/03/2025    BUN 20 05/03/2025     05/03/2025    K 2.9 05/03/2025    K 2.9 05/03/2025     05/03/2025    CO2 26.0 05/03/2025     05/03/2025    CA 7.5 05/03/2025    ALB 3.1 05/03/2025    MG 1.5 05/03/2025    PHOS 3.7 05/03/2025    PGLU 151 05/03/2025       Microbiologic Data:     Reviewed in EMR    Imaging:  CT Scan    Imaging results reviewed     Impression:  Problem List[6]      ASSESSMENT:    # Facial swelling.  Following tooth extraction.  Initial concern for cellulitis and abscess.  No abscess on CT.  With concern for zoster with tongue and palate involvement as well as lip.  With secondary cellulitis.  Following dental extraction.  Could have concomitant bacterial process.  Polymicrobial.  With leukopenia on Unasyn.   # Possible otomastoiditis with drainage from L ear - +L mastoid process tenderness now resolved   -ENT following  #Acute kidney injury.    PLAN:  -Continue meropenem for now. Continue on PO valtrex.  -MRI reviewed. Will plan for 2-4 weeks of IV abx on DC. PICC can be placed on Monday.  -Reviewed micro, labs, imaging reports.  -Case d/w patient, family, Dr. Tapia, RN.    [facility]  [phone]          [1]   Past Medical History:   Abnormal gait    Benign essential hypertension    Cancer (HCC)    Chest pain    Formatting of this note might be different from the original.   Normal stress  test 11/2021      Last Assessment & Plan:    Formatting of this note might be different from the original.   Symptoms are atypical for ischemia   Chest pain is worse with change in position      Diabetes (HCC)    Diabetes mellitus (HCC)    Diabetes mellitus (HCC)    High blood pressure    Hyperglycemia    Hypertension associated with diabetes (HCC)    Last Assessment & Plan:    Formatting of this note might be different from the original.   Blood pressure mildly elevated   Continue same medications for now   Continue to monitor readings      Multiple myeloma (HCC)    Multiple premature ventricular complexes    Formatting of this note might be different from the original.   Normal stress test 11/2021      Echo 8/2021:    1. Left ventricular ejection fraction, by visual estimation, is 60 to 65%.    2. Mild concentric left ventricular hypertrophy.    3. Normal pattern of LV diastolic filling.    4. Mild aortic valve stenosis.         Last Assessment & Plan:    Formatting of this note might be different fro    Neuropathy    Nonrheumatic aortic valve stenosis    Formatting of this note might be different from the original.   Echo 8/2021:    1. Left ventricular ejection fraction, by visual estimation, is 60 to 65%.    2. Mild concentric left ventricular hypertrophy.    3. Normal pattern of LV diastolic filling.    4. Mild aortic valve stenosis.         Last Assessment & Plan:    Formatting of this note might be different from the original.   Mild aortic mu    Rash    Type 2 diabetes mellitus without complication, without long-term current use of insulin (HCC)    Vomiting   [2] History reviewed. No pertinent surgical history.  [3] No family history on file.  [4] No Known Allergies  [5]   Current Facility-Administered Medications:     valACYclovir (Valtrex) tab 1,000 mg, 1,000 mg, Oral, BID    potassium chloride 40 mEq in 250mL sodium chloride 0.9% IVPB premix, 40 mEq, Intravenous, BID    amLODIPine (Norvasc) tab 10 mg, 10  mg, Oral, Daily    DULoxetine (Cymbalta) DR cap 60 mg, 60 mg, Oral, Daily    levothyroxine (Synthroid) tab 25 mcg, 25 mcg, Oral, Before breakfast    losartan (Cozaar) tab 50 mg, 50 mg, Oral, Daily    memantine (Namenda) tab 10 mg, 10 mg, Oral, BID    hydrALAzine (Apresoline) 20 mg/mL injection 10 mg, 10 mg, Intravenous, Q4H PRN    HYDROcodone-acetaminophen (Norco) 5-325 MG per tab 1 tablet, 1 tablet, Oral, Q4H PRN    acetaminophen (Tylenol) tab 650 mg, 650 mg, Oral, Q4H PRN    dextrose 5% infusion, , Intravenous, Continuous    insulin aspart (NovoLOG) 100 Units/mL FlexPen 1-5 Units, 1-5 Units, Subcutaneous, TID CC and HS    melatonin tab 6 mg, 6 mg, Oral, Nightly PRN    meropenem (Merrem) 500 mg in sodium chloride 0.9% 100mL IVPB-TABBY, 500 mg, Intravenous, Q12H    chlorhexidine gluconate (Peridex) 0.12 % oral solution 15 mL, 15 mL, Mouth/Throat, QID    heparin (Porcine) 5000 UNIT/ML injection 5,000 Units, 5,000 Units, Subcutaneous, 2 times per day    ondansetron (Zofran) 4 MG/2ML injection 4 mg, 4 mg, Intravenous, Q6H PRN    metoclopramide (Reglan) 5 mg/mL injection 5 mg, 5 mg, Intravenous, Q8H PRN    HYDROmorphone (Dilaudid) 1 MG/ML injection 0.2 mg, 0.2 mg, Intravenous, Q2H PRN **OR** HYDROmorphone (Dilaudid) 1 MG/ML injection 0.4 mg, 0.4 mg, Intravenous, Q2H PRN **OR** HYDROmorphone (Dilaudid) 1 MG/ML injection 0.8 mg, 0.8 mg, Intravenous, Q2H PRN    glucose (Dex4) 15 GM/59ML oral liquid 15 g, 15 g, Oral, Q15 Min PRN **OR** glucose (Glutose) 40% oral gel 15 g, 15 g, Oral, Q15 Min PRN **OR** glucose-vitamin C (Dex-4) chewable tab 4 tablet, 4 tablet, Oral, Q15 Min PRN **OR** dextrose 50% injection 50 mL, 50 mL, Intravenous, Q15 Min PRN **OR** glucose (Dex4) 15 GM/59ML oral liquid 30 g, 30 g, Oral, Q15 Min PRN **OR** glucose (Glutose) 40% oral gel 30 g, 30 g, Oral, Q15 Min PRN **OR** glucose-vitamin C (Dex-4) chewable tab 8 tablet, 8 tablet, Oral, Q15 Min PRN    metoprolol (Lopressor) 5 mg/5mL injection 5 mg, 5 mg,  Intravenous, Q6H PRN  [6]   Patient Active Problem List  Diagnosis    Weakness generalized    Recurrent falls    Uncontrolled type 2 diabetes mellitus with hyperglycemia (HCC)    Acquired hypothyroidism    Thrombocytopenia (HCC)    Acute renal failure (ARF) (HCC)    Acute kidney injury    Metabolic acidosis    Dental infection    Hypoglycemia    Acute kidney failure    Odontogenic infection of jaw    Brown hairy tongue

## 2025-05-03 NOTE — PROGRESS NOTES
Progress Note     Joselin Graham Patient Status:  Inpatient    5/15/1950 MRN [id number]   Location [facility] 5SW/SE Attending Catracho Tapia MD   Hosp Day # 5 PCP No primary care provider on file.     Chief Complaint: revaluation for ear complaint    Subjective:   S: Patient has had interval development of vesicular rash around the face and pinna. He has left otorrhea.     Review of Systems:   10 point ROS completed and was negative, except for pertinent positive and negatives stated in subjective.    Objective:   Vital signs:  Temp:  [97.9 °F (36.6 °C)-99.5 °F (37.5 °C)] 98.4 °F (36.9 °C)  Pulse:  [64-93] 93  Resp:  [16-18] 16  BP: (145-170)/(62-87) 156/66  SpO2:  [95 %-99 %] 95 %    Wt Readings from Last 3 Encounters:   25 121 lb 1.6 oz (54.9 kg)   24 143 lb 12.8 oz (65.2 kg)   24 135 lb (61.2 kg)       Intake/Output:    Intake/Output Summary (Last 24 hours) at 5/3/2025 1141  Last data filed at 5/3/2025 0730  Gross per 24 hour   Intake 1659.2 ml   Output 2250 ml   Net -590.8 ml         EXAMINATION:  I washed my hands with an alcohol-based hand gel prior to examination  Constitutional:   --Vitals: Blood pressure 156/66, pulse 93, temperature 98.4 °F (36.9 °C), temperature source Oral, resp. rate 16, height 5' 5\" (1.651 m), weight 121 lb 1.6 oz (54.9 kg), SpO2 95%.  General: no apparent distress, well-developed, conversant  Neuro: Cranial nerves: EOMI, Facial sensation intact to touch, palate elevates midline, tongue protrudes midline, shoulder shrug intact bilateral, facial movement normal bilateral  Respiratory: No stridor, stertor or increased work of breathing  ENT:  --OC/OP: Crusted painful vesicular rash lesion in different stages of healing with serous weeping around the left upper and lower lip extending to the chin.  --Neck: No palpable cervical lymphadenopathy, no thyromegaly, no masses or lesions over the bilateral submandibular or parotid glands  --Ear: (bilateral ears were  examined under binocular microscopy)  Left ear microscopic exam:  Pinna: Crusted painful vesicular rash lesion in different stages of healing with serous weeping   Mastoid: Nontender on palpation.   External auditory canal: Edematous and tender with purulent drainage.  Tympanic membrane: Difficult to visualize, but likely with perforation and pulsating purulent fluid draining  Middle ear: Purulence draining     Results:   Diagnostic Data:      Labs:    Selected labs - last 24 hours:  Endo  Lytes  Renal   Glu 176  Na 141 Ca 7.5  BUN 20   POC Gluc  151  K 2.9; 2.9 PO4 3.7  Cr 1.09   A1c -  Cl 103 Mg 1.5  eGFR 70   TSH -  CO2 26.0         LFT  CBC  Other   AST -  WBC 2.6  PTT - Procal -   ALT -  Hb 10.6  INR - CRP -   APk -  Hct 31.5  Trop - D dim -   T aliyah -  .0  pBNP -  BNP -  - Ferritin  -   Prot -    CK  - Lactate  -   Alb 3.1    LDL  - COVID  -     Imaging: Imaging data reviewed in Epic.    Medications: Scheduled Medications[1]    Assessment & Plan:   ASSESSMENT / PLAN:     #Shingles in the V2 and V3 distribution, also noted on the pinna  #Secondary bacterial acute otitis externa and acute otitis media. Patient does not have clinical mastoiditis. Fluid on imaging cannot determine mastoiditis as fluid can represent non infective process or infection which is not mastoiditis. Clinical correlation does not support the diagnosis of acute mastoiditis. CT temporal bone can better delineate if there is any cortical bony destruction in the mastoid that MRI, although I have low suspicion for this.   #Odontogenic infection along the mandible and  spaces - no drainable fluid collection, improving    -Continue antibiotics per ID - per review, IV antibiotics have been recommended, continue management of shingles with antiviral   -Recommend oral surgery follow-up  -Start ciprodex otic drops 4 drops BID to the left ear. Patient should continue this medication at home for at total of 14 days.   -Dry ear  precautions for left ear. Shower with vaseline soaked cotton ball in ear canal opening to avoid water entering the ear.  -Follow-up with me in the office in 1 week for repeat evaluation    Mauro Vargas MD  Otology/Otolaryngology  [address] [place] [address]  Phone [phone]  Fax [phone]     Plan of care: Reviewed with the patient's son and the hospitalist service     Quality:  DVT Mechanical Prophylaxis:        DVT Pharmacologic Prophylaxis   Medication    heparin (Porcine) 5000 UNIT/ML injection 5,000 Units                Code Status: Full Code  Jean: External urinary catheter in place  Jean Duration (in days):   Central line:    KUSH: 5/4/2025      Supplementary Documentation:                      [1]    valACYclovir  1,000 mg Oral BID    potassium chloride  40 mEq Intravenous BID    amLODIPine  10 mg Oral Daily    DULoxetine  60 mg Oral Daily    levothyroxine  25 mcg Oral Before breakfast    losartan  50 mg Oral Daily    memantine  10 mg Oral BID    insulin aspart  1-5 Units Subcutaneous TID CC and HS    meropenem  500 mg Intravenous Q12H    chlorhexidine gluconate  15 mL Mouth/Throat QID    heparin  5,000 Units Subcutaneous 2 times per day

## 2025-05-03 NOTE — PROGRESS NOTES
[facility]  Nephrology Daily Progress Note    Joselin Graham  [id number]  74 year old      HPI:   Joselin Graham is a 74 year old male.  Overall feeling well.  Drinking fluids better on thin liquids.       ROS:     Constitutional:  Negative for decreased activity, fever, irritability and lethargy  Endocrine:  Negative for abnormal sleep patterns, increased activity, polydipsia and polyphagia  Cardiovascular:  Negative for cool extremity and irregular heartbeat/palpitations  Gastrointestinal:  Negative for abdominal pain, constipation, decreased appetite, diarrhea and vomiting  Genitourinary:  Negative for dysuria and hematuria  Hema/Lymph:  Negative for easy bleeding and easy bruising  Integumentary:  Negative for pruritus and rash  Musculoskeletal:  Negative for bone/joint symptoms  Neurological:  Negative for gait disturbance  Psychiatric:  Negative for inappropriate interaction and psychiatric symptoms  Respiratory:  Negative for cough, dyspnea and wheezing      PHYSICAL EXAM:   Temp:  [97.9 °F (36.6 °C)-99.5 °F (37.5 °C)] 98.6 °F (37 °C)  Pulse:  [64-93] 93  Resp:  [16-18] 16  BP: (112-170)/(62-87) 112/78  SpO2:  [95 %-99 %] 97 %  Patient Weight for the past 72 hrs:   Weight   05/02/25 1300 123 lb 8 oz (56 kg)   05/03/25 0520 121 lb 1.6 oz (54.9 kg)       Constitutional: appears well hydrated alert and responsive no acute distress noted  Neck/Thyroid: neck is supple without adenopathy  Lymphatic: no abnormal cervical, supraclavicular or axillary adenopathy is noted  Respiratory: normal to inspection lungs are clear to auscultation bilaterally normal respiratory effort  Cardiovascular: regular rate and rhythm no murmurs, gallups, or rubs  Abdomen: soft non-tender non-distended no organomegaly noted no masses  Musculoskeletal: full ROM all extremities good strength  no deformities  Extremities: no edema, cyanosis, or clubbing  Neurological: exam appropriate for age reflexes and motor skills appropriate  for age    Labs:  Lab Results   Component Value Date    WBC 2.6 05/03/2025    HGB 10.6 05/03/2025    HCT 31.5 05/03/2025    .0 05/03/2025    CREATSERUM 1.09 05/03/2025    BUN 20 05/03/2025     05/03/2025    K 2.9 05/03/2025    K 2.9 05/03/2025     05/03/2025    CO2 26.0 05/03/2025     05/03/2025    CA 7.5 05/03/2025    ALB 3.1 05/03/2025    MG 1.5 05/03/2025    PHOS 3.7 05/03/2025     Recent Labs   Lab 04/30/25  0622 05/01/25  0620 05/03/25  0706   WBC 2.8* 3.0* 2.6*   HGB 11.6* 11.9* 10.6*   HCT 33.9* 36.6* 31.5*   PLT 91.0* 110.0* 116.0*     Recent Labs   Lab 04/28/25  1212 04/28/25  1212 04/29/25  0537 04/30/25  0622 05/01/25  0620 05/02/25  0516 05/03/25  0706   GLU 90  --  92 157* 158* 197* 176*   BUN 86*  --  63* 37* 33* 17 20   CREATSERUM 4.34*  --  2.60* 1.75* 1.47* 1.22 1.09   CA 8.2*  --  7.3* 7.1* 7.3* 7.6* 7.5*   ALB 4.1  --  3.4 3.2 3.2  --  3.1*     --  145 149* 153* 146* 141   K 4.9  --  3.6 3.4* 3.3*  3.3* 3.4*  3.4* 2.9*  2.9*     --  114* 109 111 110 103   CO2 12.0*  --  16.0* 23.0 28.0 24.0 26.0   ALKPHO 109  --  92  --   --   --   --    AST 20  --  23  --   --   --   --    *  --  79*  --   --   --   --    BILT 0.5  --  0.9  --   --   --   --    TP 7.1  --  6.1  --   --   --   --    PHOS  --    < > 4.4 4.0 3.0  --  3.7    < > = values in this interval not displayed.       Imaging  MRI BRAIN (CPT=70551)  Result Date: 5/3/2025  CONCLUSION:  1. There are subtotal mastoid effusions bilaterally, worse on the left. Additionally, there is apparent concurrent inflammation throughout the left external auditory canal, concerning for otomastoiditis in the appropriate clinical setting.   2. No acute intracranial process by noncontrast MR technique.  3. Senescent changes of parenchymal volume loss with sequela of chronic microvascular ischemic disease.  4. Lesser incidental findings as above.     Dictated by (CST): Scottie Allen MD on 5/03/2025 at 8:52 AM      Finalized by (CST): Scottie Allen MD on 5/03/2025 at 9:00 AM              Medications:  Current Hospital Medications[1]    Allergies:  Allergies[2]    Input/Output:    Intake/Output Summary (Last 24 hours) at 5/3/2025 1553  Last data filed at 5/3/2025 1526  Gross per 24 hour   Intake 2144.2 ml   Output 2450 ml   Net -305.8 ml          ASSESSMENT/PLAN:   Assessment   Problem List[3]    UO good 2250 ml.  Creatinine better 1.09.  Replace K and Mg.  Sodium 141. Will stop D5 W.  Labs in am.  Home soon if OK with others.  Discussed with care taker.              5/3/2025  Marbin Salcedo MD               [1]   Current Facility-Administered Medications:     valACYclovir (Valtrex) tab 1,000 mg, 1,000 mg, Oral, BID    potassium chloride 40 mEq in 250mL sodium chloride 0.9% IVPB premix, 40 mEq, Intravenous, BID    ciprofloxacin-dexamethasone (Ciprodex) 0.3-0.1 % otic suspension 4 drop, 4 drop, Left Ear, BID    amLODIPine (Norvasc) tab 10 mg, 10 mg, Oral, Daily    DULoxetine (Cymbalta) DR cap 60 mg, 60 mg, Oral, Daily    levothyroxine (Synthroid) tab 25 mcg, 25 mcg, Oral, Before breakfast    losartan (Cozaar) tab 50 mg, 50 mg, Oral, Daily    memantine (Namenda) tab 10 mg, 10 mg, Oral, BID    hydrALAzine (Apresoline) 20 mg/mL injection 10 mg, 10 mg, Intravenous, Q4H PRN    HYDROcodone-acetaminophen (Norco) 5-325 MG per tab 1 tablet, 1 tablet, Oral, Q4H PRN    acetaminophen (Tylenol) tab 650 mg, 650 mg, Oral, Q4H PRN    dextrose 5% infusion, , Intravenous, Continuous    insulin aspart (NovoLOG) 100 Units/mL FlexPen 1-5 Units, 1-5 Units, Subcutaneous, TID CC and HS    melatonin tab 6 mg, 6 mg, Oral, Nightly PRN    meropenem (Merrem) 500 mg in sodium chloride 0.9% 100mL IVPB-TABBY, 500 mg, Intravenous, Q12H    chlorhexidine gluconate (Peridex) 0.12 % oral solution 15 mL, 15 mL, Mouth/Throat, QID    heparin (Porcine) 5000 UNIT/ML injection 5,000 Units, 5,000 Units, Subcutaneous, 2 times per day    ondansetron (Zofran) 4 MG/2ML  injection 4 mg, 4 mg, Intravenous, Q6H PRN    metoclopramide (Reglan) 5 mg/mL injection 5 mg, 5 mg, Intravenous, Q8H PRN    HYDROmorphone (Dilaudid) 1 MG/ML injection 0.2 mg, 0.2 mg, Intravenous, Q2H PRN **OR** HYDROmorphone (Dilaudid) 1 MG/ML injection 0.4 mg, 0.4 mg, Intravenous, Q2H PRN **OR** HYDROmorphone (Dilaudid) 1 MG/ML injection 0.8 mg, 0.8 mg, Intravenous, Q2H PRN    glucose (Dex4) 15 GM/59ML oral liquid 15 g, 15 g, Oral, Q15 Min PRN **OR** glucose (Glutose) 40% oral gel 15 g, 15 g, Oral, Q15 Min PRN **OR** glucose-vitamin C (Dex-4) chewable tab 4 tablet, 4 tablet, Oral, Q15 Min PRN **OR** dextrose 50% injection 50 mL, 50 mL, Intravenous, Q15 Min PRN **OR** glucose (Dex4) 15 GM/59ML oral liquid 30 g, 30 g, Oral, Q15 Min PRN **OR** glucose (Glutose) 40% oral gel 30 g, 30 g, Oral, Q15 Min PRN **OR** glucose-vitamin C (Dex-4) chewable tab 8 tablet, 8 tablet, Oral, Q15 Min PRN    metoprolol (Lopressor) 5 mg/5mL injection 5 mg, 5 mg, Intravenous, Q6H PRN  [2] No Known Allergies  [3]   Patient Active Problem List  Diagnosis    Weakness generalized    Recurrent falls    Uncontrolled type 2 diabetes mellitus with hyperglycemia (HCC)    Acquired hypothyroidism    Thrombocytopenia (HCC)    Acute renal failure (ARF) (HCC)    Acute kidney injury    Metabolic acidosis    Dental infection    Hypoglycemia    Acute kidney failure    Odontogenic infection of jaw    Brown hairy tongue    Herpes zoster without complication    Otitis externa    Non-recurrent acute suppurative otitis media of left ear with spontaneous rupture of tympanic membrane

## 2025-05-03 NOTE — PROGRESS NOTES
[facility]  part of [facility]  Hospitalist Progress Note     Joselin Graham Patient Status:  Inpatient    5/15/1950  74 year old CSN [id number]   Location 544/544-A Attending Catracho Tapia MD   Hosp Day # 5 PCP No primary care provider on file.     Assessment & Plan:   ----------------------------------  Left face cellulitis.  With phlegmon but no drainable collection.  Significant improvement in the last 48 hours.  - Continue IV meropenem, change iv acyclovir to po valtrex  - ID following  - ENT evaluation appreciated, no surgical intervention recommended  - Advance diet  - MRI done, read pend  - Speech reevaluation    PARVEEN.  back to baseline.  - Repeat MP in the morning  - Encourage p.o. intake, IV fluids    Hypernatremia.  Improving  - Defer to renal  - Continue D5 at 42    Other problems  History of multiple myeloma  Hypertension  Hypothyroidism  Dementia    Supplementary Documentation:   DVT Mechanical Prophylaxis:        DVT Pharmacologic Prophylaxis   Medication    heparin (Porcine) 5000 UNIT/ML injection 5,000 Units                Code Status: Full Code  Jean: External urinary catheter in place  Jean Duration (in days):   Central line:    KUSH: 2025      I personally reviewed the available laboratories, imaging including. I discussed/will discuss the case with consultants. I ordered laboratories and/or radiographic studies. I adjusted medications as detailed above.  Medical decision making high, risk is high.    Subjective:   ----------------------------------  Feeling better again. Pain improving.  Eating better. Now on think liquids      Objective:   Chief Complaint:   Chief Complaint   Patient presents with    Dental Problem     ----------------------------------  Temp:  [97.7 °F (36.5 °C)-99.5 °F (37.5 °C)] 97.9 °F (36.6 °C)  Pulse:  [64-93] 93  Resp:  [18] 18  BP: (143-170)/(62-95) 145/87  SpO2:  [97 %-99 %] 97 %  Gen: A+Ox3.  No distress.   HEENT: Some crusted lesions on the  left bottom lip, healing nicely.  Some erythema, improving white patches in the left buccal membrane.  Able to open jaw significantly better than previously.  No significant lymphadenopathy.  CV: RRR, S1S2, and intact distal pulses. No gallop, rub, murmur.  Pulm: Effort and breath sounds normal. No distress, wheezes, rales, rhonchi.  Abd: Soft, NTND, BS normal, no mass, no HSM, no rebound/guarding.   Neuro: Normal reflexes, CN. Sensory/motor exams grossly normal deficit.   MS: No joint effusions.  No peripheral edema.  Skin: Skin is warm and dry. No rashes, erythema, diaphoresis.   Psych: Normal mood and affect. Calm, cooperative    Labs:  Lab Results   Component Value Date    HGB 10.6 (L) 05/03/2025    WBC 2.6 (L) 05/03/2025    .0 (L) 05/03/2025     (H) 05/02/2025    K 3.4 (L) 05/02/2025    K 3.4 (L) 05/02/2025    CREATSERUM 1.22 05/02/2025    AST 23 04/29/2025    ALT 79 (H) 04/29/2025           Scheduled Medications[1]  PRN Medications[2]             [1]    amLODIPine  10 mg Oral Daily    DULoxetine  60 mg Oral Daily    levothyroxine  25 mcg Oral Before breakfast    losartan  50 mg Oral Daily    memantine  10 mg Oral BID    insulin aspart  1-5 Units Subcutaneous TID CC and HS    valACYclovir  1,000 mg Oral Daily    meropenem  500 mg Intravenous Q12H    chlorhexidine gluconate  15 mL Mouth/Throat QID    heparin  5,000 Units Subcutaneous 2 times per day   [2]   hydrALAzine    HYDROcodone-acetaminophen    acetaminophen    melatonin    ondansetron    metoclopramide    HYDROmorphone **OR** HYDROmorphone **OR** HYDROmorphone    glucose **OR** glucose **OR** glucose-vitamin C **OR** dextrose **OR** glucose **OR** glucose **OR** glucose-vitamin C    metoprolol

## 2025-05-03 NOTE — PLAN OF CARE
Problem: Patient Centered Care  Goal: Patient preferences are identified and integrated in the patient's plan of care  Description: Interventions:- What would you like us to know as we care for you? From home alone with caregiver   - Provide timely, complete, and accurate information to patient/family- Incorporate patient and family knowledge, values, beliefs, and cultural backgrounds into the planning and delivery of care- Encourage patient/family to participate in care and decision-making at the level they choose- Honor patient and family perspectives and choices  Outcome: Progressing     Problem: PAIN - ADULT  Goal: Verbalizes/displays adequate comfort level or patient's stated pain goal  Description: INTERVENTIONS:- Encourage pt to monitor pain and request assistance- Assess pain using appropriate pain scale- Administer analgesics based on type and severity of pain and evaluate response- Implement non-pharmacological measures as appropriate and evaluate response- Consider cultural and social influences on pain and pain management- Manage/alleviate anxiety- Utilize distraction and/or relaxation techniques- Monitor for opioid side effects- Notify MD/LIP if interventions unsuccessful or patient reports new pain- Anticipate increased pain with activity and pre-medicate as appropriate  Outcome: Progressing     Problem: RISK FOR INFECTION - ADULT  Goal: Absence of fever/infection during anticipated neutropenic period  Description: INTERVENTIONS- Monitor WBC- Administer growth factors as ordered- Implement neutropenic guidelines  Outcome: Progressing     Problem: SAFETY ADULT - FALL  Goal: Free from fall injury  Description: INTERVENTIONS:- Assess pt frequently for physical needs- Identify cognitive and physical deficits and behaviors that affect risk of falls.- [place] fall precautions as indicated by assessment.- Educate pt/family on patient safety including physical limitations- Instruct pt to call for  assistance with activity based on assessment- Modify environment to reduce risk of injury- Provide assistive devices as appropriate- Consider OT/PT consult to assist with strengthening/mobility- Encourage toileting schedule  Outcome: Progressing     Problem: DISCHARGE PLANNING  Goal: Discharge to home or other facility with appropriate resources  Description: INTERVENTIONS:- Identify barriers to discharge w/pt and caregiver- Include patient/family/discharge partner in discharge planning- Arrange for needed discharge resources and transportation as appropriate- Identify discharge learning needs (meds, wound care, etc)- Arrange for interpreters to assist at discharge as needed- Consider post-discharge preferences of patient/family/discharge partner- Complete POLST form as appropriate- Assess patient's ability to be responsible for managing their own health- Refer to Case Management Department for coordinating discharge planning if the patient needs post-hospital services based on physician/LIP order or complex needs related to functional status, cognitive ability or social support system  Outcome: Progressing     Problem: SKIN/TISSUE INTEGRITY - ADULT  Goal: Skin integrity remains intact  Description: INTERVENTIONS- Assess and document risk factors for pressure ulcer development- Assess and document skin integrity- Monitor for areas of redness and/or skin breakdown- Initiate interventions, skin care algorithm/standards of care as needed  Outcome: Progressing  Goal: Incision(s), wounds(s) or drain site(s) healing without S/S of infection  Description: INTERVENTIONS:- Assess and document risk factors for pressure ulcer development- Assess and document skin integrity- Assess and document dressing/incision, wound bed, drain sites and surrounding tissue- Implement wound care per orders- Initiate isolation precautions as appropriate- Initiate Pressure Ulcer prevention bundle as indicated  Outcome: Progressing  Goal: Oral  mucous membranes remain intact  Description: INTERVENTIONS- Assess oral mucosa and hygiene practices- Implement preventative oral hygiene regimen- Implement oral medicated treatments as ordered  Outcome: Progressing

## 2025-05-03 NOTE — PLAN OF CARE
Patient is alert and oriented x4. Isolation maintained. Up with x2 assist S/P. Primofit in place. Iv fluids continued. Blood glucose checks ac and hs. Electrolytes replaced. Wound to mouth and cheek clean and dry. Tele in place. No distress noted. Denies SOB, Chest pain, or nausea. Call light is within reach. Caregiver at bedside. Plan for discharge home when cleared.       Problem: Patient Centered Care  Goal: Patient preferences are identified and integrated in the patient's plan of care  Description: Interventions:- What would you like us to know as we care for you? - Provide timely, complete, and accurate information to patient/family- Incorporate patient and family knowledge, values, beliefs, and cultural backgrounds into the planning and delivery of care- Encourage patient/family to participate in care and decision-making at the level they choose- Honor patient and family perspectives and choices  Outcome: Progressing     Problem: PAIN - ADULT  Goal: Verbalizes/displays adequate comfort level or patient's stated pain goal  Description: INTERVENTIONS:- Encourage pt to monitor pain and request assistance- Assess pain using appropriate pain scale- Administer analgesics based on type and severity of pain and evaluate response- Implement non-pharmacological measures as appropriate and evaluate response- Consider cultural and social influences on pain and pain management- Manage/alleviate anxiety- Utilize distraction and/or relaxation techniques- Monitor for opioid side effects- Notify MD/LIP if interventions unsuccessful or patient reports new pain- Anticipate increased pain with activity and pre-medicate as appropriate  Outcome: Progressing     Problem: RISK FOR INFECTION - ADULT  Goal: Absence of fever/infection during anticipated neutropenic period  Description: INTERVENTIONS- Monitor WBC- Administer growth factors as ordered- Implement neutropenic guidelines  Outcome: Progressing     Problem: SAFETY ADULT -  FALL  Goal: Free from fall injury  Description: INTERVENTIONS:- Assess pt frequently for physical needs- Identify cognitive and physical deficits and behaviors that affect risk of falls.- [place] fall precautions as indicated by assessment.- Educate pt/family on patient safety including physical limitations- Instruct pt to call for assistance with activity based on assessment- Modify environment to reduce risk of injury- Provide assistive devices as appropriate- Consider OT/PT consult to assist with strengthening/mobility- Encourage toileting schedule  Outcome: Progressing     Problem: DISCHARGE PLANNING  Goal: Discharge to home or other facility with appropriate resources  Description: INTERVENTIONS:- Identify barriers to discharge w/pt and caregiver- Include patient/family/discharge partner in discharge planning- Arrange for needed discharge resources and transportation as appropriate- Identify discharge learning needs (meds, wound care, etc)- Arrange for interpreters to assist at discharge as needed- Consider post-discharge preferences of patient/family/discharge partner- Complete POLST form as appropriate- Assess patient's ability to be responsible for managing their own health- Refer to Case Management Department for coordinating discharge planning if the patient needs post-hospital services based on physician/LIP order or complex needs related to functional status, cognitive ability or social support system  Outcome: Progressing     Problem: SKIN/TISSUE INTEGRITY - ADULT  Goal: Skin integrity remains intact  Description: INTERVENTIONS- Assess and document risk factors for pressure ulcer development- Assess and document skin integrity- Monitor for areas of redness and/or skin breakdown- Initiate interventions, skin care algorithm/standards of care as needed  Outcome: Progressing  Goal: Incision(s), wounds(s) or drain site(s) healing without S/S of infection  Description: INTERVENTIONS:- Assess and document  risk factors for pressure ulcer development- Assess and document skin integrity- Assess and document dressing/incision, wound bed, drain sites and surrounding tissue- Implement wound care per orders- Initiate isolation precautions as appropriate- Initiate Pressure Ulcer prevention bundle as indicated  Outcome: Progressing  Goal: Oral mucous membranes remain intact  Description: INTERVENTIONS- Assess oral mucosa and hygiene practices- Implement preventative oral hygiene regimen- Implement oral medicated treatments as ordered  Outcome: Progressing

## 2025-05-04 LAB
ALBUMIN SERPL-MCNC: 3 G/DL (ref 3.2–4.8)
ANION GAP SERPL CALC-SCNC: 9 MMOL/L (ref 0–18)
BASOPHILS # BLD AUTO: 0.03 X10(3) UL (ref 0–0.2)
BASOPHILS NFR BLD AUTO: 1.1 %
BUN BLD-MCNC: 19 MG/DL (ref 9–23)
BUN/CREAT SERPL: 17.9 (ref 10–20)
CALCIUM BLD-MCNC: 7.6 MG/DL (ref 8.7–10.4)
CHLORIDE SERPL-SCNC: 105 MMOL/L (ref 98–112)
CO2 SERPL-SCNC: 24 MMOL/L (ref 21–32)
CREAT BLD-MCNC: 1.06 MG/DL (ref 0.7–1.3)
DEPRECATED RDW RBC AUTO: 50.2 FL (ref 35.1–46.3)
EGFRCR SERPLBLD CKD-EPI 2021: 73 ML/MIN/1.73M2 (ref 60–?)
EOSINOPHIL # BLD AUTO: 0.16 X10(3) UL (ref 0–0.7)
EOSINOPHIL NFR BLD AUTO: 5.8 %
ERYTHROCYTE [DISTWIDTH] IN BLOOD BY AUTOMATED COUNT: 14.9 % (ref 11–15)
GLUCOSE BLD-MCNC: 179 MG/DL (ref 70–99)
GLUCOSE BLDC GLUCOMTR-MCNC: 155 MG/DL (ref 70–99)
GLUCOSE BLDC GLUCOMTR-MCNC: 212 MG/DL (ref 70–99)
GLUCOSE BLDC GLUCOMTR-MCNC: 259 MG/DL (ref 70–99)
GLUCOSE BLDC GLUCOMTR-MCNC: 262 MG/DL (ref 70–99)
HCT VFR BLD AUTO: 31.1 % (ref 39–53)
HGB BLD-MCNC: 10.1 G/DL (ref 13–17.5)
IMM GRANULOCYTES # BLD AUTO: 0 X10(3) UL (ref 0–1)
IMM GRANULOCYTES NFR BLD: 0 %
LYMPHOCYTES # BLD AUTO: 1.06 X10(3) UL (ref 1–4)
LYMPHOCYTES NFR BLD AUTO: 38.7 %
MAGNESIUM SERPL-MCNC: 1.9 MG/DL (ref 1.6–2.6)
MCH RBC QN AUTO: 29.5 PG (ref 26–34)
MCHC RBC AUTO-ENTMCNC: 32.5 G/DL (ref 31–37)
MCV RBC AUTO: 90.9 FL (ref 80–100)
MONOCYTES # BLD AUTO: 0.31 X10(3) UL (ref 0.1–1)
MONOCYTES NFR BLD AUTO: 11.3 %
NEUTROPHILS # BLD AUTO: 1.18 X10 (3) UL (ref 1.5–7.7)
NEUTROPHILS # BLD AUTO: 1.18 X10(3) UL (ref 1.5–7.7)
NEUTROPHILS NFR BLD AUTO: 43.1 %
OSMOLALITY SERPL CALC.SUM OF ELEC: 293 MOSM/KG (ref 275–295)
PHOSPHATE SERPL-MCNC: 3 MG/DL (ref 2.4–5.1)
PLATELET # BLD AUTO: 141 10(3)UL (ref 150–450)
POTASSIUM SERPL-SCNC: 4.1 MMOL/L (ref 3.5–5.1)
RBC # BLD AUTO: 3.42 X10(6)UL (ref 3.8–5.8)
SODIUM SERPL-SCNC: 138 MMOL/L (ref 136–145)
WBC # BLD AUTO: 2.7 X10(3) UL (ref 4–11)

## 2025-05-04 PROCEDURE — 99233 SBSQ HOSP IP/OBS HIGH 50: CPT | Performed by: HOSPITALIST

## 2025-05-04 PROCEDURE — 99232 SBSQ HOSP IP/OBS MODERATE 35: CPT | Performed by: INTERNAL MEDICINE

## 2025-05-04 NOTE — PHYSICAL THERAPY NOTE
PHYSICAL THERAPY TREATMENT NOTE - INPATIENT     Room Number: 544/544-A       Presenting Problem: PARVEEN (jaw infection)  Co-Morbidities : Dental infection, falls, DM, PARVEEN    Problem List  Principal Problem:    Acute kidney injury  Active Problems:    Thrombocytopenia (HCC)    Acute renal failure (ARF) (HCC)    Metabolic acidosis    Dental infection    Hypoglycemia    Acute kidney failure    Odontogenic infection of jaw    Brown hairy tongue    Herpes zoster without complication    Otitis externa    Non-recurrent acute suppurative otitis media of left ear with spontaneous rupture of tympanic membrane      PHYSICAL THERAPY ASSESSMENT   Patient demonstrates good  progress this session, goals  remain in progress.      Patient is requiring moderate assist as a result of the following impairments: decreased functional strength, decreased endurance/aerobic capacity, impaired standing balance, impaired coordination, impaired motor planning, and decreased muscular endurance.     Patient continues to function below baseline with transfers, gait, maintaining seated position, and standing prolonged periods.  Next session anticipate patient to progress transfers, gait, maintaining seated position, and standing prolonged periods.  Physical Therapy will continue to follow patient for duration of hospitalization.    Patient continues to benefit from continued skilled PT services: at discharge to promote prior level of function and safety with additional support and return home with home health PT. Patient has 24 hrs caregiver.    PLAN DURING HOSPITALIZATION  Nursing Mobility Recommendation : 1 Assist  PT Device Recommendation: Rolling walker  PT Treatment Plan: Coordination, Endurance, Gait training, Patient education, Balance training  Frequency (Obs): 3-5x/week     SUBJECTIVE  Cooperative.    OBJECTIVE  Precautions: Bed/chair alarm    WEIGHT BEARING RESTRICTION       PAIN ASSESSMENT   Rating: Unable to rate  Location: no  complains  Management Techniques: Activity promotion, Repositioning    BALANCE  Static Sitting: Fair  Dynamic Sitting: Fair -  Static Standing: Fair -  Dynamic Standing: Poor +    ACTIVITY TOLERANCE  Pulse: 83  Heart Rate Source: Monitor  Resp: 18  BP: 140/85  BP Location: Left arm  BP Method: Automatic  Patient Position: Semi-Morel     O2 WALK  Oxygen Therapy  SPO2% on Room Air at Rest: 96    AM-PAC '6-Clicks' INPATIENT SHORT FORM - BASIC MOBILITY  How much difficulty does the patient currently have...  Patient Difficulty: Turning over in bed (including adjusting bedclothes, sheets and blankets)?: A Little   Patient Difficulty: Sitting down on and standing up from a chair with arms (e.g., wheelchair, bedside commode, etc.): A Lot   Patient Difficulty: Moving from lying on back to sitting on the side of the bed?: A Little   How much help from another person does the patient currently need...   Help from Another: Moving to and from a bed to a chair (including a wheelchair)?: A Lot   Help from Another: Need to walk in hospital room?: A Lot   Help from Another: Climbing 3-5 steps with a railing?: Total     AM-PAC Score:  Raw Score: 13   Approx Degree of Impairment: 64.91%   Standardized Score (AM-PAC Scale): 36.74   CMS Modifier (G-Code): CL    FUNCTIONAL ABILITY STATUS  Functional Mobility/Gait Assessment  Gait Assistance: Moderate assistance  Distance (ft): 5 ft x 2  Assistive Device: Rolling walker  Pattern: Shuffle  Rolling: minimal assist  Supine to Sit: moderate assist  Sit to Supine:  NT  Sit to Stand: moderate assist and maximum assist    Skilled Therapy Provided: chart reviewed and KAYA Cooper approved session. Patient in bed agreeable to participate with therapy activity. He req cont cues for proper participation and cooperation. Patine sit at EOB for ~8 min with cues for midline balance and posture. Patient transfers with Mod A from bed and Ma A from chair, posterior lean noted. Amb limited d/t weakness, dec  balance and dec safety awareness noted; chair follow for safety. Care giver instructed on HEP to cont with patient as tolerate.      The patient's Approx Degree of Impairment: 64.91% has been calculated based on documentation in the [facility] '6 clicks' Inpatient Daily Activity Short Form.  Research supports that patients with this level of impairment may benefit from  PT 24 hr care.  Final disposition will be made by interdisciplinary medical team.    THERAPEUTIC EXERCISES  Lower Extremity Alternating marching  Ankle pumps  Knee extension     Position Sitting       Patient End of Session: Up in chair, Hospital anti-slip socks, Bracing education provided per handout, RN aware of session/findings, Call light within reach, Needs met, All patient questions and concerns addressed, Family present    CURRENT GOALS   Goals to be met by: 5/15/2025  Patient Goal Patient's self-stated goal is: Return home   Goal #1 Patient is able to demonstrate supine - sit EOB @ level: independent     Goal #1   Current Status Mod A   Goal #2 Patient is able to demonstrate transfers Sit to/from Stand at assistance level: modified independent with walker - rolling     Goal #2  Current Status Mod A to Max A   Goal #3 Patient is able to ambulate 150 feet with assist device: walker - rolling at assistance level: modified independent   Goal #3   Current Status 5 ft x 2 with RW Mod A w/chair follow   Goal #4 Patient will negotiate 2 stairs/one curb w/ assistive device and supervision   Goal #4   Current Status NT   Goal #5 Patient to demonstrate independence with home activity/exercise instructions provided to patient in preparation for discharge.   Goal #5   Current Status instructed   Goal #6    Goal #6  Current Status      Gait Training: 10 minutes  Therapeutic Activity: 18 minutes  Neuromuscular Re-education:  minutes  Therapeutic Exercise:  minutes  [place] Repositioning:  minutes  Manual Therapy:  minutes  Can add/delete any of these

## 2025-05-04 NOTE — PLAN OF CARE
Problem: Patient Centered Care  Goal: Patient preferences are identified and integrated in the patient's plan of care  Description: Interventions:- What would you like us to know as we care for you? From home alone with caregiver   - Provide timely, complete, and accurate information to patient/family- Incorporate patient and family knowledge, values, beliefs, and cultural backgrounds into the planning and delivery of care- Encourage patient/family to participate in care and decision-making at the level they choose- Honor patient and family perspectives and choices  Outcome: Progressing     Problem: PAIN - ADULT  Goal: Verbalizes/displays adequate comfort level or patient's stated pain goal  Description: INTERVENTIONS:- Encourage pt to monitor pain and request assistance- Assess pain using appropriate pain scale- Administer analgesics based on type and severity of pain and evaluate response- Implement non-pharmacological measures as appropriate and evaluate response- Consider cultural and social influences on pain and pain management- Manage/alleviate anxiety- Utilize distraction and/or relaxation techniques- Monitor for opioid side effects- Notify MD/LIP if interventions unsuccessful or patient reports new pain- Anticipate increased pain with activity and pre-medicate as appropriate  Outcome: Progressing     Problem: RISK FOR INFECTION - ADULT  Goal: Absence of fever/infection during anticipated neutropenic period  Description: INTERVENTIONS- Monitor WBC- Administer growth factors as ordered- Implement neutropenic guidelines  Outcome: Progressing     Problem: SAFETY ADULT - FALL  Goal: Free from fall injury  Description: INTERVENTIONS:- Assess pt frequently for physical needs- Identify cognitive and physical deficits and behaviors that affect risk of falls.- [place] fall precautions as indicated by assessment.- Educate pt/family on patient safety including physical limitations- Instruct pt to call for  assistance with activity based on assessment- Modify environment to reduce risk of injury- Provide assistive devices as appropriate- Consider OT/PT consult to assist with strengthening/mobility- Encourage toileting schedule  Outcome: Progressing     Problem: DISCHARGE PLANNING  Goal: Discharge to home or other facility with appropriate resources  Description: INTERVENTIONS:- Identify barriers to discharge w/pt and caregiver- Include patient/family/discharge partner in discharge planning- Arrange for needed discharge resources and transportation as appropriate- Identify discharge learning needs (meds, wound care, etc)- Arrange for interpreters to assist at discharge as needed- Consider post-discharge preferences of patient/family/discharge partner- Complete POLST form as appropriate- Assess patient's ability to be responsible for managing their own health- Refer to Case Management Department for coordinating discharge planning if the patient needs post-hospital services based on physician/LIP order or complex needs related to functional status, cognitive ability or social support system  Outcome: Progressing     Problem: SKIN/TISSUE INTEGRITY - ADULT  Goal: Skin integrity remains intact  Description: INTERVENTIONS- Assess and document risk factors for pressure ulcer development- Assess and document skin integrity- Monitor for areas of redness and/or skin breakdown- Initiate interventions, skin care algorithm/standards of care as needed  Outcome: Progressing  Goal: Incision(s), wounds(s) or drain site(s) healing without S/S of infection  Description: INTERVENTIONS:- Assess and document risk factors for pressure ulcer development- Assess and document skin integrity- Assess and document dressing/incision, wound bed, drain sites and surrounding tissue- Implement wound care per orders- Initiate isolation precautions as appropriate- Initiate Pressure Ulcer prevention bundle as indicated  Outcome: Progressing  Goal: Oral  mucous membranes remain intact  Description: INTERVENTIONS- Assess oral mucosa and hygiene practices- Implement preventative oral hygiene regimen- Implement oral medicated treatments as ordered  Outcome: Progressing

## 2025-05-04 NOTE — PROGRESS NOTES
[facility]  part of [facility]  Hospitalist Progress Note     Joselin Graham Patient Status:  Inpatient    5/15/1950  74 year old CSN [id number]   Location 544/544-A Attending Catracho Tapia MD   Hosp Day # 6 PCP No primary care provider on file.     Assessment & Plan:   ----------------------------------  Left face cellulitis.  With phlegmon but no drainable collection.  Significant improvement.  ID considering prolonged IV antibiotics for possible otomastoiditis  - Continue IV meropenem, po valtrex  - ID following  - ENT reevaluation noted  - MRI noted  - Speech reevaluation    PARVEEN.  Resolved    Hypernatremia.  Resolved    Other problems  History of multiple myeloma  Hypertension  Hypothyroidism  Dementia    Supplementary Documentation:   DVT Mechanical Prophylaxis:        DVT Pharmacologic Prophylaxis   Medication    heparin (Porcine) 5000 UNIT/ML injection 5,000 Units                Code Status: Full Code  Jean: External urinary catheter in place  Jean Duration (in days):   Central line:    KUSH: 2025, home when outpatient IV antibiotic plan in place      I personally reviewed the available laboratories, imaging including. I discussed/will discuss the case with consultants. I ordered laboratories and/or radiographic studies. I adjusted medications as detailed above.  Medical decision making high, risk is high.  Discussed with son at the bedside.  Discussed with ENT, ID 5/3    Subjective:   ----------------------------------  Improving each day.  Eating and drinking more.  Has been walking without difficulty.  No chest pain or shortness of breath.  Patient and family okay with outpatient IV antibiotics if needed.      Objective:   Chief Complaint:   Chief Complaint   Patient presents with    Dental Problem     ----------------------------------  Temp:  [98.1 °F (36.7 °C)-98.6 °F (37 °C)] 98.2 °F (36.8 °C)  Pulse:  [79-86] 83  Resp:  [16-18] 18  BP: (111-151)/(66-85) 140/85  SpO2:  [95  %-97 %] 97 %  Gen: A+Ox3.  No distress.   HEENT: Some crusted lesions on the left bottom lip, healing nicely.  Some erythema, improving white patches in the left buccal membrane.  Able to open jaw significantly better than previously.  No significant lymphadenopathy.  CV: RRR, S1S2, and intact distal pulses. No gallop, rub, murmur.  Pulm: Effort and breath sounds normal. No distress, wheezes, rales, rhonchi.  Abd: Soft, NTND, BS normal, no mass, no HSM, no rebound/guarding.   Neuro: Normal reflexes, CN. Sensory/motor exams grossly normal deficit.   MS: No joint effusions.  No peripheral edema.  Skin: Skin is warm and dry. No rashes, erythema, diaphoresis.   Psych: Normal mood and affect. Calm, cooperative    Labs:  Lab Results   Component Value Date    HGB 10.1 (L) 05/04/2025    WBC 2.7 (L) 05/04/2025    .0 (L) 05/04/2025     05/04/2025    K 4.1 05/04/2025    CREATSERUM 1.06 05/04/2025    AST 23 04/29/2025    ALT 79 (H) 04/29/2025           Scheduled Medications[1]  PRN Medications[2]             [1]    valACYclovir  1,000 mg Oral BID    ciprofloxacin-dexamethasone  4 drop Left Ear BID    amLODIPine  10 mg Oral Daily    DULoxetine  60 mg Oral Daily    levothyroxine  25 mcg Oral Before breakfast    losartan  50 mg Oral Daily    memantine  10 mg Oral BID    insulin aspart  1-5 Units Subcutaneous TID CC and HS    meropenem  500 mg Intravenous Q12H    chlorhexidine gluconate  15 mL Mouth/Throat QID    heparin  5,000 Units Subcutaneous 2 times per day   [2]   hydrALAzine    HYDROcodone-acetaminophen    acetaminophen    melatonin    ondansetron    metoclopramide    HYDROmorphone **OR** HYDROmorphone **OR** HYDROmorphone    glucose **OR** glucose **OR** glucose-vitamin C **OR** dextrose **OR** glucose **OR** glucose **OR** glucose-vitamin C    metoprolol

## 2025-05-04 NOTE — PROGRESS NOTES
[facility]  Nephrology Daily Progress Note    Joselin Graham  [id number]  74 year old      HPI:   Joselin Graham is a 74 year old male.  Mild L jaw pain but otherwise feeling well.       ROS:     Constitutional:  Negative for decreased activity, fever, irritability and lethargy  Endocrine:  Negative for abnormal sleep patterns, increased activity, polydipsia and polyphagia  Cardiovascular:  Negative for cool extremity and irregular heartbeat/palpitations  Gastrointestinal:  Negative for abdominal pain, constipation, decreased appetite, diarrhea and vomiting  Genitourinary:  Negative for dysuria and hematuria  Hema/Lymph:  Negative for easy bleeding and easy bruising  Integumentary:  Negative for pruritus and rash  Musculoskeletal:  Negative for bone/joint symptoms  Neurological:  Negative for gait disturbance  Psychiatric:  Negative for inappropriate interaction and psychiatric symptoms  Respiratory:  Negative for cough, dyspnea and wheezing      PHYSICAL EXAM:   Temp:  [98.1 °F (36.7 °C)-98.4 °F (36.9 °C)] 98.3 °F (36.8 °C)  Pulse:  [79-87] 87  Resp:  [18] 18  BP: (111-151)/(66-85) 131/74  SpO2:  [95 %-98 %] 98 %  Patient Weight for the past 72 hrs:   Weight   05/02/25 1300 123 lb 8 oz (56 kg)   05/03/25 0520 121 lb 1.6 oz (54.9 kg)       Constitutional: appears well hydrated alert and responsive no acute distress noted  Neck/Thyroid: neck is supple without adenopathy  Lymphatic: no abnormal cervical, supraclavicular or axillary adenopathy is noted  Respiratory: normal to inspection lungs are clear to auscultation bilaterally normal respiratory effort  Cardiovascular: regular rate and rhythm no murmurs, gallups, or rubs  Abdomen: soft non-tender non-distended no organomegaly noted no masses  Musculoskeletal: full ROM all extremities good strength  no deformities  Extremities: no edema, cyanosis, or clubbing  Neurological: exam appropriate for age reflexes and motor skills appropriate for age    Labs:  Lab  Results   Component Value Date    WBC 2.7 05/04/2025    HGB 10.1 05/04/2025    HCT 31.1 05/04/2025    .0 05/04/2025    CREATSERUM 1.06 05/04/2025    BUN 19 05/04/2025     05/04/2025    K 4.1 05/04/2025     05/04/2025    CO2 24.0 05/04/2025     05/04/2025    CA 7.6 05/04/2025    ALB 3.0 05/04/2025    MG 1.9 05/04/2025    PHOS 3.0 05/04/2025     Recent Labs   Lab 05/01/25  0620 05/03/25  0706 05/04/25  0528   WBC 3.0* 2.6* 2.7*   HGB 11.9* 10.6* 10.1*   HCT 36.6* 31.5* 31.1*   .0* 116.0* 141.0*     Recent Labs   Lab 04/28/25  1212 04/29/25  0537 04/30/25  0622 05/01/25  0620 05/02/25  0516 05/03/25  0706 05/03/25  1543 05/04/25  0528   GLU 90 92   < > 158* 197* 176*  --  179*   BUN 86* 63*   < > 33* 17 20  --  19   CREATSERUM 4.34* 2.60*   < > 1.47* 1.22 1.09  --  1.06   CA 8.2* 7.3*   < > 7.3* 7.6* 7.5*  --  7.6*   ALB 4.1 3.4   < > 3.2  --  3.1*  --  3.0*    145   < > 153* 146* 141  --  138   K 4.9 3.6   < > 3.3*  3.3* 3.4*  3.4* 2.9*  2.9* 3.6 4.1    114*   < > 111 110 103  --  105   CO2 12.0* 16.0*   < > 28.0 24.0 26.0  --  24.0   ALKPHO 109 92  --   --   --   --   --   --    AST 20 23  --   --   --   --   --   --    * 79*  --   --   --   --   --   --    BILT 0.5 0.9  --   --   --   --   --   --    TP 7.1 6.1  --   --   --   --   --   --    PHOS  --  4.4   < > 3.0  --  3.7  --  3.0    < > = values in this interval not displayed.       Imaging  MRI BRAIN (CPT=70551)  Result Date: 5/3/2025  CONCLUSION:  1. There are subtotal mastoid effusions bilaterally, worse on the left. Additionally, there is apparent concurrent inflammation throughout the left external auditory canal, concerning for otomastoiditis in the appropriate clinical setting.   2. No acute intracranial process by noncontrast MR technique.  3. Senescent changes of parenchymal volume loss with sequela of chronic microvascular ischemic disease.  4. Lesser incidental findings as above.     Dictated by  (CST): Scottie Allen MD on 5/03/2025 at 8:52 AM     Finalized by (CST): Scottie Allen MD on 5/03/2025 at 9:00 AM              Medications:  Current Hospital Medications[1]    Allergies:  Allergies[2]    Input/Output:    Intake/Output Summary (Last 24 hours) at 5/4/2025 1812  Last data filed at 5/4/2025 1708  Gross per 24 hour   Intake 1390 ml   Output 2670 ml   Net -1280 ml          ASSESSMENT/PLAN:   Assessment   Problem List[3]    UO good 1820 ml.  Creatinine good and sodium wnl 138 off IVF.  Will discontinue active follow up.  Please call with any questions or concerns.              5/4/2025  Marbin Salcedo MD               [1]   Current Facility-Administered Medications:     valACYclovir (Valtrex) tab 1,000 mg, 1,000 mg, Oral, BID    ciprofloxacin-dexamethasone (Ciprodex) 0.3-0.1 % otic suspension 4 drop, 4 drop, Left Ear, BID    amLODIPine (Norvasc) tab 10 mg, 10 mg, Oral, Daily    DULoxetine (Cymbalta) DR cap 60 mg, 60 mg, Oral, Daily    levothyroxine (Synthroid) tab 25 mcg, 25 mcg, Oral, Before breakfast    losartan (Cozaar) tab 50 mg, 50 mg, Oral, Daily    memantine (Namenda) tab 10 mg, 10 mg, Oral, BID    hydrALAzine (Apresoline) 20 mg/mL injection 10 mg, 10 mg, Intravenous, Q4H PRN    HYDROcodone-acetaminophen (Norco) 5-325 MG per tab 1 tablet, 1 tablet, Oral, Q4H PRN    acetaminophen (Tylenol) tab 650 mg, 650 mg, Oral, Q4H PRN    insulin aspart (NovoLOG) 100 Units/mL FlexPen 1-5 Units, 1-5 Units, Subcutaneous, TID CC and HS    melatonin tab 6 mg, 6 mg, Oral, Nightly PRN    meropenem (Merrem) 500 mg in sodium chloride 0.9% 100mL IVPB-TABBY, 500 mg, Intravenous, Q12H    chlorhexidine gluconate (Peridex) 0.12 % oral solution 15 mL, 15 mL, Mouth/Throat, QID    heparin (Porcine) 5000 UNIT/ML injection 5,000 Units, 5,000 Units, Subcutaneous, 2 times per day    ondansetron (Zofran) 4 MG/2ML injection 4 mg, 4 mg, Intravenous, Q6H PRN    metoclopramide (Reglan) 5 mg/mL injection 5 mg, 5 mg, Intravenous, Q8H  PRN    HYDROmorphone (Dilaudid) 1 MG/ML injection 0.2 mg, 0.2 mg, Intravenous, Q2H PRN **OR** HYDROmorphone (Dilaudid) 1 MG/ML injection 0.4 mg, 0.4 mg, Intravenous, Q2H PRN **OR** HYDROmorphone (Dilaudid) 1 MG/ML injection 0.8 mg, 0.8 mg, Intravenous, Q2H PRN    glucose (Dex4) 15 GM/59ML oral liquid 15 g, 15 g, Oral, Q15 Min PRN **OR** glucose (Glutose) 40% oral gel 15 g, 15 g, Oral, Q15 Min PRN **OR** glucose-vitamin C (Dex-4) chewable tab 4 tablet, 4 tablet, Oral, Q15 Min PRN **OR** dextrose 50% injection 50 mL, 50 mL, Intravenous, Q15 Min PRN **OR** glucose (Dex4) 15 GM/59ML oral liquid 30 g, 30 g, Oral, Q15 Min PRN **OR** glucose (Glutose) 40% oral gel 30 g, 30 g, Oral, Q15 Min PRN **OR** glucose-vitamin C (Dex-4) chewable tab 8 tablet, 8 tablet, Oral, Q15 Min PRN    metoprolol (Lopressor) 5 mg/5mL injection 5 mg, 5 mg, Intravenous, Q6H PRN  [2] No Known Allergies  [3]   Patient Active Problem List  Diagnosis    Weakness generalized    Recurrent falls    Uncontrolled type 2 diabetes mellitus with hyperglycemia (HCC)    Acquired hypothyroidism    Thrombocytopenia (HCC)    Acute renal failure (ARF) (HCC)    Acute kidney injury    Metabolic acidosis    Dental infection    Hypoglycemia    Acute kidney failure    Odontogenic infection of jaw    Brown hairy tongue    Herpes zoster without complication    Otitis externa    Non-recurrent acute suppurative otitis media of left ear with spontaneous rupture of tympanic membrane

## 2025-05-04 NOTE — PLAN OF CARE
Safety precautions in place. Call light within reach.  Problem: Patient Centered Care  Goal: Patient preferences are identified and integrated in the patient's plan of care  Description: Interventions:- What would you like us to know as we care for you? - Provide timely, complete, and accurate information to patient/family- Incorporate patient and family knowledge, values, beliefs, and cultural backgrounds into the planning and delivery of care- Encourage patient/family to participate in care and decision-making at the level they choose- Honor patient and family perspectives and choices  Outcome: Progressing     Problem: PAIN - ADULT  Goal: Verbalizes/displays adequate comfort level or patient's stated pain goal  Description: INTERVENTIONS:- Encourage pt to monitor pain and request assistance- Assess pain using appropriate pain scale- Administer analgesics based on type and severity of pain and evaluate response- Implement non-pharmacological measures as appropriate and evaluate response- Consider cultural and social influences on pain and pain management- Manage/alleviate anxiety- Utilize distraction and/or relaxation techniques- Monitor for opioid side effects- Notify MD/LIP if interventions unsuccessful or patient reports new pain- Anticipate increased pain with activity and pre-medicate as appropriate  Outcome: Progressing     Problem: RISK FOR INFECTION - ADULT  Goal: Absence of fever/infection during anticipated neutropenic period  Description: INTERVENTIONS- Monitor WBC- Administer growth factors as ordered- Implement neutropenic guidelines  Outcome: Progressing     Problem: SAFETY ADULT - FALL  Goal: Free from fall injury  Description: INTERVENTIONS:- Assess pt frequently for physical needs- Identify cognitive and physical deficits and behaviors that affect risk of falls.- [place] fall precautions as indicated by assessment.- Educate pt/family on patient safety including physical limitations- Instruct pt  to call for assistance with activity based on assessment- Modify environment to reduce risk of injury- Provide assistive devices as appropriate- Consider OT/PT consult to assist with strengthening/mobility- Encourage toileting schedule  Outcome: Progressing     Problem: DISCHARGE PLANNING  Goal: Discharge to home or other facility with appropriate resources  Description: INTERVENTIONS:- Identify barriers to discharge w/pt and caregiver- Include patient/family/discharge partner in discharge planning- Arrange for needed discharge resources and transportation as appropriate- Identify discharge learning needs (meds, wound care, etc)- Arrange for interpreters to assist at discharge as needed- Consider post-discharge preferences of patient/family/discharge partner- Complete POLST form as appropriate- Assess patient's ability to be responsible for managing their own health- Refer to Case Management Department for coordinating discharge planning if the patient needs post-hospital services based on physician/LIP order or complex needs related to functional status, cognitive ability or social support system  Outcome: Progressing     Problem: SKIN/TISSUE INTEGRITY - ADULT  Goal: Skin integrity remains intact  Description: INTERVENTIONS- Assess and document risk factors for pressure ulcer development- Assess and document skin integrity- Monitor for areas of redness and/or skin breakdown- Initiate interventions, skin care algorithm/standards of care as needed  Outcome: Progressing  Goal: Incision(s), wounds(s) or drain site(s) healing without S/S of infection  Description: INTERVENTIONS:- Assess and document risk factors for pressure ulcer development- Assess and document skin integrity- Assess and document dressing/incision, wound bed, drain sites and surrounding tissue- Implement wound care per orders- Initiate isolation precautions as appropriate- Initiate Pressure Ulcer prevention bundle as indicated  Outcome:  Progressing  Goal: Oral mucous membranes remain intact  Description: INTERVENTIONS- Assess oral mucosa and hygiene practices- Implement preventative oral hygiene regimen- Implement oral medicated treatments as ordered  Outcome: Progressing

## 2025-05-05 ENCOUNTER — APPOINTMENT (OUTPATIENT)
Dept: PICC SERVICES | Facility: HOSPITAL | Age: 75
End: 2025-05-05
Attending: PHYSICIAN ASSISTANT
Payer: MEDICARE

## 2025-05-05 LAB
GLUCOSE BLDC GLUCOMTR-MCNC: 172 MG/DL (ref 70–99)
GLUCOSE BLDC GLUCOMTR-MCNC: 204 MG/DL (ref 70–99)
GLUCOSE BLDC GLUCOMTR-MCNC: 250 MG/DL (ref 70–99)
GLUCOSE BLDC GLUCOMTR-MCNC: 271 MG/DL (ref 70–99)

## 2025-05-05 PROCEDURE — B548ZZA ULTRASONOGRAPHY OF SUPERIOR VENA CAVA, GUIDANCE: ICD-10-PCS | Performed by: PHYSICIAN ASSISTANT

## 2025-05-05 PROCEDURE — 99233 SBSQ HOSP IP/OBS HIGH 50: CPT | Performed by: HOSPITALIST

## 2025-05-05 PROCEDURE — 02HV33Z INSERTION OF INFUSION DEVICE INTO SUPERIOR VENA CAVA, PERCUTANEOUS APPROACH: ICD-10-PCS | Performed by: PHYSICIAN ASSISTANT

## 2025-05-05 RX ORDER — LIDOCAINE HYDROCHLORIDE 10 MG/ML
5 INJECTION, SOLUTION EPIDURAL; INFILTRATION; INTRACAUDAL; PERINEURAL
Status: COMPLETED | OUTPATIENT
Start: 2025-05-05 | End: 2025-05-05

## 2025-05-05 NOTE — PLAN OF CARE
Safety precautions in place. Call light within reach.  Problem: Patient Centered Care  Goal: Patient preferences are identified and integrated in the patient's plan of care  Description: Interventions:- What would you like us to know as we care for you? - Provide timely, complete, and accurate information to patient/family- Incorporate patient and family knowledge, values, beliefs, and cultural backgrounds into the planning and delivery of care- Encourage patient/family to participate in care and decision-making at the level they choose- Honor patient and family perspectives and choices  Interventions:- What would you like us to know as we care for you? - Provide timely, complete, and accurate information to patient/family- Incorporate patient and family knowledge, values, beliefs, and cultural backgrounds into the planning and delivery of care- Encourage patient/family to participate in care and decision-making at the level they choose- Honor patient and family perspectives and choices  Outcome: Progressing     Problem: PAIN - ADULT  Goal: Verbalizes/displays adequate comfort level or patient's stated pain goal  Description: INTERVENTIONS:- Encourage pt to monitor pain and request assistance- Assess pain using appropriate pain scale- Administer analgesics based on type and severity of pain and evaluate response- Implement non-pharmacological measures as appropriate and evaluate response- Consider cultural and social influences on pain and pain management- Manage/alleviate anxiety- Utilize distraction and/or relaxation techniques- Monitor for opioid side effects- Notify MD/LIP if interventions unsuccessful or patient reports new pain- Anticipate increased pain with activity and pre-medicate as appropriate  Outcome: Progressing     Problem: RISK FOR INFECTION - ADULT  Goal: Absence of fever/infection during anticipated neutropenic period  Description: INTERVENTIONS- Monitor WBC- Administer growth factors as ordered-  Implement neutropenic guidelines  Outcome: Progressing     Problem: SAFETY ADULT - FALL  Goal: Free from fall injury  Description: INTERVENTIONS:- Assess pt frequently for physical needs- Identify cognitive and physical deficits and behaviors that affect risk of falls.- [place] fall precautions as indicated by assessment.- Educate pt/family on patient safety including physical limitations- Instruct pt to call for assistance with activity based on assessment- Modify environment to reduce risk of injury- Provide assistive devices as appropriate- Consider OT/PT consult to assist with strengthening/mobility- Encourage toileting schedule  Outcome: Progressing     Problem: DISCHARGE PLANNING  Goal: Discharge to home or other facility with appropriate resources  Description: INTERVENTIONS:- Identify barriers to discharge w/pt and caregiver- Include patient/family/discharge partner in discharge planning- Arrange for needed discharge resources and transportation as appropriate- Identify discharge learning needs (meds, wound care, etc)- Arrange for interpreters to assist at discharge as needed- Consider post-discharge preferences of patient/family/discharge partner- Complete POLST form as appropriate- Assess patient's ability to be responsible for managing their own health- Refer to Case Management Department for coordinating discharge planning if the patient needs post-hospital services based on physician/LIP order or complex needs related to functional status, cognitive ability or social support system  Outcome: Progressing     Problem: SKIN/TISSUE INTEGRITY - ADULT  Goal: Skin integrity remains intact  Description: INTERVENTIONS- Assess and document risk factors for pressure ulcer development- Assess and document skin integrity- Monitor for areas of redness and/or skin breakdown- Initiate interventions, skin care algorithm/standards of care as needed  Outcome: Progressing  Goal: Incision(s), wounds(s) or drain site(s)  healing without S/S of infection  Description: INTERVENTIONS:- Assess and document risk factors for pressure ulcer development- Assess and document skin integrity- Assess and document dressing/incision, wound bed, drain sites and surrounding tissue- Implement wound care per orders- Initiate isolation precautions as appropriate- Initiate Pressure Ulcer prevention bundle as indicated  Outcome: Progressing  Goal: Oral mucous membranes remain intact  Description: INTERVENTIONS- Assess oral mucosa and hygiene practices- Implement preventative oral hygiene regimen- Implement oral medicated treatments as ordered  Outcome: Progressing     Problem: Patient/Family Goals  Goal: Patient/Family Long Term Goal  Description: Patient's Long Term Goal: Interventions:- - See additional Care Plan goals for specific interventions  Outcome: Progressing  Goal: Patient/Family Short Term Goal  Description: Patient's Short Term Goal: Interventions: - - See additional Care Plan goals for specific interventions  Outcome: Progressing

## 2025-05-05 NOTE — SLP NOTE
SPEECH DAILY NOTE - INPATIENT    ASSESSMENT & PLAN   ASSESSMENT  PPE REQUIRED. THIS THERAPIST WORE GLOVES AND DROPLET MASK FOR DURATION OF TX SESSION. HANDS WASHED UPON ENTRANCE/EXIT.     SLP f/u for ongoing meal assessment per recommendations of upgraded diet of soft easy to chew solids and thin liquids per speech therapy recommendations. RN reports pt tolerates diet and medication well with no overt clinical overt clinical signs aspiration. RN approves swallowing therapy session. The pt seen at bedside and agreeable to participate in swallowing therapy. Family/caregiver present at the time of therapy.  Pt denies any swallowing challenges. Pt denies any pain.  Pt prefers education via verbal explanation.     Pt positioned upright in 90 degrees in bed. Pt alert, febrile, tolerating room air with oxygen status 96% prior to the start of oral trials. SLP verbally reviewed aspiration precautions and safe swallowing compensatory strategies with the patient. Patient acknowledged understanding of swallowing precautions. The pt demonstrated small bites and sips.  Improving tolerates on po trials of puree, soft easy to chew solids, and thin liquids via cup with no overt clinical signs of aspiration (e.g., immediate/delayed throat clear, immediate/delayed cough, wet vocal quality, increased O2 effort) observed across all trials. Functional oral acceptance and bilabial seal.  Oral phase of swallow delayed with oral hold prior to the swallow. Bite strength reduced with prolonged mastication.  Oxygen status remained stable at 96% throughout the entire session. Oral/buccal cavities clear of residue at the end of the session. Observed RN provide medication pass.  Overt clinical signs of aspiration with pills and liquids with long oral hold and coughing.  Recommend to place medication/pills in puree to reduce dual consistencies.  Continue current diet of soft easy to chew solids and thin liquids with no straws.  Swallowing  precautions posted in written format on white board to assist pt with carry over.  The pt was left resting in bed with call light in reach.     PLAN: Recommend to continue swallowing therapy 1-2x for meal assessment, train pt on swallowing precautions, monitor CXR, and VFSS if any overt CSA and/or decline in CXR. RN alerted with results and recommendations. Updated diet order in chart.      MOST RECENT CXR 4/28  Findings and impression:  Normal heart size, clear lungs, normal pleura        Diet Recommendations - Solids: Soft/ Easy to chew  Diet Recommendations - Liquids: Thin Liquids  Compensatory Strategies Recommended: Alternate consistencies, Multiple swallows  Aspiration Precautions: Upright position, Slow rate, Small bites, Small sips, No straw  Medication Administration Recommendations: Whole in puree    Patient Experiencing Pain: No       Treatment Plan  Treatment Plan/Recommendations: Aspiration precautions    Interdisciplinary Communication: Discussed with RN          GOALS  Goal #1 The patient will tolerate soft easy to chew consistency and thin liquids without overt signs or symptoms of aspiration with 100 % accuracy over 1-2 session(s). In Progress   Goal #2 The patient/family/caregiver will demonstrate understanding and implementation of aspiration precautions and swallow strategies independently over 1-2 session(s).    In Progress   Goal #3 The patient will tolerate trial upgrade of regular consistency and thin liquids without overt signs or symptoms of aspiration with 100 % accuracy over 1-2 session(s).    Prolonged mastication on regular solids.  Continue current diet of soft easy to chew.  In Progress   Goal #4 The patient will utilize compensatory strategies as outlined by  BSSE (clinical evaluation) including Slow rate, Small bites, Small sips, No straws, Upright 90 degrees, Upright 90 degrees 30 mins after meal, Eliminate distractions with PRN assistance 100 % of the time across 2 sessions.    In  Progress     FOLLOW UP  Follow Up Needed (Documentation Required): Yes  SLP Follow-up Date: 05/06/25  Duration: 1 week    Session: 3    If you have any questions, please contact     Mikki Jones MS/CCC-SLP  Speech Language Pathologist  [facility]  EXT. [phone]

## 2025-05-05 NOTE — CM/SW NOTE
04/30/25 1200   Discharge Needs   Anticipated D/C needs Home health care;Infusion care;Subacute rehab   Services Requested   Submitted to [facility] Yes   PASRR Level 1 Submitted Yes     AKILA followed up on discharge planning.    Pt will need IV abx at discharge.  PICC placed.    AKILA sent tentative [facility] referrals in [place].      [facility] approved.    AKILA met with pt and caregiver Ali bedside.      AKILA explained options for infusion- pt and Ali stated they are comfortable with ome infusion.    AKILA explained [facility]- pt has hx with [facility].  At this time, pt declining [facility].    Pt declined for SW to call his family.    SW sent infusion referrals in [place] to check benefits.      Per Derek at [facility], pt covered at 100% for drug and $17 per day until OOP is met ($535.17 met so far).    Final ID script in place (meropenem q12).    Face to face entered for IV abx teach and train.    PLAN: DC home w/[facility] and [facility] pending delivery of IV abx/med clear    / to remain available for support and/or discharge planning.     Quin KEMP, LSW  Discharge Planner

## 2025-05-05 NOTE — PLAN OF CARE
Problem: Patient Centered Care  Goal: Patient preferences are identified and integrated in the patient's plan of care  Description: Interventions:- What would you like us to know as we care for you? From home alone with caregiver   - Provide timely, complete, and accurate information to patient/family- Incorporate patient and family knowledge, values, beliefs, and cultural backgrounds into the planning and delivery of care- Encourage patient/family to participate in care and decision-making at the level they choose- Honor patient and family perspectives and choices  5/4/2025 2239 by Mayra Eller RN  Outcome: Progressing  5/4/2025 2238 by Mayra Eller RN  Outcome: Progressing     Problem: PAIN - ADULT  Goal: Verbalizes/displays adequate comfort level or patient's stated pain goal  Description: INTERVENTIONS:- Encourage pt to monitor pain and request assistance- Assess pain using appropriate pain scale- Administer analgesics based on type and severity of pain and evaluate response- Implement non-pharmacological measures as appropriate and evaluate response- Consider cultural and social influences on pain and pain management- Manage/alleviate anxiety- Utilize distraction and/or relaxation techniques- Monitor for opioid side effects- Notify MD/LIP if interventions unsuccessful or patient reports new pain- Anticipate increased pain with activity and pre-medicate as appropriate  5/4/2025 2239 by Mayra Eller RN  Outcome: Progressing  5/4/2025 2238 by Mayra Eller RN  Outcome: Progressing     Problem: RISK FOR INFECTION - ADULT  Goal: Absence of fever/infection during anticipated neutropenic period  Description: INTERVENTIONS- Monitor WBC- Administer growth factors as ordered- Implement neutropenic guidelines  5/4/2025 2239 by Mayra Eller RN  Outcome: Progressing  5/4/2025 2238 by Mayra Eller RN  Outcome: Progressing     Problem: SAFETY ADULT - FALL  Goal: Free from fall  injury  Description: INTERVENTIONS:- Assess pt frequently for physical needs- Identify cognitive and physical deficits and behaviors that affect risk of falls.- [place] fall precautions as indicated by assessment.- Educate pt/family on patient safety including physical limitations- Instruct pt to call for assistance with activity based on assessment- Modify environment to reduce risk of injury- Provide assistive devices as appropriate- Consider OT/PT consult to assist with strengthening/mobility- Encourage toileting schedule  5/4/2025 2239 by Mayra Eller, RN  Outcome: Progressing  5/4/2025 2238 by Mayra Eller, RN  Outcome: Progressing     Problem: DISCHARGE PLANNING  Goal: Discharge to home or other facility with appropriate resources  Description: INTERVENTIONS:- Identify barriers to discharge w/pt and caregiver- Include patient/family/discharge partner in discharge planning- Arrange for needed discharge resources and transportation as appropriate- Identify discharge learning needs (meds, wound care, etc)- Arrange for interpreters to assist at discharge as needed- Consider post-discharge preferences of patient/family/discharge partner- Complete POLST form as appropriate- Assess patient's ability to be responsible for managing their own health- Refer to Case Management Department for coordinating discharge planning if the patient needs post-hospital services based on physician/LIP order or complex needs related to functional status, cognitive ability or social support system  5/4/2025 2239 by Mayra Eller, RN  Outcome: Progressing  5/4/2025 2238 by Mayra Eller, RN  Outcome: Progressing     Problem: SKIN/TISSUE INTEGRITY - ADULT  Goal: Skin integrity remains intact  Description: INTERVENTIONS- Assess and document risk factors for pressure ulcer development- Assess and document skin integrity- Monitor for areas of redness and/or skin breakdown- Initiate interventions, skin care  algorithm/standards of care as needed  5/4/2025 2239 by Mayra Eller RN  Outcome: Progressing  5/4/2025 2238 by Mayra Eller RN  Outcome: Progressing  Goal: Incision(s), wounds(s) or drain site(s) healing without S/S of infection  Description: INTERVENTIONS:- Assess and document risk factors for pressure ulcer development- Assess and document skin integrity- Assess and document dressing/incision, wound bed, drain sites and surrounding tissue- Implement wound care per orders- Initiate isolation precautions as appropriate- Initiate Pressure Ulcer prevention bundle as indicated  5/4/2025 2239 by Mayra Eller RN  Outcome: Progressing  5/4/2025 2238 by Mayra Eller RN  Outcome: Progressing  Goal: Oral mucous membranes remain intact  Description: INTERVENTIONS- Assess oral mucosa and hygiene practices- Implement preventative oral hygiene regimen- Implement oral medicated treatments as ordered  5/4/2025 2239 by Mayra Eller RN  Outcome: Progressing  5/4/2025 2238 by Mayra Eller RN  Outcome: Progressing     Problem: Patient/Family Goals  Goal: Patient/Family Long Term Goal  Description: Patient's Long Term Goal: to discharge   Interventions:  - - Monitor vital signs  - Monitor appropriate labs   - Monitor blood glucose levels   - Pain management   - Administer medications per order   - Follow MD orders   - Diagnostics per order   - Update/inform patient and family on plan of care   - Discharge planning   - See additional Care Plan goals for specific interventions  Outcome: Progressing  Goal: Patient/Family Short Term Goal  Description: Patient's Short Term Goal: to feel better   Interventions:   - - Monitor vital signs  - Monitor appropriate labs   - Monitor blood glucose levels   - Pain management   - Administer medications per order   - Follow MD orders   - Diagnostics per order   - Update/inform patient and family on plan of care   - Discharge planning   - See additional Care  Plan goals for specific interventions  Outcome: Progressing

## 2025-05-05 NOTE — PROGRESS NOTES
.[facility], [facility]  TEL: [phone]  FAX: [phone]    Joselin Graham Patient Status:  Inpatient    5/15/1950 MRN [id number]   Location [facility] 5SW/SE Attending Zenia Martel MD   Hosp Day # 7 PCP No primary care provider on file.     Subjective:  ROS reviewed. Feels much better. Tolerating PO.    History of Present Illness:  Joselin Graham is a a(n) 74 year old male.  Who had a dental extraction 3 days prior.  Start developing some swelling.  Poor p.o. intake.  Came in through the ED.  With low platelet and white count.  White count 2.7.  Concern for left facial swelling.  With PARVEEN.  CT of the face with small focus of superoposterior gas and fluid surrounding lingual and buccal cortical surfaces.  On my exam, patient has unilateral coating of the tongue as well as some ulcers on the hard palate on the left side.  Also possible crusted ulcerations on the lip.  Nothing around the eye.  History provided by caregiver    History:  Past Medical History[1]  Past Surgical History[2]  Family History[3]   reports that he has never smoked. He has never used smokeless tobacco. He reports that he does not drink alcohol and does not use drugs.    Allergies:  Allergies[4]    Medications:  Current Hospital Medications[5]    Review of Systems:    A comprehensive 10 point review of systems was completed.  Pertinent positives and negatives noted in the the HPI.    Physical Exam:    General: No acute distress. Alert and oriented x 3.  Able to follow commands.  Vital signs: Blood pressure 148/89, pulse 80, temperature 97.7 °F (36.5 °C), temperature source Oral, resp. rate 18, height 165.1 cm (5' 5\"), weight 120 lb 11.2 oz (54.7 kg), SpO2 96%.  HEENT: Dry mucous membranes, dried drainage in left ear. No pain with palpation over the mastoid. Crusting over lesions on face and oral mucousa  Extraocular muscles are intact.  Neck: No lymphadenopathy.  No JVD. No carotid bruits.  Respiratory:  Clear to auscultation bilaterally.  No wheezes. No rhonchi.  Cardiovascular: S1, S2.  Regular rate and rhythm.  No murmurs.  Abdomen: Soft, nontender, nondistended.  Positive bowel sounds.  Musculoskeletal: Full range of motion of all extremities.  No swelling noted.  Integument: No lesions. No erythema.  Psychiatric: Appropriate mood and affect.  Neurologic: No focal neurological deficits.    Laboratory Data:  Lab Results   Component Value Date    PGLU 172 05/05/2025       Microbiologic Data:     Reviewed in EMR    Imaging:  CT Scan    Imaging results reviewed     Impression:  Problem List[6]      ASSESSMENT:    # Facial swelling.  Following tooth extraction.  Initial concern for cellulitis and abscess.  No abscess on CT.  With concern for zoster with tongue and palate involvement as well as lip.  With secondary cellulitis.  Following dental extraction.  Could have concomitant bacterial process.  Polymicrobial.  With leukopenia on Unasyn.   # Possible otomastoiditis with drainage from L ear - +L mastoid process tenderness now resolved   -ENT following  #Acute kidney injury.    PLAN:  -Continue meropenem. Continue on PO valtrex.  -MRI reviewed. Will plan for 2-4 weeks of IV abx on DC. PICC placed. Script in med rec   -Reviewed micro, labs, imaging reports.  -Case d/w patient, family, Dr. Willie RN.    [facility]           [1]   Past Medical History:   Abnormal gait    Benign essential hypertension    Cancer (HCC)    Chest pain    Formatting of this note might be different from the original.   Normal stress test 11/2021      Last Assessment & Plan:    Formatting of this note might be different from the original.   Symptoms are atypical for ischemia   Chest pain is worse with change in position      Diabetes (HCC)    Diabetes mellitus (HCC)    Diabetes mellitus (HCC)    High blood pressure    Hyperglycemia    Hypertension associated with diabetes (HCC)    Last Assessment &  Plan:    Formatting of this note might be different from the original.   Blood pressure mildly elevated   Continue same medications for now   Continue to monitor readings      Multiple myeloma (HCC)    Multiple premature ventricular complexes    Formatting of this note might be different from the original.   Normal stress test 11/2021      Echo 8/2021:    1. Left ventricular ejection fraction, by visual estimation, is 60 to 65%.    2. Mild concentric left ventricular hypertrophy.    3. Normal pattern of LV diastolic filling.    4. Mild aortic valve stenosis.         Last Assessment & Plan:    Formatting of this note might be different fro    Neuropathy    Nonrheumatic aortic valve stenosis    Formatting of this note might be different from the original.   Echo 8/2021:    1. Left ventricular ejection fraction, by visual estimation, is 60 to 65%.    2. Mild concentric left ventricular hypertrophy.    3. Normal pattern of LV diastolic filling.    4. Mild aortic valve stenosis.         Last Assessment & Plan:    Formatting of this note might be different from the original.   Mild aortic mu    Rash    Type 2 diabetes mellitus without complication, without long-term current use of insulin (HCC)    Vomiting   [2] History reviewed. No pertinent surgical history.  [3] No family history on file.  [4] No Known Allergies  [5]   Current Facility-Administered Medications:     valACYclovir (Valtrex) tab 1,000 mg, 1,000 mg, Oral, BID    ciprofloxacin-dexamethasone (Ciprodex) 0.3-0.1 % otic suspension 4 drop, 4 drop, Left Ear, BID    amLODIPine (Norvasc) tab 10 mg, 10 mg, Oral, Daily    DULoxetine (Cymbalta) DR cap 60 mg, 60 mg, Oral, Daily    levothyroxine (Synthroid) tab 25 mcg, 25 mcg, Oral, Before breakfast    losartan (Cozaar) tab 50 mg, 50 mg, Oral, Daily    memantine (Namenda) tab 10 mg, 10 mg, Oral, BID    hydrALAzine (Apresoline) 20 mg/mL injection 10 mg, 10 mg, Intravenous, Q4H PRN    HYDROcodone-acetaminophen (Norco)  5-325 MG per tab 1 tablet, 1 tablet, Oral, Q4H PRN    acetaminophen (Tylenol) tab 650 mg, 650 mg, Oral, Q4H PRN    insulin aspart (NovoLOG) 100 Units/mL FlexPen 1-5 Units, 1-5 Units, Subcutaneous, TID CC and HS    melatonin tab 6 mg, 6 mg, Oral, Nightly PRN    meropenem (Merrem) 500 mg in sodium chloride 0.9% 100mL IVPB-TABBY, 500 mg, Intravenous, Q12H    chlorhexidine gluconate (Peridex) 0.12 % oral solution 15 mL, 15 mL, Mouth/Throat, QID    heparin (Porcine) 5000 UNIT/ML injection 5,000 Units, 5,000 Units, Subcutaneous, 2 times per day    ondansetron (Zofran) 4 MG/2ML injection 4 mg, 4 mg, Intravenous, Q6H PRN    metoclopramide (Reglan) 5 mg/mL injection 5 mg, 5 mg, Intravenous, Q8H PRN    HYDROmorphone (Dilaudid) 1 MG/ML injection 0.2 mg, 0.2 mg, Intravenous, Q2H PRN **OR** HYDROmorphone (Dilaudid) 1 MG/ML injection 0.4 mg, 0.4 mg, Intravenous, Q2H PRN **OR** HYDROmorphone (Dilaudid) 1 MG/ML injection 0.8 mg, 0.8 mg, Intravenous, Q2H PRN    glucose (Dex4) 15 GM/59ML oral liquid 15 g, 15 g, Oral, Q15 Min PRN **OR** glucose (Glutose) 40% oral gel 15 g, 15 g, Oral, Q15 Min PRN **OR** glucose-vitamin C (Dex-4) chewable tab 4 tablet, 4 tablet, Oral, Q15 Min PRN **OR** dextrose 50% injection 50 mL, 50 mL, Intravenous, Q15 Min PRN **OR** glucose (Dex4) 15 GM/59ML oral liquid 30 g, 30 g, Oral, Q15 Min PRN **OR** glucose (Glutose) 40% oral gel 30 g, 30 g, Oral, Q15 Min PRN **OR** glucose-vitamin C (Dex-4) chewable tab 8 tablet, 8 tablet, Oral, Q15 Min PRN    metoprolol (Lopressor) 5 mg/5mL injection 5 mg, 5 mg, Intravenous, Q6H PRN  [6]   Patient Active Problem List  Diagnosis    Weakness generalized    Recurrent falls    Uncontrolled type 2 diabetes mellitus with hyperglycemia (HCC)    Acquired hypothyroidism    Thrombocytopenia (HCC)    Acute renal failure (ARF) (HCC)    Acute kidney injury    Metabolic acidosis    Dental infection    Hypoglycemia    Acute kidney failure    Odontogenic infection of jaw    Brown hairy  tongue    Herpes zoster without complication    Otitis externa    Non-recurrent acute suppurative otitis media of left ear with spontaneous rupture of tympanic membrane

## 2025-05-05 NOTE — SPIRITUAL CARE NOTE
Spiritual Care Visit Note    Patient Name: Joselin Graham Date of Spiritual Care Visit: 25   : 5/15/1950 Primary Dx: Acute kidney injury       Referred By: Referral From: JAIME Lan          Visit Type/Summary:     received referral for visit from JAIME joseph.    - Spiritual Care: Patient declined a  visit at this time.  remains available as needed for follow up.    Spiritual Care support can be requested via an Epic consult.   For urgent/immediate needs, please contact the On Call  at: [employer]: ext [phone]    -Chaplain Foster.

## 2025-05-05 NOTE — PROGRESS NOTES
[facility]  part of [facility]  Hospitalist Progress Note     Joselin Graham Patient Status:  Inpatient    5/15/1950  74 year old CSN [id number]   Location 544/544-A Attending Catracho Tapia MD   Hosp Day # 7 PCP No primary care provider on file.     Assessment & Plan:   ----------------------------------  Left face cellulitis.  With phlegmon but no drainable collection.  Significant improvement.  ID considering prolonged IV antibiotics for possible otomastoiditis  - Continue IV meropenem, po valtrex  - ID following  - ENT reevaluation noted  - MRI noted  - Speech reevaluation    PARVEEN.  Resolved    Hypernatremia.  Resolved    Other problems  History of multiple myeloma  Hypertension  Hypothyroidism  Dementia    Supplementary Documentation:   DVT Mechanical Prophylaxis:        DVT Pharmacologic Prophylaxis   Medication    heparin (Porcine) 5000 UNIT/ML injection 5,000 Units                Code Status: Full Code  Jean: External urinary catheter in place  Jean Duration (in days):   Central line:    KUSH: 2025, home when outpatient IV antibiotic plan in place      I personally reviewed the available laboratories, imaging including. I discussed/will discuss the case with consultants. I ordered laboratories and/or radiographic studies. I adjusted medications as detailed above.  Medical decision making high, risk is high.  Discussed with son at the bedside.  Discussed with ENT, ID 5/3    Subjective:   ----------------------------------  Improving each day.  Eating and drinking more.  Has been walking without difficulty.  No chest pain or shortness of breath.  Patient and family okay with outpatient IV antibiotics if needed.      Objective:   Chief Complaint:   Chief Complaint   Patient presents with    Dental Problem     ----------------------------------  Temp:  [98.2 °F (36.8 °C)-98.5 °F (36.9 °C)] 98.5 °F (36.9 °C)  Pulse:  [83-88] 83  Resp:  [18] 18  BP: (131-140)/(63-85) 137/72  SpO2:  [97 %-98  %] 98 %  Gen: A+Ox3.  No distress.   HEENT: Some crusted lesions on the left bottom lip, healing nicely.  Some erythema, improving white patches in the left buccal membrane.  Able to open jaw significantly better than previously.  No significant lymphadenopathy.  CV: RRR, S1S2, and intact distal pulses. No gallop, rub, murmur.  Pulm: Effort and breath sounds normal. No distress, wheezes, rales, rhonchi.  Abd: Soft, NTND, BS normal, no mass, no HSM, no rebound/guarding.   Neuro: Normal reflexes, CN. Sensory/motor exams grossly normal deficit.   MS: No joint effusions.  No peripheral edema.  Skin: Skin is warm and dry. No rashes, erythema, diaphoresis.   Psych: Normal mood and affect. Calm, cooperative    Labs:  Lab Results   Component Value Date    HGB 10.1 (L) 05/04/2025    WBC 2.7 (L) 05/04/2025    .0 (L) 05/04/2025     05/04/2025    K 4.1 05/04/2025    CREATSERUM 1.06 05/04/2025    AST 23 04/29/2025    ALT 79 (H) 04/29/2025           Scheduled Medications[1]  PRN Medications[2]             [1]    valACYclovir  1,000 mg Oral BID    ciprofloxacin-dexamethasone  4 drop Left Ear BID    amLODIPine  10 mg Oral Daily    DULoxetine  60 mg Oral Daily    levothyroxine  25 mcg Oral Before breakfast    losartan  50 mg Oral Daily    memantine  10 mg Oral BID    insulin aspart  1-5 Units Subcutaneous TID CC and HS    meropenem  500 mg Intravenous Q12H    chlorhexidine gluconate  15 mL Mouth/Throat QID    heparin  5,000 Units Subcutaneous 2 times per day   [2]   hydrALAzine    HYDROcodone-acetaminophen    acetaminophen    melatonin    ondansetron    metoclopramide    HYDROmorphone **OR** HYDROmorphone **OR** HYDROmorphone    glucose **OR** glucose **OR** glucose-vitamin C **OR** dextrose **OR** glucose **OR** glucose **OR** glucose-vitamin C    metoprolol

## 2025-05-06 VITALS
OXYGEN SATURATION: 97 % | RESPIRATION RATE: 18 BRPM | HEART RATE: 86 BPM | SYSTOLIC BLOOD PRESSURE: 114 MMHG | BODY MASS INDEX: 20.11 KG/M2 | WEIGHT: 120.69 LBS | TEMPERATURE: 98 F | HEIGHT: 65 IN | DIASTOLIC BLOOD PRESSURE: 58 MMHG

## 2025-05-06 LAB
GLUCOSE BLDC GLUCOMTR-MCNC: 174 MG/DL (ref 70–99)
GLUCOSE BLDC GLUCOMTR-MCNC: 243 MG/DL (ref 70–99)

## 2025-05-06 PROCEDURE — 99239 HOSP IP/OBS DSCHRG MGMT >30: CPT | Performed by: HOSPITALIST

## 2025-05-06 RX ORDER — VALACYCLOVIR HYDROCHLORIDE 1 G/1
1000 TABLET, FILM COATED ORAL EVERY 12 HOURS
Qty: 20 TABLET | Refills: 0 | Status: SHIPPED | OUTPATIENT
Start: 2025-05-06 | End: 2025-05-16

## 2025-05-06 RX ORDER — ACETAMINOPHEN 325 MG/1
650 TABLET ORAL EVERY 4 HOURS PRN
Status: SHIPPED | COMMUNITY
Start: 2025-05-06

## 2025-05-06 RX ORDER — CHLORHEXIDINE GLUCONATE ORAL RINSE 1.2 MG/ML
15 SOLUTION DENTAL 2 TIMES DAILY
Qty: 420 ML | Refills: 0 | Status: SHIPPED | OUTPATIENT
Start: 2025-05-06 | End: 2025-05-20

## 2025-05-06 NOTE — PROGRESS NOTES
I have spoken to Sammi who indicated the caregiver agency can not provide or support IV abx in the home.  He has no one to administer the IV abx and the agency also is wanting additional people in the home to assist him, they can not pay for additional support in the home.  I have notified  of the concerns for discharge.

## 2025-05-06 NOTE — CONGREGATE LIVING REVIEW
Congregate Living Authorization    The [facility] ([facility]) has reviewed this case and the committee DOES NOT RECOMMEND discharge to a skilled nursing facility under skilled care.    The CLRC recommends:  Home with home health and any other appropriate caregiver assistance or medical equipment as needed vs respite in SNF.     Does not appear to have skilled services for [facility], but additional home support appears to be needed.    For questions regarding CLRC approval process, please contact the CM assigned to the case.  For questions regarding RN discharge workflow, please contact the unit Clinical Leader.

## 2025-05-06 NOTE — CM/SW NOTE
Prior Authorization - Destination  Destination Type: Skilled nursing facility  Service Provider: Anette Pedraza  Payer Communication Destination Comments: [place] [id number]  Prior Authorization Status: Approved  Prior Authorization Start Date: 05/06/25 5/6 to 5/10    Lindy Weston DSC

## 2025-05-06 NOTE — SLP NOTE
SPEECH DAILY NOTE - INPATIENT    ASSESSMENT & PLAN   ASSESSMENT  PPE REQUIRED. THIS THERAPIST WORE GLOVES AND GOWN FOR DURATION OF SESSION. HANDS WASHED UPON ENTRANCE/EXIT.    SLP f/u for ongoing dysphagia tx/meal assessment per recommendations of soft easy to chew/thin liquids per swallow f/u. RN reports pt tolerates diet and medication well with no overt clinical s/s aspiration. Pt denies any swallowing challenges.     Pt positioned upright in bed, alert/cooperative. Pt afebrile, tolerating room air with oxygen status 99% prior to the start of oral trials. SLP reviewed aspiration precautions and safe swallowing compensatory strategies with the patient. Safe swallow guidelines remain written on the white board in purple. Patient v/u. Provided no assistance, pt tolerates hard solids and thin liquids via straw with no overt clinical signs/symptoms of aspiration. Oxygen status remained stable t/o the entire session. Recommend remain on current diet with adherence to aspiration precautions and swallow strategies. No further swallow services warranted at this time. Please re consult if needed. RN alerted with results and recommendations.     MOST RECENT CXR 4/28  Findings and impression:  Normal heart size, clear lungs, normal pleura       Diet Recommendations - Solids: Soft/ Easy to chew  Diet Recommendations - Liquids: Thin Liquids    Compensatory Strategies Recommended: Alternate consistencies, Multiple swallows  Aspiration Precautions: Upright position, Slow rate, Small bites, Small sips  Medication Administration Recommendations:  (as tolerated)    Patient Experiencing Pain: No  Pain Rating: Unable to Rate     Pain Control: IV meds  Nursing Aware of Pain?: Yes    Treatment Plan  Treatment Plan/Recommendations: No further inpatient SLP service warranted, Aspiration precautions    Interdisciplinary Communication: Plan posted at bedside          GOALS  Goal #1 The patient will tolerate soft easy to chew consistency  and thin liquids without overt signs or symptoms of aspiration with 100 % accuracy over 1-2 session(s). Met 5/6   Goal #2 The patient/family/caregiver will demonstrate understanding and implementation of aspiration precautions and swallow strategies independently over 1-2 session(s).    Met 5/6   Goal #3 The patient will tolerate trial upgrade of regular consistency and thin liquids without overt signs or symptoms of aspiration with 100 % accuracy over 1-2 session(s).    Prolonged mastication on regular solids.  Continue current diet of soft easy to chew.  Discontinue 5/6   Goal #4 The patient will utilize compensatory strategies as outlined by  BSSE (clinical evaluation) including Slow rate, Small bites, Small sips, Upright 90 degrees, Upright 90 degrees 30 mins after meal, Eliminate distractions with PRN assistance 100 % of the time across 2 sessions.    Revised/Met 5/6     FOLLOW UP  Follow Up Needed (Documentation Required): No  SLP Follow-up Date: 05/06/25  Duration: 1 week    Session: 4    If you have any questions, please contact PAMELA Rosario M.S. CCC-SLP  Speech Language Pathologist  Phone Number Ext. [phone]

## 2025-05-06 NOTE — PLAN OF CARE
Problem: Patient Centered Care  Goal: Patient preferences are identified and integrated in the patient's plan of care  Description: Interventions:- What would you like us to know as we care for you? - Provide timely, complete, and accurate information to patient/family- Incorporate patient and family knowledge, values, beliefs, and cultural backgrounds into the planning and delivery of care- Encourage patient/family to participate in care and decision-making at the level they choose- Honor patient and family perspectives and choices  Interventions:- What would you like us to know as we care for you? - Provide timely, complete, and accurate information to patient/family- Incorporate patient and family knowledge, values, beliefs, and cultural backgrounds into the planning and delivery of care- Encourage patient/family to participate in care and decision-making at the level they choose- Honor patient and family perspectives and choices  Outcome: Progressing     Problem: PAIN - ADULT  Goal: Verbalizes/displays adequate comfort level or patient's stated pain goal  Description: INTERVENTIONS:- Encourage pt to monitor pain and request assistance- Assess pain using appropriate pain scale- Administer analgesics based on type and severity of pain and evaluate response- Implement non-pharmacological measures as appropriate and evaluate response- Consider cultural and social influences on pain and pain management- Manage/alleviate anxiety- Utilize distraction and/or relaxation techniques- Monitor for opioid side effects- Notify MD/LIP if interventions unsuccessful or patient reports new pain- Anticipate increased pain with activity and pre-medicate as appropriate  Outcome: Progressing     Problem: RISK FOR INFECTION - ADULT  Goal: Absence of fever/infection during anticipated neutropenic period  Description: INTERVENTIONS- Monitor WBC- Administer growth factors as ordered- Implement neutropenic guidelines  Outcome:  Progressing     Problem: SAFETY ADULT - FALL  Goal: Free from fall injury  Description: INTERVENTIONS:- Assess pt frequently for physical needs- Identify cognitive and physical deficits and behaviors that affect risk of falls.- [place] fall precautions as indicated by assessment.- Educate pt/family on patient safety including physical limitations- Instruct pt to call for assistance with activity based on assessment- Modify environment to reduce risk of injury- Provide assistive devices as appropriate- Consider OT/PT consult to assist with strengthening/mobility- Encourage toileting schedule  Outcome: Progressing     Problem: DISCHARGE PLANNING  Goal: Discharge to home or other facility with appropriate resources  Description: INTERVENTIONS:- Identify barriers to discharge w/pt and caregiver- Include patient/family/discharge partner in discharge planning- Arrange for needed discharge resources and transportation as appropriate- Identify discharge learning needs (meds, wound care, etc)- Arrange for interpreters to assist at discharge as needed- Consider post-discharge preferences of patient/family/discharge partner- Complete POLST form as appropriate- Assess patient's ability to be responsible for managing their own health- Refer to Case Management Department for coordinating discharge planning if the patient needs post-hospital services based on physician/LIP order or complex needs related to functional status, cognitive ability or social support system  Outcome: Progressing     Problem: SKIN/TISSUE INTEGRITY - ADULT  Goal: Skin integrity remains intact  Description: INTERVENTIONS- Assess and document risk factors for pressure ulcer development- Assess and document skin integrity- Monitor for areas of redness and/or skin breakdown- Initiate interventions, skin care algorithm/standards of care as needed  Outcome: Progressing  Goal: Incision(s), wounds(s) or drain site(s) healing without S/S of  infection  Description: INTERVENTIONS:- Assess and document risk factors for pressure ulcer development- Assess and document skin integrity- Assess and document dressing/incision, wound bed, drain sites and surrounding tissue- Implement wound care per orders- Initiate isolation precautions as appropriate- Initiate Pressure Ulcer prevention bundle as indicated  Outcome: Progressing  Goal: Oral mucous membranes remain intact  Description: INTERVENTIONS- Assess oral mucosa and hygiene practices- Implement preventative oral hygiene regimen- Implement oral medicated treatments as ordered  Outcome: Progressing     Problem: Patient/Family Goals  Goal: Patient/Family Long Term Goal  Description: Patient's Long Term Goal: Interventions:- - See additional Care Plan goals for specific interventions  Outcome: Progressing  Goal: Patient/Family Short Term Goal  Description: Patient's Short Term Goal: Interventions: - - See additional Care Plan goals for specific interventions  Outcome: Progressing

## 2025-05-06 NOTE — DISCHARGE SUMMARY
[facility]  part of [facility]     DISCHARGE SUMMARY     Joselin Graham Patient Status:  Inpatient    5/15/1950 MRN [id number]   Location [facility] 5SW/SE Attending Catracho Tapia MD   Hosp Day # 8 PCP No primary care provider on file.     DATE OF ADMISSION: 2025  DATE OF DISCHARGE:  25  DISPOSITION: subacute rehab  CONDITION ON DISCHARGE: good    DISCHARGE DIAGNOSES:  Left face cellulitis  Left V2, V3 herpes zoster  Possible otomastoiditis  PARVEEN  Hypernatremia  History of multiple myeloma  Hypertension  Hypothyroidism  Dementia    HISTORY OF PRESENT ILLNESS (COPIED FROM ADMISSION H&P)  The patient is a 74-year-old [place]  male who had a tooth extraction of tooth #17 a few days ago and ever since he has been having swelling and pain in the left mandibular area with poor oral intake. Today he was brought into the emergency department for evaluation. CBC showed white blood cell count of 2.7, platelet count 112, mild lymphopenia suggestive of possible viral infection. Blood cultures and lactic acid were obtained. Chemistry showed GFR of 13 which is way below his baseline, BUN and creatinine of 86 and 4.34, bicarb 12. Liver function tests were unremarkable. CT scan of abdomen and pelvis showed no hydronephrosis, multifocal colonic wall thickening with scattered area of liquified stool, moderate distal colonic and rectal stool burden, distal esophageal wall thickening, no other clear acute findings. CT scan of the facial area showed interval extraction of tooth #17, small foci of super-ostial gas and fluid surrounding both the lingual and buccal cortical surfaces of the left mandible which may likely be related to subperiosteal phlegmon. There is also acute inflammatory stranding throughout the left  space with reactive myositis in the left platysma muscle. No collection of fluid or abscess. The patient has a history of multiple myeloma. Innumerable small lytic foci  throughout the imaged calvarium skull base, maxillofacial bones, and upper cervical spine; findings very similar to prior imaging studies. Chest x-ray showed no acute findings.     HOSPITAL COURSE:  Patient was admitted, placed on IV antibiotics.  Initially felt to be related to recent dental procedure.  However patient developed vesicular dermatomal lesions consistent with herpes zoster in the left V2 V3 distribution.  This likely became super infected with otitis externa and otitis media as well.  Seen by ENT.  No drainable fluid collection.  ID ordered MRI which showed  possible otomastoiditis.  Will be discharged on at least 2 weeks of IV antibiotics for this.  Will have follow-up with ENT, ID.  His pain, ability to swallow improved significantly during his hospitalization.  PARVEEN was due to poor oral intake, this resolved prior to discharge    Patient understands return to the emergency room for increased pain, fever, discharge, shortness of breath, chest pain, new neurologic symptoms, other concerning symptoms.    PHYSICAL EXAM:  Temp:  [97.4 °F (36.3 °C)-98.4 °F (36.9 °C)] 97.6 °F (36.4 °C)  Pulse:  [86-88] 86  Resp:  [18] 18  BP: (124-164)/(76-86) 124/76  SpO2:  [98 %-100 %] 99 %  Gen: A+Ox3.  No distress.   HEENT: NCAT, neck supple, no carotid bruit.  Resolving vesicular rash in the left V2 V3 distribution  CV: RRR, S1S2, and intact distal pulses. No gallop, rub, murmur.  Pulm: Effort and breath sounds normal. No distress, wheezes, rales, rhonchi.  Abd: Soft, NTND, BS normal, no mass, no HSM, no rebound/guarding.   Neuro: Normal reflexes, CN. Sensory/motor exams grossly normal deficit. Coordination  and gait normal.   MS: No joint effusions.  No peripheral edema.  Skin: Skin is warm and dry. No rashes, erythema, diaphoresis.   Psych: Normal mood and affect. Behavior and judgment normal.     DISCHARGE MEDICATIONS     Discharge Medications        START taking these medications        Instructions Prescription  details   acetaminophen 325 MG Tabs  Commonly known as: Tylenol      Take 2 tablets (650 mg total) by mouth every 4 (four) hours as needed.   Refills: 0     chlorhexidine gluconate 0.12 % Soln  Commonly known as: Peridex      Use as directed 15 mL in the mouth or throat 2 (two) times daily for 14 days.   Stop taking on: May 20, 2025  Quantity: 420 mL  Refills: 0     ciprofloxacin-dexamethasone 0.3-0.1 % Susp  Commonly known as: Ciprodex      Place 4 drops into the right ear 2 (two) times daily for 14 days.   Stop taking on: May 17, 2025  Quantity: 7.5 mL  Refills: 0     valACYclovir 1 G Tabs  Commonly known as: Valtrex      Take 1 tablet (1,000 mg total) by mouth every 12 (twelve) hours for 10 days.   Stop taking on: May 16, 2025  Quantity: 20 tablet  Refills: 0            CHANGE how you take these medications        Instructions Prescription details   sodium chloride 0.9% SOLN 100 mL with meropenem 500 MG SOLR 500 mg  What changed: Another medication with the same name was added. Make sure you understand how and when to take each.      Inject 500 mg into the vein every 12 (twelve) hours. CBC, CMP, ESR, CRP weekly with results faxed to Dr. Rudolph at [phone]. Routine PICC care while in place. EOT 5/26   Refills: 0     sodium chloride 0.9% SOLN 100 mL with meropenem 500 MG SOLR 500 mg  What changed: You were already taking a medication with the same name, and this prescription was added. Make sure you understand how and when to take each.      Inject 500 mg into the vein every 12 (twelve) hours.   Refills: 0            CONTINUE taking these medications        Instructions Prescription details   amLODIPine 10 MG Tabs  Commonly known as: Norvasc      Take 1 tablet (10 mg total) by mouth daily.   Refills: 0     DULoxetine 60 MG Cpep  Commonly known as: Cymbalta      Take 1 capsule (60 mg total) by mouth in the morning.   Refills: 0     insulin aspart 100 Units/mL Sopn  Commonly known as: NovoLOG      Inject 6 Units  into the skin 3 (three) times daily with meals.   Quantity: 3 mL  Refills: 0     insulin degludec 100 units/mL Sopn  Commonly known as: Tresiba      Inject 15 Units into the skin nightly.   Quantity: 3 mL  Refills: 0     Insulin Pen Needle 32G X 4 MM Misc      May substitute if not on insurance formulary   Quantity: 100 each  Refills: 5     Jardiance 25 MG Tabs  Generic drug: empagliflozin      Take 1 tablet (25 mg total) by mouth in the morning.   Refills: 0     Lenalidomide 5 MG Caps      Take 5 mg by mouth daily.   Refills: 0     levothyroxine 25 MCG Tabs  Commonly known as: Synthroid      Take 1 tablet (25 mcg total) by mouth before breakfast.   Refills: 0     losartan 50 MG Tabs  Commonly known as: Cozaar      Take 1 tablet (50 mg total) by mouth in the morning.   Refills: 0     memantine 10 MG Tabs  Commonly known as: Namenda      Take 1 tablet (10 mg total) by mouth 2 (two) times daily.   Refills: 0     potassium chloride 20 MEQ Tbcr  Commonly known as: Klor-Con M20      Take 1 tablet (20 mEq total) by mouth daily.   Refills: 0            STOP taking these medications      amoxicillin 500 MG Caps  Commonly known as: Amoxil        ibuprofen 600 MG Tabs  Commonly known as: Motrin                  Where to Get Your Medications        These medications were sent to [id number] - [place], [place] - [address] AT [facility], [phone], [phone]  [address], [facility] [address]      Phone: [phone]   chlorhexidine gluconate 0.12 % Soln  ciprofloxacin-dexamethasone 0.3-0.1 % Susp  valACYclovir 1 G Tabs         CONSULTANTS  Consultants         Provider   Role Specialty     Darrell Moore MD      Consulting Physician INFECTIOUS DISEASES     Mauro Castro MD      Consulting Physician Otolaryngology Otology & Neurotology     Marbin Salcedo MD      Consulting Physician NEPHROLOGY            FOLLOW UP:  Mauro Castro,  MD  [address] [facility]  [address]  [place] [id number]  [phone]    Follow up in 1 week(s)      Rishabh Coy MD  [address] [facility] [address] [facility]  [address]  [facility] [address]  [phone]    Follow up in 2 week(s)  Post Discharge Followup    PCP    Follow up in 1 week(s)  Post Discharge Followup    The above plan and follow-up instructions were reviewed with the patient and they verbalized understanding and agreement.  They understand to return to the emergency room for any concerning signs or symptoms.  Greater than 30 minutes spent on discharge.  -----------------------    Hospital Discharge Diagnoses:  Facial cellulitis    Lace+ Score: 51  59-90 High Risk  29-58 Medium Risk  0-28   Low Risk.    TCM Follow-Up Recommendation:  LACE 29-58: Moderate Risk of readmission after discharge from the hospital.  Supplementary Documentation:

## 2025-05-06 NOTE — CM/SW NOTE
05/06/25 1400   Discharge disposition   Expected discharge disposition subacute   Post Acute Care Provider   ([facility])   Discharge transportation [facility]     AKILA received confirmation of insurance authorization.     AKILA confirmed with Dr. Tapia and RN Nicky that pt is medically ready for discharge today.  DC order placed.    AKILA discussed with liaison Nereida from [facility] to arrange a time for discharge.  [facility] scheduled for 5:00 PM.  RN is aware of discharge time and location and will inform patient.  VM left for pt's ex-wife Sammi.    RN to attach IP Transfer Report to After Visit Summary packet for transfer to [facility].     AKILA confirmed PASRR screen was completed.     PLAN: DC to [facility] via [facility] at 5:00 PM.  PCS completed.    RN # to report: [phone]    Quin KEMP, LSW  Discharge Planner

## 2025-05-06 NOTE — CM/SW NOTE
04/30/25 1200   Discharge Needs   Anticipated D/C needs Subacute rehab   Services Requested   Submitted to [facility] Yes   PASRR Level 1 Submitted Yes   PMR Consult Requested Not clinically appropriate at this time   Choice of Post-Acute Provider   Informed patient of right to choose their preferred provider Yes   List of appropriate post-acute services provided to patient/family with quality data No - Declined list   Patient/family choice [facility]     AKILA followed up on discharge planning.    AKILA was informed that pt's care giving agency stated they are NOT able to assist with IV abx and that pt is too weak to return with 1 CG at this time.    [facility] approved- [facility] referrals sent by this SW 5/5.    AKILA spoke to pt's ex-wife Sammi who is involved in pt's care.  Sammi agreeable to [facility]- preference is [facility] in [place] as this is where her and pt's daughter live.    Liaison Nereida corey/[facility] confirmed they can accommodate pt and have beds open today.    [facility] started auth through [employer] portal.    PLAN: DC to [facility] pending alin    / to remain available for support and/or discharge planning.     Quin SALAZAR MSW, LSW  Discharge Planner

## 2025-05-06 NOTE — CONGREGATE LIVING REVIEW
[facility] Living Authorization    The [facility] Review Committee has reviewed this case and the patient IS APPROVED for discharge to a facility for Short Term Skilled once the following procedure is followed:     - The physician discharge instructions (contained within the ANTONIO note for SNF) must inlcude the below appropriate and approved COVID instructions to the facility    For questions regarding CLRC approval process, please contact the CM assigned to the case.  For questions regarding RN discharge workflow, please contact the unit Clinical Leader.

## 2025-05-06 NOTE — PLAN OF CARE
Patient discharged to the [facility] with transportation provided by [facility]. PIV removed. AVS reviewed with patient/family with all questions answered. All patient belongings returned at discharge. Report given to KAYA Cormier    Problem: Patient Centered Care  Goal: Patient preferences are identified and integrated in the patient's plan of care  Description: Interventions:- What would you like us to know as we care for you? - Provide timely, complete, and accurate information to patient/family- Incorporate patient and family knowledge, values, beliefs, and cultural backgrounds into the planning and delivery of care- Encourage patient/family to participate in care and decision-making at the level they choose- Honor patient and family perspectives and choices  Interventions:- What would you like us to know as we care for you? - Provide timely, complete, and accurate information to patient/family- Incorporate patient and family knowledge, values, beliefs, and cultural backgrounds into the planning and delivery of care- Encourage patient/family to participate in care and decision-making at the level they choose- Honor patient and family perspectives and choices  Outcome: Adequate for Discharge     Problem: PAIN - ADULT  Goal: Verbalizes/displays adequate comfort level or patient's stated pain goal  Description: INTERVENTIONS:- Encourage pt to monitor pain and request assistance- Assess pain using appropriate pain scale- Administer analgesics based on type and severity of pain and evaluate response- Implement non-pharmacological measures as appropriate and evaluate response- Consider cultural and social influences on pain and pain management- Manage/alleviate anxiety- Utilize distraction and/or relaxation techniques- Monitor for opioid side effects- Notify MD/LIP if interventions unsuccessful or patient reports new pain- Anticipate increased pain with activity and pre-medicate as appropriate  Outcome: Adequate  for Discharge     Problem: RISK FOR INFECTION - ADULT  Goal: Absence of fever/infection during anticipated neutropenic period  Description: INTERVENTIONS- Monitor WBC- Administer growth factors as ordered- Implement neutropenic guidelines  Outcome: Adequate for Discharge     Problem: SAFETY ADULT - FALL  Goal: Free from fall injury  Description: INTERVENTIONS:- Assess pt frequently for physical needs- Identify cognitive and physical deficits and behaviors that affect risk of falls.- [place] fall precautions as indicated by assessment.- Educate pt/family on patient safety including physical limitations- Instruct pt to call for assistance with activity based on assessment- Modify environment to reduce risk of injury- Provide assistive devices as appropriate- Consider OT/PT consult to assist with strengthening/mobility- Encourage toileting schedule  Outcome: Adequate for Discharge     Problem: DISCHARGE PLANNING  Goal: Discharge to home or other facility with appropriate resources  Description: INTERVENTIONS:- Identify barriers to discharge w/pt and caregiver- Include patient/family/discharge partner in discharge planning- Arrange for needed discharge resources and transportation as appropriate- Identify discharge learning needs (meds, wound care, etc)- Arrange for interpreters to assist at discharge as needed- Consider post-discharge preferences of patient/family/discharge partner- Complete POLST form as appropriate- Assess patient's ability to be responsible for managing their own health- Refer to Case Management Department for coordinating discharge planning if the patient needs post-hospital services based on physician/LIP order or complex needs related to functional status, cognitive ability or social support system  Outcome: Adequate for Discharge     Problem: SKIN/TISSUE INTEGRITY - ADULT  Goal: Skin integrity remains intact  Description: INTERVENTIONS- Assess and document risk factors for pressure ulcer  development- Assess and document skin integrity- Monitor for areas of redness and/or skin breakdown- Initiate interventions, skin care algorithm/standards of care as needed  Outcome: Adequate for Discharge  Goal: Incision(s), wounds(s) or drain site(s) healing without S/S of infection  Description: INTERVENTIONS:- Assess and document risk factors for pressure ulcer development- Assess and document skin integrity- Assess and document dressing/incision, wound bed, drain sites and surrounding tissue- Implement wound care per orders- Initiate isolation precautions as appropriate- Initiate Pressure Ulcer prevention bundle as indicated  Outcome: Adequate for Discharge  Goal: Oral mucous membranes remain intact  Description: INTERVENTIONS- Assess oral mucosa and hygiene practices- Implement preventative oral hygiene regimen- Implement oral medicated treatments as ordered  Outcome: Adequate for Discharge     Problem: Patient/Family Goals  Goal: Patient/Family Long Term Goal  Description: Patient's Long Term Goal: Interventions:- - See additional Care Plan goals for specific interventions  Outcome: Adequate for Discharge  Goal: Patient/Family Short Term Goal  Description: Patient's Short Term Goal: Interventions: - - See additional Care Plan goals for specific interventions  Outcome: Adequate for Discharge

## 2025-05-06 NOTE — PROGRESS NOTES
.[facility]  TEL: [phone]  FAX: [phone]    Joselin Graham Patient Status:  Inpatient    5/15/1950 MRN [id number]   Location [facility] 5SW/SE Attending Zenia Martel MD   Hosp Day # 8 PCP No primary care provider on file.     Subjective:  ROS reviewed. Feels better. Tolerating PO. Ongoing fatigue    History of Present Illness:  Joselin Graham is a a(n) 74 year old male.  Who had a dental extraction 3 days prior.  Start developing some swelling.  Poor p.o. intake.  Came in through the ED.  With low platelet and white count.  White count 2.7.  Concern for left facial swelling.  With PARVEEN.  CT of the face with small focus of superoposterior gas and fluid surrounding lingual and buccal cortical surfaces.  On my exam, patient has unilateral coating of the tongue as well as some ulcers on the hard palate on the left side.  Also possible crusted ulcerations on the lip.  Nothing around the eye.  History provided by caregiver    History:  Past Medical History[1]  Past Surgical History[2]  Family History[3]   reports that he has never smoked. He has never used smokeless tobacco. He reports that he does not drink alcohol and does not use drugs.    Allergies:  Allergies[4]    Medications:  Current Hospital Medications[5]    Review of Systems:    A comprehensive 10 point review of systems was completed.  Pertinent positives and negatives noted in the the HPI.    Physical Exam:    General: No acute distress. Alert and oriented x 3.  Able to follow commands.  Vital signs: Blood pressure 124/76, pulse 86, temperature 97.6 °F (36.4 °C), temperature source Oral, resp. rate 18, height 165.1 cm (5' 5\"), weight 120 lb 11.2 oz (54.7 kg), SpO2 99%.  HEENT: Dry mucous membranes, dried drainage in left ear. No pain with palpation over the mastoid. Crusting over lesions on face and oral mucousa  Extraocular muscles are intact.  Neck: No lymphadenopathy.  No JVD. No carotid  bruits.  Respiratory: Clear to auscultation bilaterally.  No wheezes. No rhonchi.  Cardiovascular: S1, S2.  Regular rate and rhythm.  No murmurs.  Abdomen: Soft, nontender, nondistended.  Positive bowel sounds.  Musculoskeletal: Full range of motion of all extremities.  No swelling noted.  Integument: No lesions. No erythema.  Psychiatric: Appropriate mood and affect.  Neurologic: No focal neurological deficits.    Laboratory Data:  Lab Results   Component Value Date    PGLU 243 05/06/2025       Microbiologic Data:     Reviewed in EMR    Imaging:  CT Scan    Imaging results reviewed     Impression:  Problem List[6]      ASSESSMENT:    # Facial swelling.  Following tooth extraction.  Initial concern for cellulitis and abscess.  No abscess on CT.  With concern for zoster with tongue and palate involvement as well as lip.  With secondary cellulitis.  Following dental extraction.  Could have concomitant bacterial process.  Polymicrobial.  With leukopenia on Unasyn.   # Possible otomastoiditis with drainage from L ear - +L mastoid process tenderness now resolved   -ENT following  #Acute kidney injury.    PLAN:  -Continue meropenem. Continue on PO valtrex. Scripts in med rec  -MRI reviewed. Will plan for 2-4 weeks of IV abx on DC. PICC placed. Script in med rec   -Reviewed micro, labs, imaging reports.  -Case d/w patient, family, Dr. Tapia RN.    [facility]           [1]   Past Medical History:   Abnormal gait    Benign essential hypertension    Cancer (HCC)    Chest pain    Formatting of this note might be different from the original.   Normal stress test 11/2021      Last Assessment & Plan:    Formatting of this note might be different from the original.   Symptoms are atypical for ischemia   Chest pain is worse with change in position      Diabetes (HCC)    Diabetes mellitus (HCC)    Diabetes mellitus (HCC)    High blood pressure    Hyperglycemia    Hypertension associated  with diabetes (HCC)    Last Assessment & Plan:    Formatting of this note might be different from the original.   Blood pressure mildly elevated   Continue same medications for now   Continue to monitor readings      Multiple myeloma (HCC)    Multiple premature ventricular complexes    Formatting of this note might be different from the original.   Normal stress test 11/2021      Echo 8/2021:    1. Left ventricular ejection fraction, by visual estimation, is 60 to 65%.    2. Mild concentric left ventricular hypertrophy.    3. Normal pattern of LV diastolic filling.    4. Mild aortic valve stenosis.         Last Assessment & Plan:    Formatting of this note might be different fro    Neuropathy    Nonrheumatic aortic valve stenosis    Formatting of this note might be different from the original.   Echo 8/2021:    1. Left ventricular ejection fraction, by visual estimation, is 60 to 65%.    2. Mild concentric left ventricular hypertrophy.    3. Normal pattern of LV diastolic filling.    4. Mild aortic valve stenosis.         Last Assessment & Plan:    Formatting of this note might be different from the original.   Mild aortic mu    Rash    Type 2 diabetes mellitus without complication, without long-term current use of insulin (HCC)    Vomiting   [2] History reviewed. No pertinent surgical history.  [3] No family history on file.  [4] No Known Allergies  [5]   Current Facility-Administered Medications:     valACYclovir (Valtrex) tab 1,000 mg, 1,000 mg, Oral, BID    ciprofloxacin-dexamethasone (Ciprodex) 0.3-0.1 % otic suspension 4 drop, 4 drop, Left Ear, BID    amLODIPine (Norvasc) tab 10 mg, 10 mg, Oral, Daily    DULoxetine (Cymbalta) DR cap 60 mg, 60 mg, Oral, Daily    levothyroxine (Synthroid) tab 25 mcg, 25 mcg, Oral, Before breakfast    losartan (Cozaar) tab 50 mg, 50 mg, Oral, Daily    memantine (Namenda) tab 10 mg, 10 mg, Oral, BID    hydrALAzine (Apresoline) 20 mg/mL injection 10 mg, 10 mg, Intravenous, Q4H  PRN    HYDROcodone-acetaminophen (Norco) 5-325 MG per tab 1 tablet, 1 tablet, Oral, Q4H PRN    acetaminophen (Tylenol) tab 650 mg, 650 mg, Oral, Q4H PRN    insulin aspart (NovoLOG) 100 Units/mL FlexPen 1-5 Units, 1-5 Units, Subcutaneous, TID CC and HS    melatonin tab 6 mg, 6 mg, Oral, Nightly PRN    meropenem (Merrem) 500 mg in sodium chloride 0.9% 100mL IVPB-TABBY, 500 mg, Intravenous, Q12H    chlorhexidine gluconate (Peridex) 0.12 % oral solution 15 mL, 15 mL, Mouth/Throat, QID    heparin (Porcine) 5000 UNIT/ML injection 5,000 Units, 5,000 Units, Subcutaneous, 2 times per day    ondansetron (Zofran) 4 MG/2ML injection 4 mg, 4 mg, Intravenous, Q6H PRN    metoclopramide (Reglan) 5 mg/mL injection 5 mg, 5 mg, Intravenous, Q8H PRN    HYDROmorphone (Dilaudid) 1 MG/ML injection 0.2 mg, 0.2 mg, Intravenous, Q2H PRN **OR** HYDROmorphone (Dilaudid) 1 MG/ML injection 0.4 mg, 0.4 mg, Intravenous, Q2H PRN **OR** HYDROmorphone (Dilaudid) 1 MG/ML injection 0.8 mg, 0.8 mg, Intravenous, Q2H PRN    glucose (Dex4) 15 GM/59ML oral liquid 15 g, 15 g, Oral, Q15 Min PRN **OR** glucose (Glutose) 40% oral gel 15 g, 15 g, Oral, Q15 Min PRN **OR** glucose-vitamin C (Dex-4) chewable tab 4 tablet, 4 tablet, Oral, Q15 Min PRN **OR** dextrose 50% injection 50 mL, 50 mL, Intravenous, Q15 Min PRN **OR** glucose (Dex4) 15 GM/59ML oral liquid 30 g, 30 g, Oral, Q15 Min PRN **OR** glucose (Glutose) 40% oral gel 30 g, 30 g, Oral, Q15 Min PRN **OR** glucose-vitamin C (Dex-4) chewable tab 8 tablet, 8 tablet, Oral, Q15 Min PRN    metoprolol (Lopressor) 5 mg/5mL injection 5 mg, 5 mg, Intravenous, Q6H PRN  [6]   Patient Active Problem List  Diagnosis    Weakness generalized    Recurrent falls    Uncontrolled type 2 diabetes mellitus with hyperglycemia (HCC)    Acquired hypothyroidism    Thrombocytopenia (HCC)    Acute renal failure (ARF) (HCC)    Acute kidney injury    Metabolic acidosis    Dental infection    Hypoglycemia    Acute kidney failure     Odontogenic infection of jaw    Brown hairy tongue    Herpes zoster without complication    Otitis externa    Non-recurrent acute suppurative otitis media of left ear with spontaneous rupture of tympanic membrane

## 2025-05-07 NOTE — PAYOR COMM NOTE
--------------  DISCHARGE REVIEW    [place] [facility] [place]  [id number]  Authorization Number: [id number]    Admit date: 25  Admit time:   7:56 PM  Discharge Date: 2025  5:44 PM     Admitting Physician: Duy Mera MD  Attending Physician:  Zenia Martel MD  Primary Care Physician: No primary care provider on file.          Discharge Summary Notes        Discharge Summary signed by Catracho Tapia MD at 2025  1:14 PM       Author: Catracho Tapia MD Specialty: HOSPITALIST Author Type: Physician    Filed: 2025  1:14 PM Date of Service: 2025  1:09 PM Status: Signed    : Catracho Tapia MD (Physician)           [facility]  part of [facility]     DISCHARGE SUMMARY     Joselin Graham Patient Status:  Inpatient    5/15/1950 MRN [id number]   Location [facility] 5SW/SE Attending Catracho Tapia MD   Hosp Day # 8 PCP No primary care provider on file.     DATE OF ADMISSION: 2025  DATE OF DISCHARGE:  25  DISPOSITION: subacute rehab  CONDITION ON DISCHARGE: good    DISCHARGE DIAGNOSES:  Left face cellulitis  Left V2, V3 herpes zoster  Possible otomastoiditis  PARVEEN  Hypernatremia  History of multiple myeloma  Hypertension  Hypothyroidism  Dementia    HISTORY OF PRESENT ILLNESS (COPIED FROM ADMISSION H&P)  The patient is a 74-year-old [place]  male who had a tooth extraction of tooth #17 a few days ago and ever since he has been having swelling and pain in the left mandibular area with poor oral intake. Today he was brought into the emergency department for evaluation. CBC showed white blood cell count of 2.7, platelet count 112, mild lymphopenia suggestive of possible viral infection. Blood cultures and lactic acid were obtained. Chemistry showed GFR of 13 which is way below his baseline, BUN and creatinine of 86 and 4.34, bicarb 12. Liver function tests were unremarkable. CT scan of abdomen and pelvis showed no hydronephrosis, multifocal  colonic wall thickening with scattered area of liquified stool, moderate distal colonic and rectal stool burden, distal esophageal wall thickening, no other clear acute findings. CT scan of the facial area showed interval extraction of tooth #17, small foci of super-ostial gas and fluid surrounding both the lingual and buccal cortical surfaces of the left mandible which may likely be related to subperiosteal phlegmon. There is also acute inflammatory stranding throughout the left  space with reactive myositis in the left platysma muscle. No collection of fluid or abscess. The patient has a history of multiple myeloma. Innumerable small lytic foci throughout the imaged calvarium skull base, maxillofacial bones, and upper cervical spine; findings very similar to prior imaging studies. Chest x-ray showed no acute findings.     HOSPITAL COURSE:  Patient was admitted, placed on IV antibiotics.  Initially felt to be related to recent dental procedure.  However patient developed vesicular dermatomal lesions consistent with herpes zoster in the left V2 V3 distribution.  This likely became super infected with otitis externa and otitis media as well.  Seen by ENT.  No drainable fluid collection.  ID ordered MRI which showed  possible otomastoiditis.  Will be discharged on at least 2 weeks of IV antibiotics for this.  Will have follow-up with ENT, ID.  His pain, ability to swallow improved significantly during his hospitalization.  PARVEEN was due to poor oral intake, this resolved prior to discharge    Patient understands return to the emergency room for increased pain, fever, discharge, shortness of breath, chest pain, new neurologic symptoms, other concerning symptoms.    PHYSICAL EXAM:  Temp:  [97.4 °F (36.3 °C)-98.4 °F (36.9 °C)] 97.6 °F (36.4 °C)  Pulse:  [86-88] 86  Resp:  [18] 18  BP: (124-164)/(76-86) 124/76  SpO2:  [98 %-100 %] 99 %  Gen: A+Ox3.  No distress.   HEENT: NCAT, neck supple, no carotid bruit.   Resolving vesicular rash in the left V2 V3 distribution  CV: RRR, S1S2, and intact distal pulses. No gallop, rub, murmur.  Pulm: Effort and breath sounds normal. No distress, wheezes, rales, rhonchi.  Abd: Soft, NTND, BS normal, no mass, no HSM, no rebound/guarding.   Neuro: Normal reflexes, CN. Sensory/motor exams grossly normal deficit. Coordination  and gait normal.   MS: No joint effusions.  No peripheral edema.  Skin: Skin is warm and dry. No rashes, erythema, diaphoresis.   Psych: Normal mood and affect. Behavior and judgment normal.     DISCHARGE MEDICATIONS     Discharge Medications        START taking these medications        Instructions Prescription details   acetaminophen 325 MG Tabs  Commonly known as: Tylenol      Take 2 tablets (650 mg total) by mouth every 4 (four) hours as needed.   Refills: 0     chlorhexidine gluconate 0.12 % Soln  Commonly known as: Peridex      Use as directed 15 mL in the mouth or throat 2 (two) times daily for 14 days.   Stop taking on: May 20, 2025  Quantity: 420 mL  Refills: 0     ciprofloxacin-dexamethasone 0.3-0.1 % Susp  Commonly known as: Ciprodex      Place 4 drops into the right ear 2 (two) times daily for 14 days.   Stop taking on: May 17, 2025  Quantity: 7.5 mL  Refills: 0     valACYclovir 1 G Tabs  Commonly known as: Valtrex      Take 1 tablet (1,000 mg total) by mouth every 12 (twelve) hours for 10 days.   Stop taking on: May 16, 2025  Quantity: 20 tablet  Refills: 0            CHANGE how you take these medications        Instructions Prescription details   sodium chloride 0.9% SOLN 100 mL with meropenem 500 MG SOLR 500 mg  What changed: Another medication with the same name was added. Make sure you understand how and when to take each.      Inject 500 mg into the vein every 12 (twelve) hours. CBC, CMP, ESR, CRP weekly with results faxed to Dr. Rudolph at [phone]. Routine PICC care while in place. EOT 5/26   Refills: 0     sodium chloride 0.9% SOLN 100 mL with  meropenem 500 MG SOLR 500 mg  What changed: You were already taking a medication with the same name, and this prescription was added. Make sure you understand how and when to take each.      Inject 500 mg into the vein every 12 (twelve) hours.   Refills: 0            CONTINUE taking these medications        Instructions Prescription details   amLODIPine 10 MG Tabs  Commonly known as: Norvasc      Take 1 tablet (10 mg total) by mouth daily.   Refills: 0     DULoxetine 60 MG Cpep  Commonly known as: Cymbalta      Take 1 capsule (60 mg total) by mouth in the morning.   Refills: 0     insulin aspart 100 Units/mL Sopn  Commonly known as: NovoLOG      Inject 6 Units into the skin 3 (three) times daily with meals.   Quantity: 3 mL  Refills: 0     insulin degludec 100 units/mL Sopn  Commonly known as: Tresiba      Inject 15 Units into the skin nightly.   Quantity: 3 mL  Refills: 0     Insulin Pen Needle 32G X 4 MM Misc      May substitute if not on insurance formulary   Quantity: 100 each  Refills: 5     Jardiance 25 MG Tabs  Generic drug: empagliflozin      Take 1 tablet (25 mg total) by mouth in the morning.   Refills: 0     Lenalidomide 5 MG Caps      Take 5 mg by mouth daily.   Refills: 0     levothyroxine 25 MCG Tabs  Commonly known as: Synthroid      Take 1 tablet (25 mcg total) by mouth before breakfast.   Refills: 0     losartan 50 MG Tabs  Commonly known as: Cozaar      Take 1 tablet (50 mg total) by mouth in the morning.   Refills: 0     memantine 10 MG Tabs  Commonly known as: Namenda      Take 1 tablet (10 mg total) by mouth 2 (two) times daily.   Refills: 0     potassium chloride 20 MEQ Tbcr  Commonly known as: Klor-Con M20      Take 1 tablet (20 mEq total) by mouth daily.   Refills: 0            STOP taking these medications      amoxicillin 500 MG Caps  Commonly known as: Amoxil        ibuprofen 600 MG Tabs  Commonly known as: Motrin                  Where to Get Your Medications        These medications were  sent to [id number] - [place], [place] - [address] AT [facility], [phone], [phone]  [address], [facility] [address]      Phone: [phone]   chlorhexidine gluconate 0.12 % Soln  ciprofloxacin-dexamethasone 0.3-0.1 % Susp  valACYclovir 1 G Tabs         [facility]ULTANT[facility]  Consultants         Provider   Role Specialty     Darrell Moore MD      Consulting Physician INFECTIOUS DISEASES     Mauro Castro MD      Consulting Physician Otolaryngology Otology & Neurotology     Marbin Salcedo MD      Consulting Physician NEPHROLOGY            FOLLOW UP:  Mauro Castro MD  [address]  [address]  [facility] [address] [phone]    Follow up in 1 week(s)      Olegario Coy MD  [address]  [address]  [facility] [address] [phone]    Follow up in 2 week(s)  Post Discharge Followup    PCP    Follow up in 1 week(s)  Post Discharge Followup    The above plan and follow-up instructions were reviewed with the patient and they verbalized understanding and agreement.  They understand to return to the emergency room for any concerning signs or symptoms.  Greater than 30 minutes spent on discharge.  -----------------------    Hospital Discharge Diagnoses:  Facial cellulitis    Lace+ Score: 51  59-90 High Risk  29-58 Medium Risk  0-28   Low Risk.    TCM Follow-Up Recommendation:  LACE 29-58: Moderate Risk of readmission after discharge from the hospital.  Supplementary Documentation:       Electronically signed by Catracho Tapia MD on 5/6/2025  1:14 PM         REVIEWER COMMENTS

## 2025-05-09 ENCOUNTER — TELEPHONE (OUTPATIENT)
Dept: OTOLARYNGOLOGY | Facility: CLINIC | Age: 75
End: 2025-05-09

## 2025-05-09 NOTE — TELEPHONE ENCOUNTER
Per Jen calling from the [facility] requesting to schedule a hospital follow up appointment for patient with Dr. Whitney due to a mouth infection. Patient is coming from a nursing facility and is unable to independently move onto a table or chair and requires assistance. Please call back.

## 2025-05-09 NOTE — TELEPHONE ENCOUNTER
Pt coming from nursing home  , per Jen from nursing pt coming via wheel chair, pt able to sit up on his own , Advised Jen that pt will need escort to stay with pt at all times Per Jen pt can sign consent, does not have POA.   Recommended F/U from admission on 04/28

## 2025-05-16 ENCOUNTER — OFFICE VISIT (OUTPATIENT)
Dept: OTOLARYNGOLOGY | Facility: CLINIC | Age: 75
End: 2025-05-16

## 2025-05-16 DIAGNOSIS — B02.21 RAMSAY HUNT AURICULAR SYNDROME: Primary | ICD-10-CM

## 2025-05-16 DIAGNOSIS — B02.8 HERPES ZOSTER WITH OTHER COMPLICATION: ICD-10-CM

## 2025-05-16 PROCEDURE — 99214 OFFICE O/P EST MOD 30 MIN: CPT | Performed by: OTOLARYNGOLOGY

## 2025-05-16 PROCEDURE — 1111F DSCHRG MED/CURRENT MED MERGE: CPT | Performed by: OTOLARYNGOLOGY

## 2025-05-16 PROCEDURE — 1160F RVW MEDS BY RX/DR IN RCRD: CPT | Performed by: OTOLARYNGOLOGY

## 2025-05-16 PROCEDURE — 1159F MED LIST DOCD IN RCRD: CPT | Performed by: OTOLARYNGOLOGY

## 2025-05-16 NOTE — PATIENT INSTRUCTIONS
Continue avoiding water in left ear, cotton ball placed in left ear during shower to avoid this.  Continue ciprofloxacin-dexamethasone  drops in left ear for 2 weeks, 4 drops in left ear.   Follow up in 2 weeks.  Follow up with infections disease doctor.

## 2025-05-20 ENCOUNTER — TELEPHONE (OUTPATIENT)
Dept: GASTROENTEROLOGY | Age: 75
End: 2025-05-20

## 2025-05-22 ENCOUNTER — APPOINTMENT (OUTPATIENT)
Dept: GASTROENTEROLOGY | Age: 75
End: 2025-05-22
Attending: INTERNAL MEDICINE

## 2025-05-28 NOTE — PROGRESS NOTES
NEW PATIENT PROGRESS NOTE  OTOLOGY/OTOLARYNGOLOGY    REF MD:  No referring provider defined for this encounter.    PCP: No primary care provider on file.    CHIEF COMPLAINT:    Chief Complaint   Patient presents with    Mouth/Lip Problem     Infection and lesions of lips x2 months       History of Present Illness  Joselin Graham is a 75 year old male who presents with a bacterial infection related to shingles in his ear.    He has a bacterial infection in his ear related to shingles, which is improving. There is reduced swelling in the ear canal and no fluid behind the eardrum. He was previously seen by infectious disease specialists in the hospital but does not have a follow-up appointment scheduled with him.    He underwent a wisdom tooth extraction, which was followed by an infection. Subsequently, he developed shingles in his mouth and ear, affecting the V2 and V3 distribution, as well as the pinna. This condition continues to cause discomfort.    PAST MEDICAL HISTORY:  Past Medical History[1]    PAST SURGICAL HISTORY:  Past Surgical History[2]    Medications Ordered Prior to Encounter[3]    Allergies: Allergies[4]    SOCIAL HISTORY:    Social History     Tobacco Use    Smoking status: Never    Smokeless tobacco: Never   Substance Use Topics    Alcohol use: Never       family history is not on file.    REVIEW OF SYSTEMS:   PER HPI    EXAMINATION:  I washed my hands with an alcohol-based hand gel prior to examination  Constitutional:   --Vitals: There were no vitals taken for this visit.  General: no apparent distress, well-developed  Neuro: Cranial nerves: facial movement normal bilateral  Respiratory: No stridor, stertor or increased work of breathing  ENT:  --OC/OP: crusted vesicles in the left corner of the lip, No trismus. No masses or lesions noted over the gingiva, buccal mucosa, tongue, FOM, hard/soft palate, tonsillar pillars, posterior pharyngeal wall.   --Ear: (bilateral ears were examined under binocular  microscopy)  Right ear microscopic exam:  Pinna: Normal, no lesions or masses.  Mastoid: Nontender on palpation.   External auditory canal: Clear, no masses or lesions.  Tympanic membrane: Intact, no lesions, normal landmarks.  Middle ear: Aerated.    Left ear microscopic exam:  Pinna: Normal, no lesions or masses.  Mastoid: Nontender on palpation.   External auditory canal: Large amount of dead skin and old infection removed from ear.  Tympanic membrane: Intact, no lesions, normal landmarks.  Middle ear: Aerated.    Results  Procedure: Ear Cleaning  Description: The ear canal was cleaned, removing a large amount of desquamated epithelium and old purulent material. The tympanic membrane was visualized, and the edema in the ear canal was noted to be reduced. No effusion was observed behind the tympanic membrane.    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 Herpes zoster with other complication  B02.8        Assessment & Plan  Bacterial infection related to shingles in ear  Bacterial infection secondary to shingles improved post ear cleaning. Reduced swelling and absence of fluid noted. Necrotic tissue removal enhanced ear drop efficacy. Continued infectious disease follow-up necessary for antibiotic therapy assessment.  - Prescribe ear drops for two more weeks.  - Advise water ingress prevention during showers using a cotton ball.  - Ensure follow-up with infectious disease specialists for potential IV antibiotics.  -Follow-up in 2 weeks     Situation reviewed with the patient in detail.    Mauro Vargas MD  Otology/Otolaryngology  [facility] [address] [place] [id number]  Phone [phone]  Fax [phone]          [1]   Past Medical History:   Abnormal gait    Benign essential hypertension    Cancer (HCC)    Chest pain    Formatting of this note might be different from the  original.   Normal stress test 11/2021      Last Assessment & Plan:    Formatting of this note might be different from the original.   Symptoms are atypical for ischemia   Chest pain is worse with change in position      Diabetes (HCC)    Diabetes mellitus (HCC)    Diabetes mellitus (HCC)    High blood pressure    Hyperglycemia    Hypertension associated with diabetes (HCC)    Last Assessment & Plan:    Formatting of this note might be different from the original.   Blood pressure mildly elevated   Continue same medications for now   Continue to monitor readings      Multiple myeloma (HCC)    Multiple premature ventricular complexes    Formatting of this note might be different from the original.   Normal stress test 11/2021      Echo 8/2021:    1. Left ventricular ejection fraction, by visual estimation, is 60 to 65%.    2. Mild concentric left ventricular hypertrophy.    3. Normal pattern of LV diastolic filling.    4. Mild aortic valve stenosis.         Last Assessment & Plan:    Formatting of this note might be different fro    Neuropathy    Nonrheumatic aortic valve stenosis    Formatting of this note might be different from the original.   Echo 8/2021:    1. Left ventricular ejection fraction, by visual estimation, is 60 to 65%.    2. Mild concentric left ventricular hypertrophy.    3. Normal pattern of LV diastolic filling.    4. Mild aortic valve stenosis.         Last Assessment & Plan:    Formatting of this note might be different from the original.   Mild aortic mu    Rash    Type 2 diabetes mellitus without complication, without long-term current use of insulin (HCC)    Vomiting   [2] History reviewed. No pertinent surgical history.  [3]   Current Outpatient Medications on File Prior to Visit   Medication Sig Dispense Refill    acetaminophen 325 MG Oral Tab Take 2 tablets (650 mg total) by mouth every 4 (four) hours as needed.      sodium chloride 0.9% SOLN 100 mL with meropenem 500 MG SOLR 500 mg Inject  500 mg into the vein every 12 (twelve) hours.      sodium chloride 0.9% SOLN 100 mL with meropenem 500 MG SOLR 500 mg Inject 500 mg into the vein every 12 (twelve) hours. CBC, CMP, ESR, CRP weekly with results faxed to Dr. Rudolph at [phone]. Routine PICC care while in place. EOT 5/26      Lenalidomide 5 MG Oral Cap Take 5 mg by mouth daily.      memantine 10 MG Oral Tab Take 1 tablet (10 mg total) by mouth 2 (two) times daily.      potassium chloride 20 MEQ Oral Tab CR Take 1 tablet (20 mEq total) by mouth daily.      amLODIPine 10 MG Oral Tab Take 1 tablet (10 mg total) by mouth daily.      insulin degludec 100 units/mL Subcutaneous Solution Pen-injector Inject 15 Units into the skin nightly. 3 mL 0    insulin aspart 100 Units/mL Subcutaneous Solution Pen-injector Inject 6 Units into the skin 3 (three) times daily with meals. 3 mL 0    Insulin Pen Needle 32G X 4 MM Does not apply Misc May substitute if not on insurance formulary 100 each 5    DULoxetine 60 MG Oral Cap DR Particles Take 1 capsule (60 mg total) by mouth in the morning.      losartan 50 MG Oral Tab Take 1 tablet (50 mg total) by mouth in the morning.      Empagliflozin (JARDIANCE) 25 MG Oral Tab Take 1 tablet (25 mg total) by mouth in the morning.      levothyroxine 25 MCG Oral Tab Take 1 tablet (25 mcg total) by mouth before breakfast.       No current facility-administered medications on file prior to visit.   [4] No Known Allergies

## 2025-05-30 ENCOUNTER — TELEPHONE (OUTPATIENT)
Dept: OTOLARYNGOLOGY | Facility: CLINIC | Age: 75
End: 2025-05-30

## 2025-05-30 NOTE — TELEPHONE ENCOUNTER
Rocio calling from [facility] where patient is staying calling to informed that patient has a referral to give for appointment with Dr Whitney  .Please advise

## 2025-05-30 NOTE — TELEPHONE ENCOUNTER
Contacted Rocio from the [facility] and according to nurse, their AdN called patient's insurance and patient needs prior authorization for his referral for his appt with  and new appt has been made. This has been requested from the insurance

## 2025-06-05 ENCOUNTER — HOSPITAL ENCOUNTER (INPATIENT)
Facility: HOSPITAL | Age: 75
LOS: 5 days | Discharge: NURSING, REHAB OR LONG-TERM CARE | End: 2025-06-11
Attending: STUDENT IN AN ORGANIZED HEALTH CARE EDUCATION/TRAINING PROGRAM | Admitting: INTERNAL MEDICINE
Payer: MEDICARE

## 2025-06-05 ENCOUNTER — APPOINTMENT (OUTPATIENT)
Dept: CT IMAGING | Facility: HOSPITAL | Age: 75
End: 2025-06-05
Attending: STUDENT IN AN ORGANIZED HEALTH CARE EDUCATION/TRAINING PROGRAM
Payer: MEDICARE

## 2025-06-05 DIAGNOSIS — M27.2 ODONTOGENIC INFECTION OF JAW: ICD-10-CM

## 2025-06-05 DIAGNOSIS — K04.7 DENTAL ABSCESS: Primary | ICD-10-CM

## 2025-06-05 LAB
ALBUMIN SERPL-MCNC: 4.2 G/DL (ref 3.2–4.8)
ALBUMIN/GLOB SERPL: 1.3 {RATIO} (ref 1–2)
ALP LIVER SERPL-CCNC: 92 U/L (ref 45–117)
ALT SERPL-CCNC: 90 U/L (ref 10–49)
ANION GAP SERPL CALC-SCNC: 10 MMOL/L (ref 0–18)
AST SERPL-CCNC: 47 U/L (ref ?–34)
BASOPHILS # BLD AUTO: 0.03 X10(3) UL (ref 0–0.2)
BASOPHILS NFR BLD AUTO: 1.1 %
BILIRUB SERPL-MCNC: 0.5 MG/DL (ref 0.2–1.1)
BUN BLD-MCNC: 22 MG/DL (ref 9–23)
BUN/CREAT SERPL: 18 (ref 10–20)
CALCIUM BLD-MCNC: 9.2 MG/DL (ref 8.7–10.4)
CHLORIDE SERPL-SCNC: 102 MMOL/L (ref 98–112)
CO2 SERPL-SCNC: 21 MMOL/L (ref 21–32)
CREAT BLD-MCNC: 1.22 MG/DL (ref 0.7–1.3)
DEPRECATED RDW RBC AUTO: 62 FL (ref 35.1–46.3)
EGFRCR SERPLBLD CKD-EPI 2021: 61 ML/MIN/1.73M2 (ref 60–?)
EOSINOPHIL # BLD AUTO: 0.27 X10(3) UL (ref 0–0.7)
EOSINOPHIL NFR BLD AUTO: 10.2 %
ERYTHROCYTE [DISTWIDTH] IN BLOOD BY AUTOMATED COUNT: 18.6 % (ref 11–15)
GLOBULIN PLAS-MCNC: 3.3 G/DL (ref 2–3.5)
GLUCOSE BLD-MCNC: 225 MG/DL (ref 70–99)
GLUCOSE BLDC GLUCOMTR-MCNC: 215 MG/DL (ref 70–99)
HCT VFR BLD AUTO: 30.1 % (ref 39–53)
HGB BLD-MCNC: 10.1 G/DL (ref 13–17.5)
IMM GRANULOCYTES # BLD AUTO: 0 X10(3) UL (ref 0–1)
IMM GRANULOCYTES NFR BLD: 0 %
LYMPHOCYTES # BLD AUTO: 0.98 X10(3) UL (ref 1–4)
LYMPHOCYTES NFR BLD AUTO: 37.1 %
MCH RBC QN AUTO: 30.5 PG (ref 26–34)
MCHC RBC AUTO-ENTMCNC: 33.6 G/DL (ref 31–37)
MCV RBC AUTO: 90.9 FL (ref 80–100)
MONOCYTES # BLD AUTO: 0.4 X10(3) UL (ref 0.1–1)
MONOCYTES NFR BLD AUTO: 15.2 %
NEUTROPHILS # BLD AUTO: 0.96 X10 (3) UL (ref 1.5–7.7)
NEUTROPHILS # BLD AUTO: 0.96 X10(3) UL (ref 1.5–7.7)
NEUTROPHILS NFR BLD AUTO: 36.4 %
OSMOLALITY SERPL CALC.SUM OF ELEC: 286 MOSM/KG (ref 275–295)
PLATELET # BLD AUTO: 264 10(3)UL (ref 150–450)
POTASSIUM SERPL-SCNC: 5.9 MMOL/L (ref 3.5–5.1)
PROT SERPL-MCNC: 7.5 G/DL (ref 5.7–8.2)
RBC # BLD AUTO: 3.31 X10(6)UL (ref 3.8–5.8)
SODIUM SERPL-SCNC: 133 MMOL/L (ref 136–145)
WBC # BLD AUTO: 2.6 X10(3) UL (ref 4–11)

## 2025-06-05 PROCEDURE — 70487 CT MAXILLOFACIAL W/DYE: CPT | Performed by: STUDENT IN AN ORGANIZED HEALTH CARE EDUCATION/TRAINING PROGRAM

## 2025-06-05 PROCEDURE — 99223 1ST HOSP IP/OBS HIGH 75: CPT | Performed by: INTERNAL MEDICINE

## 2025-06-05 RX ORDER — MORPHINE SULFATE 4 MG/ML
4 INJECTION, SOLUTION INTRAMUSCULAR; INTRAVENOUS ONCE
Status: COMPLETED | OUTPATIENT
Start: 2025-06-05 | End: 2025-06-05

## 2025-06-06 LAB
ATRIAL RATE: 78 BPM
GLUCOSE BLDC GLUCOMTR-MCNC: 111 MG/DL (ref 70–99)
GLUCOSE BLDC GLUCOMTR-MCNC: 124 MG/DL (ref 70–99)
GLUCOSE BLDC GLUCOMTR-MCNC: 84 MG/DL (ref 70–99)
GLUCOSE BLDC GLUCOMTR-MCNC: 90 MG/DL (ref 70–99)
GLUCOSE BLDC GLUCOMTR-MCNC: 99 MG/DL (ref 70–99)
P AXIS: 50 DEGREES
P-R INTERVAL: 142 MS
POTASSIUM SERPL-SCNC: 4.6 MMOL/L (ref 3.5–5.1)
Q-T INTERVAL: 388 MS
QRS DURATION: 106 MS
QTC CALCULATION (BEZET): 442 MS
R AXIS: -47 DEGREES
T AXIS: 66 DEGREES
VENTRICULAR RATE: 78 BPM

## 2025-06-06 PROCEDURE — 99233 SBSQ HOSP IP/OBS HIGH 50: CPT | Performed by: HOSPITALIST

## 2025-06-06 RX ORDER — INSULIN DEGLUDEC 100 U/ML
15 INJECTION, SOLUTION SUBCUTANEOUS NIGHTLY
Status: DISCONTINUED | OUTPATIENT
Start: 2025-06-06 | End: 2025-06-07

## 2025-06-06 RX ORDER — ACETAMINOPHEN 500 MG
500 TABLET ORAL EVERY 4 HOURS PRN
Status: DISCONTINUED | OUTPATIENT
Start: 2025-06-06 | End: 2025-06-11

## 2025-06-06 RX ORDER — CHLORHEXIDINE GLUCONATE ORAL RINSE 1.2 MG/ML
15 SOLUTION DENTAL 2 TIMES DAILY
Status: DISCONTINUED | OUTPATIENT
Start: 2025-06-06 | End: 2025-06-11

## 2025-06-06 RX ORDER — HEPARIN SODIUM 5000 [USP'U]/ML
5000 INJECTION, SOLUTION INTRAVENOUS; SUBCUTANEOUS EVERY 8 HOURS SCHEDULED
Status: DISCONTINUED | OUTPATIENT
Start: 2025-06-06 | End: 2025-06-11

## 2025-06-06 RX ORDER — HYDROCODONE BITARTRATE AND ACETAMINOPHEN 5; 325 MG/1; MG/1
1 TABLET ORAL EVERY 4 HOURS PRN
Status: DISCONTINUED | OUTPATIENT
Start: 2025-06-06 | End: 2025-06-07

## 2025-06-06 RX ORDER — SODIUM CHLORIDE 9 MG/ML
INJECTION, SOLUTION INTRAVENOUS CONTINUOUS
Status: DISCONTINUED | OUTPATIENT
Start: 2025-06-06 | End: 2025-06-06

## 2025-06-06 RX ORDER — POTASSIUM CHLORIDE 1500 MG/1
20 TABLET, EXTENDED RELEASE ORAL DAILY
Status: DISCONTINUED | OUTPATIENT
Start: 2025-06-06 | End: 2025-06-11

## 2025-06-06 RX ORDER — LEVOTHYROXINE SODIUM 25 UG/1
25 TABLET ORAL
Status: DISCONTINUED | OUTPATIENT
Start: 2025-06-06 | End: 2025-06-11

## 2025-06-06 RX ORDER — HYDROCODONE BITARTRATE AND ACETAMINOPHEN 5; 325 MG/1; MG/1
2 TABLET ORAL EVERY 4 HOURS PRN
Status: DISCONTINUED | OUTPATIENT
Start: 2025-06-06 | End: 2025-06-11

## 2025-06-06 RX ORDER — NICOTINE POLACRILEX 4 MG
15 LOZENGE BUCCAL
Status: DISCONTINUED | OUTPATIENT
Start: 2025-06-06 | End: 2025-06-11

## 2025-06-06 RX ORDER — AMLODIPINE BESYLATE 10 MG/1
10 TABLET ORAL DAILY
Status: DISCONTINUED | OUTPATIENT
Start: 2025-06-06 | End: 2025-06-11

## 2025-06-06 RX ORDER — MEMANTINE HYDROCHLORIDE 10 MG/1
10 TABLET ORAL 2 TIMES DAILY
Status: DISCONTINUED | OUTPATIENT
Start: 2025-06-06 | End: 2025-06-11

## 2025-06-06 RX ORDER — MORPHINE SULFATE 2 MG/ML
2 INJECTION, SOLUTION INTRAMUSCULAR; INTRAVENOUS EVERY 2 HOUR PRN
Status: DISCONTINUED | OUTPATIENT
Start: 2025-06-06 | End: 2025-06-11

## 2025-06-06 RX ORDER — NICOTINE POLACRILEX 4 MG
30 LOZENGE BUCCAL
Status: DISCONTINUED | OUTPATIENT
Start: 2025-06-06 | End: 2025-06-11

## 2025-06-06 RX ORDER — DULOXETIN HYDROCHLORIDE 60 MG/1
60 CAPSULE, DELAYED RELEASE ORAL DAILY
Status: DISCONTINUED | OUTPATIENT
Start: 2025-06-06 | End: 2025-06-11

## 2025-06-06 RX ORDER — DEXTROSE MONOHYDRATE AND SODIUM CHLORIDE 5; .9 G/100ML; G/100ML
INJECTION, SOLUTION INTRAVENOUS CONTINUOUS
Status: DISCONTINUED | OUTPATIENT
Start: 2025-06-06 | End: 2025-06-11

## 2025-06-06 RX ORDER — ACETAMINOPHEN 325 MG/1
650 TABLET ORAL EVERY 4 HOURS PRN
Status: DISCONTINUED | OUTPATIENT
Start: 2025-06-06 | End: 2025-06-11

## 2025-06-06 RX ORDER — LOSARTAN POTASSIUM 50 MG/1
50 TABLET ORAL DAILY
Status: DISCONTINUED | OUTPATIENT
Start: 2025-06-06 | End: 2025-06-11

## 2025-06-06 RX ORDER — DEXTROSE MONOHYDRATE 25 G/50ML
50 INJECTION, SOLUTION INTRAVENOUS
Status: DISCONTINUED | OUTPATIENT
Start: 2025-06-06 | End: 2025-06-11

## 2025-06-06 RX ORDER — MORPHINE SULFATE 4 MG/ML
4 INJECTION, SOLUTION INTRAMUSCULAR; INTRAVENOUS EVERY 2 HOUR PRN
Status: DISCONTINUED | OUTPATIENT
Start: 2025-06-06 | End: 2025-06-11

## 2025-06-06 RX ORDER — MORPHINE SULFATE 2 MG/ML
1 INJECTION, SOLUTION INTRAMUSCULAR; INTRAVENOUS EVERY 2 HOUR PRN
Status: DISCONTINUED | OUTPATIENT
Start: 2025-06-06 | End: 2025-06-11

## 2025-06-06 NOTE — ED QUICK NOTES
Orders for admission, patient is aware of plan and ready to go upstairs. Any questions, please call ED RN vianey at extension [phone].     Patient Covid vaccination status: Fully vaccinated     COVID Test Ordered in ED: None    COVID Suspicion at Admission: N/A    Running Infusions: Medication Infusions[1]       Other pertinent information:   CIWA score: N/A   NIH score:  N/A             [1]

## 2025-06-06 NOTE — CM/SW NOTE
MDO-Patient doesn't  have dental insurance for oral surgery    Per chart review patient hospitalized 4/28/2025 to 5/6/2025 for left face cellulitis, initially attributed to dental extraction. Patient subsequently developed vesicular rashes in the V2/V3 distribution and was diagnosed with herpes zoster. MRI showed possible otomastoiditis and he was discharged with 2 weeks of IV antibiotics.     SW assessment 4/30/25:    04/30/25 1200    CM/SW Referral Data   Referral Source Social Work (self-referral)   Reason for Referral Discharge planning   Informant Patient   Medical Hx   Does patient have an established PCP? Yes  (Federico Sykes MD)   Significant Past Medical/Mental Health Hx DMll; HTN; dementia   Patient Info   Advanced directives? No   Patient's Current Mental Status at Time of Assessment Memory Impairments   Patient's Home Environment House   Number of Levels in Home 2  (Able to live on main floor)   Number of Stair in Home 2 SARIKA   Patient lives with Alone  (caregiver)   Patient Status Prior to Admission   Independent with ADLs and Mobility No   Pt. requires assistance with Housework;Driving;Meals;Bathing;Ambulating;Dressing;Medications;Feeding;Toileting   Services in place prior to admission DME/Supplies at home;[facility]   Type of DME/Supplies Wheeled Walker   [facility] Provider Private Duty   [facility] hours per day 24   [facility] days per week 7     Ultimately dc to [facility] at  [facility]     Pt now sent to ED by oral surgeon to eval dental abscess    CM placed resource for [facility] dentistry, [facility] on AVS. They are sliding scale/income based. It will be a student that performs the surgery.    CM sent ref to [place] to confirm status is still DENA.    Pt may need to continue IV abx at dc, which will be a qualifying reason for DENA however, CM requested PT OT eval as well.    Will need a qualifying reason for DENA and ins auth to return.  PASRR is current    1555  CM rec'd confirmation from liaison Ramya that pt is still DENA however, they have been working w/ family on possible transition to LTC     Plan  [facility] pend abx plan and ins auth    / to remain available for support and/or discharge planning.     Jessica Mcgill, RN    Ext [phone]

## 2025-06-06 NOTE — ED PROVIDER NOTES
Patient Seen in: [facility] Emergency Department        History  Chief Complaint   Patient presents with    Jaw Pain     Stated Complaint: left jaw infection    Subjective:   HPI            75-year-old male with history of diabetes mellitus hypertension, multiple myeloma, dementia, presented for evaluation of left jaw pain.  Was reportedly seen by an oral surgeon today, was referred to the ER, there is able to take x-rays but is unclear why.  They note exposed bone associated with tooth #18 through 22, oral mucosa is detached in that area, he was sent to the ER simply for severe pain.  No reports of fevers.      Objective:     Past Medical History:    Abnormal gait    Benign essential hypertension    Cancer (HCC)    Chest pain    Formatting of this note might be different from the original.   Normal stress test 11/2021      Last Assessment & Plan:    Formatting of this note might be different from the original.   Symptoms are atypical for ischemia   Chest pain is worse with change in position      Diabetes (HCC)    Diabetes mellitus (HCC)    Diabetes mellitus (HCC)    High blood pressure    Hyperglycemia    Hypertension associated with diabetes (HCC)    Last Assessment & Plan:    Formatting of this note might be different from the original.   Blood pressure mildly elevated   Continue same medications for now   Continue to monitor readings      Multiple myeloma (HCC)    Multiple premature ventricular complexes    Formatting of this note might be different from the original.   Normal stress test 11/2021      Echo 8/2021:    1. Left ventricular ejection fraction, by visual estimation, is 60 to 65%.    2. Mild concentric left ventricular hypertrophy.    3. Normal pattern of LV diastolic filling.    4. Mild aortic valve stenosis.         Last Assessment & Plan:    Formatting of this note might be different fro    Neuropathy    Nonrheumatic aortic valve stenosis    Formatting of this note might be different from  the original.   Echo 8/2021:    1. Left ventricular ejection fraction, by visual estimation, is 60 to 65%.    2. Mild concentric left ventricular hypertrophy.    3. Normal pattern of LV diastolic filling.    4. Mild aortic valve stenosis.         Last Assessment & Plan:    Formatting of this note might be different from the original.   Mild aortic mu    Rash    Type 2 diabetes mellitus without complication, without long-term current use of insulin (HCC)    Vomiting              History reviewed. No pertinent surgical history.             Social History     Socioeconomic History    Marital status: Single   Tobacco Use    Smoking status: Never    Smokeless tobacco: Never   Vaping Use    Vaping status: Never Used   Substance and Sexual Activity    Alcohol use: Never    Drug use: Never     Social Drivers of Health     Food Insecurity: No Food Insecurity (4/28/2025)    NCSS - Food Insecurity     Worried About Running Out of Food in the Last Year: No     Ran Out of Food in the Last Year: No   Transportation Needs: No Transportation Needs (4/28/2025)    NCSS - Transportation     Lack of Transportation: No   Housing Stability: Not At Risk (4/28/2025)    NCSS - Housing/Utilities     Has Housing: Yes     Worried About Losing Housing: No     Unable to Get Utilities: No                                Physical Exam    ED Triage Vitals [06/05/25 2048]   /71   Pulse 68   Resp 18   Temp 97.5 °F (36.4 °C)   Temp src Oral   SpO2 99 %   O2 Device None (Room air)       Current Vitals:   Vital Signs  BP: 115/59  Pulse: 78  Resp: 16  Temp: 97.5 °F (36.4 °C)  Temp src: Oral  MAP (mmHg): 74    Oxygen Therapy  SpO2: 99 %  O2 Device: None (Room air)            Physical Exam  Constitutional: awake, alert, no sig distress  HENT: Exposed bone in tooth 18 through 22 overall very poor dentition, tooth #17 extracted, there is some mucosal swelling in that area  -No elevation of floor the mouth, no stridor or tripoding or drooling or  trismus  Neck: normal range of motion, no tenderness, supple.  Eyes: PERRL, EOMI, conjunctiva normal, no discharge. Sclera anicteric.  Cardiovascular: rr no murmur  Respiratory: Normal breath sounds, no respiratory distress, no wheezing, no chest tenderness.  GI: Bowel sounds normal, Soft, no tenderness, no masses, no pulsatile masses.  : No CVA tenderness.  Skin: Warm, dry, no erythema, no rash.  Musculoskeletal: Intact distal pulses, no edema, no tenderness, no cyanosis, no clubbing. Good range of motion in all major joints. No tenderness to palpation or major deformities noted. Back- No tenderness.  Neurologic: Alert & oriented x 3, normal motor function, normal sensory function, no focal deficits noted.  Psych: Calm, cooperative, nl affect        ED Course  Labs Reviewed   CBC WITH DIFFERENTIAL WITH PLATELET - Abnormal; Notable for the following components:       Result Value    WBC 2.6 (*)     RBC 3.31 (*)     HGB 10.1 (*)     HCT 30.1 (*)     RDW-SD 62.0 (*)     RDW 18.6 (*)     Neutrophil Absolute Prelim 0.96 (*)     Neutrophil Absolute 0.96 (*)     Lymphocyte Absolute 0.98 (*)     All other components within normal limits   COMP METABOLIC PANEL (14) - Abnormal; Notable for the following components:    Glucose 225 (*)     Sodium 133 (*)     Potassium 5.9 (*)     ALT 90 (*)     AST 47 (*)     All other components within normal limits   POCT GLUCOSE - Abnormal; Notable for the following components:    POC Glucose  215 (*)     All other components within normal limits     EKG               ED Course as of 06/05/25 8440  ------------------------------------------------------------  Time: 06/05 9759  Comment: EKG is inter by ED physician: Normal sinus rhythm 70 bpm left axis deviation, no hyperkalemia changes no peaked T waves or QRS widening, QTc 442                       MDM     75-year-old male history as documented above sent in due to severe pain related to left jawline tooth discomfort.  Arrival vital  stable reassuring  DDx: Dental abscess,.  Other dental infection, osteomyelitis of the mandible  Plan for labs and CT with contrast  Discussed with oral surgery    Reviewed CT facial bones independently shows no clear evidence of osteomyelitis.    Patient with significant pain requirements in the ER, pain not well-controlled with IV morphine so will admit.  Given IV Unasyn.    Admission disposition: 6/5/2025 11:50 PM           Medical Decision Making      Disposition and Plan     Clinical Impression:  1. Dental abscess         Disposition:  Admit  6/5/2025 11:50 pm    Follow-up:  No follow-up provider specified.  We recommend that you schedule follow up care with a primary care provider within the next three months to obtain basic health screening including reassessment of your blood pressure.      Medications Prescribed:  Current Discharge Medication List                Supplementary Documentation:         Hospital Problems       Present on Admission  Date Reviewed: 5/25/2025          ICD-10-CM Noted POA    Dental abscess K04.7 6/5/2025 Unknown

## 2025-06-06 NOTE — PROGRESS NOTES
[facility]  part of [facility]    Progress Note    Joselin Graham Patient Status:  Inpatient    5/15/1950 MRN [id number]   Location [facility] 4W/SW/SE Attending Bonnie Craig MD   Hosp Day # 0 PCP No primary care provider on file.       Subjective:   Joselin Graham is a(n) 75 year old male who is feeling ok. Still having some pain with swallowing. No fever.     Objective:   Blood pressure 114/66, pulse 73, temperature 98 °F (36.7 °C), temperature source Oral, resp. rate 18, weight 122 lb 3.2 oz (55.4 kg), SpO2 97%.    Physical Exam:    General: No acute distress.   Respiratory: Clear to auscultation bilaterally. No wheezes. No rhonchi.  Cardiovascular: S1, S2. Regular rate and rhythm. No murmurs, rubs or gallops.   Abdomen: Soft, nontender, nondistended.  Positive bowel sounds. No rebound or guarding.  Neurologic: No focal neurological deficits.   Musculoskeletal: Moves all extremities.  Extremities: No edema.    Results:     Lab Results   Component Value Date    WBC 2.6 (L) 2025    HGB 10.1 (L) 2025    HCT 30.1 (L) 2025    .0 2025    CREATSERUM 1.22 2025    BUN 22 2025     (L) 2025    K 4.6 2025     2025    CO2 21.0 2025     (H) 2025    CA 9.2 2025    ALB 4.2 2025    ALKPHO 92 2025    BILT 0.5 2025    TP 7.5 2025    AST 47 (H) 2025    ALT 90 (H) 2025    T4F 1.1 2024    TSH 0.847 2025    MG 1.9 2025    PHOS 3.0 2025    B12 415 2024       CT FACIAL BONES(CONTRAST ONLY) (CPT=70487)  Result Date: 2025  CONCLUSION:  1. Postprocedural changes of extraction of the left mandibular 1st and 2nd bicuspids/premolars with peripheral enhancing fluid collection adjacent to the sockets, concerning for 3.1 cm dental abscess.  2. Lytic lesions throughout the osseous structures, likely secondary to multiple myeloma.  3. Minimal left  maxillary sinusitis.  4. Lesser incidental findings as above.    A preliminary report was issued by the [facility] teleradiology service. There are no major discrepancies.  elm-remote.  Dictated by (CST): Scottie Allen MD on 6/06/2025 at 4:34 AM     Finalized by (CST): Scottie Allen MD on 6/06/2025 at 5:05 AM          EKG  Result Date: 6/6/2025  Normal sinus rhythm Left axis deviation Anteroseptal infarct (cited on or before 22-JUL-2024) Abnormal ECG When compared with ECG of 28-JUL-2024 17:04, Questionable change in initial forces of Anterior leads Nonspecific T wave abnormality has replaced inverted T waves in Lateral leads Confirmed by VASQUEZ ESPINOZA, VERÓNICA ([id number]) on 6/6/2025 1:05:46 PM      Assessment and Plan:       Dental abscess  - Per ED physician, discussed with oral surgery  - Admit for pain control  - IV antibiotics with Unasyn  - Follow-up radiology CT read  - Dr. Ha will see for abscess care      Hyperkalemia  Mild hyponatremia  - Recheck and treat accordingly     DM2  - Insulin  - Carb controlled diet     Leukopenia, chronic  Anemia  History of multiple myeloma  - Monitor  - Resume home meds     HTN  Dementia  - Resume home meds when reconciled     Quality:  DVT Prophylaxis: Heparin  CODE status: Full code    HAMMAD SMITH MD  06/06/25

## 2025-06-06 NOTE — PLAN OF CARE
Problem: Patient Centered Care  Goal: Patient preferences are identified and integrated in the patient's plan of care  Description: Interventions:  - What would you like us to know as we care for you?   - Provide timely, complete, and accurate information to patient/family  - Incorporate patient and family knowledge, values, beliefs, and cultural backgrounds into the planning and delivery of care  - Encourage patient/family to participate in care and decision-making at the level they choose  - Honor patient and family perspectives and choices  6/6/2025 0355 by Court Morgan RN  Outcome: Progressing  6/6/2025 0354 by Court Morgan RN  Outcome: Progressing     Problem: Patient/Family Goals  Goal: Patient/Family Long Term Goal  Description: Patient's Long Term Goal:    Interventions:  -   - See additional Care Plan goals for specific interventions  6/6/2025 0355 by Court Morgan RN  Outcome: Progressing  6/6/2025 0354 by Court Morgan RN  Outcome: Progressing  Goal: Patient/Family Short Term Goal  Description: Patient's Short Term Goal:     Interventions:   -   - See additional Care Plan goals for specific interventions  6/6/2025 0355 by Court Morgan RN  Outcome: Progressing  6/6/2025 0354 by Court Morgan RN  Outcome: Progressing     Problem: PAIN - ADULT  Goal: Verbalizes/displays adequate comfort level or patient's stated pain goal  Description: INTERVENTIONS:  - Encourage pt to monitor pain and request assistance  - Assess pain using appropriate pain scale  - Administer analgesics based on type and severity of pain and evaluate response  - Implement non-pharmacological measures as appropriate and evaluate response  - Consider cultural and social influences on pain and pain management  - Manage/alleviate anxiety  - Utilize distraction and/or relaxation techniques  - Monitor for opioid side effects  - Notify MD/LIP if interventions unsuccessful or patient reports new pain  - Anticipate increased pain  with activity and pre-medicate as appropriate  Outcome: Progressing     Problem: RISK FOR INFECTION - ADULT  Goal: Absence of fever/infection during anticipated neutropenic period  Description: INTERVENTIONS  - Monitor WBC  - Administer growth factors as ordered  - Implement neutropenic guidelines  Outcome: Progressing     Problem: SAFETY ADULT - FALL  Goal: Free from fall injury  Description: INTERVENTIONS:  - Assess pt frequently for physical needs  - Identify cognitive and physical deficits and behaviors that affect risk of falls.  - [place] fall precautions as indicated by assessment.  - Educate pt/family on patient safety including physical limitations  - Instruct pt to call for assistance with activity based on assessment  - Modify environment to reduce risk of injury  - Provide assistive devices as appropriate  - Consider OT/PT consult to assist with strengthening/mobility  - Encourage toileting schedule  Outcome: Progressing     Problem: DISCHARGE PLANNING  Goal: Discharge to home or other facility with appropriate resources  Description: INTERVENTIONS:  - Identify barriers to discharge w/pt and caregiver  - Include patient/family/discharge partner in discharge planning  - Arrange for needed discharge resources and transportation as appropriate  - Identify discharge learning needs (meds, wound care, etc)  - Arrange for interpreters to assist at discharge as needed  - Consider post-discharge preferences of patient/family/discharge partner  - Complete POLST form as appropriate  - Assess patient's ability to be responsible for managing their own health  - Refer to Case Management Department for coordinating discharge planning if the patient needs post-hospital services based on physician/LIP order or complex needs related to functional status, cognitive ability or social support system  Outcome: Progressing     Problem: Altered Communication/Language Barrier  Goal: Patient/Family is able to understand and  participate in their care  Description: Interventions:  - Assess communication ability and preferred communication style  - Implement communication aides and strategies  - Use visual cues when possible  - Listen attentively, be patient, do not interrupt  - Minimize distractions  - Allow time for understanding and response  - Establish method for patient to ask for assistance (call light)  - Provide an  as needed  - Communicate barriers and strategies to overcome with those who interact with patient  Outcome: Progressing

## 2025-06-06 NOTE — CONSULTS
[facility]  part of [facility]      Consult     Joselin Graham Patient Status:  Inpatient    5/15/1950 MRN [id number]   Location [facility] 4W/SW/SE Attending Bonnie Craig MD   Hosp Day # 0 PCP No primary care provider on file.     Referring Provider: Dr. Craig  Reason for Consultation: Dental Pain/Abscess    Subjective:    Joselin Graham is a 75 year old male with complaining of sore throat and moderate to severe left facial pain.  Pain became intractable, so he presented to the ED.  He has history of Multiple Myeloma and being treated with Xgeva (denosumab).      History/Other:      Past Medical History:Past Medical History[1]     Past Surgical History: Past Surgical History[2]    Social History:  reports that he has never smoked. He has never used smokeless tobacco. He reports that he does not drink alcohol and does not use drugs.    Family History: Family History[3]    Allergies: Allergies[4]    Medications:  Medications Ordered Prior to Encounter[5]    Review of Systems:   Review of systems was completed.  Pertinent positives and negatives noted in the HPI.    Objective:     /52 (BP Location: Right arm)   Pulse 67   Temp 98 °F (36.7 °C) (Oral)   Resp 18   Wt 122 lb 3.2 oz (55.4 kg)   SpO2 95%   BMI 20.34 kg/m²       Exam limited to head and neck area.  See H&P for complete exam.  General: Patient disoriented.  Lethargic  HEENT:  Gross intra oral exposed bone present left mandible.  Recent dental extraction sites with gross visible bone from approximately teeth 20-25.  No gross swelling or signs of abscess or cellulitis at this time.             Results:    Labs:  Laboratory data reviewed.      Selected labs - last 24 hours:  Endo  Lytes  Renal   Glu 225  Na 133 Ca 9.2  BUN 22   POC Gluc  90  K 4.6 PO4 -  Cr 1.22   A1c -  Cl 102 Mg -  eGFR 61   TSH -  CO2 21.0         LFT  CBC  Other   AST 47  WBC 2.6  PTT - Procal -   ALT 90  Hb 10.1  INR - CRP -   APk 92  Hct 30.1  Trop  - D dim -   T aliyah 0.5  .0  pBNP -  BNP -  - Ferritin  -   Prot 7.5    CK  - Lactate  -   Alb 4.2    LDL  - COVID  -       Imaging: Imaging data reviewed in Epic.    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  Abscess no longer present and appears to have resolved with IV antibiotics.  Patient complaining of moderate to severe facial pain.  He is afebrile and has leukopenia.      P:    Continue IV antibiotics - Unasyn  Pain management as needed  Advance diet as tolerated  Continue present management.  He will require mandible resection to removed necrotic bone and to reconstruct the mandible when medically stable.   Referral to [place] [facility] indicated for mandible resection.  Surgery not done at this hospital that I know of.  Ideally he may be able to be discharged when his pain is more under control, at which time he can be referred to [facility].  If his pain remains intractable and he needs more urgent care, transfer to [facility] may be indicated at this time.  Definitive care cannot be provided at this hospital.  Will continue to follow while in the hospital.      Plan of care discussed with patient only.      DAVID RAWLS DDS  6/6/2025    Supplementary Documentation:                            [1]   Past Medical History:   Abnormal gait    Benign essential hypertension    Cancer (HCC)    Chest pain    Formatting of this note might be different from the original.   Normal stress test 11/2021      Last Assessment & Plan:    Formatting of this note might be different from the original.   Symptoms are atypical for ischemia   Chest pain is worse with change in position      Diabetes (HCC)    Diabetes mellitus (HCC)    Diabetes mellitus (HCC)    High blood pressure    Hyperglycemia    Hypertension associated with diabetes (HCC)    Last Assessment & Plan:    Formatting of this note might be  different from the original.   Blood pressure mildly elevated   Continue same medications for now   Continue to monitor readings      Multiple myeloma (HCC)    Multiple premature ventricular complexes    Formatting of this note might be different from the original.   Normal stress test 11/2021      Echo 8/2021:    1. Left ventricular ejection fraction, by visual estimation, is 60 to 65%.    2. Mild concentric left ventricular hypertrophy.    3. Normal pattern of LV diastolic filling.    4. Mild aortic valve stenosis.         Last Assessment & Plan:    Formatting of this note might be different fro    Neuropathy    Nonrheumatic aortic valve stenosis    Formatting of this note might be different from the original.   Echo 8/2021:    1. Left ventricular ejection fraction, by visual estimation, is 60 to 65%.    2. Mild concentric left ventricular hypertrophy.    3. Normal pattern of LV diastolic filling.    4. Mild aortic valve stenosis.         Last Assessment & Plan:    Formatting of this note might be different from the original.   Mild aortic mu    Rash    Type 2 diabetes mellitus without complication, without long-term current use of insulin (HCC)    Vomiting   [2] History reviewed. No pertinent surgical history.  [3] History reviewed. No pertinent family history.  [4]   Allergies  Allergen Reactions    Pork UNKNOWN   [5]   No current facility-administered medications on file prior to encounter.     Current Outpatient Medications on File Prior to Encounter   Medication Sig Dispense Refill    acetaminophen 325 MG Oral Tab Take 2 tablets (650 mg total) by mouth every 4 (four) hours as needed.      Lenalidomide 5 MG Oral Cap Take 5 mg by mouth daily.      potassium chloride 20 MEQ Oral Tab CR Take 1 tablet (20 mEq total) by mouth daily.      amLODIPine 10 MG Oral Tab Take 1 tablet (10 mg total) by mouth daily.      DULoxetine 60 MG Oral Cap DR Particles Take 1 capsule (60 mg total) by mouth in the morning.       Empagliflozin (JARDIANCE) 25 MG Oral Tab Take 1 tablet (25 mg total) by mouth in the morning.      levothyroxine 25 MCG Oral Tab Take 1 tablet (25 mcg total) by mouth before breakfast.      sodium chloride 0.9% SOLN 100 mL with meropenem 500 MG SOLR 500 mg Inject 500 mg into the vein every 12 (twelve) hours.      sodium chloride 0.9% SOLN 100 mL with meropenem 500 MG SOLR 500 mg Inject 500 mg into the vein every 12 (twelve) hours. CBC, CMP, ESR, CRP weekly with results faxed to Dr. Rudolph at [phone]. Routine PICC care while in place. EOT 5/26      memantine 10 MG Oral Tab Take 1 tablet (10 mg total) by mouth 2 (two) times daily.      insulin degludec 100 units/mL Subcutaneous Solution Pen-injector Inject 15 Units into the skin nightly. 3 mL 0    insulin aspart 100 Units/mL Subcutaneous Solution Pen-injector Inject 6 Units into the skin 3 (three) times daily with meals. 3 mL 0    Insulin Pen Needle 32G X 4 MM Does not apply Misc May substitute if not on insurance formulary 100 each 5    losartan 50 MG Oral Tab Take 1 tablet (50 mg total) by mouth in the morning.

## 2025-06-06 NOTE — SLP NOTE
ADULT SWALLOWING EVALUATION    ASSESSMENT    ASSESSMENT/OVERALL IMPRESSION:      Proper PPE worn. Hands sanitized upon entrance/exit Pt room.       BSE ordered 2/2 RN dysphagia screen. Pt admitted 6/05/25 with dental abscess. Pt on solid/thin liquid diet at [facility]. PMH includes HTN, generalized weakness, neuropathy, DM. No PMH of dysphagia at [facility]. Pt denies any past swallowing difficulty. Currently, Pt NPO.    CT Facial Bones 6/05/25:  CONCLUSION:   1. Postprocedural changes of extraction of the left mandibular 1st and 2nd bicuspids/premolars with peripheral enhancing fluid collection adjacent to the sockets, concerning for 3.1 cm dental abscess.   2. Lytic lesions throughout the osseous structures, likely secondary to multiple myeloma.   3. Minimal left maxillary sinusitis.   4. Lesser incidental findings as above.       Pt alert, on room air, afebrile and assessed sitting upright in bed (after consulting with RN). Pt cooperative. Learning preference verbal. No verbal or non-verbal indication of pain. Vocal quality/intensity weak. Volitional swallow and cough present; considered functional. Oral motor exam revealed reduced overall reduced lingual skills for rate, ROM and strength. Functional natural dentition. Pt was fed pureed and thin liquid trials. Bilabial seal adequate; no anterior loss. Lingual skills slow and uncoordinated for bolus formation and transit of pureed trials with increased MARY JANE noted. Oral cavity clear post swallows.     Pharyngeal response appeared timely per hyolaryngeal elevation to completion (considered functional in strength/rise to palpation). No overt CSA on pureed nor thin liquids, including chain swallows of thin liquids.Overall, swallow function appeared weak d/t oral deficits. Pt denied any globus sensation or odynophagia. Described sense of discomfort with oral stage of swallow on pureed consistency. Sp02 ~96% during this assessment.        IMPRESSIONS:    Pt  presents with mild to moderate oral dysphagia and possible pharyngeal dysfunction. Collaborated with RN regarding Pt's swallowing plan of care. BSE results/recommendations discussed with Pt. Pt v/u; would benefit from additional reinforcement. Swallowing precautions written on white board in room for reinforcement/carry-over of skills for Pt, family and staff. Call light within Pt's reach upon SLP discharge from room.          PLAN:    To f/u x2-3 sessions to ensure safe intake of diet and reinforce swallowing/aspiration precautions. To monitor for any CXR results. Diet upgrade as tolerated. VFSS IF appropriate. Family education as available.        RECOMMENDATIONS   Diet Recommendations - Solids:  (thin liquids only)  Diet Recommendations - Liquids: Thin Liquids    Aspiration Precautions: Small sips, No straw, Upright position  Medication Administration Recommendations: One pill at a time (with thin liquids)  Treatment Plan/Recommendations: Aspiration precautions    HISTORY   MEDICAL HISTORY  Reason for Referral: RN dysphagia screen    Problem List  Principal Problem:    Dental abscess      Past Medical History  Past Medical History[1]    Prior Living Situation:  ([facility])  Diet Prior to Admission: Regular, Thin liquids  Precautions: Aspiration    Patient/Family Goals: to eat/to be discharged    SWALLOWING HISTORY  Current Diet Consistency: NPO    OBJECTIVE   ORAL MOTOR EXAMINATION  Dentition: Natural, Functional  Symmetry: Within Functional Limits  Strength: Overall reduced  Range of Motion: Overall reduced  Rate of Motion: Reduced    Voice Quality: Weak  Respiratory Status: Unlabored  Consistencies Trialed: Thin liquids, Puree  Method of Presentation: Staff/Clinician assistance, Cup, Spoon  Patient Positioned: Upright, Midline    Oral Phase of Swallow: Impaired  Bolus Formation: Impaired  Bolus Propulsion: Impaired    Pharyngeal Phase of Swallow: Within Functional Limits     (Please note: Silent  aspiration cannot be evaluated clinically. Videofluoroscopic Swallow Study is required to rule-out silent aspiration.)    GOALS  Goal #1 The patient will tolerate thin liquids without overt signs or symptoms of aspiration with 100% accuracy over 1-2 session(s).    In Progress   Goal #2 The patient will utilize compensatory strategies as outlined by  BSSE (clinical evaluation) including Slow rate, No straws, Controlled amts, Upright 90 degrees with no feeding assistance 90% of the time across 2 sessions.    In Progress   Goal #3 The patient will tolerate trial upgrade of PUREED/BITE SIZE/SOFT/SOLID consistency and thin liquids without overt signs or symptoms of aspiration with 100 % accuracy over 1-2 session(s).    In Progress   FOLLOW UP  Treatment Plan/Recommendations: Aspiration precautions  Duration: 1 week  Follow Up Needed (Documentation Required): Yes  SLP Follow-up Date: 06/07/25    Thank you for your referral.   If you have any questions, please contact   Elena Tang M.S. [facility]/SLP  Speech-Language Pathologist  [facility]  #[phone]         [1]   Past Medical History:   Abnormal gait    Benign essential hypertension    Cancer (HCC)    Chest pain    Formatting of this note might be different from the original.   Normal stress test 11/2021      Last Assessment & Plan:    Formatting of this note might be different from the original.   Symptoms are atypical for ischemia   Chest pain is worse with change in position      Diabetes (HCC)    Diabetes mellitus (HCC)    Diabetes mellitus (HCC)    High blood pressure    Hyperglycemia    Hypertension associated with diabetes (HCC)    Last Assessment & Plan:    Formatting of this note might be different from the original.   Blood pressure mildly elevated   Continue same medications for now   Continue to monitor readings      Multiple myeloma (HCC)    Multiple premature ventricular complexes    Formatting of this note might be different from the  original.   Normal stress test 11/2021      Echo 8/2021:    1. Left ventricular ejection fraction, by visual estimation, is 60 to 65%.    2. Mild concentric left ventricular hypertrophy.    3. Normal pattern of LV diastolic filling.    4. Mild aortic valve stenosis.         Last Assessment & Plan:    Formatting of this note might be different fro    Neuropathy    Nonrheumatic aortic valve stenosis    Formatting of this note might be different from the original.   Echo 8/2021:    1. Left ventricular ejection fraction, by visual estimation, is 60 to 65%.    2. Mild concentric left ventricular hypertrophy.    3. Normal pattern of LV diastolic filling.    4. Mild aortic valve stenosis.         Last Assessment & Plan:    Formatting of this note might be different from the original.   Mild aortic mu    Rash    Type 2 diabetes mellitus without complication, without long-term current use of insulin (HCC)    Vomiting

## 2025-06-06 NOTE — CM/SW NOTE
Department  notified of request for curtis FERNANDES referrals started. Assigned CM/SW to follow up with pt/family on further discharge planning.     Lindy Weston, DSC

## 2025-06-06 NOTE — DISCHARGE INSTRUCTIONS
For [facility] They offer a sliding scale/income based program. It will be a student that performs the surgery

## 2025-06-06 NOTE — H&P
[facility]  part of [facility]                                                                                                          History and Physical     Joselin Graham Patient Status:  Emergency    5/15/1950 MRN [id number]   Location [facility] EMERGENCY DEPARTMENT Attending Ángel Vargas*   Hosp Day # 0 PCP No primary care provider on file.       Chief Complaint: Dental abscess    Subjective:    Joselin Graham is a 75 year old male with history of HTN, DM, MM, dementia who was sent to the ED from oral surgeons office for evaluation of dental abscess, pain control.  Dental pain started about a week ago.  Per chart/ED review, dental exam showed exposed bone.  No fever or chills.  He denies chest pain or shortness of breath.  No difficulty swallowing or eating.    Vital stable  Labs with sodium 133, potassium 5.9, WBC 2.6, hemoglobin 10.1  CT facial bones shows dental abscess per ED report.  Official radiology read pending.  EKG with normal sinus rhythm, left axis deviation, anteroseptal infarct.    Chart reviewed:  Patient hospitalized 2025 to 2025 for left face cellulitis, initially attributed to dental extraction.  Patient subsequently developed vesicular rashes in the V2/V3 distribution and was diagnosed with herpes zoster.  MRI showed possible otomastoiditis and he was discharged with 2 weeks of IV antibiotics.  He was seen at outside ED 2025 for left-sided facial and ear pain.  Noted with excoriations from picking his ear.    History/Other:      Past Medical History:Past Medical History[1]     Past Surgical History: Past Surgical History[2]    Social History:  reports that he has never smoked. He has never used smokeless tobacco. He reports that he does not drink alcohol and does not use drugs.    Family History: Family History[3]    Allergies: Allergies[4]    Medications:  Medications Ordered Prior to Encounter[5]    Review of Systems:   A  comprehensive 14 point review of systems was completed.    Pertinent positives and negatives noted in the HPI.    Objective:     /73   Pulse 80   Temp 97.5 °F (36.4 °C) (Oral)   Resp 16   Wt 120 lb 9.5 oz (54.7 kg)   SpO2 99%   BMI 20.07 kg/m²   General: No acute distress.    HEENT: Normocephalic, atraumatic.  Neck: Supple.  Respiratory: Normal effort.  CTAB  Cardiovascular: S1, S2 regular.  Normal rate.  No murmur.   Abdomen: Soft, nontender distended.  Neurologic: Alert, Oriented x 3.  Nonfocal.  Musculoskeletal: No tenderness or deformity.  Extremities: No edema  Psychiatric: Appropriate    Results:      Labs:  Labs Last 24 Hours:   BMP     CBC    Other     Na 133 Cl 102 BUN 22 Glu 225   Hb 10.1   PTT - Procal -   K 5.9 CO2 21.0 Cr 1.22   WBC 2.6 >< .0  INR - CRP -   Renal Lytes Endo    Hct 30.1   Trop - D dim -   eGFR - Ca 9.2 POC Gluc  215    LFT   pBNP - Lactic -   eGFR AA - PO4 - A1c -   AST 47 APk 92 Prot 7.5  LDL -     Mg - TSH -   ALT 90 T aliyah 0.5 Alb 4.2          COVID-19 Lab Results    COVID-19  Lab Results   Component Value Date    COVID19 Not Detected 07/27/2024       Pro-Calcitonin  No results for input(s): \"PCT\" in the last 168 hours.    Cardiac  No results for input(s): \"TROP\", \"PBNP\" in the last 168 hours.    Creatinine Kinase  No results for input(s): \"CK\" in the last 168 hours.    Inflammatory Markers  No results for input(s): \"CRP\", \"ADIN\", \"LDH\", \"DDIMER\" in the last 168 hours.    Imaging: Imaging data reviewed in Epic.    Assessment & Plan:    Dental abscess  - Per ED physician, discussed with oral surgery  - Admit for pain control  - IV antibiotics with Unasyn  - Follow-up radiology CT read    Hyperkalemia  Mild hyponatremia  - Recheck and treat accordingly    DM2  - Insulin  - Carb controlled diet    Leukopenia, chronic  Anemia  History of multiple myeloma  - Monitor  - Resume home meds    HTN  Dementia  - Resume home meds when reconciled    Quality:  DVT Prophylaxis:  Heparin  CODE status: Full code  KUSH: 2 to 3 days    IV antibiotics: Unasyn  IV pain medications: Morphine  IV fluids: Yes  Diet: Controlled diet  Monitoring: Labs      Plan of care discussed with patient. Acknowledged understanding and agrees to plan. Also discussed with the ED physician.    High MDM  Time spent on this admission - examining patient, obtaining history, reviewing previous medical records, going over test results/imaging and discussing plan of care. More than 50% of the time was spent in counseling and/or coordination of care related to dental abscess.   All questions answered.     Dayana Galloway MD  6/5/2025                   [1]   Past Medical History:   Abnormal gait    Benign essential hypertension    Cancer (HCC)    Chest pain    Formatting of this note might be different from the original.   Normal stress test 11/2021      Last Assessment & Plan:    Formatting of this note might be different from the original.   Symptoms are atypical for ischemia   Chest pain is worse with change in position      Diabetes (HCC)    Diabetes mellitus (HCC)    Diabetes mellitus (HCC)    High blood pressure    Hyperglycemia    Hypertension associated with diabetes (HCC)    Last Assessment & Plan:    Formatting of this note might be different from the original.   Blood pressure mildly elevated   Continue same medications for now   Continue to monitor readings      Multiple myeloma (HCC)    Multiple premature ventricular complexes    Formatting of this note might be different from the original.   Normal stress test 11/2021      Echo 8/2021:    1. Left ventricular ejection fraction, by visual estimation, is 60 to 65%.    2. Mild concentric left ventricular hypertrophy.    3. Normal pattern of LV diastolic filling.    4. Mild aortic valve stenosis.         Last Assessment & Plan:    Formatting of this note might be different fro    Neuropathy    Nonrheumatic aortic valve stenosis    Formatting of this note might be  different from the original.   Echo 8/2021:    1. Left ventricular ejection fraction, by visual estimation, is 60 to 65%.    2. Mild concentric left ventricular hypertrophy.    3. Normal pattern of LV diastolic filling.    4. Mild aortic valve stenosis.         Last Assessment & Plan:    Formatting of this note might be different from the original.   Mild aortic mu    Rash    Type 2 diabetes mellitus without complication, without long-term current use of insulin (HCC)    Vomiting   [2] History reviewed. No pertinent surgical history.  [3] History reviewed. No pertinent family history.  [4]   Allergies  Allergen Reactions    Pork UNKNOWN   [5]   Current Facility-Administered Medications on File Prior to Encounter   Medication Dose Route Frequency Provider Last Rate Last Admin    [COMPLETED] lidocaine PF (Xylocaine-MPF) 1% injection  5 mL Intradermal Once Darrell Moore MD   5 mL at 05/05/25 1030    [COMPLETED] magnesium oxide (Mag-Ox) tab 800 mg  800 mg Oral Once Catracho Tapia MD   800 mg at 05/03/25 0910    [COMPLETED] potassium chloride 40 mEq in 250mL sodium chloride 0.9% IVPB premix  40 mEq Intravenous BID Catracho Tapia MD 62.5 mL/hr at 05/03/25 2104 40 mEq at 05/03/25 2104    [COMPLETED] magnesium sulfate in sterile water for injection 2 g/50mL IVPB premix 2 g  2 g Intravenous Once Marbin Salcedo MD 50 mL/hr at 05/03/25 0911 2 g at 05/03/25 0911    [COMPLETED] potassium chloride 40 mEq in 250mL sodium chloride 0.9% IVPB premix  40 mEq Intravenous Once Catracho Tapia MD 62.5 mL/hr at 05/02/25 0854 40 mEq at 05/02/25 0854    [COMPLETED] potassium chloride 40 mEq in 250mL sodium chloride 0.9% IVPB premix  40 mEq Intravenous Once Catracho Tapia MD 62.5 mL/hr at 05/01/25 0929 40 mEq at 05/01/25 0929    [COMPLETED] potassium chloride 40 mEq in 250mL sodium chloride 0.9% IVPB premix  40 mEq Intravenous Once Catracho Tapia MD 62.5 mL/hr at 04/30/25 1318 40 mEq at 04/30/25 1318    [COMPLETED] sodium chloride 0.9  % IV bolus 1,000 mL  1,000 mL Intravenous Once Humble Wilkinson MD   Stopped at 25 1313    [COMPLETED] ampicillin-sulbactam (Unasyn) 1.5 g in sodium chloride 0.9% 100mL IVPB-TABBY  1.5 g Intravenous Once Humble Wilkinson MD   Stopped at 25 1513    [COMPLETED] dextrose 50% injection 50 mL  50 mL Intravenous Once Humble Wilkinson MD   50 mL at 25 1602    [COMPLETED] sodium chloride 0.9 % IV bolus 1,000 mL  1,000 mL Intravenous Once Humble Wilkinson MD 1,000 mL/hr at 25 1710 1,000 mL at 25 1710     Current Outpatient Medications on File Prior to Encounter   Medication Sig Dispense Refill    acetaminophen 325 MG Oral Tab Take 2 tablets (650 mg total) by mouth every 4 (four) hours as needed.      [] chlorhexidine gluconate 0.12 % Mouth/Throat Solution Use as directed 15 mL in the mouth or throat 2 (two) times daily for 14 days. 420 mL 0    [] valACYclovir 1 G Oral Tab Take 1 tablet (1,000 mg total) by mouth every 12 (twelve) hours for 10 days. 20 tablet 0    sodium chloride 0.9% SOLN 100 mL with meropenem 500 MG SOLR 500 mg Inject 500 mg into the vein every 12 (twelve) hours.      sodium chloride 0.9% SOLN 100 mL with meropenem 500 MG SOLR 500 mg Inject 500 mg into the vein every 12 (twelve) hours. CBC, CMP, ESR, CRP weekly with results faxed to Dr. Rudolph at [phone]. Routine PICC care while in place. EOT       [] ciprofloxacin-dexamethasone 0.3-0.1 % Otic Suspension Place 4 drops into the right ear 2 (two) times daily for 14 days. 7.5 mL 0    Lenalidomide 5 MG Oral Cap Take 5 mg by mouth daily.      memantine 10 MG Oral Tab Take 1 tablet (10 mg total) by mouth 2 (two) times daily.      potassium chloride 20 MEQ Oral Tab CR Take 1 tablet (20 mEq total) by mouth daily.      amLODIPine 10 MG Oral Tab Take 1 tablet (10 mg total) by mouth daily.      insulin degludec 100 units/mL Subcutaneous Solution Pen-injector Inject 15 Units into the skin nightly. 3 mL 0     insulin aspart 100 Units/mL Subcutaneous Solution Pen-injector Inject 6 Units into the skin 3 (three) times daily with meals. 3 mL 0    Insulin Pen Needle 32G X 4 MM Does not apply Misc May substitute if not on insurance formulary 100 each 5    DULoxetine 60 MG Oral Cap DR Particles Take 1 capsule (60 mg total) by mouth in the morning.      losartan 50 MG Oral Tab Take 1 tablet (50 mg total) by mouth in the morning.      Empagliflozin (JARDIANCE) 25 MG Oral Tab Take 1 tablet (25 mg total) by mouth in the morning.      levothyroxine 25 MCG Oral Tab Take 1 tablet (25 mcg total) by mouth before breakfast.

## 2025-06-06 NOTE — PLAN OF CARE
Poly remains on Unasyn for dental abscess. IVF infusing. Clear liquids, no straws per speech. PT/OT consult for tomorrow. Tele in place, no calls. Morphine and norco for pain control with some relief. Family updated via phone. Dr. Ha to mikal Pang updated on plan of care.     1900: ISC x1 for urine retention noted this evening.  Problem: Patient Centered Care  Goal: Patient preferences are identified and integrated in the patient's plan of care  Description: Interventions:  - What would you like us to know as we care for you?   - Provide timely, complete, and accurate information to patient/family  - Incorporate patient and family knowledge, values, beliefs, and cultural backgrounds into the planning and delivery of care  - Encourage patient/family to participate in care and decision-making at the level they choose  - Honor patient and family perspectives and choices  Outcome: Progressing     Problem: Patient/Family Goals  Goal: Patient/Family Long Term Goal  Description: Patient's Long Term Goal:     Interventions:  -   - See additional Care Plan goals for specific interventions  Outcome: Progressing  Goal: Patient/Family Short Term Goal  Description: Patient's Short Term Goal:     Interventions:   -   - See additional Care Plan goals for specific interventions  Outcome: Progressing     Problem: PAIN - ADULT  Goal: Verbalizes/displays adequate comfort level or patient's stated pain goal  Description: INTERVENTIONS:  - Encourage pt to monitor pain and request assistance  - Assess pain using appropriate pain scale  - Administer analgesics based on type and severity of pain and evaluate response  - Implement non-pharmacological measures as appropriate and evaluate response  - Consider cultural and social influences on pain and pain management  - Manage/alleviate anxiety  - Utilize distraction and/or relaxation techniques  - Monitor for opioid side effects  - Notify MD/LIP if interventions unsuccessful or  patient reports new pain  - Anticipate increased pain with activity and pre-medicate as appropriate  Outcome: Progressing     Problem: RISK FOR INFECTION - ADULT  Goal: Absence of fever/infection during anticipated neutropenic period  Description: INTERVENTIONS  - Monitor WBC  - Administer growth factors as ordered  - Implement neutropenic guidelines  Outcome: Progressing     Problem: SAFETY ADULT - FALL  Goal: Free from fall injury  Description: INTERVENTIONS:  - Assess pt frequently for physical needs  - Identify cognitive and physical deficits and behaviors that affect risk of falls.  - [place] fall precautions as indicated by assessment.  - Educate pt/family on patient safety including physical limitations  - Instruct pt to call for assistance with activity based on assessment  - Modify environment to reduce risk of injury  - Provide assistive devices as appropriate  - Consider OT/PT consult to assist with strengthening/mobility  - Encourage toileting schedule  Outcome: Progressing     Problem: DISCHARGE PLANNING  Goal: Discharge to home or other facility with appropriate resources  Description: INTERVENTIONS:  - Identify barriers to discharge w/pt and caregiver  - Include patient/family/discharge partner in discharge planning  - Arrange for needed discharge resources and transportation as appropriate  - Identify discharge learning needs (meds, wound care, etc)  - Arrange for interpreters to assist at discharge as needed  - Consider post-discharge preferences of patient/family/discharge partner  - Complete POLST form as appropriate  - Assess patient's ability to be responsible for managing their own health  - Refer to Case Management Department for coordinating discharge planning if the patient needs post-hospital services based on physician/LIP order or complex needs related to functional status, cognitive ability or social support system  Outcome: Progressing     Problem: Altered Communication/Language  Barrier  Goal: Patient/Family is able to understand and participate in their care  Description: Interventions:  - Assess communication ability and preferred communication style  - Implement communication aides and strategies  - Use visual cues when possible  - Listen attentively, be patient, do not interrupt  - Minimize distractions  - Allow time for understanding and response  - Establish method for patient to ask for assistance (call light)  - Provide an  as needed  - Communicate barriers and strategies to overcome with those who interact with patient  Outcome: Progressing

## 2025-06-06 NOTE — ED INITIAL ASSESSMENT (HPI)
Pt to ED via [facility] from [facility] for left lower jaw infection. EMS denies fever. Per EMS elevated blood sugar in the 300's.

## 2025-06-07 LAB
ANION GAP SERPL CALC-SCNC: 10 MMOL/L (ref 0–18)
BUN BLD-MCNC: 11 MG/DL (ref 9–23)
BUN/CREAT SERPL: 10 (ref 10–20)
CALCIUM BLD-MCNC: 8.6 MG/DL (ref 8.7–10.4)
CHLORIDE SERPL-SCNC: 106 MMOL/L (ref 98–112)
CO2 SERPL-SCNC: 23 MMOL/L (ref 21–32)
CREAT BLD-MCNC: 1.1 MG/DL (ref 0.7–1.3)
DEPRECATED RDW RBC AUTO: 61.6 FL (ref 35.1–46.3)
EGFRCR SERPLBLD CKD-EPI 2021: 69 ML/MIN/1.73M2 (ref 60–?)
ERYTHROCYTE [DISTWIDTH] IN BLOOD BY AUTOMATED COUNT: 18.1 % (ref 11–15)
GLUCOSE BLD-MCNC: 69 MG/DL (ref 70–99)
GLUCOSE BLDC GLUCOMTR-MCNC: 134 MG/DL (ref 70–99)
GLUCOSE BLDC GLUCOMTR-MCNC: 139 MG/DL (ref 70–99)
GLUCOSE BLDC GLUCOMTR-MCNC: 189 MG/DL (ref 70–99)
GLUCOSE BLDC GLUCOMTR-MCNC: 195 MG/DL (ref 70–99)
GLUCOSE BLDC GLUCOMTR-MCNC: 217 MG/DL (ref 70–99)
GLUCOSE BLDC GLUCOMTR-MCNC: 61 MG/DL (ref 70–99)
GLUCOSE BLDC GLUCOMTR-MCNC: 89 MG/DL (ref 70–99)
HCT VFR BLD AUTO: 33.2 % (ref 39–53)
HGB BLD-MCNC: 10.7 G/DL (ref 13–17.5)
MCH RBC QN AUTO: 30.2 PG (ref 26–34)
MCHC RBC AUTO-ENTMCNC: 32.2 G/DL (ref 31–37)
MCV RBC AUTO: 93.8 FL (ref 80–100)
OSMOLALITY SERPL CALC.SUM OF ELEC: 286 MOSM/KG (ref 275–295)
PLATELET # BLD AUTO: 246 10(3)UL (ref 150–450)
POTASSIUM SERPL-SCNC: 3.8 MMOL/L (ref 3.5–5.1)
RBC # BLD AUTO: 3.54 X10(6)UL (ref 3.8–5.8)
SODIUM SERPL-SCNC: 139 MMOL/L (ref 136–145)
WBC # BLD AUTO: 3.5 X10(3) UL (ref 4–11)

## 2025-06-07 PROCEDURE — 99233 SBSQ HOSP IP/OBS HIGH 50: CPT | Performed by: HOSPITALIST

## 2025-06-07 RX ORDER — HALOPERIDOL 5 MG/ML
5 INJECTION INTRAMUSCULAR ONCE
Status: COMPLETED | OUTPATIENT
Start: 2025-06-07 | End: 2025-06-07

## 2025-06-07 RX ORDER — HALOPERIDOL DECANOATE 50 MG/ML
5 INJECTION INTRAMUSCULAR ONCE
Status: DISCONTINUED | OUTPATIENT
Start: 2025-06-07 | End: 2025-06-07

## 2025-06-07 RX ORDER — HYDROCODONE BITARTRATE AND ACETAMINOPHEN 5; 325 MG/1; MG/1
1 TABLET ORAL EVERY 6 HOURS PRN
Refills: 0 | Status: DISCONTINUED | OUTPATIENT
Start: 2025-06-07 | End: 2025-06-11

## 2025-06-07 NOTE — PROGRESS NOTES
[facility] [place]  part of [facility]    Progress Note    Joselin Graham Patient Status:  Inpatient    5/15/1950 MRN [id number]   Location [facility] 4W/SW/SE Attending Bonnie Craig MD   Hosp Day # 1 PCP No primary care provider on file.       Subjective:   Joselin Graham is a(n) 75 year old male who is having a lot of pain in his left jaw. No fevers. Tolerating CLD.     Objective:   Blood pressure 109/60, pulse 77, temperature 98.9 °F (37.2 °C), temperature source Axillary, resp. rate 16, weight 122 lb 3.2 oz (55.4 kg), SpO2 95%.    Physical Exam:    General: No acute distress.   Respiratory: Clear to auscultation bilaterally. No wheezes. No rhonchi.  Cardiovascular: S1, S2. Regular rate and rhythm. No murmurs, rubs or gallops.   Abdomen: Soft, nontender, nondistended.  Positive bowel sounds. No rebound or guarding.  Neurologic: No focal neurological deficits.   Musculoskeletal: Moves all extremities.  Extremities: No edema.    Results:     Lab Results   Component Value Date    WBC 3.5 (L) 2025    HGB 10.7 (L) 2025    HCT 33.2 (L) 2025    .0 2025    CREATSERUM 1.10 2025    BUN 11 2025     2025    K 3.8 2025     2025    CO2 23.0 2025    GLU 69 (L) 2025    CA 8.6 (L) 2025    ALB 4.2 2025    ALKPHO 92 2025    BILT 0.5 2025    TP 7.5 2025    AST 47 (H) 2025    ALT 90 (H) 2025    T4F 1.1 2024    TSH 0.847 2025    MG 1.9 2025    PHOS 3.0 2025    B12 415 2024       CT FACIAL BONES(CONTRAST ONLY) (CPT=70487)  Result Date: 2025  CONCLUSION:  1. Postprocedural changes of extraction of the left mandibular 1st and 2nd bicuspids/premolars with peripheral enhancing fluid collection adjacent to the sockets, concerning for 3.1 cm dental abscess.  2. Lytic lesions throughout the osseous structures, likely secondary to multiple myeloma.  3. Minimal left  maxillary sinusitis.  4. Lesser incidental findings as above.    A preliminary report was issued by the [facility] teleradiology service. There are no major discrepancies.  elm-remote.  Dictated by (CST): Scottie Allen MD on 6/06/2025 at 4:34 AM     Finalized by (CST): Scottie Allen MD on 6/06/2025 at 5:05 AM          EKG  Result Date: 6/6/2025  Normal sinus rhythm Left axis deviation Anteroseptal infarct (cited on or before 22-JUL-2024) Abnormal ECG When compared with ECG of 28-JUL-2024 17:04, Questionable change in initial forces of Anterior leads Nonspecific T wave abnormality has replaced inverted T waves in Lateral leads Confirmed by VASQUEZ ESPINOZA, VERÓNICA ([id number]) on 6/6/2025 1:05:46 PM    Scheduled Medications[1]      Assessment and Plan:       Dental abscess  - Per ED physician, discussed with oral surgery  - IV antibiotics with Unasyn  - Dr. Ha reccs appreciated  - will need to eventually be seen at [place] for mandible resection once medically stable  - might need rehab- PT/OT eval   - add norco for pain     Hypoglycemia  - Added D5 in fluids  - advance diet as tolerated      Hyperkalemia  Mild hyponatremia  - Recheck and treat accordingly     DM2  - Insulin- hold due to hypoglycemia - stop tresiba   - Carb controlled diet     Leukopenia, chronic  Anemia  History of multiple myeloma  - Monitor  - Resume home meds     HTN  Dementia  - Resume home meds when reconciled     Quality:  DVT Prophylaxis: Heparin  CODE status: Full code    MDM. High     BRADLEY SMITH MD  06/07/25           [1]    ampicillin-sulbactam  3 g Intravenous q6h    heparin  5,000 Units Subcutaneous Q8H [facility]    insulin aspart  1-7 Units Subcutaneous TID CC and HS    amLODIPine  10 mg Oral Daily    chlorhexidine gluconate  15 mL Mouth/Throat BID    DULoxetine  60 mg Oral Daily    levothyroxine  25 mcg Oral Before breakfast    losartan  50 mg Oral Daily    memantine  10 mg Oral BID    potassium chloride  20 mEq Oral Daily

## 2025-06-07 NOTE — OCCUPATIONAL THERAPY NOTE
OCCUPATIONAL THERAPY EVALUATION - INPATIENT     Room Number: 465/465-A  Evaluation Date: 6/7/2025  Type of Evaluation: Initial  Presenting Problem: dental abscess; PMH: PARVEEN, brown hairy tongue, recurent falls, generalized weakness, ARF, dental infection, DM II    Physician Order: IP Consult to Occupational Therapy  Reason for Therapy: ADL/IADL Dysfunction and Discharge Planning    OCCUPATIONAL THERAPY ASSESSMENT   Patient is a 75 year old male admitted 6/5/2025 for dental abscess.  Prior to admission, patient's baseline is assist for ADLS, IADLs, SNF resident ( need to confirm PLOF, pt poor historian).  Patient is currently functioning below baseline with toileting, bathing, upper body dressing, lower body dressing, bed mobility, transfers, static sitting balance, dynamic sitting balance, static standing balance, dynamic standing balance, maintaining seated position, and functional standing tolerance.  Patient is requiring maximum assist as a result of the following impairments: decreased functional strength, decreased functional reach, decreased endurance, pain, impaired dynamic balance, impaired motor planning, decreased muscular endurance, difficulty maintaining precautions, cognitive deficits (A&Ox1), medical status, decreased compliance/participation, and decreased safety awareness. Occupational Therapy will continue to follow for duration of hospitalization.    Patient will benefit from continued skilled OT Services to promote return to prior level of function and safety with continuous assistance and gradual rehabilitative therapy.    PLAN DURING HOSPITALIZATION  OT Device Recommendations: TBD  OT Treatment Plan: Balance activities, Energy conservation/work simplification techniques, IADL training, ADL training, Functional transfer training, Patient/Family education, Patient/Family training     OCCUPATIONAL THERAPY MEDICAL/SOCIAL HISTORY   Problem List  Principal Problem:    Dental abscess    HOME  SITUATION  Type of Home: Skilled nursing facility  Drives: No  Patient Regularly Uses: Rolling walker    Stairs in Home: none  Use of Assistive Device(s): RW    Prior Level of [place]: admit from SNF, assist for ADLs, IADLs, need to confirm PLOF, pt poor historian (A&Ox1)    SUBJECTIVE  \"Where am I? Home?\"    OCCUPATIONAL THERAPY EXAMINATION      OBJECTIVE  Precautions: Bed/chair alarm  Fall Risk: High fall risk      PAIN ASSESSMENT  [place]      COGNITION  Overall Cognitive Status:  Impaired- Poor safety awareness, command following, impuslivity, A&Ox1    VISION  Current Vision: no visual deficits    PERCEPTION  Overall Perception Status:   WFL - within functional limits    SENSATION  Light touch:  intact    Communication: WFL    Behavioral/Emotional/Social: Impaired- impulsivity, decreased command following, confused     RANGE OF MOTION   Upper extremity ROM is within functional limits     STRENGTH ASSESSMENT  Upper extremity strength is within functional limits     COORDINATION  Gross Motor: WFL   Fine Motor: WFL     ACTIVITIES OF DAILY LIVING ASSESSMENT  AM-PAC ‘6-Clicks’ Inpatient Daily Activity Short Form  How much help from another person does the patient currently need…  -   Putting on and taking off regular lower body clothing?: A Lot  -   Bathing (including washing, rinsing, drying)?: A Lot  -   Toileting, which includes using toilet, bedpan or urinal? : A Lot  -   Putting on and taking off regular upper body clothing?: A Lot  -   Taking care of personal grooming such as brushing teeth?: A Lot  -   Eating meals?: A Little    AM-PAC Score:  Score: 13  Approx Degree of Impairment: 63.03%  Standardized Score (AM-PAC Scale): 32.03  CMS Modifier (G-Code): CL    FUNCTIONAL TRANSFER ASSESSMENT  Sit to Stand: Edge of Bed  Edge of Bed: Maximum Assist (x2 for safety, poor safety awareness and command following)    BED MOBILITY  Supine to Sit : Maximum Assist    BALANCE ASSESSMENT  Static Sitting: Stand-by  Assist  Static Standing: Maximum Assist    FUNCTIONAL ADL ASSESSMENT  Eating: Stand-by Assist  Grooming Seated: Minimal Assist  Bathing Seated: Maximum Assist  UB Dressing Seated: Moderate Assist  LB Dressing Seated: Maximum Assist  Toileting Seated: Maximum Assist    THERAPEUTIC EXERCISE     Skilled Therapy Provided: Pt seen for skilled OT evaluation to address pt's I and safety with functional mobility, t/fs, and ADLs. Pt's RN approved session and pt agreeable. Pt performing functional mobility and t/fs with MAX A (bed mob and SPT EOB>chair). Recommend assist of second for safety due to pt with poor command following, safety awareness, and orientation. Pt performed LBD with MAX. Pt edu provided re: role of OT, safe t/f techniques, fall prevention. Pt demo'd poor carryover of all concepts covered, continue to re-edu. End of session, pt up in recliner, with alarm on, all needs met. RN aware of pt status. Continue skilled OT.       EDUCATION PROVIDED  Patient Education : Role of Occupational Therapy; Plan of Care; Discharge Recommendations; DME Recommendations; Functional Transfer Techniques; Fall Prevention; Posture/Positioning; Proper Body Mechanics  Patient's Response to Education: Demonstrates Poor Carry Over to Information    The patient's Approx Degree of Impairment: 63.03% has been calculated based on documentation in the [facility] '6 clicks' Inpatient Daily Activity Short Form.  Research supports that patients with this level of impairment may benefit from GR.  Final disposition will be made by interdisciplinary medical team.     Patient End of Session: Up in chair, Needs met, Call light within reach, RN aware of session/findings, All patient questions and concerns addressed, Alarm set    OT Goals  Patients self stated goal is: unknown     Patient will complete functional transfer with MIN  Comment:     Patient will complete toileting with MIN  Comment:     Patient will tolerate standing for 3 minutes in prep for  adls with MIN   Comment:    Patient will complete item retrieval with MIN  Comment:          Goals  on: 25  Frequency: 3-5x/week    Patient Evaluation Complexity Level:   Occupational Profile/Medical History MODERATE - Expanded review of history including review of medical or therapy record   Specific performance deficits impacting engagement in ADL/IADL MODERATE  3 - 5 performance deficits   Client Assessment/Performance Deficits MODERATE - Comorbidities and min to mod modifications of tasks    Clinical Decision Making MODERATE - Analysis of occupational profile, detailed assessments, several treatment options    Overall Complexity MODERATE     OT Session Time:   Therapeutic Activity: 15 minutes    Beatriz Redd, Occupational Therapist, MS, OTR/L

## 2025-06-07 NOTE — PLAN OF CARE
Patient's pain managed with morphine and norco prn. Up standby. Ivf, antibiotics infusing. Tolerating clears. On tele, no calls overnight. Speech, Pt/Ot eval today. Plan for mandible resection/ reconstruction at [place], pending. Urine retention this am- see charts, straight cath output was 720. Safety precautions in place.     Problem: Patient Centered Care  Goal: Patient preferences are identified and integrated in the patient's plan of care  Description: Interventions:  - What would you like us to know as we care for you?   - Provide timely, complete, and accurate information to patient/family  - Incorporate patient and family knowledge, values, beliefs, and cultural backgrounds into the planning and delivery of care  - Encourage patient/family to participate in care and decision-making at the level they choose  - Honor patient and family perspectives and choices  Outcome: Progressing     Problem: PAIN - ADULT  Goal: Verbalizes/displays adequate comfort level or patient's stated pain goal  Description: INTERVENTIONS:  - Encourage pt to monitor pain and request assistance  - Assess pain using appropriate pain scale  - Administer analgesics based on type and severity of pain and evaluate response  - Implement non-pharmacological measures as appropriate and evaluate response  - Consider cultural and social influences on pain and pain management  - Manage/alleviate anxiety  - Utilize distraction and/or relaxation techniques  - Monitor for opioid side effects  - Notify MD/LIP if interventions unsuccessful or patient reports new pain  - Anticipate increased pain with activity and pre-medicate as appropriate  Outcome: Progressing     Problem: RISK FOR INFECTION - ADULT  Goal: Absence of fever/infection during anticipated neutropenic period  Description: INTERVENTIONS  - Monitor WBC  - Administer growth factors as ordered  - Implement neutropenic guidelines  Outcome: Progressing

## 2025-06-07 NOTE — PHYSICAL THERAPY NOTE
PHYSICAL THERAPY EVALUATION - INPATIENT     Room Number: 465/465-A  Evaluation Date: 6/7/2025  Type of Evaluation: Initial   Physician Order: PT Eval and Treat    Presenting Problem: dental abscess     Reason for Therapy: Mobility Dysfunction and Discharge Planning    PHYSICAL THERAPY ASSESSMENT   Patient is a 75 year old male admitted 6/5/2025 for dental abscess, Hx facial cellulitis, jaw infection.  Prior to admission, patient's baseline is min A.  Patient is currently functioning below baseline with bed mobility, transfers, and gait.  Patient is requiring maximum assist and dependent as a result of the following impairments: decreased functional strength, decreased endurance/aerobic capacity, pain, impaired motor planning, cognitive deficits (A&O 1/3), and medical status.  Physical Therapy will continue to follow for duration of hospitalization.    Patient will benefit from continued skilled PT Services to promote return to prior level of function and safety with continuous assistance and gradual rehabilitative therapy .    PLAN DURING HOSPITALIZATION  Nursing Mobility Recommendation : Lift Equipment  PT Device Recommendation: Hospital bed  PT Treatment Plan: Bed mobility, Endurance, Energy conservation, Gait training  Rehab Potential : Poor  Frequency (Obs): 3x/week     PHYSICAL THERAPY MEDICAL/SOCIAL HISTORY   History related to current admission: EMS with L jaw infection.  Hx demntia.  Here from 4/28 to 5/6 with L face cellulitis     Problem List  Principal Problem:    Dental abscess      HOME SITUATION  Type of Home: Skilled nursing facility                             Drives: No   Patient Regularly Uses: Rolling walker     Prior Level of [place]: resident of [facility] in [place].    Poor Historian.    SUBJECTIVE  Why are you here    PHYSICAL THERAPY EXAMINATION   OBJECTIVE  Precautions: Bed/chair alarm  Fall Risk: High fall risk    WEIGHT BEARING RESTRICTION       PAIN ASSESSMENT  Rating: Unable to  rate  Location: face  Management Techniques: Activity promotion    COGNITION  Overall Cognitive Status:  Impaired    RANGE OF MOTION AND STRENGTH ASSESSMENT  Upper extremity ROM and strength are within functional limits except for the following:  see OT  Lower extremity ROM is within functional limits except for the following: BLE  Lower extremity strength is within functional limits except for the following:  BLE    BALANCE  Static Sitting: Fair -  Dynamic Sitting: Fair -  Static Standing: Poor  Dynamic Standing: Poor -    ADDITIONAL TESTS                                    NEUROLOGICAL FINDINGS                      ACTIVITY TOLERANCE                         O2 WALK       AM-PAC '6-Clicks' INPATIENT SHORT FORM - BASIC MOBILITY  How much difficulty does the patient currently have...  Patient Difficulty: Turning over in bed (including adjusting bedclothes, sheets and blankets)?: A Lot   Patient Difficulty: Sitting down on and standing up from a chair with arms (e.g., wheelchair, bedside commode, etc.): A Lot   Patient Difficulty: Moving from lying on back to sitting on the side of the bed?: A Lot   How much help from another person does the patient currently need...   Help from Another: Moving to and from a bed to a chair (including a wheelchair)?: A Lot   Help from Another: Need to walk in hospital room?: Total   Help from Another: Climbing 3-5 steps with a railing?: Total     AM-PAC Score:  Raw Score: 10   Approx Degree of Impairment: 76.75%   Standardized Score (AM-PAC Scale): 32.29   CMS Modifier (G-Code): CL    FUNCTIONAL ABILITY STATUS  Functional Mobility/Gait Assessment  Gait Assistance: Not tested  Rolling: maximum assist  Supine to Sit: maximum assist  Sit to Supine: maximum assist  Sit to Stand: maximum assist    Exercise/Education Provided:  Bed mobility  Body mechanics  Functional activity tolerated  Posture  ROM  Transfer training    Skilled Therapy Provided: Chart reviewed. RN aware. Patient up in bed.   A&O /3.  Patient needs signif cueing to stay on task.  Max A with bed mob.  Poor sitting tolerance.  SPT from EOB to recliner with max A.  Set up alarm and elevated legs to reduce fall risk.  All needs left within reach. RN aware.    The patient's Approx Degree of Impairment: 76.75% has been calculated based on documentation in the [facility] '6 clicks' Inpatient Basic Mobility Short Form.  Research supports that patients with this level of impairment may benefit from GR.  Final disposition will be made by interdisciplinary medical team.    Patient End of Session: Up in chair, Needs met, Call light within reach, RN aware of session/findings, All patient questions and concerns addressed, Hospital anti-slip socks, Alarm set    CURRENT GOALS  Goals to be met by: 6/15/2025  Patient Goal Patient's self-stated goal is: to get better   Goal #1 Patient is able to demonstrate supine - sit EOB @ level: minimum assistance     Goal #1   Current Status    Goal #2 Patient is able to demonstrate transfers EOB to/from [facility] at assistance level: minimum assistance with walker - rolling     Goal #2  Current Status    Goal #3 Patient is able to ambulate 100 feet with assist device: walker - rolling at assistance level: minimum assistance   Goal #3   Current Status    Goal #4 Patient will negotiate 0 stairs/one curb w/ assistive device and supervision   Goal #4   Current Status    Goal #5 Patient to demonstrate independence with home activity/exercise instructions provided to patient in preparation for discharge.   Goal #5   Current Status    Goal #6    Goal #6  Current Status      Patient Evaluation Complexity Level:  History Moderate - 1 or 2 personal factors and/or co-morbidities   Examination of body systems Moderate - addressing a total of 3 or more elements   Clinical Presentation  Moderate - Evolving   Clinical Decision Making  Moderate Complexity     Gait Trainin minutes  Therapeutic Activity:  25 minutes  Neuromuscular  Re-education: 5 minutes  Therapeutic Exercise: 0 minutes  [place]al[place] Repositionin minutes  Manual Therapy: 0 minutes  Can add/delete any of these

## 2025-06-07 NOTE — SLP NOTE
SPEECH DAILY NOTE - INPATIENT    ASSESSMENT & PLAN   ASSESSMENT    Patient seen for dysphagia f/u per plan of care, goals addressed with details in grid below. Patient upright in chair at bedside, afebrile, in NAD, oriented x1-2, denies oral pain. RN authorizes visit. No visitors at bedside. Dr. Ha DDS visit note 6/6/25 recommends advance diet as tolerated.     Patient demonstrates adequate clinical tolerance for purees and thin liquids this date with reduced c/o pain. Patient appears appropriate for advancement to puree diet and continue thin liquids. Acute SLP service will continue to follow while in house for ongoing diet advancement if consistent with oral surgery plans. Plan and recommendations reviewed with patient and RN at bedside. Written recommendations posted on whiteboard.     Diet Recommendations - Solids: Puree  Diet Recommendations - Liquids: Thin Liquids     Aspiration Precautions: Upright position, Slow rate, Small bites, Small sips  Medication Administration Recommendations: One pill at a time, Whole in puree, Crushed in puree    Patient Experiencing Pain: No    Treatment Plan  Treatment Plan/Recommendations: Aspiration precautions    Interdisciplinary Communication: Discussed with RN  Recommendations posted at bedside          GOALS  Goal #1 The patient will tolerate thin liquids without overt signs or symptoms of aspiration with 100% accuracy over 1-2 session(s).  Patient self-administered cup and straw sips thin liquids with oropharyngeal timing and control which appears adequate and without overt signs aspiration/distress.    NEW GOAL 6/7/25:  Patient will demonstrate safe and efficient clinical tolerance for puree diet and thin liquids without overt signs aspiration/distress x1-2 f/u visits.     Goal Met; Modified 6/7/25   Goal #2 The patient will utilize compensatory strategies as outlined by  BSSE (clinical evaluation) including Slow rate, No straws, Controlled amts, Upright 90 degrees  with no feeding assistance 90% of the time across 2 sessions.  Patient positioned upright in chair, distractions minimized, safe swallow precautions reviewed/modeled. Patient implements with fair effort following review/instruction.     In Progress   Goal #3 The patient will tolerate trial upgrade of PUREED/BITE SIZE/SOFT/SOLID consistency and thin liquids without overt signs or symptoms of aspiration with 100 % accuracy over 1-2 session(s).  Patient self-administered tsps puree and pudding with oropharyngeal timing and control which appears adequate and without overt signs aspiration/distress. No c/o odynophagia during PO intake.    NEW GOAL 6/7/25:  Patient will demonstrate safe and efficient clinical tolerance for trials advanced solids with SLP as appropriate without overt signs aspiration/distress x2-3 f/u visits.      Goal partially met; Modified 6/7/25     FOLLOW UP  Follow Up Needed (Documentation Required): Yes  SLP Follow-up Date: 06/09/25  Duration: 1 week    Session: 1 following BSE    If you have any questions, please contact Nikky Bailon, SLP  Nikky Bailon M.S. CCC-SLP  Speech-Language Pathologist  [phone]

## 2025-06-07 NOTE — PLAN OF CARE
Patient alert and oriented x1. Up with max assist. Forgetful and confused at times throughout shift. Started on pureed thin liquid diet, tolerating well. Accuchecks ACHS. Patient unable to void, amador placed per order. Continuing IV fluids and antibiotics. Remote tele in place. Pain controlled with PRN [place]. Call light in reach. Frequent rounding performed.     Problem: Patient Centered Care  Goal: Patient preferences are identified and integrated in the patient's plan of care  Description: Interventions:  - What would you like us to know as we care for you? My jaw hurts  - Provide timely, complete, and accurate information to patient/family  - Incorporate patient and family knowledge, values, beliefs, and cultural backgrounds into the planning and delivery of care  - Encourage patient/family to participate in care and decision-making at the level they choose  - Honor patient and family perspectives and choices  Outcome: Not Progressing     Problem: Patient/Family Goals  Goal: Patient/Family Long Term Goal  Description: Patient's Long Term Goal: to return home    Interventions:  - See additional Care Plan goals for specific interventions  Outcome: Not Progressing  Goal: Patient/Family Short Term Goal  Description: Patient's Short Term Goal: to control my pain    Interventions:   - See additional Care Plan goals for specific interventions  Outcome: Not Progressing     Problem: PAIN - ADULT  Goal: Verbalizes/displays adequate comfort level or patient's stated pain goal  Description: INTERVENTIONS:  - Encourage pt to monitor pain and request assistance  - Assess pain using appropriate pain scale  - Administer analgesics based on type and severity of pain and evaluate response  - Implement non-pharmacological measures as appropriate and evaluate response  - Consider cultural and social influences on pain and pain management  - Manage/alleviate anxiety  - Utilize distraction and/or relaxation techniques  - Monitor for  opioid side effects  - Notify MD/LIP if interventions unsuccessful or patient reports new pain  - Anticipate increased pain with activity and pre-medicate as appropriate  Outcome: Not Progressing     Problem: RISK FOR INFECTION - ADULT  Goal: Absence of fever/infection during anticipated neutropenic period  Description: INTERVENTIONS  - Monitor WBC  - Administer growth factors as ordered  - Implement neutropenic guidelines  Outcome: Not Progressing     Problem: SAFETY ADULT - FALL  Goal: Free from fall injury  Description: INTERVENTIONS:  - Assess pt frequently for physical needs  - Identify cognitive and physical deficits and behaviors that affect risk of falls.  - [place] fall precautions as indicated by assessment.  - Educate pt/family on patient safety including physical limitations  - Instruct pt to call for assistance with activity based on assessment  - Modify environment to reduce risk of injury  - Provide assistive devices as appropriate  - Consider OT/PT consult to assist with strengthening/mobility  - Encourage toileting schedule  Outcome: Not Progressing     Problem: DISCHARGE PLANNING  Goal: Discharge to home or other facility with appropriate resources  Description: INTERVENTIONS:  - Identify barriers to discharge w/pt and caregiver  - Include patient/family/discharge partner in discharge planning  - Arrange for needed discharge resources and transportation as appropriate  - Identify discharge learning needs (meds, wound care, etc)  - Arrange for interpreters to assist at discharge as needed  - Consider post-discharge preferences of patient/family/discharge partner  - Complete POLST form as appropriate  - Assess patient's ability to be responsible for managing their own health  - Refer to Case Management Department for coordinating discharge planning if the patient needs post-hospital services based on physician/LIP order or complex needs related to functional status, cognitive ability or social  support system  Outcome: Not Progressing     Problem: Altered Communication/Language Barrier  Goal: Patient/Family is able to understand and participate in their care  Description: Interventions:  - Assess communication ability and preferred communication style  - Implement communication aides and strategies  - Use visual cues when possible  - Listen attentively, be patient, do not interrupt  - Minimize distractions  - Allow time for understanding and response  - Establish method for patient to ask for assistance (call light)  - Provide an  as needed  - Communicate barriers and strategies to overcome with those who interact with patient  Outcome: Not Progressing

## 2025-06-08 LAB
ANION GAP SERPL CALC-SCNC: 10 MMOL/L (ref 0–18)
BUN BLD-MCNC: 11 MG/DL (ref 9–23)
BUN/CREAT SERPL: 10.1 (ref 10–20)
C DIFF TOX B STL QL: NEGATIVE
CALCIUM BLD-MCNC: 8.3 MG/DL (ref 8.7–10.4)
CHLORIDE SERPL-SCNC: 107 MMOL/L (ref 98–112)
CO2 SERPL-SCNC: 24 MMOL/L (ref 21–32)
CREAT BLD-MCNC: 1.09 MG/DL (ref 0.7–1.3)
EGFRCR SERPLBLD CKD-EPI 2021: 70 ML/MIN/1.73M2 (ref 60–?)
GLUCOSE BLD-MCNC: 131 MG/DL (ref 70–99)
GLUCOSE BLDC GLUCOMTR-MCNC: 133 MG/DL (ref 70–99)
GLUCOSE BLDC GLUCOMTR-MCNC: 140 MG/DL (ref 70–99)
GLUCOSE BLDC GLUCOMTR-MCNC: 188 MG/DL (ref 70–99)
GLUCOSE BLDC GLUCOMTR-MCNC: 244 MG/DL (ref 70–99)
OSMOLALITY SERPL CALC.SUM OF ELEC: 293 MOSM/KG (ref 275–295)
POTASSIUM SERPL-SCNC: 3.4 MMOL/L (ref 3.5–5.1)
SODIUM SERPL-SCNC: 141 MMOL/L (ref 136–145)

## 2025-06-08 PROCEDURE — 99233 SBSQ HOSP IP/OBS HIGH 50: CPT | Performed by: FAMILY MEDICINE

## 2025-06-08 RX ORDER — POTASSIUM CHLORIDE 1.5 G/1.58G
40 POWDER, FOR SOLUTION ORAL ONCE
Status: COMPLETED | OUTPATIENT
Start: 2025-06-08 | End: 2025-06-08

## 2025-06-08 NOTE — PLAN OF CARE
Patient alert and oriented x1. Up with max assist. Forgetful and confused at times throughout shift. Pureed thin liquid diet, tolerating well. Frequent loose bowel movements throughout the shift. C diff sample sent, results negative.  Accuchecks ACHS. Jean in place, draining freely. Continuing IV fluids and antibiotics. Pain controlled with PRN [place]. Call light in reach. Frequent rounding performed.     Problem: Patient Centered Care  Goal: Patient preferences are identified and integrated in the patient's plan of care  Description: Interventions:  - What would you like us to know as we care for you? My jaw hurts  - Provide timely, complete, and accurate information to patient/family  - Incorporate patient and family knowledge, values, beliefs, and cultural backgrounds into the planning and delivery of care  - Encourage patient/family to participate in care and decision-making at the level they choose  - Honor patient and family perspectives and choices  Outcome: Not Progressing     Problem: Patient/Family Goals  Goal: Patient/Family Long Term Goal  Description: Patient's Long Term Goal: to return home    Interventions:  - See additional Care Plan goals for specific interventions  Outcome: Not Progressing  Goal: Patient/Family Short Term Goal  Description: Patient's Short Term Goal: to control my pain    Interventions:   - See additional Care Plan goals for specific interventions  Outcome: Not Progressing     Problem: PAIN - ADULT  Goal: Verbalizes/displays adequate comfort level or patient's stated pain goal  Description: INTERVENTIONS:  - Encourage pt to monitor pain and request assistance  - Assess pain using appropriate pain scale  - Administer analgesics based on type and severity of pain and evaluate response  - Implement non-pharmacological measures as appropriate and evaluate response  - Consider cultural and social influences on pain and pain management  - Manage/alleviate anxiety  - Utilize distraction  and/or relaxation techniques  - Monitor for opioid side effects  - Notify MD/LIP if interventions unsuccessful or patient reports new pain  - Anticipate increased pain with activity and pre-medicate as appropriate  Outcome: Not Progressing     Problem: RISK FOR INFECTION - ADULT  Goal: Absence of fever/infection during anticipated neutropenic period  Description: INTERVENTIONS  - Monitor WBC  - Administer growth factors as ordered  - Implement neutropenic guidelines  Outcome: Not Progressing     Problem: SAFETY ADULT - FALL  Goal: Free from fall injury  Description: INTERVENTIONS:  - Assess pt frequently for physical needs  - Identify cognitive and physical deficits and behaviors that affect risk of falls.  - [place] fall precautions as indicated by assessment.  - Educate pt/family on patient safety including physical limitations  - Instruct pt to call for assistance with activity based on assessment  - Modify environment to reduce risk of injury  - Provide assistive devices as appropriate  - Consider OT/PT consult to assist with strengthening/mobility  - Encourage toileting schedule  Outcome: Not Progressing     Problem: DISCHARGE PLANNING  Goal: Discharge to home or other facility with appropriate resources  Description: INTERVENTIONS:  - Identify barriers to discharge w/pt and caregiver  - Include patient/family/discharge partner in discharge planning  - Arrange for needed discharge resources and transportation as appropriate  - Identify discharge learning needs (meds, wound care, etc)  - Arrange for interpreters to assist at discharge as needed  - Consider post-discharge preferences of patient/family/discharge partner  - Complete POLST form as appropriate  - Assess patient's ability to be responsible for managing their own health  - Refer to Case Management Department for coordinating discharge planning if the patient needs post-hospital services based on physician/LIP order or complex needs related to  functional status, cognitive ability or social support system  Outcome: Not Progressing     Problem: Altered Communication/Language Barrier  Goal: Patient/Family is able to understand and participate in their care  Description: Interventions:  - Assess communication ability and preferred communication style  - Implement communication aides and strategies  - Use visual cues when possible  - Listen attentively, be patient, do not interrupt  - Minimize distractions  - Allow time for understanding and response  - Establish method for patient to ask for assistance (call light)  - Provide an  as needed  - Communicate barriers and strategies to overcome with those who interact with patient  Outcome: Not Progressing

## 2025-06-08 NOTE — PROGRESS NOTES
[facility]  part of [facility]    Progress Note    Joselin Graham Patient Status:  Inpatient    5/15/1950 MRN [id number]   Location [facility] 4W/SW/SE Attending Bonnie Craig MD   Hosp Day # 2 PCP No primary care provider on file.       Subjective:   Joselin Graham is a(n) 75 year old male having diarrhea  No fevers. Tolerating CLD.     Objective:   Blood pressure 154/71, pulse 71, temperature 97.2 °F (36.2 °C), temperature source Temporal, resp. rate 20, weight 122 lb 3.2 oz (55.4 kg), SpO2 100%.    Physical Exam:    General: No acute distress.   Respiratory: Clear to auscultation bilaterally. No wheezes. No rhonchi.  Cardiovascular: S1, S2. Regular rate and rhythm. No murmurs, rubs or gallops.   Abdomen: Soft, nontender, nondistended.  Positive bowel sounds. No rebound or guarding.  Neurologic: No focal neurological deficits.   Musculoskeletal: Moves all extremities.  Extremities: No edema.    Results:     Lab Results   Component Value Date    WBC 2.0 (L) 2025    HGB 8.8 (L) 2025    HCT 26.8 (L) 2025    .0 2025    CREATSERUM 1.09 2025    BUN 11 2025     2025    K 3.4 (L) 2025     2025    CO2 24.0 2025     (H) 2025    CA 8.3 (L) 2025    ALB 4.2 2025    ALKPHO 92 2025    BILT 0.5 2025    TP 7.5 2025    AST 47 (H) 2025    ALT 90 (H) 2025    T4F 1.1 2024    TSH 0.847 2025    MG 1.9 2025    PHOS 3.0 2025    B12 415 2024       CT FACIAL BONES(CONTRAST ONLY) (CPT=70487)  Result Date: 2025  CONCLUSION:  1. Postprocedural changes of extraction of the left mandibular 1st and 2nd bicuspids/premolars with peripheral enhancing fluid collection adjacent to the sockets, concerning for 3.1 cm dental abscess.  2. Lytic lesions throughout the osseous structures, likely secondary to multiple myeloma.  3. Minimal left maxillary sinusitis.   4. Lesser incidental findings as above.    A preliminary report was issued by the [facility] teleradiology service. There are no major discrepancies.  elm-remote.  Dictated by (CST): Scottie Allen MD on 6/06/2025 at 4:34 AM     Finalized by (CST): Scottie Allen MD on 6/06/2025 at 5:05 AM                Scheduled Medications[1]      Assessment and Plan:       Dental abscess  - Per ED physician, discussed with oral surgery  - IV antibiotics with Unasyn  - Dr. Ha reccs appreciated  - will need to eventually be seen at [place] for mandible resection once medically stable  - might need rehab- PT/OT eval   - add norco for pain     Diarrhea  Cdiff pending     Hypoglycemia  - Added D5 in fluids  - advance diet as tolerated      Hyperkalemia  Mild hyponatremia  - Recheck and treat accordingly     DM2  - Insulin- hold due to hypoglycemia - stop tresiba   - Carb controlled diet     Leukopenia, chronic  Anemia  History of multiple myeloma  - Monitor  - Resume home meds     HTN  Dementia  - Resume home meds when reconciled     Quality:  DVT Prophylaxis: Heparin  CODE status: Full code    MDM. High Symone Pelaez MD, FAAFP             [1]    ampicillin-sulbactam  3 g Intravenous q6h    heparin  5,000 Units Subcutaneous Q8H ISHA    insulin aspart  1-7 Units Subcutaneous TID CC and HS    amLODIPine  10 mg Oral Daily    chlorhexidine gluconate  15 mL Mouth/Throat BID    DULoxetine  60 mg Oral Daily    levothyroxine  25 mcg Oral Before breakfast    losartan  50 mg Oral Daily    memantine  10 mg Oral BID    potassium chloride  20 mEq Oral Daily

## 2025-06-08 NOTE — PLAN OF CARE
Patient's pain managed with morphine and norco prn. Up x2 with a walker. Pt was agitated early last night, IV pulled out, MD aware. IM Haldol given. BM overnight. Jean intact- continued. Wife agreeable for sx, wanting to discuss with oral sx.      Plan for mandible resection/ reconstruction at [facility], pending. Safety precautions in place.     Problem: Patient/Family Goals  Goal: Patient/Family Long Term Goal  Description: Patient's Long Term Goal: to return home    Interventions:  - See additional Care Plan goals for specific interventions  Outcome: Progressing  Goal: Patient/Family Short Term Goal  Description: Patient's Short Term Goal: to control my pain    Interventions:   - See additional Care Plan goals for specific interventions  Outcome: Progressing     Problem: PAIN - ADULT  Goal: Verbalizes/displays adequate comfort level or patient's stated pain goal  Description: INTERVENTIONS:  - Encourage pt to monitor pain and request assistance  - Assess pain using appropriate pain scale  - Administer analgesics based on type and severity of pain and evaluate response  - Implement non-pharmacological measures as appropriate and evaluate response  - Consider cultural and social influences on pain and pain management  - Manage/alleviate anxiety  - Utilize distraction and/or relaxation techniques  - Monitor for opioid side effects  - Notify MD/LIP if interventions unsuccessful or patient reports new pain  - Anticipate increased pain with activity and pre-medicate as appropriate  Outcome: Progressing     Problem: RISK FOR INFECTION - ADULT  Goal: Absence of fever/infection during anticipated neutropenic period  Description: INTERVENTIONS  - Monitor WBC  - Administer growth factors as ordered  - Implement neutropenic guidelines  Outcome: Progressing

## 2025-06-09 LAB
DEPRECATED RDW RBC AUTO: 59.5 FL (ref 35.1–46.3)
ERYTHROCYTE [DISTWIDTH] IN BLOOD BY AUTOMATED COUNT: 17.7 % (ref 11–15)
GLUCOSE BLDC GLUCOMTR-MCNC: 166 MG/DL (ref 70–99)
GLUCOSE BLDC GLUCOMTR-MCNC: 195 MG/DL (ref 70–99)
GLUCOSE BLDC GLUCOMTR-MCNC: 204 MG/DL (ref 70–99)
HCT VFR BLD AUTO: 26.8 % (ref 39–53)
HGB BLD-MCNC: 8.8 G/DL (ref 13–17.5)
MCH RBC QN AUTO: 30 PG (ref 26–34)
MCHC RBC AUTO-ENTMCNC: 32.8 G/DL (ref 31–37)
MCV RBC AUTO: 91.5 FL (ref 80–100)
PLATELET # BLD AUTO: 214 10(3)UL (ref 150–450)
POTASSIUM SERPL-SCNC: 3.7 MMOL/L (ref 3.5–5.1)
RBC # BLD AUTO: 2.93 X10(6)UL (ref 3.8–5.8)
WBC # BLD AUTO: 2 X10(3) UL (ref 4–11)

## 2025-06-09 NOTE — DIETARY NOTE
ADULT NUTRITION INITIAL ASSESSMENT    Pt is at moderate nutrition risk.  Pt meets moderate malnutrition criteria.      RECOMMENDATIONS TO MD: See nutrition intervention for ONS (oral nutrition supplements). As mouth status/pain improves--recommend diet consistency upgrade.      ADMITTING DIAGNOSIS:  Dental abscess [K04.7]   has a past medical history of Abnormal gait (03/03/2024), Benign essential hypertension (09/17/2017), Cancer ([facility]), Chest pain (09/16/2022), Diabetes ([facility]), Diabetes mellitus (HCC) (07/28/2024), Diabetes mellitus (HCC) (07/28/2024), High blood pressure, Hyperglycemia (03/03/2024), Hypertension associated with diabetes ([facility]) (09/16/2022), Multiple myeloma ([facility]) (07/06/2017), Multiple premature ventricular complexes (09/16/2022), Neuropathy (11/06/2017), Nonrheumatic aortic valve stenosis (09/16/2022), Rash (07/28/2024), Type 2 diabetes mellitus without complication, without long-term current use of insulin ([facility]) (09/17/2017), and Vomiting (07/05/2017).     PATIENT STATUS: Initial 06/09/25: seeing pt due to MD consult due to mouth pain. Pt admitted with dental abscess and mandible necrosis. Pt is on a pureed diet and eating 100% of meals. Pt reports he has mouth pain, but is able to eat this diet due to being hungry.  Agreed to glucerna bid as he had recent admission.  Oral surgeon notes reviewed.  Recent admit beginning of last month--only 1# less than that admission, but just before that admission had lost 17#--140# to 123#, so now 18# loss in the past 1-2 months.     FOOD/NUTRITION RELATED HISTORY:  Appetite: Good  Intake: 100% of pureed diet  Intake Meeting Needs: Marginal, added oral nutrition supplements (ONS)  Percent Meals Eaten (last 6 days)       Date/Time Percent Meals Eaten (%)    06/07/25 1045 80 %    06/08/25 1512 100 %           Food Allergies: pork  Cultural/Ethnic/Mormonism Preferences: Not Obtained    GASTROINTESTINAL: difficulty chewing and mouth pain    MEDICATIONS: noted    ampicillin-sulbactam  3 g Intravenous q6h    heparin  5,000 Units Subcutaneous Q8H ISHA    insulin aspart  1-7 Units Subcutaneous TID CC and HS    amLODIPine  10 mg Oral Daily    chlorhexidine gluconate  15 mL Mouth/Throat BID    DULoxetine  60 mg Oral Daily    levothyroxine  25 mcg Oral Before breakfast    losartan  50 mg Oral Daily    memantine  10 mg Oral BID    potassium chloride  20 mEq Oral Daily     LABS: noted. A1C 4/28/25 7.3  Recent Labs     06/07/25  0620 06/08/25  0639 06/09/25  0607   GLU 69* 131*  --    BUN 11 11  --    CREATSERUM 1.10 1.09  --    CA 8.6* 8.3*  --     141  --    K 3.8 3.4* 3.7    107  --    CO2 23.0 24.0  --    OSMOCALC 286 293  --        WEIGHT HISTORY:  Patient Weight(s) for the past 336 hrs:   Weight   06/06/25 0142 55.4 kg (122 lb 3.2 oz)   06/05/25 2048 54.7 kg (120 lb 9.5 oz)     Wt Readings from Last 10 Encounters:   06/06/25 55.4 kg (122 lb 3.2 oz)   05/05/25 54.7 kg (120 lb 11.2 oz)   07/28/24 65.2 kg (143 lb 12.8 oz)   07/22/24 61.2 kg (135 lb)       ANTHROPOMETRICS:  HT:  5'5\"  Wt Readings from Last 1 Encounters:   06/06/25 55.4 kg (122 lb 3.2 oz)     Last weight: Likely accurate  BMI: Body mass index is 20.34 kg/m².  Dosing Weight: 55.4 kg - admission weight, utilized for anthropometric calculations  BMI CLASSIFICATION: <22 considered underweight for advanced age (>65)  IBW: 136#        90% IBW  Usual Body Wt: 140-143 lbs       87% UBW    NUTRITION RELATED PHYSICAL FINDINGS:  - Nutrition Focused Physical Exam (NFPE): mild depletion body fat orbital region and mild depletion muscle mass temple region, clavicle region, scapular region, and shoulder region--some areas close to moderate  - Fluid Accumulation: none . See RN documentation for details  - Skin Integrity: at risk. See RN documentation for details    CRITERIA FOR MALNUTRITION DIAGNOSIS:  Criteria for non-severe malnutrition diagnosis: acute illness/injury related to wt loss 7.5% in 3 months, body fat mild  depletion, and muscle mass mild depletion.    NUTRITION DIAGNOSIS/PROBLEM:   Moderate Malnutrition related to Acute - Physiological causes resulting in anorexia or diminished intake in the setting of mouth pain with dental abscess as evidenced by 12% wt loss in the past 2 months, mild + muscle and fat deficits with BMI of 20.24 kg/m2    NUTRITION INTERVENTION:     NUTRITION PRESCRIPTION:   Estimated Nutrition needs: --dosing wt of 55.4 kg - wt taken on 6/6  Calories: 7371-2557 calories/day (30-35 calories per kg Dosing wt)  Protein: 66-72 g protein/day (1.2-1.3 g protein/kg Dosing wt)    - Diet:       Procedures    Regular/General diet Calorie Restriction/Carb Controlled: 1800 kcal/60 grams; Fluid Consistency: Thin Liquids ; Texture Consistency: Pureed; Is Patient on Accuchecks? Yes      - Nutrition Care Plan: Encouraged increased PO intake and Initiated ONS (oral nutritional supplements)  - ONS (Oral Nutrition Supplements)/Meals/Snacks: Glucerna (220 calories/ 10 g protein each) BID Strawberry   - Vitamin and mineral supplements: none  - Feeding assistance: meal set up  - Nutrition education: Discussed importance of adequate energy and protein intake    - Coordination of nutrition care: collaboration with other providers  - Discharge and transfer of nutrition care to new setting or provider: monitor plans    MONITOR AND EVALUATE/NUTRITION GOALS:  - Food and Nutrient Intake:      Monitor: adequacy of PO intake, tolerance of PO intake, adequacy of supplement intake, and tolerance of supplement intake  - Food and Nutrient Administration:      Monitor: N/A  - Anthropometric Measurement:    Monitor weight  - Nutrition Goals:      halt wt loss, regain wt as able, PO and supplement greater than 75% of needs, labs within acceptable limits, euglycemia, promote healing, and improved GI status    DIETITIAN FOLLOW UP: RD to follow and monitor nutrition status    Pearl Hastings RD, LDN   Clinical Dietitian [id number]

## 2025-06-09 NOTE — PROGRESS NOTES
Progress Note     Joselin Graham Patient Status:  Inpatient    5/15/1950 MRN [id number]   Location [facility] 4W/SW/SE Attending Symone Pelaez MD   Hosp Day # 3 PCP No primary care provider on file.     Chief Complaint: Dental Abscess and Mandible Necrosis    Subjective:   S: Patient Patient awake and alert.  No apparent distress.  Some pain today, described as \"still there.\"  Tolerating diet.    Review of Systems:   10 point ROS completed and was negative, except for pertinent positive and negatives stated in subjective.    Objective:   Vital signs:  Temp:  [97.1 °F (36.2 °C)-97.3 °F (36.3 °C)] 97.3 °F (36.3 °C)  Pulse:  [70-72] 72  Resp:  [18-20] 18  BP: (144-154)/(67-80) 149/80  SpO2:  [99 %-100 %] 100 %    Wt Readings from Last 3 Encounters:   25 122 lb 3.2 oz (55.4 kg)   25 120 lb 11.2 oz (54.7 kg)   24 143 lb 12.8 oz (65.2 kg)       Intake/Output:    Intake/Output Summary (Last 24 hours) at 2025 0835  Last data filed at 2025 0748  Gross per 24 hour   Intake 804.8 ml   Output 3425 ml   Net -2620.2 ml         Physical Exam:    General: No acute distress. Alert ,         HEENT:  No gross facial or mandible swelling.  Gross intra oral exposed bone unchanged.  No sign of gross abscess or cellulitis.    Results:   Diagnostic Data:      Labs:    Selected labs - last 24 hours:  Endo  Lytes  Renal   Glu -  Na - Ca -  BUN -   POC Gluc  140  K 3.7 PO4 -  Cr -   A1c -  Cl - Mg -  eGFR -   TSH -  CO2 -         LFT  CBC  Other   AST -  WBC -  PTT - Procal -   ALT -  Hb -  INR - CRP -   APk -  Hct -  Trop - D dim -   T aliyah -  PLT -  pBNP -  BNP -  - Ferritin  -   Prot -    CK  - Lactate  -   Alb -    LDL  - COVID  -     Imaging: Imaging data reviewed in Epic.    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  No gross dental abscess or infection at this time.  Patient  complaining of some pain.  He is currently afebrile an is tolerating diet.    Plan of care:      Continue IV antibiotics- Unasyn  Advance diet as tolerated  Pain management as needed.  Patient appears comfortable at this time.  Discharge per hospitalist.  Definitive care for MRONJ not provided at this hospital to my knowledge.  Patient may be discharged and then an appointment made a at either [facility] or [facility] at a later time.    His infection is now resolved and no further treatment for MRONJ can be done at this time.  Will continue to follow while he is in the hospital.       Quality:  DVT Mechanical Prophylaxis:     Early ambuation  DVT Pharmacologic Prophylaxis   Medication    heparin (Porcine) 5000 UNIT/ML injection 5,000 Units                 Code Status: Full Code  Jean: Jean catheter in place  Jean Duration (in days): 3  Central line:    KUSH: 6/10/2025    Will the patient be referred to [facility] on discharge?: Per Hospitalist  Discharge is dependent on: Per Hospitalist  At this point Mr. Graham is expected to be discharge to: Per Hospitalist    Plan of care discussed with patient today.    DAVID RAWLS DDS    Supplementary Documentation:                             [1]    ampicillin-sulbactam  3 g Intravenous q6h    heparin  5,000 Units Subcutaneous Q8H ISHA    insulin aspart  1-7 Units Subcutaneous TID CC and HS    amLODIPine  10 mg Oral Daily    chlorhexidine gluconate  15 mL Mouth/Throat BID    DULoxetine  60 mg Oral Daily    levothyroxine  25 mcg Oral Before breakfast    losartan  50 mg Oral Daily    memantine  10 mg Oral BID    potassium chloride  20 mEq Oral Daily

## 2025-06-09 NOTE — PLAN OF CARE
A&Ox1-2. Denies pain overnight. IVF and IV abt. Pureed, thin liquid diet. Pills given whole with applesauce. Pt tolerating diet, no complaints of n/v. Jean in place and care provided. Max assist. Safety measures in place, call light within reach.  Problem: Patient Centered Care  Goal: Patient preferences are identified and integrated in the patient's plan of care  Description: Interventions:  - What would you like us to know as we care for you? My jaw hurts  - Provide timely, complete, and accurate information to patient/family  - Incorporate patient and family knowledge, values, beliefs, and cultural backgrounds into the planning and delivery of care  - Encourage patient/family to participate in care and decision-making at the level they choose  - Honor patient and family perspectives and choices  Outcome: Progressing     Problem: Patient/Family Goals  Goal: Patient/Family Long Term Goal  Description: Patient's Long Term Goal: to return home    Interventions:  - See additional Care Plan goals for specific interventions  Outcome: Progressing  Goal: Patient/Family Short Term Goal  Description: Patient's Short Term Goal: to control my pain    Interventions:   - See additional Care Plan goals for specific interventions  Outcome: Progressing     Problem: PAIN - ADULT  Goal: Verbalizes/displays adequate comfort level or patient's stated pain goal  Description: INTERVENTIONS:  - Encourage pt to monitor pain and request assistance  - Assess pain using appropriate pain scale  - Administer analgesics based on type and severity of pain and evaluate response  - Implement non-pharmacological measures as appropriate and evaluate response  - Consider cultural and social influences on pain and pain management  - Manage/alleviate anxiety  - Utilize distraction and/or relaxation techniques  - Monitor for opioid side effects  - Notify MD/LIP if interventions unsuccessful or patient reports new pain  - Anticipate increased pain with  activity and pre-medicate as appropriate  Outcome: Progressing     Problem: RISK FOR INFECTION - ADULT  Goal: Absence of fever/infection during anticipated neutropenic period  Description: INTERVENTIONS  - Monitor WBC  - Administer growth factors as ordered  - Implement neutropenic guidelines  Outcome: Progressing     Problem: SAFETY ADULT - FALL  Goal: Free from fall injury  Description: INTERVENTIONS:  - Assess pt frequently for physical needs  - Identify cognitive and physical deficits and behaviors that affect risk of falls.  - [place] fall precautions as indicated by assessment.  - Educate pt/family on patient safety including physical limitations  - Instruct pt to call for assistance with activity based on assessment  - Modify environment to reduce risk of injury  - Provide assistive devices as appropriate  - Consider OT/PT consult to assist with strengthening/mobility  - Encourage toileting schedule  Outcome: Progressing     Problem: DISCHARGE PLANNING  Goal: Discharge to home or other facility with appropriate resources  Description: INTERVENTIONS:  - Identify barriers to discharge w/pt and caregiver  - Include patient/family/discharge partner in discharge planning  - Arrange for needed discharge resources and transportation as appropriate  - Identify discharge learning needs (meds, wound care, etc)  - Arrange for interpreters to assist at discharge as needed  - Consider post-discharge preferences of patient/family/discharge partner  - Complete POLST form as appropriate  - Assess patient's ability to be responsible for managing their own health  - Refer to Case Management Department for coordinating discharge planning if the patient needs post-hospital services based on physician/LIP order or complex needs related to functional status, cognitive ability or social support system  Outcome: Progressing     Problem: Altered Communication/Language Barrier  Goal: Patient/Family is able to understand and  participate in their care  Description: Interventions:  - Assess communication ability and preferred communication style  - Implement communication aides and strategies  - Use visual cues when possible  - Listen attentively, be patient, do not interrupt  - Minimize distractions  - Allow time for understanding and response  - Establish method for patient to ask for assistance (call light)  - Provide an  as needed  - Communicate barriers and strategies to overcome with those who interact with patient  Outcome: Progressing

## 2025-06-09 NOTE — PROGRESS NOTES
[facility]  part of [facility]    Progress Note    Joselin Graham Patient Status:  Inpatient    5/15/1950 MRN [id number]   Location [facility] 4W/SW/SE Attending Bonnie Craig MD   Hosp Day # 3 PCP No primary care provider on file.       Subjective:   Joselin Graham is a(n) 75 year old male who is sitting up in bed. Feeling ok.     Objective:   Blood pressure 143/84, pulse 79, temperature 97.3 °F (36.3 °C), temperature source Temporal, resp. rate 18, weight 122 lb 3.2 oz (55.4 kg), SpO2 100%.    Physical Exam:    General: No acute distress.   Respiratory: Clear to auscultation bilaterally. No wheezes. No rhonchi.  Cardiovascular: S1, S2. Regular rate and rhythm. No murmurs, rubs or gallops.   Abdomen: Soft, nontender, nondistended.  Positive bowel sounds. No rebound or guarding.  Neurologic: No focal neurological deficits.   Musculoskeletal: Moves all extremities.  Extremities: No edema.    Results:     Lab Results   Component Value Date    WBC 2.0 (L) 2025    HGB 8.8 (L) 2025    HCT 26.8 (L) 2025    .0 2025    CREATSERUM 1.09 2025    BUN 11 2025     2025    K 3.7 2025     2025    CO2 24.0 2025     (H) 2025    CA 8.3 (L) 2025    ALB 4.2 2025    ALKPHO 92 2025    BILT 0.5 2025    TP 7.5 2025    AST 47 (H) 2025    ALT 90 (H) 2025    T4F 1.1 2024    TSH 0.847 2025    MG 1.9 2025    PHOS 3.0 2025    B12 415 2024       No results found.          Scheduled Medications[1]      Assessment and Plan:       Dental abscess  - Per ED physician, discussed with oral surgery  - IV antibiotics with Unasyn  - Dr. Ha reccs appreciated  - will need to eventually be seen at [place] for mandible resection once medically stable  - might need rehab- PT/OT eval   - added norco for pain     Diarrhea  Cdiff pending   Resolving     Hypoglycemia  -  Added D5 in fluids  - advance diet as tolerated      Hyperkalemia  Mild hyponatremia  - Recheck and treat accordingly     DM2  - Insulin- hold due to hypoglycemia - stop tresiba   - Carb controlled diet     Leukopenia, chronic  Anemia  History of multiple myeloma  - Monitor  - Resume home meds     HTN  Dementia  - Resume home meds when reconciled     Quality:  DVT Prophylaxis: Heparin  CODE status: Full code    HAMMAD Craig MD             [1]    ampicillin-sulbactam  3 g Intravenous q6h    heparin  5,000 Units Subcutaneous Q8H [facility]    insulin aspart  1-7 Units Subcutaneous TID CC and HS    amLODIPine  10 mg Oral Daily    chlorhexidine gluconate  15 mL Mouth/Throat BID    DULoxetine  60 mg Oral Daily    levothyroxine  25 mcg Oral Before breakfast    losartan  50 mg Oral Daily    memantine  10 mg Oral BID    potassium chloride  20 mEq Oral Daily

## 2025-06-09 NOTE — PAYOR COMM NOTE
--------------RECONSIDERATION REQUEST FOR INPATIENT SERVICES--PATIENT IS STILL IN HOUSE AND RECEIVING HOSPITAL SERVICES    ADMISSION REVIEW     Payor: [employer]  Subscriber #:  [id number]  Authorization Number: [id number]    Admit date: 6/6/25  Admit time:  1:05 AM       REVIEW DOCUMENTATION:     ED Provider Notes        ED Provider Notes signed by Ángel Vargas MD at 6/5/2025 11:50 PM        Patient Seen in: [facility] Emergency Department        History  Chief Complaint   Patient presents with    Jaw Pain     Stated Complaint: left jaw infection    Subjective:   HPI            75-year-old male with history of diabetes mellitus hypertension, multiple myeloma, dementia, presented for evaluation of left jaw pain.  Was reportedly seen by an oral surgeon today, was referred to the ER, there is able to take x-rays but is unclear why.  They note exposed bone associated with tooth #18 through 22, oral mucosa is detached in that area, he was sent to the ER simply for severe pain.  No reports of fevers.      Objective:     Past Medical History:    Abnormal gait    Benign essential hypertension    Cancer (HCC)    Chest pain    Formatting of this note might be different from the original.   Normal stress test 11/2021      Last Assessment & Plan:    Formatting of this note might be different from the original.   Symptoms are atypical for ischemia   Chest pain is worse with change in position      Diabetes (HCC)    Diabetes mellitus (HCC)    Diabetes mellitus (HCC)    High blood pressure    Hyperglycemia    Hypertension associated with diabetes (HCC)    Last Assessment & Plan:    Formatting of this note might be different from the original.   Blood pressure mildly elevated   Continue same medications for now   Continue to monitor readings      Multiple myeloma (HCC)    Multiple premature ventricular complexes    Formatting of this note might be different from the original.   Normal stress test 11/2021       Echo 8/2021:    1. Left ventricular ejection fraction, by visual estimation, is 60 to 65%.    2. Mild concentric left ventricular hypertrophy.    3. Normal pattern of LV diastolic filling.    4. Mild aortic valve stenosis.         Last Assessment & Plan:    Formatting of this note might be different fro    Neuropathy    Nonrheumatic aortic valve stenosis    Formatting of this note might be different from the original.   Echo 8/2021:    1. Left ventricular ejection fraction, by visual estimation, is 60 to 65%.    2. Mild concentric left ventricular hypertrophy.    3. Normal pattern of LV diastolic filling.    4. Mild aortic valve stenosis.         Last Assessment & Plan:    Formatting of this note might be different from the original.   Mild aortic mu    Rash    Type 2 diabetes mellitus without complication, without long-term current use of insulin (HCC)    Vomiting     Physical Exam    ED Triage Vitals [06/05/25 2048]   /71   Pulse 68   Resp 18   Temp 97.5 °F (36.4 °C)   Temp src Oral   SpO2 99 %   O2 Device None (Room air)       Current Vitals:   Vital Signs  BP: 115/59  Pulse: 78  Resp: 16  Temp: 97.5 °F (36.4 °C)  Temp src: Oral  MAP (mmHg): 74    Oxygen Therapy  SpO2: 99 %  O2 Device: None (Room air)            Physical Exam  Constitutional: awake, alert, no sig distress  HENT: Exposed bone in tooth 18 through 22 overall very poor dentition, tooth #17 extracted, there is some mucosal swelling in that area  -No elevation of floor the mouth, no stridor or tripoding or drooling or trismus  Neck: normal range of motion, no tenderness, supple.  Eyes: PERRL, EOMI, conjunctiva normal, no discharge. Sclera anicteric.  Cardiovascular: rr no murmur  Respiratory: Normal breath sounds, no respiratory distress, no wheezing, no chest tenderness.  GI: Bowel sounds normal, Soft, no tenderness, no masses, no pulsatile masses.  : No CVA tenderness.  Skin: Warm, dry, no erythema, no rash.  Musculoskeletal: Intact distal  pulses, no edema, no tenderness, no cyanosis, no clubbing. Good range of motion in all major joints. No tenderness to palpation or major deformities noted. Back- No tenderness.  Neurologic: Alert & oriented x 3, normal motor function, normal sensory function, no focal deficits noted.  Psych: Calm, cooperative, nl affect        ED Course  Labs Reviewed   CBC WITH DIFFERENTIAL WITH PLATELET - Abnormal; Notable for the following components:       Result Value    WBC 2.6 (*)     RBC 3.31 (*)     HGB 10.1 (*)     HCT 30.1 (*)     RDW-SD 62.0 (*)     RDW 18.6 (*)     Neutrophil Absolute Prelim 0.96 (*)     Neutrophil Absolute 0.96 (*)     Lymphocyte Absolute 0.98 (*)     All other components within normal limits   COMP METABOLIC PANEL (14) - Abnormal; Notable for the following components:    Glucose 225 (*)     Sodium 133 (*)     Potassium 5.9 (*)     ALT 90 (*)     AST 47 (*)     All other components within normal limits   POCT GLUCOSE - Abnormal; Notable for the following components:    POC Glucose  215 (*)     All other components within normal limits     EKG    ED Course as of 06/05/25 2350  ------------------------------------------------------------  Time: 06/05 2339  Comment: EKG is inter by ED physician: Normal sinus rhythm 70 bpm left axis deviation, no hyperkalemia changes no peaked T waves or QRS widening, QTc 442     MDM     75-year-old male history as documented above sent in due to severe pain related to left jawline tooth discomfort.  Arrival vital stable reassuring  DDx: Dental abscess,.  Other dental infection, osteomyelitis of the mandible  Plan for labs and CT with contrast  Discussed with oral surgery    Reviewed CT facial bones independently shows no clear evidence of osteomyelitis.    Patient with significant pain requirements in the ER, pain not well-controlled with IV morphine so will admit.  Given IV Unasyn.    Admission disposition: 6/5/2025 11:50 PM           Medical Decision  Making      Disposition and Plan     Clinical Impression:  1. Dental abscess         Disposition:  Admit  6/5/2025 11:50 pm        Hospital Problems       Present on Admission  Date Reviewed: 5/25/2025          ICD-10-CM Noted POA    Dental abscess K04.7 6/5/2025 Unknown            Signed by Ángel Vargas MD on 6/5/2025 11:50 PM               History and Physical    H&P signed by Dayana Galloway MD at 6/6/2025  1:23 AM       [facility]  part of [facility]                                                                                                           History and Physical            Chief Complaint: Dental abscess     Subjective:    Joselin Graham is a 75 year old male with history of HTN, DM, MM, dementia who was sent to the ED from oral surgeons office for evaluation of dental abscess, pain control.  Dental pain started about a week ago.  Per chart/ED review, dental exam showed exposed bone.  No fever or chills.  He denies chest pain or shortness of breath.  No difficulty swallowing or eating.     Vital stable  Labs with sodium 133, potassium 5.9, WBC 2.6, hemoglobin 10.1  CT facial bones shows dental abscess per ED report.  Official radiology read pending.  EKG with normal sinus rhythm, left axis deviation, anteroseptal infarct.     Chart reviewed:  Patient hospitalized 4/28/2025 to 5/6/2025 for left face cellulitis, initially attributed to dental extraction.  Patient subsequently developed vesicular rashes in the V2/V3 distribution and was diagnosed with herpes zoster.  MRI showed possible otomastoiditis and he was discharged with 2 weeks of IV antibiotics.  He was seen at outside ED 5/30/2025 for left-sided facial and ear pain.  Noted with excoriations from picking his ear.     Assessment & Plan:    Dental abscess  - Per ED physician, discussed with oral surgery  - Admit for pain control  - IV antibiotics with Unasyn  - Follow-up radiology CT read     Hyperkalemia  Mild  hyponatremia  - Recheck and treat accordingly     DM2  - Insulin  - Carb controlled diet     Leukopenia, chronic  Anemia  History of multiple myeloma  - Monitor  - Resume home meds     HTN  Dementia  - Resume home meds when reconciled                CONSULT      Referring Provider: Dr. Craig  Reason for Consultation: Dental Pain/Abscess     Subjective:    Joselin Graham is a 75 year old male with complaining of sore throat and moderate to severe left facial pain.  Pain became intractable, so he presented to the ED.  He has history of Multiple Myeloma and being treated with Xgeva (denosumab).       Imaging: Imaging data reviewed in Epic.     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  Abscess no longer present and appears to have resolved with IV antibiotics.  Patient complaining of moderate to severe facial pain.  He is afebrile and has leukopenia.       P:     Continue IV antibiotics - Unasyn  Pain management as needed  Advance diet as tolerated  Continue present management.  He will require mandible resection to removed necrotic bone and to reconstruct the mandible when medically stable.   Referral to [place] indicated for mandible resection.  Surgery not done at this hospital that I know of.  Ideally he may be able to be discharged when his pain is more under control, at which time he can be referred to [place].  If his pain remains intractable and he needs more urgent care, transfer to [place] may be indicated at this time.  Definitive care cannot be provided at this hospital.  Will continue to follow while in the hospital.        Plan of care discussed with patient only.       DAVID RAWLS DDS  6/6/2025 6/6       Date of Service: 6/6/2025  1:44 PM     Signed         [facility]  [facility]     Progress Note        Subjective:   Joselin Graham is a(n)  75 year old male who is feeling ok. Still having some pain with swallowing. No fever.      Objective:   Blood pressure 114/66, pulse 73, temperature 98 °F (36.7 °C), temperature source Oral, resp. rate 18, weight 122 lb 3.2 oz (55.4 kg), SpO2 97%.     Physical Exam:    General: No acute distress.   Respiratory: Clear to auscultation bilaterally. No wheezes. No rhonchi.  Cardiovascular: S1, S2. Regular rate and rhythm. No murmurs, rubs or gallops.   Abdomen: Soft, nontender, nondistended.  Positive bowel sounds. No rebound or guarding.  Neurologic: No focal neurological deficits.   Musculoskeletal: Moves all extremities.  Extremities: No edema.     Results:            Lab Results   Component Value Date     WBC 2.6 (L) 06/05/2025     HGB 10.1 (L) 06/05/2025     HCT 30.1 (L) 06/05/2025     .0 06/05/2025     CREATSERUM 1.22 06/05/2025     BUN 22 06/05/2025      (L) 06/05/2025     K 4.6 06/06/2025      06/05/2025     CO2 21.0 06/05/2025      (H) 06/05/2025     CA 9.2 06/05/2025     ALB 4.2 06/05/2025     ALKPHO 92 06/05/2025     BILT 0.5 06/05/2025     TP 7.5 06/05/2025     AST 47 (H) 06/05/2025     ALT 90 (H) 06/05/2025     T4F 1.1 07/28/2024     TSH 0.847 04/29/2025     MG 1.9 05/04/2025     PHOS 3.0 05/04/2025     B12 415 07/28/2024         CT FACIAL BONES(CONTRAST ONLY) (CPT=70487)  Result Date: 6/6/2025  CONCLUSION:            1. Postprocedural changes of extraction of the left mandibular 1st and 2nd bicuspids/premolars with peripheral enhancing fluid collection adjacent to the sockets, concerning for 3.1 cm dental abscess.  2. Lytic lesions throughout the osseous structures, likely secondary to multiple myeloma.  3. Minimal left maxillary sinusitis.  4. Lesser incidental findings as above.    A preliminary report was issued by the [facility] teleradiology service. There are no major discrepancies.  [facility].  Dictated by (CST): Scottie Allen MD on 6/06/2025 at 4:34 AM      Finalized by (CST): Scottie Allen MD on 6/06/2025 at 5:05 AM           EKG  Result Date: 6/6/2025  Normal sinus rhythm Left axis deviation Anteroseptal infarct (cited on or before 22-JUL-2024) Abnormal ECG When compared with ECG of 28-JUL-2024 17:04, Questionable change in initial forces of Anterior leads Nonspecific T wave abnormality has replaced inverted T waves in Lateral leads Confirmed by VASQUEZ ESPINOZA, VERÓNICA ([id number]) on 6/6/2025 1:05:46 PM        Assessment and Plan:       Dental abscess  - Per ED physician, discussed with oral surgery  - Admit for pain control  - IV antibiotics with Unasyn  - Follow-up radiology CT read  - Dr. Ha will see for abscess care      Hyperkalemia  Mild hyponatremia  - Recheck and treat accordingly     DM2  - Insulin  - Carb controlled diet     Leukopenia, chronic  Anemia  History of multiple myeloma  - Monitor  - Resume home meds     HTN  Dementia  - Resume home meds when reconciled     Quality:  DVT Prophylaxis: Heparin  CODE status: Full code     [facility] Cross HospitalShantelle SMITH MD  06/06/25 6/7           Date of Service: 6/7/2025 10:29 AM     Signed         [facility]  part of [facility]     Progress Note        Subjective:   Joselin Graham is a(n) 75 year old male who is having a lot of pain in his left jaw. No fevers. Tolerating CLD.      Objective:   Blood pressure 109/60, pulse 77, temperature 98.9 °F (37.2 °C), temperature source Axillary, resp. rate 16, weight 122 lb 3.2 oz (55.4 kg), SpO2 95%.     Physical Exam:    General: No acute distress.   Respiratory: Clear to auscultation bilaterally. No wheezes. No rhonchi.  Cardiovascular: S1, S2. Regular rate and rhythm. No murmurs, rubs or gallops.   Abdomen: Soft, nontender, nondistended.  Positive bowel sounds. No rebound or guarding.  Neurologic: No focal neurological deficits.   Musculoskeletal: Moves all extremities.  Extremities: No edema.     Results:            Lab Results    Component Value Date     WBC 3.5 (L) 06/07/2025     HGB 10.7 (L) 06/07/2025     HCT 33.2 (L) 06/07/2025     .0 06/07/2025     CREATSERUM 1.10 06/07/2025     BUN 11 06/07/2025      06/07/2025     K 3.8 06/07/2025      06/07/2025     CO2 23.0 06/07/2025     GLU 69 (L) 06/07/2025     CA 8.6 (L) 06/07/2025     ALB 4.2 06/05/2025     ALKPHO 92 06/05/2025     BILT 0.5 06/05/2025     TP 7.5 06/05/2025     AST 47 (H) 06/05/2025     ALT 90 (H) 06/05/2025     T4F 1.1 07/28/2024     TSH 0.847 04/29/2025     MG 1.9 05/04/2025     PHOS 3.0 05/04/2025     B12 415 07/28/2024         CT FACIAL BONES(CONTRAST ONLY) (CPT=70487)  Result Date: 6/6/2025  CONCLUSION:            1. Postprocedural changes of extraction of the left mandibular 1st and 2nd bicuspids/premolars with peripheral enhancing fluid collection adjacent to the sockets, concerning for 3.1 cm dental abscess.  2. Lytic lesions throughout the osseous structures, likely secondary to multiple myeloma.  3. Minimal left maxillary sinusitis.  4. Lesser incidental findings as above.    A preliminary report was issued by the [facility]iology teleradiology service. There are no major discrepancies.  [facility].  Dictated by (CST): Scottie Allen MD on 6/06/2025 at 4:34 AM     Finalized by (CST): Scottie Allen MD on 6/06/2025 at 5:05 AM           EKG  Result Date: 6/6/2025  Normal sinus rhythm Left axis deviation Anteroseptal infarct (cited on or before 22-JUL-2024) Abnormal ECG When compared with ECG of 28-JUL-2024 17:04, Questionable change in initial forces of Anterior leads Nonspecific T wave abnormality has replaced inverted T waves in Lateral leads Confirmed by VASQUEZ ESPINOZA ELMER ([id number]) on 6/6/2025 1:05:46 PM     [Scheduled Medications]    [Scheduled Medications]   ampicillin-sulbactam  3 g Intravenous q6h    heparin  5,000 Units Subcutaneous Q8H ISHA    insulin aspart  1-7 Units Subcutaneous TID CC and HS    amLODIPine  10 mg Oral Daily     chlorhexidine gluconate  15 mL Mouth/Throat BID    DULoxetine  60 mg Oral Daily    levothyroxine  25 mcg Oral Before breakfast    losartan  50 mg Oral Daily    memantine  10 mg Oral BID    potassium chloride  20 mEq Oral Daily           Assessment and Plan:       Dental abscess  - Per ED physician, discussed with oral surgery  - IV antibiotics with Unasyn  - Dr. Ha [facility] appreciated  - will need to eventually be seen at [place] for mandible resection once medically stable  - might need rehab- PT/OT eval   - add norco for pain      Hypoglycemia  - Added D5 in fluids  - advance diet as tolerated      Hyperkalemia  Mild hyponatremia  - Recheck and treat accordingly     DM2  - Insulin- hold due to hypoglycemia - stop tresiba   - Carb controlled diet     Leukopenia, chronic  Anemia  History of multiple myeloma  - Monitor  - Resume home meds     HTN  Dementia  - Resume home meds when reconciled     Quality:  DVT Prophylaxis: Heparin  CODE status: Full code     HAMMAD SMITH MD  06/07/25 6/8           Date of Service: 6/8/2025 10:46 AM     Signed         [facility]  part of [facility]     Progress Note   Subjective:   Joselin Graham is a(n) 75 year old male having diarrhea  No fevers. Tolerating CLD.      Objective:   Blood pressure 154/71, pulse 71, temperature 97.2 °F (36.2 °C), temperature source Temporal, resp. rate 20, weight 122 lb 3.2 oz (55.4 kg), SpO2 100%.     Physical Exam:    General: No acute distress.   Respiratory: Clear to auscultation bilaterally. No wheezes. No rhonchi.  Cardiovascular: S1, S2. Regular rate and rhythm. No murmurs, rubs or gallops.   Abdomen: Soft, nontender, nondistended.  Positive bowel sounds. No rebound or guarding.  Neurologic: No focal neurological deficits.   Musculoskeletal: Moves all extremities.  Extremities: No edema.     Results:            Lab Results   Component Value Date     WBC 2.0 (L) 06/08/2025     HGB 8.8 (L)  06/08/2025     HCT 26.8 (L) 06/08/2025     .0 06/08/2025     CREATSERUM 1.09 06/08/2025     BUN 11 06/08/2025      06/08/2025     K 3.4 (L) 06/08/2025      06/08/2025     CO2 24.0 06/08/2025      (H) 06/08/2025     CA 8.3 (L) 06/08/2025     ALB 4.2 06/05/2025     ALKPHO 92 06/05/2025     BILT 0.5 06/05/2025     TP 7.5 06/05/2025     AST 47 (H) 06/05/2025     ALT 90 (H) 06/05/2025     T4F 1.1 07/28/2024     TSH 0.847 04/29/2025     MG 1.9 05/04/2025     PHOS 3.0 05/04/2025     B12 415 07/28/2024         CT FACIAL BONES(CONTRAST ONLY) (CPT=70487)  Result Date: 6/6/2025  CONCLUSION:            1. Postprocedural changes of extraction of the left mandibular 1st and 2nd bicuspids/premolars with peripheral enhancing fluid collection adjacent to the sockets, concerning for 3.1 cm dental abscess.  2. Lytic lesions throughout the osseous structures, likely secondary to multiple myeloma.  3. Minimal left maxillary sinusitis.  4. Lesser incidental findings as above.    A preliminary report was issued by the [email or web address] teleradiology service. There are no major discrepancies.  [facility].  Dictated by (CST): Scottie Allen MD on 6/06/2025 at 4:34 AM     Finalized by (CST): Scottie Allen MD on 6/06/2025 at 5:05 AM                  [Scheduled Medications]    [Scheduled Medications]   ampicillin-sulbactam  3 g Intravenous q6h    heparin  5,000 Units Subcutaneous Q8H ISHA    insulin aspart  1-7 Units Subcutaneous TID CC and HS    amLODIPine  10 mg Oral Daily    chlorhexidine gluconate  15 mL Mouth/Throat BID    DULoxetine  60 mg Oral Daily    levothyroxine  25 mcg Oral Before breakfast    losartan  50 mg Oral Daily    memantine  10 mg Oral BID    potassium chloride  20 mEq Oral Daily           Assessment and Plan:       Dental abscess  - Per ED physician, discussed with oral surgery  - IV antibiotics with Unasyn  - Dr. Ha reccs appreciated  - will need to eventually be seen at [place] for  mandible resection once medically stable  - might need rehab- PT/OT eval   - add norco for pain      Diarrhea  Cdiff pending      Hypoglycemia  - Added D5 in fluids  - advance diet as tolerated      Hyperkalemia  Mild hyponatremia  - Recheck and treat accordingly     DM2  - Insulin- hold due to hypoglycemia - stop tresiba   - Carb controlled diet     Leukopenia, chronic  Anemia  History of multiple myeloma  - Monitor  - Resume home meds     HTN  Dementia  - Resume home meds when reconciled     Quality:  DVT Prophylaxis: Heparin  CODE status: Full code     HAMMAD Pelaez MD, FAAFP                          Medication Administration Report  for Joselin Graham as of 05/31/25 through 06/09/25  Legend:         Medications 05/31 06/01 06/02 06/03 06/04 06/05 06/06 06/07 06/08 06/09                            Or   HYDROcodone-acetaminophen (Norco) 5-325 MG per tab 1 tablet  Dose: 1 tablet  Freq: Every 4 hours PRN Route: OR  PRN Reason: moderate pain  Start: 06/06/25 0116 End: 06/07/25 1152   Admin Instructions:   Use PRN reason as a guide and follow range order policy          20969     5      48289          Or   HYDROcodone-acetaminophen (Norco) 5-325 MG per tab 2 tablet  Dose: 2 tablet  Freq: Every 4 hours PRN Route: OR  PRN Reason: severe pain  Start: 06/06/25 0116   Admin Instructions:   Use PRN reason as a guide and follow range order policy          7     24021      9          amLODIPine (Norvasc) tab 10 mg  Dose: 10 mg  Freq: Daily Route: OR  Start: 06/06/25 5960          ([id number])72 [id number]      [id number]      824428        ampicillin-sulbactam (Unasyn) 3 g in sodium chloride 0.9% 100mL IVPB-TABBY  Dose: 3 g  Freq: Every 6 hours Route: IV  Start: 06/06/25 0600   Order specific questions:             [id number]     409009     [id number]      382851     463324     384915     075390     945383      884442     073219     170507      800322     731063     1200     1800        chlorhexidine gluconate (Peridex) 0.12 %  oral solution 15 mL  Dose: 15 mL  Freq: 2 times daily Route: MT  Start: 06/06/25 1345          275509     553635      928418     243370      050189     582636      649048     2100        dextrose 5%-sodium chloride 0.9% infusion  Rate: 83 mL/hr  Freq: Continuous Route: IV  Start: 06/06/25 1530          [id number]       150998                                                             Or   glucose (Dex4) 15 GM/59ML oral liquid 30 g  Dose: 30 g  Freq: Every 15 min PRN Route: OR  PRN Reason: Low blood glucose  PRN Comment: less than 54 mg/dL  Start: 06/06/25 0116   Admin Instructions:   Use PRN reason as a guide and follow hypoglycemia policy           [id number]                                    DULoxetine (Cymbalta) DR cap 60 mg  Dose: 60 mg  Freq: Daily Route: OR  Start: 06/06/25 1345   Admin Instructions:   Do not chew, break or crush.          [id number]      627024      351821      405844        heparin (Porcine) 5000 UNIT/ML injection 5,000 Units  Dose: 5,000 Units  Freq: Every 8 hours scheduled Route: SC  Start: 06/06/25 1400          [id number]     070481      554508     157489     884905      249089     714862     892782      085141     1400     2200        HYDROcodone-acetaminophen (Norco) 5-325 MG per tab 1 tablet  Dose: 1 tablet  Freq: Every 6 hours PRN Route: OR  PRN Reason: moderate pain  Start: 06/07/25 1148           894319      267698      825534        insulin aspart (NovoLOG) 100 Units/mL FlexPen 1-7 Units  Dose: 1-7 Units  Freq: 3 times daily with meals and at bedtime Route: SC  Start: 06/06/25 0130   Admin Instructions:   CORRECTION FACTOR - MEDIUM DOSE  Continue to give correction insulin even if NPO  DO NOT HOLD OR ALTER INSULIN DOSE WITHOUT A PHYSICIAN ORDER    Give 1 unit for blood glucose 150-180 mg/dL  Give 2 units for blood glucose 181-210 mg/dL  Give 3 units for blood glucose 211-240 mg/dL  Give 4 units for blood glucose 241-270 mg/dL  Give 5 units for blood glucose 271-300 mg/dL  Give 6 units for blood  glucose 301-330 mg/dL  Give 7 units for blood glucose 331-360 mg/dL  Call physician if blood glucose is greater than 360 mg/dL with time and last dose of correction insulin given.          (0130) [C]59     (0752) [C]60     (1200) [C]61     (1749) [C]62     (2124) [C]63      (0800)64     906872     (1700)66     (2100) [C]67      (0800)68     199563     708832     (2100)71      0800     1200     1700     2100        levothyroxine (Synthroid) tab 25 mcg  Dose: 25 mcg  Freq: Before breakfast Route: OR  Start: 06/06/25 1345   Admin Instructions:   Give on an empty stomach. Hold tube feedings 1 hour before AND after.          [phone] [id number]     [phone]      [id number]      453045        losartan (Cozaar) tab 50 mg  Dose: 50 mg  Freq: Daily Route: OR  Start: 06/06/25 1345          ([id number]      [id number]      [id number]      547741        memantine (Namenda) tab 10 mg  Dose: 10 mg  Freq: 2 times daily Route: OR  Start: 06/06/25 1345          [id number]     066049      229950     [id number]      [id number]     247160      042318     2100                    Or   morphINE PF 2 MG/ML injection 2 mg  Dose: 2 mg  Freq: Every 2 hour PRN Route: IV  PRN Reason: moderate pain  Start: 06/06/25 0116   Admin Instructions:   Use PRN reason as a guide and follow range order policy. If oral pain meds are ordered and patient can tolerate oral intake, start with PRN oral pain medications first.          [id number]     95     96     97      98     99          Or   morphINE PF 4 MG/ML injection 4 mg  Dose: 4 mg  Freq: Every 2 hour PRN Route: IV  PRN Reason: severe pain  Start: 06/06/25 0116   Admin Instructions:   Use PRN reason as a guide and follow range order policy. If oral pain meds are ordered and patient can tolerate oral intake, start with PRN oral pain medications first.          100     [id number]     (1445) [C]102     [id number]      [id number]     [id number]          potassium chloride (Klor-Con M20) tab 20 mEq  Dose: 20 mEq  Freq: Daily Route: OR  Start:  06/06/25 1345   Admin Instructions:   Do not crush          [phone]      [id number]      [id number]      [id number]        Completed Medications   Medications 05/31 06/01 06/02 06/03 06/04 06/05 06/06 06/07 06/08 06/09   ampicillin-sulbactam (Unasyn) 3 g in sodium chloride 0.9% 100mL IVPB-TABBY  Dose: 3 g  Freq: Once Route: IV  Start: 06/05/25 2334 End: 06/06/25 0057   Order specific questions:            [id number]      [id number]           haloperidol lactate (Haldol) 5 MG/ML injection 5 mg  Dose: 5 mg  Freq: Once Route: IM  Start: 06/07/25 2045 End: 06/07/25 2101 [id number]          iopamidol 76% (ISOVUE-370) injection for power injector  Dose: 65 mL  Freq: IMG once as needed Route: IV  PRN Reason: contrast  Start: 06/05/25 2235 End: 06/05/25 2235 [id number]            morphINE PF 4 MG/ML injection 4 mg  Dose: 4 mg  Freq: Once Route: IV  Start: 06/05/25 2256 End: 06/05/25 2258 [id number]            potassium chloride (Klor-Con) 20 MEQ oral powder 40 mEq  Dose: 40 mEq  Freq: Once Route: OR  Start: 06/08/25 0745 End: 06/08/25 0834   Admin Instructions:   Dissolve one packet in at least 120 mL of cold water or other beverage prior to administration. If GI irritation occurs, increase dilution.            [id number]         sodium chloride 0.9 % IV bolus 1,000 mL  Dose: 1,000 mL  Freq: Once Route: IV  Start: 06/05/25 2332 End: 06/06/25 0057         [id number]      [id number]           Discontinued Medications   Medications 05/31 06/01 06/02 06/03 06/04 06/05 06/06 06/07 06/08 06/09                insulin degludec (Tresiba) 100 units/mL flextouch 15 Units  Dose: 15 Units  Freq: Nightly Route: SC  Start: 06/06/25 0130 End: 06/07/25 1032   Admin Instructions:   This is LONG ACTING insulin.  Continue to give basal insulin (insulin degludec - Tresiba) even if NPO.  DO NOT hold or alter insulin dose without a physician order.          [id number]     [phone]           sodium chloride 0.9% infusion  Rate: 83 mL/hr  Freq: Continuous  Route: IV  Start: 06/06/25 1345 End: 06/06/25 1522          [id number]               Vitals (since admission)    Date/Time Temp Pulse Resp BP SpO2 Weight O2 Device O2 Flow Rate (L/min) [employer]   06/09/25 0947 -- 79 -- 143/84 -- -- -- -- SL   06/09/25 0345 97.3 °F (36.3 °C) 72 18 149/80 100 % -- None (Room air) -- AS   06/08/25 2039 97.1 °F (36.2 °C) 70 18 144/67 99 % -- None (Room air) -- AS   06/08/25 0836 -- 71 20 154/71 100 % -- None (Room air) -- KV   06/08/25 0403 97.2 °F (36.2 °C) -- 18 120/68 96 % -- None (Room air) -- AS   06/07/25 2016 97.4 °F (36.3 °C) 78 18 140/65 98 % -- None (Room air) -- AS   06/07/25 1941 -- 84 -- -- -- -- -- -- CY   06/07/25 1900 -- 82 -- -- -- -- -- -- CY   06/07/25 1042 97.2 °F (36.2 °C) 79 18 122/62 96 % -- None (Room air) -- KV   06/07/25 0453 98.9 °F (37.2 °C) -- 16 109/60 95 % -- None (Room air) -- JA   06/06/25 2033 98 °F (36.7 °C) 77 18 122/72 98 % -- None (Room air) -- MM   06/06/25 1900 -- 76 -- -- -- -- -- -- MO   06/06/25 1444 -- 67 -- 112/52 95 % -- None (Room air) -- SE   06/06/25 1208 98 °F (36.7 °C) 73 18 114/66 97 % -- None (Room air) -- SE   06/06/25 0616 97.6 °F (36.4 °C) 71 17 111/70 96 % -- None (Room air) -- MM   06/06/25 0142 -- -- -- -- -- 122 lb 3.2 oz (55.4 kg) -- -- BC   06/06/25 0127 97.6 °F (36.4 °C) 81 16 156/76 99 % -- -- -- BC   06/05/25 2345 -- 80 -- 135/73 -- -- -- --    06/05/25 2245 -- 78 16 115/59 -- -- -- --    06/05/25 2048 97.5 °F (36.4 °C) 68 18 128/71 99 % 120 lb 9.5 oz (54.7 kg) None (Room air) -- VM

## 2025-06-09 NOTE — CM/SW NOTE
CM was notified by DENA liaison that patient's DENA approval was cut by insurance on 6/4. He was switched to Private Pay as of 6/5. Pt appealed and lost.     CM notified MD of above     CM notified ex wife/POA of above. She is aware that pt is private pay at [facility]. Per POA she applied for Medicaid & a few weeks ago was notified of denial. Pt owns his \"apartment\" and gets $2000/month in SS income and did not meet financial qualification.    No POA documents are on file.POA sts [facility] faxed POA papers to medical records at [facility]. CM sent request to liaison to email documents to CM so they can be scanned into epic.    POA stated the person on the facesheet named Jean Paul is not his brother, it is a friend. POA asked to have his name removed from the record. Registration notified.    CM advised POA to discuss financial POA with pt and contact an  to secure a financial POA for pt in the event he is not able to make his own decisions. POA vu.    6/10 0930  Per documents rec'd from [facility], Sammi is the pts HCPOA and POA for Property. CM tubed documents to registration to be scanned into Epic.    CM notified Rasi of above, suggested she obtain a copy for her own records. She vu.    1440  MDO for dc. Pt failed void trial, will dc w/ [facility]ador.    CM upd DENA on above.    Per DENA liaison-Per Admin \"We would like to wait for auth, otherwise, we can take patient under private pay if the family agrees to give us a 2-month deposit.  We can refund whatever days that are unused from the deposit once auth is received.\" Team is going to call POA to update her on this so we are all on same page.     Per liaison-his DENA stay was always for short term and the private pay was by default since the appeal was lost. We were going to be moving forward with DC plan and then he was sent out to hospital. We will require the private pay deposit if insurance denies this time again. Per team \"POA didn't answer the call - I left a voicemail  asking for her to call me back right away. I'll update you once I hear back.\"     Auth is pending at this time. Pt refused PT today.    1530  [facility] is agreeable to accept back. They will f/u with POA re LTC plan/payment.    FRANKLIN notified RN of above. RN reports pt is now agitated so IV Haldol was given.    CM asked RN to obtain orders for po meds and avoid IV as pt will need to be off x24 hrs prior to dc.    Plan  Return to [facility]     / to remain available for support and/or discharge planning.     Jessica Mcgill, RN    Ext [phone]

## 2025-06-09 NOTE — CONSULTS
[facility]  part of [facility] ID CONSULT NOTE    Joselin Graham Patient Status:  Inpatient    5/15/1950 MRN [id number]   Location [facility] 4W/SW/SE Attending Bonnie Craig MD   Hosp Day # 3 PCP No primary care provider on file.       Reason for Consultation:  Dental abscess    ASSESSMENT:    Antibiotics:   Unasyn    # Concern for dental abscess  # Recent shingles of V2/V3.  # Facial pain likely postherpetic neuralgia  # Medication related osteonecrosis of the jaw  # Multiple myeloma on immunotherapy      PLAN:    -Continue Unasyn for now  -Will plan to give a short course of p.o. Augmentin on discharge out of precaution  -Continue to monitor clinically  -Follow fever curve, wbc  -Reviewed labs, micro, imaging reports, available old records  -Discussed with primary attending and RN    History of Present Illness:  Joselin Graham is a a(n) 75 year old male known to myself admitted from  through  with facial cellulitis after a dental extraction.  During his hospitalization, he developed a vesicular rash in the V2/V3 distribution was diagnosed with shingles.  He completed 2 weeks of IV antibiotics as well as p.o. Valtrex.    He was seen in the outpatient setting by oral surgery and was sent to the emergency department to rule out a dental abscess as the patient was having facial pain.      In the ED, patient was afebrile hemodynamically stable.  WBC 2.6, hemoglobin 10.1, platelets 264, AST 47, ALT 90.  CT of the facial bones demonstrated postprocedural changes with extraction of the left mandibular 1st and 2nd premolars with a peripheral enhancing fluid collection adjacent to the sockets concerning for 3.1 cm dental abscess.  Lytic lesions throughout the osseous structures likely secondary to multiple myeloma.    Patient seen by Oral surgery.    ID consulted for assistance with antibiotic management.     History:  Past Medical History[1]  Past Surgical History[2]  Family  History[3]   reports that he has never smoked. He has never used smokeless tobacco. He reports that he does not drink alcohol and does not use drugs.    Allergies:  Allergies[4]    Medications:  Current Hospital Medications[5]      Physical Exam:  Vital signs: Blood pressure 134/76, pulse 73, temperature 97.2 °F (36.2 °C), temperature source Oral, resp. rate 18, weight 122 lb 3.2 oz (55.4 kg), SpO2 100%.    Physical Exam  HENT:      Head:      Comments: No obvious fluctuance appreciated in the mouth or exterior mandible     Mouth/Throat:      Pharynx: No oropharyngeal exudate.   Pulmonary:      Effort: No respiratory distress.   Abdominal:      Palpations: Abdomen is soft.      Tenderness: There is no abdominal tenderness.   Neurological:      Mental Status: He is alert.         Laboratory Data: Reviewed in EMR    Microbiology: Reviewed in EMR    Radiology: Reviewed    Thank you for allowing us to participate in the care of this patient. Please do not hesitate to call if you have any questions.     We will continue to follow with you and will make further recommendations based on patient's progress.    Rishabh Coy MD   [facility]  [phone]  6/9/2025         [1]   Past Medical History:   Abnormal gait    Benign essential hypertension    Cancer (HCC)    Chest pain    Formatting of this note might be different from the original.   Normal stress test 11/2021      Last Assessment & Plan:    Formatting of this note might be different from the original.   Symptoms are atypical for ischemia   Chest pain is worse with change in position      Diabetes (HCC)    Diabetes mellitus (HCC)    Diabetes mellitus (HCC)    High blood pressure    Hyperglycemia    Hypertension associated with diabetes (HCC)    Last Assessment & Plan:    Formatting of this note might be different from the original.   Blood pressure mildly elevated   Continue same medications for now   Continue to monitor readings       Multiple myeloma (HCC)    Multiple premature ventricular complexes    Formatting of this note might be different from the original.   Normal stress test 11/2021      Echo 8/2021:    1. Left ventricular ejection fraction, by visual estimation, is 60 to 65%.    2. Mild concentric left ventricular hypertrophy.    3. Normal pattern of LV diastolic filling.    4. Mild aortic valve stenosis.         Last Assessment & Plan:    Formatting of this note might be different fro    Neuropathy    Nonrheumatic aortic valve stenosis    Formatting of this note might be different from the original.   Echo 8/2021:    1. Left ventricular ejection fraction, by visual estimation, is 60 to 65%.    2. Mild concentric left ventricular hypertrophy.    3. Normal pattern of LV diastolic filling.    4. Mild aortic valve stenosis.         Last Assessment & Plan:    Formatting of this note might be different from the original.   Mild aortic mu    Rash    Type 2 diabetes mellitus without complication, without long-term current use of insulin (HCC)    Vomiting   [2] History reviewed. No pertinent surgical history.  [3] History reviewed. No pertinent family history.  [4]   Allergies  Allergen Reactions    Pork UNKNOWN   [5]   Current Facility-Administered Medications:     HYDROcodone-acetaminophen (Norco) 5-325 MG per tab 1 tablet, 1 tablet, Oral, Q6H PRN    glucose (Dex4) 15 GM/59ML oral liquid 15 g, 15 g, Oral, Q15 Min PRN **OR** glucose (Glutose) 40% oral gel 15 g, 15 g, Oral, Q15 Min PRN **OR** glucose-vitamin C (Dex-4) chewable tab 4 tablet, 4 tablet, Oral, Q15 Min PRN **OR** dextrose 50% injection 50 mL, 50 mL, Intravenous, Q15 Min PRN **OR** glucose (Dex4) 15 GM/59ML oral liquid 30 g, 30 g, Oral, Q15 Min PRN **OR** glucose (Glutose) 40% oral gel 30 g, 30 g, Oral, Q15 Min PRN **OR** glucose-vitamin C (Dex-4) chewable tab 8 tablet, 8 tablet, Oral, Q15 Min PRN    ampicillin-sulbactam (Unasyn) 3 g in sodium chloride 0.9% 100mL IVPB-TABBY, 3 g,  Intravenous, q6h    heparin (Porcine) 5000 UNIT/ML injection 5,000 Units, 5,000 Units, Subcutaneous, Q8H ISHA    acetaminophen (Tylenol Extra Strength) tab 500 mg, 500 mg, Oral, Q4H PRN    acetaminophen (Tylenol) tab 650 mg, 650 mg, Oral, Q4H PRN **OR** [DISCONTINUED] HYDROcodone-acetaminophen (Norco) 5-325 MG per tab 1 tablet, 1 tablet, Oral, Q4H PRN **OR** HYDROcodone-acetaminophen (Norco) 5-325 MG per tab 2 tablet, 2 tablet, Oral, Q4H PRN    morphINE PF 2 MG/ML injection 1 mg, 1 mg, Intravenous, Q2H PRN **OR** morphINE PF 2 MG/ML injection 2 mg, 2 mg, Intravenous, Q2H PRN **OR** morphINE PF 4 MG/ML injection 4 mg, 4 mg, Intravenous, Q2H PRN    insulin aspart (NovoLOG) 100 Units/mL FlexPen 1-7 Units, 1-7 Units, Subcutaneous, TID CC and HS    amLODIPine (Norvasc) tab 10 mg, 10 mg, Oral, Daily    chlorhexidine gluconate (Peridex) 0.12 % oral solution 15 mL, 15 mL, Mouth/Throat, BID    DULoxetine (Cymbalta) DR cap 60 mg, 60 mg, Oral, Daily    levothyroxine (Synthroid) tab 25 mcg, 25 mcg, Oral, Before breakfast    losartan (Cozaar) tab 50 mg, 50 mg, Oral, Daily    memantine (Namenda) tab 10 mg, 10 mg, Oral, BID    potassium chloride (Klor-Con M20) tab 20 mEq, 20 mEq, Oral, Daily    dextrose 5%-sodium chloride 0.9% infusion, , Intravenous, Continuous

## 2025-06-10 LAB
GLUCOSE BLDC GLUCOMTR-MCNC: 148 MG/DL (ref 70–99)
GLUCOSE BLDC GLUCOMTR-MCNC: 180 MG/DL (ref 70–99)
GLUCOSE BLDC GLUCOMTR-MCNC: 183 MG/DL (ref 70–99)
GLUCOSE BLDC GLUCOMTR-MCNC: 208 MG/DL (ref 70–99)

## 2025-06-10 PROCEDURE — 99232 SBSQ HOSP IP/OBS MODERATE 35: CPT | Performed by: HOSPITALIST

## 2025-06-10 RX ORDER — HALOPERIDOL 5 MG/1
5 TABLET ORAL EVERY 6 HOURS PRN
Status: DISCONTINUED | OUTPATIENT
Start: 2025-06-10 | End: 2025-06-11

## 2025-06-10 RX ORDER — HYDROCODONE BITARTRATE AND ACETAMINOPHEN 7.5; 325 MG/1; MG/1
1-2 TABLET ORAL EVERY 6 HOURS PRN
Qty: 28 TABLET | Refills: 0 | Status: SHIPPED | OUTPATIENT
Start: 2025-06-10

## 2025-06-10 RX ORDER — CHLORHEXIDINE GLUCONATE ORAL RINSE 1.2 MG/ML
15 SOLUTION DENTAL 2 TIMES DAILY
Qty: 420 ML | Refills: 0 | Status: SHIPPED | OUTPATIENT
Start: 2025-06-10 | End: 2025-06-24

## 2025-06-10 RX ORDER — HALOPERIDOL 5 MG/ML
5 INJECTION INTRAMUSCULAR EVERY 6 HOURS PRN
Status: DISCONTINUED | OUTPATIENT
Start: 2025-06-10 | End: 2025-06-11

## 2025-06-10 NOTE — PLAN OF CARE
Problem: Patient Centered Care  Goal: Patient preferences are identified and integrated in the patient's plan of care  Description: Interventions:  - What would you like us to know as we care for you? My jaw hurts  - Provide timely, complete, and accurate information to patient/family  - Incorporate patient and family knowledge, values, beliefs, and cultural backgrounds into the planning and delivery of care  - Encourage patient/family to participate in care and decision-making at the level they choose  - Honor patient and family perspectives and choices  Outcome: Progressing     Problem: Patient/Family Goals  Goal: Patient/Family Long Term Goal  Description: Patient's Long Term Goal: to return home    Interventions:  - See additional Care Plan goals for specific interventions  Outcome: Progressing  Goal: Patient/Family Short Term Goal  Description: Patient's Short Term Goal: to control my pain    Interventions:   - See additional Care Plan goals for specific interventions  Outcome: Progressing     Problem: PAIN - ADULT  Goal: Verbalizes/displays adequate comfort level or patient's stated pain goal  Description: INTERVENTIONS:  - Encourage pt to monitor pain and request assistance  - Assess pain using appropriate pain scale  - Administer analgesics based on type and severity of pain and evaluate response  - Implement non-pharmacological measures as appropriate and evaluate response  - Consider cultural and social influences on pain and pain management  - Manage/alleviate anxiety  - Utilize distraction and/or relaxation techniques  - Monitor for opioid side effects  - Notify MD/LIP if interventions unsuccessful or patient reports new pain  - Anticipate increased pain with activity and pre-medicate as appropriate  Outcome: Progressing     Problem: RISK FOR INFECTION - ADULT  Goal: Absence of fever/infection during anticipated neutropenic period  Description: INTERVENTIONS  - Monitor WBC  - Administer growth factors  as ordered  - Implement neutropenic guidelines  Outcome: Progressing     Problem: SAFETY ADULT - FALL  Goal: Free from fall injury  Description: INTERVENTIONS:  - Assess pt frequently for physical needs  - Identify cognitive and physical deficits and behaviors that affect risk of falls.  - [place] fall precautions as indicated by assessment.  - Educate pt/family on patient safety including physical limitations  - Instruct pt to call for assistance with activity based on assessment  - Modify environment to reduce risk of injury  - Provide assistive devices as appropriate  - Consider OT/PT consult to assist with strengthening/mobility  - Encourage toileting schedule  Outcome: Progressing     Problem: DISCHARGE PLANNING  Goal: Discharge to home or other facility with appropriate resources  Description: INTERVENTIONS:  - Identify barriers to discharge w/pt and caregiver  - Include patient/family/discharge partner in discharge planning  - Arrange for needed discharge resources and transportation as appropriate  - Identify discharge learning needs (meds, wound care, etc)  - Arrange for interpreters to assist at discharge as needed  - Consider post-discharge preferences of patient/family/discharge partner  - Complete POLST form as appropriate  - Assess patient's ability to be responsible for managing their own health  - Refer to Case Management Department for coordinating discharge planning if the patient needs post-hospital services based on physician/LIP order or complex needs related to functional status, cognitive ability or social support system  Outcome: Progressing     Problem: Altered Communication/Language Barrier  Goal: Patient/Family is able to understand and participate in their care  Description: Interventions:  - Assess communication ability and preferred communication style  - Implement communication aides and strategies  - Use visual cues when possible  - Listen attentively, be patient, do not  interrupt  - Minimize distractions  - Allow time for understanding and response  - Establish method for patient to ask for assistance (call light)  - Provide an  as needed  - Communicate barriers and strategies to overcome with those who interact with patient  Outcome: Progressing     Pt a&ox2, easily forgetful/confused and needs frequent reorientation. Norco as needed for pain control. Tolerating diet, good appetite. IVF infusing. Unasyn as abx coverage. Jean in place. Continuing to have loose bowel movements. Plans to discharge on po abx per ID. Call light within reach, frequent rounding. All safety precautions put in place.

## 2025-06-10 NOTE — PLAN OF CARE
A&Ox1-2. Tylenol given for pain overnight. IVF and IV abt. Pureed, thin liquid diet. Pills given whole with applesauce. Pt tolerating diet, no complaints of n/v. Jean in place and care provided. Up x1 assist, stand-pivot to rolling chair. Safety measures in place, call light within reach.     Problem: Patient Centered Care  Goal: Patient preferences are identified and integrated in the patient's plan of care  Description: Interventions:  - What would you like us to know as we care for you? My jaw hurts  - Provide timely, complete, and accurate information to patient/family  - Incorporate patient and family knowledge, values, beliefs, and cultural backgrounds into the planning and delivery of care  - Encourage patient/family to participate in care and decision-making at the level they choose  - Honor patient and family perspectives and choices  Outcome: Progressing     Problem: Patient/Family Goals  Goal: Patient/Family Long Term Goal  Description: Patient's Long Term Goal: to return home    Interventions:  - See additional Care Plan goals for specific interventions  Outcome: Progressing  Goal: Patient/Family Short Term Goal  Description: Patient's Short Term Goal: to control my pain    Interventions:   - See additional Care Plan goals for specific interventions  Outcome: Progressing     Problem: PAIN - ADULT  Goal: Verbalizes/displays adequate comfort level or patient's stated pain goal  Description: INTERVENTIONS:  - Encourage pt to monitor pain and request assistance  - Assess pain using appropriate pain scale  - Administer analgesics based on type and severity of pain and evaluate response  - Implement non-pharmacological measures as appropriate and evaluate response  - Consider cultural and social influences on pain and pain management  - Manage/alleviate anxiety  - Utilize distraction and/or relaxation techniques  - Monitor for opioid side effects  - Notify MD/LIP if interventions unsuccessful or patient  reports new pain  - Anticipate increased pain with activity and pre-medicate as appropriate  Outcome: Progressing     Problem: RISK FOR INFECTION - ADULT  Goal: Absence of fever/infection during anticipated neutropenic period  Description: INTERVENTIONS  - Monitor WBC  - Administer growth factors as ordered  - Implement neutropenic guidelines  Outcome: Progressing     Problem: SAFETY ADULT - FALL  Goal: Free from fall injury  Description: INTERVENTIONS:  - Assess pt frequently for physical needs  - Identify cognitive and physical deficits and behaviors that affect risk of falls.  - [place] fall precautions as indicated by assessment.  - Educate pt/family on patient safety including physical limitations  - Instruct pt to call for assistance with activity based on assessment  - Modify environment to reduce risk of injury  - Provide assistive devices as appropriate  - Consider OT/PT consult to assist with strengthening/mobility  - Encourage toileting schedule  Outcome: Progressing     Problem: DISCHARGE PLANNING  Goal: Discharge to home or other facility with appropriate resources  Description: INTERVENTIONS:  - Identify barriers to discharge w/pt and caregiver  - Include patient/family/discharge partner in discharge planning  - Arrange for needed discharge resources and transportation as appropriate  - Identify discharge learning needs (meds, wound care, etc)  - Arrange for interpreters to assist at discharge as needed  - Consider post-discharge preferences of patient/family/discharge partner  - Complete POLST form as appropriate  - Assess patient's ability to be responsible for managing their own health  - Refer to Case Management Department for coordinating discharge planning if the patient needs post-hospital services based on physician/LIP order or complex needs related to functional status, cognitive ability or social support system  Outcome: Progressing     Problem: Altered Communication/Language  Barrier  Goal: Patient/Family is able to understand and participate in their care  Description: Interventions:  - Assess communication ability and preferred communication style  - Implement communication aides and strategies  - Use visual cues when possible  - Listen attentively, be patient, do not interrupt  - Minimize distractions  - Allow time for understanding and response  - Establish method for patient to ask for assistance (call light)  - Provide an  as needed  - Communicate barriers and strategies to overcome with those who interact with patient  Outcome: Progressing

## 2025-06-10 NOTE — PROGRESS NOTES
[facility]  part of [facility]    Progress Note    Joselin Graham Patient Status:  Inpatient    5/15/1950 MRN [id number]   Location [facility] 4W/SW/SE Attending Bonnie Craig MD   Hosp Day # 4 PCP No primary care provider on file.       Subjective:   Joselin Graham is a(n) 75 year old male who feels ok. No fever. Is able to tolerate diet.    Objective:   Blood pressure 132/77, pulse 73, temperature 98.2 °F (36.8 °C), temperature source Oral, resp. rate 18, weight 122 lb 3.2 oz (55.4 kg), SpO2 100%.    Physical Exam:    General: No acute distress.   Respiratory: Clear to auscultation bilaterally. No wheezes. No rhonchi.  Cardiovascular: S1, S2. Regular rate and rhythm. No murmurs, rubs or gallops.   Abdomen: Soft, nontender, nondistended.  Positive bowel sounds. No rebound or guarding.  Neurologic: No focal neurological deficits.   Musculoskeletal: Moves all extremities.  Extremities: No edema.    Results:     Lab Results   Component Value Date    WBC 2.0 (L) 2025    HGB 8.8 (L) 2025    HCT 26.8 (L) 2025    .0 2025    CREATSERUM 1.09 2025    BUN 11 2025     2025    K 3.7 2025     2025    CO2 24.0 2025     (H) 2025    CA 8.3 (L) 2025    ALB 4.2 2025    ALKPHO 92 2025    BILT 0.5 2025    TP 7.5 2025    AST 47 (H) 2025    ALT 90 (H) 2025    T4F 1.1 2024    TSH 0.847 2025    MG 1.9 2025    PHOS 3.0 2025    B12 415 2024       No results found.          Scheduled Medications[1]      Assessment and Plan:       Dental abscess  - Per ED physician, discussed with oral surgery  - IV antibiotics with Leonasyn  - Dr. Ha reccs appreciated  - will need to eventually be seen at [place] for mandible resection once medically stable  - might need rehab- PT/OT eval   - now on general diet   - added norco for pain   - discharge to rehab at the  [facility] once voiding trial is done.    Diarrhea  Cdiff pending   Resolving     Hypoglycemia  - Added D5 in fluids  - advance diet as tolerated      Hyperkalemia  Mild hyponatremia  - Recheck and treat accordingly     DM2  - Insulin- hold due to hypoglycemia - stop tresiba   - Carb controlled diet     Leukopenia, chronic  Anemia  History of multiple myeloma  - Monitor  - Resume home meds     HTN  Dementia  - Resume home meds when reconciled    Acute urinary retention  - placed amador 6/9  - remove amador today- voiding trial     Quality:  DVT Prophylaxis: Heparin  CODE status: Full code    Kindred HealthcareShantelle Craig MD             [1]    ampicillin-sulbactam  3 g Intravenous q6h    heparin  5,000 Units Subcutaneous Q8H ISHA    insulin aspart  1-7 Units Subcutaneous TID CC and HS    amLODIPine  10 mg Oral Daily    chlorhexidine gluconate  15 mL Mouth/Throat BID    DULoxetine  60 mg Oral Daily    levothyroxine  25 mcg Oral Before breakfast    losartan  50 mg Oral Daily    memantine  10 mg Oral BID    potassium chloride  20 mEq Oral Daily

## 2025-06-10 NOTE — DISCHARGE SUMMARY
[facility]  part of [facility]    Discharge Summary    Joselin Grhaam Patient Status:  Inpatient    5/15/1950 MRN [id number]   Location [facility] 4W/SW/SE Attending Bonnie Craig MD   Hosp Day # 4 PCP No primary care provider on file.     Date of Admission: 2025   Date of Discharge: 6/10/2025    Hospital Discharge Diagnoses: Acute Dental Abscess    Lace+ Score: 65  59-90 High Risk  29-58 Medium Risk  0-28   Low Risk.    TCM Follow-Up Recommendation:  LACE > 58: High Risk of readmission after discharge from the hospital.        Admitting Diagnosis: Dental abscess [K04.7]    Disposition: Home    Discharge Diagnosis: .Principal Problem:    Dental abscess      Hospital Course:   Reason for Admission:   Per Nocturnist  Joselin Graham is a 75 year old male with history of HTN, DM, MM, dementia who was sent to the ED from oral surgeons office for evaluation of dental abscess, pain control.  Dental pain started about a week ago.  Per chart/ED review, dental exam showed exposed bone.  No fever or chills.  He denies chest pain or shortness of breath.  No difficulty swallowing or eating.     Vital stable  Labs with sodium 133, potassium 5.9, WBC 2.6, hemoglobin 10.1  CT facial bones shows dental abscess per ED report.  Official radiology read pending.  EKG with normal sinus rhythm, left axis deviation, anteroseptal infarct.     Chart reviewed:  Patient hospitalized 2025 to 2025 for left face cellulitis, initially attributed to dental extraction.  Patient subsequently developed vesicular rashes in the V2/V3 distribution and was diagnosed with herpes zoster.  MRI showed possible otomastoiditis and he was discharged with 2 weeks of IV antibiotics.  He was seen at outside ED 2025 for left-sided facial and ear pain.  Noted with excoriations from picking his ear.    Discharge Physical Exam:   Physical Exam:    General: No acute distress.   Respiratory: Clear to auscultation bilaterally.  No wheezes. No rhonchi.  Cardiovascular: S1, S2. Regular rate and rhythm. No murmurs, rubs or gallops.   Abdomen: Soft, nontender, nondistended.  Positive bowel sounds. No rebound or guarding.  Neurologic: No focal neurological deficits.   Musculoskeletal: Moves all extremities.    Hospital Course:     Dental abscess  - Per ED physician, discussed with oral surgery  - IV antibiotics with Unasyn- switch to oral antibiotics per ID   - Dr. Ha reccs appreciated  - will need to eventually be seen at [place] for mandible resection once medically stable  - might need rehab- PT/OT eval   - now on general diet   - added norco for pain   - discharge to rehab at [facility] once voiding trial is done.     Diarrhea   Resolving      Hypoglycemia  - Added D5 in fluids  - advance diet as tolerated      Hyperkalemia  Mild hyponatremia  - Recheck and treat accordingly     DM2  - Insulin- hold due to hypoglycemia - stop tresiba   - Carb controlled diet     Leukopenia, chronic  Anemia  History of multiple myeloma  - Monitor  - Resume home meds     HTN  Dementia  - Resume home meds when reconciled     Acute urinary retention  - placed amador 6/9  - remove amador today- voiding trial- patient failed  - will place back in and patient will continue amador management at Rehab      Quality:  DVT Prophylaxis: Heparin  CODE status: Full code     MDM. High     Complications: none    Consultants         Provider   Role Specialty     Darrell Moore MD      Consulting Physician INFECTIOUS DISEASES     Loretta Norton MD      Consulting Physician Hematology and Oncology     Trevor Ha DDS      Consulting Physician SURGERY, ORAL & MAXILLOFACIAL                Discharge Plan:   Discharge Condition: Stable    Current Discharge Medication List        New Orders    Details   amoxicillin clavulanate 875-125 MG Oral Tab Take 1 tablet by mouth 2 (two) times daily for 5 days.           Home Meds - Modified    Details   chlorhexidine gluconate  0.12 % Mouth/Throat Solution Use as directed 15 mL in the mouth or throat 2 (two) times daily for 14 days.           Home Meds - Unchanged    Details   acetaminophen 325 MG Oral Tab Take 2 tablets (650 mg total) by mouth every 4 (four) hours as needed.      Lenalidomide 5 MG Oral Cap Take 5 mg by mouth daily.      potassium chloride 20 MEQ Oral Tab CR Take 1 tablet (20 mEq total) by mouth daily.      amLODIPine 10 MG Oral Tab Take 1 tablet (10 mg total) by mouth daily.      DULoxetine 60 MG Oral Cap DR Particles Take 1 capsule (60 mg total) by mouth in the morning.      Empagliflozin (JARDIANCE) 25 MG Oral Tab Take 1 tablet (25 mg total) by mouth in the morning.      levothyroxine 25 MCG Oral Tab Take 1 tablet (25 mcg total) by mouth before breakfast.      memantine 10 MG Oral Tab Take 1 tablet (10 mg total) by mouth 2 (two) times daily.      insulin degludec 100 units/mL Subcutaneous Solution Pen-injector Inject 15 Units into the skin nightly.      insulin aspart 100 Units/mL Subcutaneous Solution Pen-injector Inject 6 Units into the skin 3 (three) times daily with meals.      Insulin Pen Needle 32G X 4 MM Does not apply Misc May substitute if not on insurance formulary      losartan 50 MG Oral Tab Take 1 tablet (50 mg total) by mouth in the morning.                 Discharge Diet: As tolerated and Diabetic diet    Discharge Activity: As tolerated       Discharge Medications        START taking these medications        Instructions Prescription details   amoxicillin clavulanate 875-125 MG Tabs  Commonly known as: Augmentin      Take 1 tablet by mouth 2 (two) times daily for 5 days.   Stop taking on: Elizabeth 15, 2025  Quantity: 10 tablet  Refills: 0            CONTINUE taking these medications        Instructions Prescription details   acetaminophen 325 MG Tabs  Commonly known as: Tylenol      Take 2 tablets (650 mg total) by mouth every 4 (four) hours as needed.   Refills: 0     amLODIPine 10 MG Tabs  Commonly known  as: Norvasc      Take 1 tablet (10 mg total) by mouth daily.   Refills: 0     chlorhexidine gluconate 0.12 % Soln  Commonly known as: Peridex      Use as directed 15 mL in the mouth or throat 2 (two) times daily for 14 days.   Stop taking on: June 24, 2025  Quantity: 420 mL  Refills: 0     DULoxetine 60 MG Cpep  Commonly known as: Cymbalta      Take 1 capsule (60 mg total) by mouth in the morning.   Refills: 0     insulin aspart 100 Units/mL Sopn  Commonly known as: NovoLOG      Inject 6 Units into the skin 3 (three) times daily with meals.   Quantity: 3 mL  Refills: 0     insulin degludec 100 units/mL Sopn  Commonly known as: Tresiba      Inject 15 Units into the skin nightly.   Quantity: 3 mL  Refills: 0     Insulin Pen Needle 32G X 4 MM Misc      May substitute if not on insurance formulary   Quantity: 100 each  Refills: 5     Jardiance 25 MG Tabs  Generic drug: empagliflozin      Take 1 tablet (25 mg total) by mouth in the morning.   Refills: 0     Lenalidomide 5 MG Caps      Take 5 mg by mouth daily.   Refills: 0     levothyroxine 25 MCG Tabs  Commonly known as: Synthroid      Take 1 tablet (25 mcg total) by mouth before breakfast.   Refills: 0     losartan 50 MG Tabs  Commonly known as: Cozaar      Take 1 tablet (50 mg total) by mouth in the morning.   Refills: 0     memantine 10 MG Tabs  Commonly known as: Namenda      Take 1 tablet (10 mg total) by mouth 2 (two) times daily.   Refills: 0     potassium chloride 20 MEQ Tbcr  Commonly known as: Klor-Con M20      Take 1 tablet (20 mEq total) by mouth daily.   Refills: 0            STOP taking these medications      ciprofloxacin-dexamethasone 0.3-0.1 % Susp  Commonly known as: Ciprodex        sodium chloride 0.9% SOLN 100 mL with meropenem 500 MG SOLR 500 mg        valACYclovir 1 G Tabs  Commonly known as: Valtrex                  Where to Get Your Medications        These medications [id number] [place] [facility] [address] AT [facility], [phone], [phone]  [address] [facility] [address]      Phone: [phone]   chlorhexidine gluconate 0.12 % Soln       Please  your prescriptions at the location directed by your doctor or nurse    Bring a paper prescription for each of these medications  amoxicillin clavulanate 875-125 MG Tabs         Follow up:       Follow up Labs and imaging:         Other Discharge Instructions:         [facility] They offer a sliding scale/income based program. It will be a student that performs the surgery      Time spent:  > 30 minutes    BRADLEY SMITH MD  6/10/2025

## 2025-06-10 NOTE — PLAN OF CARE
Problem: Patient Centered Care  Goal: Patient preferences are identified and integrated in the patient's plan of care  Description: Interventions:  - What would you like us to know as we care for you? My jaw hurts  - Provide timely, complete, and accurate information to patient/family  - Incorporate patient and family knowledge, values, beliefs, and cultural backgrounds into the planning and delivery of care  - Encourage patient/family to participate in care and decision-making at the level they choose  - Honor patient and family perspectives and choices  Outcome: Progressing     Problem: Patient/Family Goals  Goal: Patient/Family Long Term Goal  Description: Patient's Long Term Goal: to return home    Interventions:  - See additional Care Plan goals for specific interventions  Outcome: Progressing  Goal: Patient/Family Short Term Goal  Description: Patient's Short Term Goal: to control my pain    Interventions:   - See additional Care Plan goals for specific interventions  Outcome: Progressing     Problem: PAIN - ADULT  Goal: Verbalizes/displays adequate comfort level or patient's stated pain goal  Description: INTERVENTIONS:  - Encourage pt to monitor pain and request assistance  - Assess pain using appropriate pain scale  - Administer analgesics based on type and severity of pain and evaluate response  - Implement non-pharmacological measures as appropriate and evaluate response  - Consider cultural and social influences on pain and pain management  - Manage/alleviate anxiety  - Utilize distraction and/or relaxation techniques  - Monitor for opioid side effects  - Notify MD/LIP if interventions unsuccessful or patient reports new pain  - Anticipate increased pain with activity and pre-medicate as appropriate  Outcome: Progressing     Problem: RISK FOR INFECTION - ADULT  Goal: Absence of fever/infection during anticipated neutropenic period  Description: INTERVENTIONS  - Monitor WBC  - Administer growth factors  as ordered  - Implement neutropenic guidelines  Outcome: Progressing     Problem: SAFETY ADULT - FALL  Goal: Free from fall injury  Description: INTERVENTIONS:  - Assess pt frequently for physical needs  - Identify cognitive and physical deficits and behaviors that affect risk of falls.  - Jamison fall precautions as indicated by assessment.  - Educate pt/family on patient safety including physical limitations  - Instruct pt to call for assistance with activity based on assessment  - Modify environment to reduce risk of injury  - Provide assistive devices as appropriate  - Consider OT/PT consult to assist with strengthening/mobility  - Encourage toileting schedule  Outcome: Progressing     Problem: DISCHARGE PLANNING  Goal: Discharge to home or other facility with appropriate resources  Description: INTERVENTIONS:  - Identify barriers to discharge w/pt and caregiver  - Include patient/family/discharge partner in discharge planning  - Arrange for needed discharge resources and transportation as appropriate  - Identify discharge learning needs (meds, wound care, etc)  - Arrange for interpreters to assist at discharge as needed  - Consider post-discharge preferences of patient/family/discharge partner  - Complete POLST form as appropriate  - Assess patient's ability to be responsible for managing their own health  - Refer to Case Management Department for coordinating discharge planning if the patient needs post-hospital services based on physician/LIP order or complex needs related to functional status, cognitive ability or social support system  Outcome: Progressing     Problem: Altered Communication/Language Barrier  Goal: Patient/Family is able to understand and participate in their care  Description: Interventions:  - Assess communication ability and preferred communication style  - Implement communication aides and strategies  - Use visual cues when possible  - Listen attentively, be patient, do not  interrupt  - Minimize distractions  - Allow time for understanding and response  - Establish method for patient to ask for assistance (call light)  - Provide an  as needed  - Communicate barriers and strategies to overcome with those who interact with patient  Outcome: Progressing

## 2025-06-10 NOTE — PROGRESS NOTES
Provider Clarification     Additional information on the patient's nutritional status.    Moderate Malnutrition    This note is part of the patient's medical record.

## 2025-06-10 NOTE — PAYOR COMM NOTE
--------------  CONTINUED STAY REVIEW FOR 6/9      Payor: [employer]  Subscriber #:  [id number]  Authorization Number: [id number]    Admit date: 6/6/25  Admit time:  1:05 AM    REVIEW DOCUMENTATION:          Date of Service: 6/9/2025 11:12 AM     Signed         [facility]  part of [facility]     Progress Note        Subjective:   Joselin Graham is a(n) 75 year old male who is sitting up in bed. Feeling ok.      Objective:   Blood pressure 143/84, pulse 79, temperature 97.3 °F (36.3 °C), temperature source Temporal, resp. rate 18, weight 122 lb 3.2 oz (55.4 kg), SpO2 100%.     Physical Exam:    General: No acute distress.   Respiratory: Clear to auscultation bilaterally. No wheezes. No rhonchi.  Cardiovascular: S1, S2. Regular rate and rhythm. No murmurs, rubs or gallops.   Abdomen: Soft, nontender, nondistended.  Positive bowel sounds. No rebound or guarding.  Neurologic: No focal neurological deficits.   Musculoskeletal: Moves all extremities.  Extremities: No edema.     Results:            Lab Results   Component Value Date     WBC 2.0 (L) 06/08/2025     HGB 8.8 (L) 06/08/2025     HCT 26.8 (L) 06/08/2025     .0 06/08/2025     CREATSERUM 1.09 06/08/2025     BUN 11 06/08/2025      06/08/2025     K 3.7 06/09/2025      06/08/2025     CO2 24.0 06/08/2025      (H) 06/08/2025     CA 8.3 (L) 06/08/2025     ALB 4.2 06/05/2025     ALKPHO 92 06/05/2025     BILT 0.5 06/05/2025     TP 7.5 06/05/2025     AST 47 (H) 06/05/2025     ALT 90 (H) 06/05/2025     T4F 1.1 07/28/2024     TSH 0.847 04/29/2025     MG 1.9 05/04/2025     PHOS 3.0 05/04/2025     B12 415 07/28/2024         No results found.            [Scheduled Medications]    [Scheduled Medications]   ampicillin-sulbactam  3 g Intravenous q6h    heparin  5,000 Units Subcutaneous Q8H ISHA    insulin aspart  1-7 Units Subcutaneous TID CC and HS    amLODIPine  10 mg Oral Daily    chlorhexidine gluconate  15 mL Mouth/Throat BID     DULoxetine  60 mg Oral Daily    levothyroxine  25 mcg Oral Before breakfast    losartan  50 mg Oral Daily    memantine  10 mg Oral BID    potassium chloride  20 mEq Oral Daily           Assessment and Plan:       Dental abscess  - Per ED physician, discussed with oral surgery  - IV antibiotics with Unasyn  - Dr. Ha reccs appreciated  - will need to eventually be seen at [place] for mandible resection once medically stable  - might need rehab- PT/OT eval   - added norco for pain      Diarrhea  Cdiff pending   Resolving      Hypoglycemia  - Added D5 in fluids  - advance diet as tolerated      Hyperkalemia  Mild hyponatremia  - Recheck and treat accordingly     DM2  - Insulin- hold due to hypoglycemia - stop tresiba   - Carb controlled diet     Leukopenia, chronic  Anemia  History of multiple myeloma  - Monitor  - Resume home meds     HTN  Dementia  - Resume home meds when reconciled      Bonnie Craig MD                                MEDICATIONS ADMINISTERED IN LAST 1 DAY:  acetaminophen (Tylenol) tab 650 mg       Date Action Dose Route User    6/10/2025 0417 Given 650 mg Oral Annabel Beebe RN          amLODIPine (Norvasc) tab 10 mg       Date Action Dose Route User    6/10/2025 1018 Given 10 mg Oral Alok Rock RN          ampicillin-sulbactam (Unasyn) 3 g in sodium chloride 0.9% 100mL IVPB-TABBY       Date Action Dose Route User    6/10/2025 0603 New Bag 3 g Intravenous Annabel Beebe RN    6/10/2025 0005 New Bag 3 g Intravenous Annabel Beebe RN    6/9/2025 1911 New Bag 3 g Intravenous Neyda Carvajal RN          chlorhexidine gluconate (Peridex) 0.12 % oral solution 15 mL       Date Action Dose Route User    6/10/2025 1018 Given 15 mL Mouth/Throat Alok Rock RN    6/9/2025 2059 Given 15 mL Mouth/Throat Annabel Beebe RN          dextrose 5%-sodium chloride 0.9% infusion       Date Action Dose Route User    6/10/2025 0223 New Bag (none) Intravenous Annabel Beebe RN          DULoxetine (Cymbalta) DR cap 60 mg        Date Action Dose Route User    6/10/2025 1018 Given 60 mg Oral Alok Rock RN          heparin (Porcine) 5000 UNIT/ML injection 5,000 Units       Date Action Dose Route User    6/10/2025 0603 Given 5,000 Units Subcutaneous (Right Upper Arm) Annabel Beebe RN    6/9/2025 2100 Given 5,000 Units Subcutaneous (Left Upper Arm) Annabel Beebe RN          HYDROcodone-acetaminophen (Norco) 5-325 MG per tab 1 tablet       Date Action Dose Route User    6/9/2025 1614 Given 1 tablet Oral Leticia Avery RN          insulin aspart (NovoLOG) 100 Units/mL FlexPen 1-7 Units       Date Action Dose Route User    6/9/2025 2057 Given 2 Units Subcutaneous (Right Upper Arm) Annabel Beebe RN    6/9/2025 1911 Given 2 Units Subcutaneous (Right Upper Arm) Neyda Carvajal RN          levothyroxine (Synthroid) tab 25 mcg       Date Action Dose Route User    6/10/2025 0602 Given 25 mcg Oral Annabel Beebe RN          losartan (Cozaar) tab 50 mg       Date Action Dose Route User    6/10/2025 1018 Given 50 mg Oral Alok Rock RN          memantine (Namenda) tab 10 mg       Date Action Dose Route User    6/10/2025 1102 Given 10 mg Oral Alok Rock RN    6/9/2025 2059 Given 10 mg Oral Annabel Beebe RN          morphINE PF 2 MG/ML injection 2 mg       Date Action Dose Route User    6/10/2025 0809 Given 2 mg Intravenous Alok Rock RN          potassium chloride (Klor-Con M20) tab 20 mEq       Date Action Dose Route User    6/10/2025 1018 Given 20 mEq Oral Alok Rock RN            Vitals (last day)       Date/Time Temp Pulse Resp BP SpO2 Weight O2 Device O2 Flow Rate (L/min) Who    06/10/25 1202 98.3 °F (36.8 °C) 86 18 152/79 100 % -- None (Room air) -- KA    06/10/25 1016 98.2 °F (36.8 °C) 73 18 132/77 100 % -- None (Room air) -- ESTUARDO    06/10/25 0413 98.1 °F (36.7 °C) 81 16 147/83 99 % -- None (Room air) -- KJ    06/09/25 2043 97.8 °F (36.6 °C) 77 18 132/70 100 % -- None (Room air) -- KJ    06/09/25 1208 97.2 °F (36.2 °C) 73 18 134/76 100  % -- None (Room air) -- KA    06/09/25 0947 -- 79 -- 143/84 -- -- -- --     06/09/25 0345 97.3 °F (36.3 °C) 72 18 149/80 100 % -- None (Room air) -- AS

## 2025-06-10 NOTE — SLP NOTE
SPEECH DAILY NOTE - INPATIENT    ASSESSMENT & PLAN   ASSESSMENT      Proper PPE worn. Hands sanitized upon entrance/exit Pt room.         Pt alert, afebrile and on room air.. Pt seen for swallow analysis per ongoing swallow recommendations (after consulting with RN). Pt agreed to participate. Pt's preferred method of learning verbal. Pt upright in bed; observed with current diet of pureed/thin liquids for monitoring diet tolerance. Additionally, Pt seen with solid trials for candidacy of diet upgrade. Swallowing precautions/strategies discussed; Pt able to self-cue for use. Functional bite reflex/mastication/lingual skills on solids. No significant oral retention. Pt denied globus sensation; denied odynophagia. Pharyngeal response appeared to trigger within 1-2 sec per hyolaryngeal elevation to completion (functional rise/strength per palpation). No overt CSA on solid nor thin liquids. Collaborated with RN regarding Pt's swallowing plan of care. Per RN, Pt with good tolerance of current diet. No report of difficulty taking meds. No CXR completed. Sp02 ~99% during this session. Call light within Pt's reach upon SLP discharge from room.        PLAN: Pt is discharged from skilled swallowing intervention secondary to functional swallowing skills on least restrictive safest diet.           Diet Recommendations - Solids: Regular  Diet Recommendations - Liquids: Thin Liquids    Aspiration Precautions: Upright position, Slow rate, Small bites, Small sips  Medication Administration Recommendations: One pill at a time, Whole in puree, Crushed in puree    Patient Experiencing Pain: No  Treatment Plan  Treatment Plan/Recommendations: Aspiration precautions  Interdisciplinary Communication: Discussed with RN  Plan posted at bedside      GOALS  Goal #1 The patient will tolerate thin liquids without overt signs or symptoms of aspiration with 100% accuracy over 1-2 session(s).  Patient self-administered cup and straw sips thin  liquids with oropharyngeal timing and control which appears adequate and without overt signs aspiration/distress.    NEW GOAL 6/7/25:  Patient will demonstrate safe and efficient clinical tolerance for puree diet and thin liquids without overt signs aspiration/distress x1-2 f/u visits.     No CSA on pureed/thin liquidt diet.          GOAL MET     Goal #2 The patient will utilize compensatory strategies as outlined by  BSSE (clinical evaluation) including Slow rate, No straws, Controlled amts, Upright 90 degrees with no feeding assistance 90% of the time across 2 sessions.    Pt self-cued for use of all swallow precautions.      GOAL MET     Goal #3 The patient will tolerate trial upgrade of PUREED/BITE SIZE/SOFT/SOLID consistency and thin liquids without overt signs or symptoms of aspiration with 100 % accuracy over 1-2 session(s).    Patient self-administered tsps puree and pudding with oropharyngeal timing and control which appears adequate and without overt signs aspiration/distress. No c/o odynophagia during PO intake.    NEW GOAL 6/7/25:  Patient will demonstrate safe and efficient clinical tolerance for trials advanced solids with SLP as appropriate without overt signs aspiration/distress x2-3 f/u visits.      No CSA on solid nor thin liquids. Functional oral skills on new upgraded diet to solids.        GOAL MET      FOLLOW UP  Follow Up Needed (Documentation Required): No  SLP Follow-up Date: 06/10/25  Duration: 1 week    Session: 2    If you have any questions, please contact   Elena Tang M.S. CCC/SLP  Speech-Language Pathologist  [facility]  #[phone]

## 2025-06-10 NOTE — PHYSICAL THERAPY NOTE
Physical Therapy Contact Note    Orders received and chart reviewed. Attempted to see patient for Physical Therapy services at 1130. Patient not available secondary to patient declining to participate at this time, requesting to rest. Will re-attempt pending patient availability/appropriateness as schedule allows, otherwise will re-schedule visit.    Sue Newton, PT, DPT  [facility]  Rehab Services - Physical Therapy  [id number]

## 2025-06-10 NOTE — PROGRESS NOTES
INFECTIOUS DISEASE PROGRESS NOTE  [facility]  part of [facility] ID PROGRESS NOTE    Joselin Graham Patient Status:  Inpatient    5/15/1950 MRN [id number]   Location [facility] 4W/SW/SE Attending Bonnie Craig MD   Hosp Day # 4 PCP No primary care provider on file.     Subjective:  ROS reviewed. Eating breakfast.     ASSESSMENT:    Antibiotics: Unasyn     # Concern for dental abscess   -OMFS following  # Recent shingles of V2/V3.  # Facial pain likely postherpetic neuralgia  # Medication related osteonecrosis of the jaw   -Plans for tertiary care follow up  # Multiple myeloma on immunotherapy        PLAN:  -Currently on unasyn for now but will DC on short course of PO augmentin.  -Follow fever curve, wbc.  -Reviewed labs, micro, imaging reports, available old records.  -Discussed with patient, RN.     History of Present Illness:  75 year old male known to myself admitted from  through  with facial cellulitis after a dental extraction.  During his hospitalization, he developed a vesicular rash in the V2/V3 distribution was diagnosed with shingles.  He completed 2 weeks of IV antibiotics as well as p.o. Valtrex.     He was seen in the outpatient setting by oral surgery and was sent to the emergency department to rule out a dental abscess as the patient was having facial pain.        In the ED, patient was afebrile hemodynamically stable.  WBC 2.6, hemoglobin 10.1, platelets 264, AST 47, ALT 90.  CT of the facial bones demonstrated postprocedural changes with extraction of the left mandibular 1st and 2nd premolars with a peripheral enhancing fluid collection adjacent to the sockets concerning for 3.1 cm dental abscess.  Lytic lesions throughout the osseous structures likely secondary to multiple myeloma.     Patient seen by Oral surgery.     ID consulted for assistance with antibiotic management.     Physical Exam:  /77 (BP Location: Right arm)   Pulse 73    Temp 98.2 °F (36.8 °C) (Oral)   Resp 18   Wt 122 lb 3.2 oz (55.4 kg)   SpO2 100%   BMI 20.34 kg/m²     Gen:   NAD, awake, alert  HEENT:  EOMI, no open sores in mouth, neck supple  CV/lungs:  RRR, CTAB  Abdom:  Soft, NT/ND, +BS  Skin/extrem:  No rashes, no c/c/e  Lines:  PIV+    Laboratory Data: Reviewed    Microbiology: Reviewed    Radiology: Reviewed      [facility]  [phone]  6/10/2025

## 2025-06-11 VITALS
HEART RATE: 83 BPM | TEMPERATURE: 98 F | WEIGHT: 122.19 LBS | BODY MASS INDEX: 20 KG/M2 | DIASTOLIC BLOOD PRESSURE: 74 MMHG | OXYGEN SATURATION: 100 % | SYSTOLIC BLOOD PRESSURE: 149 MMHG | RESPIRATION RATE: 16 BRPM

## 2025-06-11 PROBLEM — F39 EPISODIC MOOD DISORDER: Status: ACTIVE | Noted: 2025-06-11

## 2025-06-11 PROBLEM — F05 DELIRIUM SUPERIMPOSED ON DEMENTIA: Status: ACTIVE | Noted: 2025-06-11

## 2025-06-11 LAB
ANION GAP SERPL CALC-SCNC: 10 MMOL/L (ref 0–18)
BUN BLD-MCNC: 8 MG/DL (ref 9–23)
BUN/CREAT SERPL: 7.5 (ref 10–20)
CALCIUM BLD-MCNC: 8.5 MG/DL (ref 8.7–10.4)
CHLORIDE SERPL-SCNC: 107 MMOL/L (ref 98–112)
CO2 SERPL-SCNC: 23 MMOL/L (ref 21–32)
CREAT BLD-MCNC: 1.07 MG/DL (ref 0.7–1.3)
DEPRECATED RDW RBC AUTO: 58.3 FL (ref 35.1–46.3)
EGFRCR SERPLBLD CKD-EPI 2021: 71 ML/MIN/1.73M2 (ref 60–?)
ERYTHROCYTE [DISTWIDTH] IN BLOOD BY AUTOMATED COUNT: 17.3 % (ref 11–15)
GLUCOSE BLD-MCNC: 160 MG/DL (ref 70–99)
GLUCOSE BLDC GLUCOMTR-MCNC: 197 MG/DL (ref 70–99)
GLUCOSE BLDC GLUCOMTR-MCNC: 216 MG/DL (ref 70–99)
HCT VFR BLD AUTO: 28.9 % (ref 39–53)
HGB BLD-MCNC: 9.5 G/DL (ref 13–17.5)
MCH RBC QN AUTO: 29.8 PG (ref 26–34)
MCHC RBC AUTO-ENTMCNC: 32.9 G/DL (ref 31–37)
MCV RBC AUTO: 90.6 FL (ref 80–100)
OSMOLALITY SERPL CALC.SUM OF ELEC: 292 MOSM/KG (ref 275–295)
PLATELET # BLD AUTO: 184 10(3)UL (ref 150–450)
POTASSIUM SERPL-SCNC: 3.5 MMOL/L (ref 3.5–5.1)
RBC # BLD AUTO: 3.19 X10(6)UL (ref 3.8–5.8)
SODIUM SERPL-SCNC: 140 MMOL/L (ref 136–145)
WBC # BLD AUTO: 2.4 X10(3) UL (ref 4–11)

## 2025-06-11 PROCEDURE — 99239 HOSP IP/OBS DSCHRG MGMT >30: CPT | Performed by: INTERNAL MEDICINE

## 2025-06-11 PROCEDURE — 90792 PSYCH DIAG EVAL W/MED SRVCS: CPT | Performed by: OTHER

## 2025-06-11 NOTE — PHYSICAL THERAPY NOTE
PHYSICAL THERAPY TREATMENT NOTE - INPATIENT     Room Number: 467/467-A       Presenting Problem: dental abscess       Problem List  Principal Problem:    Dental abscess      PHYSICAL THERAPY ASSESSMENT   Patient demonstrates good  progress this session, goals  remain in progress.      Patient is requiring minimal assist and moderate assist as a result of the following impairments: decreased functional strength, pain, impaired coordination, impaired motor planning, and decreased muscular endurance.     Patient continues to function below baseline with bed mobility, transfers, and gait.  Next session anticipate patient to progress bed mobility, transfers, gait, stair negotiation, and maintaining seated position.  Physical Therapy will continue to follow patient for duration of hospitalization.    Patient continues to benefit from continued skilled PT services: to promote return to prior level of function and safety with continuous assistance and gradual rehabilitative therapy .    PLAN DURING HOSPITALIZATION  Nursing Mobility Recommendation : 1 Assist  PT Device Recommendation: Hospital bed  PT Treatment Plan: Bed mobility, Body mechanics, Patient education, Energy conservation, Gait training, Range of motion, Strengthening, Transfer training, Balance training  Frequency (Obs): 3x/week     SUBJECTIVE  What are we going to do?    OBJECTIVE  Precautions: Bed/chair alarm    WEIGHT BEARING RESTRICTION       PAIN ASSESSMENT   Ratin  Location: jaw area and face on L side  Management Techniques: Activity promotion, Body mechanics, Repositioning    BALANCE  Static Sitting: Fair +  Dynamic Sitting: Fair  Static Standing: Fair -  Dynamic Standing: Poor +    ACTIVITY TOLERANCE                          O2 WALK       AM-PAC '6-Clicks' INPATIENT SHORT FORM - BASIC MOBILITY  How much difficulty does the patient currently have...  Patient Difficulty: Turning over in bed (including adjusting bedclothes, sheets and blankets)?: A  Lot   Patient Difficulty: Sitting down on and standing up from a chair with arms (e.g., wheelchair, bedside commode, etc.): A Lot   Patient Difficulty: Moving from lying on back to sitting on the side of the bed?: A Little   How much help from another person does the patient currently need...   Help from Another: Moving to and from a bed to a chair (including a wheelchair)?: A Little   Help from Another: Need to walk in hospital room?: A Lot   Help from Another: Climbing 3-5 steps with a railing?: A Lot     AM-PAC Score:  Raw Score: 14   Approx Degree of Impairment: 61.29%   Standardized Score (AM-PAC Scale): 38.1   CMS Modifier (G-Code): CL    FUNCTIONAL ABILITY STATUS  Functional Mobility/Gait Assessment  Gait Assistance: Minimum assistance  Distance (ft): 40 ft  Assistive Device: Rolling walker  Pattern: Shuffle  Stairs: Other (comment)  Rolling: minimal assist  Supine to Sit: minimal assist  Sit to Supine: minimal assist  Sit to Stand: contact guard assist from bed level    Skilled Therapy Provided: RN approved PT session. Pt in supine, just finish breakfast. Instructed on supine thera exs with ble's to promote functional ability. Pt cooperative and willing to work with therapist. Supine to sit towards t R side with using bed rail, pt needs time to complete task. Sitting chichi on EOB with improve sitting posture. Pt amb with RW 40 ft min A and cues for amb closer to RW. Pt amb with decreased maurice and shuffling steps. Pt left in chair with all needs in reach, alarm activated. Pt stated he lives by himself at home. RN present in room.    The patient's Approx Degree of Impairment: 61.29% has been calculated based on documentation in the [facility] '6 clicks' Inpatient Daily Activity Short Form.  Research supports that patients with this level of impairment may benefit from gradual rehabilitative therapy.  Final disposition will be made by interdisciplinary medical team.    THERAPEUTIC EXERCISES  Lower Extremity  Alternating marching  Ankle pumps  Hip AB/AD  Heel slides  Knee extension  Quad sets     Position Sitting and Supine       Patient End of Session: Up in chair, Needs met, Call light within reach, With  staff, RN aware of session/findings, All patient questions and concerns addressed, Alarm set    CURRENT GOALS   Goals to be met by: 6/15/2025  Patient Goal Patient's self-stated goal is: to get better   Goal #1 Patient is able to demonstrate supine - sit EOB @ level: minimum assistance     Goal #1   Current Status Min a    Goal #2 Patient is able to demonstrate transfers EOB to/from [facility] at assistance level: minimum assistance with walker - rolling     Goal #2  Current Status Min A   Goal #3 Patient is able to ambulate 100 feet with assist device: walker - rolling at assistance level: minimum assistance   Goal #3   Current Status Amb with RW 40 ft min A   Goal #4 Patient will negotiate 0 stairs/one curb w/ assistive device and supervision   Goal #4   Current Status NT   Goal #5 Patient to demonstrate independence with home activity/exercise instructions provided to patient in preparation for discharge.   Goal #5   Current Status In progress   Goal #6    Goal #6  Current Status        Gait Trainin minutes  Therapeutic Activity: 12 minutes

## 2025-06-11 NOTE — PLAN OF CARE
Patient is alert and oriented x2. Reoriented as needed. Room air. Vital signs stable. General diet. ACHS accuchecks. IVF and abx continued. Voiding. Call light and personal belongings within reach. Safety precautions in place. Video monitoring continued.     Problem: Patient Centered Care  Goal: Patient preferences are identified and integrated in the patient's plan of care  Description: Interventions:  - What would you like us to know as we care for you? My jaw hurts  - Provide timely, complete, and accurate information to patient/family  - Incorporate patient and family knowledge, values, beliefs, and cultural backgrounds into the planning and delivery of care  - Encourage patient/family to participate in care and decision-making at the level they choose  - Honor patient and family perspectives and choices  Outcome: Progressing     Problem: Patient/Family Goals  Goal: Patient/Family Long Term Goal  Description: Patient's Long Term Goal: to return home    Interventions:  - See additional Care Plan goals for specific interventions  Outcome: Progressing  Goal: Patient/Family Short Term Goal  Description: Patient's Short Term Goal: to control my pain    Interventions:   - See additional Care Plan goals for specific interventions  Outcome: Progressing     Problem: PAIN - ADULT  Goal: Verbalizes/displays adequate comfort level or patient's stated pain goal  Description: INTERVENTIONS:  - Encourage pt to monitor pain and request assistance  - Assess pain using appropriate pain scale  - Administer analgesics based on type and severity of pain and evaluate response  - Implement non-pharmacological measures as appropriate and evaluate response  - Consider cultural and social influences on pain and pain management  - Manage/alleviate anxiety  - Utilize distraction and/or relaxation techniques  - Monitor for opioid side effects  - Notify MD/LIP if interventions unsuccessful or patient reports new pain  - Anticipate increased  pain with activity and pre-medicate as appropriate  Outcome: Progressing     Problem: RISK FOR INFECTION - ADULT  Goal: Absence of fever/infection during anticipated neutropenic period  Description: INTERVENTIONS  - Monitor WBC  - Administer growth factors as ordered  - Implement neutropenic guidelines  Outcome: Progressing     Problem: SAFETY ADULT - FALL  Goal: Free from fall injury  Description: INTERVENTIONS:  - Assess pt frequently for physical needs  - Identify cognitive and physical deficits and behaviors that affect risk of falls.  - [place] fall precautions as indicated by assessment.  - Educate pt/family on patient safety including physical limitations  - Instruct pt to call for assistance with activity based on assessment  - Modify environment to reduce risk of injury  - Provide assistive devices as appropriate  - Consider OT/PT consult to assist with strengthening/mobility  - Encourage toileting schedule  Outcome: Progressing     Problem: DISCHARGE PLANNING  Goal: Discharge to home or other facility with appropriate resources  Description: INTERVENTIONS:  - Identify barriers to discharge w/pt and caregiver  - Include patient/family/discharge partner in discharge planning  - Arrange for needed discharge resources and transportation as appropriate  - Identify discharge learning needs (meds, wound care, etc)  - Arrange for interpreters to assist at discharge as needed  - Consider post-discharge preferences of patient/family/discharge partner  - Complete POLST form as appropriate  - Assess patient's ability to be responsible for managing their own health  - Refer to Case Management Department for coordinating discharge planning if the patient needs post-hospital services based on physician/LIP order or complex needs related to functional status, cognitive ability or social support system  Outcome: Progressing     Problem: Altered Communication/Language Barrier  Goal: Patient/Family is able to understand  and participate in their care  Description: Interventions:  - Assess communication ability and preferred communication style  - Implement communication aides and strategies  - Use visual cues when possible  - Listen attentively, be patient, do not interrupt  - Minimize distractions  - Allow time for understanding and response  - Establish method for patient to ask for assistance (call light)  - Provide an  as needed  - Communicate barriers and strategies to overcome with those who interact with patient  Outcome: Progressing

## 2025-06-11 NOTE — PLAN OF CARE
Problem: Patient Centered Care  Goal: Patient preferences are identified and integrated in the patient's plan of care  Description: Interventions:  - What would you like us to know as we care for you? My jaw hurts  - Provide timely, complete, and accurate information to patient/family  - Incorporate patient and family knowledge, values, beliefs, and cultural backgrounds into the planning and delivery of care  - Encourage patient/family to participate in care and decision-making at the level they choose  - Honor patient and family perspectives and choices  Outcome: Progressing     Problem: Patient/Family Goals  Goal: Patient/Family Long Term Goal  Description: Patient's Long Term Goal: to return home    Interventions:  - See additional Care Plan goals for specific interventions  Outcome: Progressing  Goal: Patient/Family Short Term Goal  Description: Patient's Short Term Goal: to control my pain    Interventions:   - See additional Care Plan goals for specific interventions  Outcome: Progressing     Problem: PAIN - ADULT  Goal: Verbalizes/displays adequate comfort level or patient's stated pain goal  Description: INTERVENTIONS:  - Encourage pt to monitor pain and request assistance  - Assess pain using appropriate pain scale  - Administer analgesics based on type and severity of pain and evaluate response  - Implement non-pharmacological measures as appropriate and evaluate response  - Consider cultural and social influences on pain and pain management  - Manage/alleviate anxiety  - Utilize distraction and/or relaxation techniques  - Monitor for opioid side effects  - Notify MD/LIP if interventions unsuccessful or patient reports new pain  - Anticipate increased pain with activity and pre-medicate as appropriate  Outcome: Progressing     Problem: RISK FOR INFECTION - ADULT  Goal: Absence of fever/infection during anticipated neutropenic period  Description: INTERVENTIONS  - Monitor WBC  - Administer growth factors  as ordered  - Implement neutropenic guidelines  Outcome: Progressing     Problem: SAFETY ADULT - FALL  Goal: Free from fall injury  Description: INTERVENTIONS:  - Assess pt frequently for physical needs  - Identify cognitive and physical deficits and behaviors that affect risk of falls.  - [place] fall precautions as indicated by assessment.  - Educate pt/family on patient safety including physical limitations  - Instruct pt to call for assistance with activity based on assessment  - Modify environment to reduce risk of injury  - Provide assistive devices as appropriate  - Consider OT/PT consult to assist with strengthening/mobility  - Encourage toileting schedule  Outcome: Progressing     Problem: DISCHARGE PLANNING  Goal: Discharge to home or other facility with appropriate resources  Description: INTERVENTIONS:  - Identify barriers to discharge w/pt and caregiver  - Include patient/family/discharge partner in discharge planning  - Arrange for needed discharge resources and transportation as appropriate  - Identify discharge learning needs (meds, wound care, etc)  - Arrange for interpreters to assist at discharge as needed  - Consider post-discharge preferences of patient/family/discharge partner  - Complete POLST form as appropriate  - Assess patient's ability to be responsible for managing their own health  - Refer to Case Management Department for coordinating discharge planning if the patient needs post-hospital services based on physician/LIP order or complex needs related to functional status, cognitive ability or social support system  Outcome: Progressing     Problem: Altered Communication/Language Barrier  Goal: Patient/Family is able to understand and participate in their care  Description: Interventions:  - Assess communication ability and preferred communication style  - Implement communication aides and strategies  - Use visual cues when possible  - Listen attentively, be patient, do not  interrupt  - Minimize distractions  - Allow time for understanding and response  - Establish method for patient to ask for assistance (call light)  - Provide an  as needed  - Communicate barriers and strategies to overcome with those who interact with patient  Outcome: Progressing     No acute changes today. No complaints of pain. Tolerating diet, no n/v. Voiding freely, having bowel movements. IVF infusing, unsayn as abx coverage. Plans to discharge on po abx back to [facility]. Report called to receiving RN. Call light within reach, frequent rounding.

## 2025-06-11 NOTE — CM/SW NOTE
Prior Authorization - Destination  Destination Type: Skilled nursing facility  Service Provider: karan kinney  Payer Communication Destination Comments: [place] [phone]  Prior Authorization Status: Approved  Prior Authorization Start Date: 06/11/25 6/11 to 6/13 ([employer] 6/15)    Lindy Weston DSC

## 2025-06-11 NOTE — DISCHARGE SUMMARY
Discharge Summary     Joselin Graham Patient Status:  Inpatient    5/15/1950 MRN [id number]   Location [facility] 4W/SW/SE Attending Ritu Lozano MD   Hosp Day # 5 PCP No primary care provider on file.     Date of Admission: 2025  Date of Discharge: 2025  Discharge Disposition: SNF Subacute Rehab    Discharge Diagnosis:   Odontalgia  diarrhea  Hypoglycemia  Acute urinary retention      History of Present Illness:             Brief Synopsis:     Dental abscess  - Per ED physician, discussed with oral surgery  - IV antibiotics with Unasyn  - Dr. Ha reccs appreciated  - will need to eventually be seen at [place] for mandible resection once medically stable  - now on general diet   - added norco for pain   - discharge to rehab at the [facility]     Diarrhea  Cdiff neg  Resolving      Hypoglycemia  - Added D5 in fluids  - advance diet as tolerated      Hyperkalemia  Mild hyponatremia  replaced     DM2  - Insulin- hold due to hypoglycemia - stop tresiba   - Carb controlled diet     Leukopenia, chronic  Anemia  History of multiple myeloma  - Monitor  - Resume home meds     HTN  Dementia  - Resume home meds when reconciled     Acute urinary retention  - placed amador  and removed prior to dc- pt voiding freely           Lace+ Score: 66  59-90 High Risk  29-58 Medium Risk  0-28   Low Risk       TCM Follow-Up Recommendation:  LACE < 29: Low Risk of readmission after discharge from the hospital; Still recommend for TCM follow-up.        Consultants:  ID, oral surgery    Discharge Medication List:     Discharge Medications        START taking these medications        Instructions Prescription details   amoxicillin clavulanate 875-125 MG Tabs  Commonly known as: Augmentin      Take 1 tablet by mouth 2 (two) times daily for 5 days.   Stop taking on: Elizabeth 15, 2025  Quantity: 10 tablet  Refills: 0     HYDROcodone-acetaminophen 7.5-325 MG Tabs  Commonly known as: Norco      Take 1-2 tablets by mouth every 6  (six) hours as needed for Pain.   Quantity: 28 tablet  Refills: 0     Naloxone HCl 4 MG/0.1ML Liqd      4 mg by Nasal route as needed. If patient remains unresponsive, repeat dose in other nostril 2-5 minutes after first dose.   Quantity: 56 Undefined  Refills: 0            CONTINUE taking these medications        Instructions Prescription details   acetaminophen 325 MG Tabs  Commonly known as: Tylenol      Take 2 tablets (650 mg total) by mouth every 4 (four) hours as needed.   Refills: 0     amLODIPine 10 MG Tabs  Commonly known as: Norvasc      Take 1 tablet (10 mg total) by mouth daily.   Refills: 0     chlorhexidine gluconate 0.12 % Soln  Commonly known as: Peridex      Use as directed 15 mL in the mouth or throat 2 (two) times daily for 14 days.   Stop taking on: June 24, 2025  Quantity: 420 mL  Refills: 0     DULoxetine 60 MG Cpep  Commonly known as: Cymbalta      Take 1 capsule (60 mg total) by mouth in the morning.   Refills: 0     insulin aspart 100 Units/mL Sopn  Commonly known as: NovoLOG      Inject 6 Units into the skin 3 (three) times daily with meals.   Quantity: 3 mL  Refills: 0     insulin degludec 100 units/mL Sopn  Commonly known as: Tresiba      Inject 15 Units into the skin nightly.   Quantity: 3 mL  Refills: 0     Insulin Pen Needle 32G X 4 MM Misc      May substitute if not on insurance formulary   Quantity: 100 each  Refills: 5     Jardiance 25 MG Tabs  Generic drug: empagliflozin      Take 1 tablet (25 mg total) by mouth in the morning.   Refills: 0     Lenalidomide 5 MG Caps      Take 5 mg by mouth daily.   Refills: 0     levothyroxine 25 MCG Tabs  Commonly known as: Synthroid      Take 1 tablet (25 mcg total) by mouth before breakfast.   Refills: 0     losartan 50 MG Tabs  Commonly known as: Cozaar      Take 1 tablet (50 mg total) by mouth in the morning.   Refills: 0     memantine 10 MG Tabs  Commonly known as: Namenda      Take 1 tablet (10 mg total) by mouth 2 (two) times daily.    Refills: 0     potassium chloride 20 MEQ Tbcr  Commonly known as: Klor-Con M20      Take 1 tablet (20 mEq total) by mouth daily.   Refills: 0            STOP taking these medications      ciprofloxacin-dexamethasone 0.3-0.1 % Susp  Commonly known as: Ciprodex        sodium chloride 0.9% SOLN 100 mL with meropenem 500 MG SOLR 500 mg        valACYclovir 1 G Tabs  Commonly known as: Valtrex                  Where to Get Your Medications        These medications were sent to [id number] - [place], [place] - [address] AT [facility], [phone], [phone]  [address], [facility] [address]      Phone: [phone]   chlorhexidine gluconate 0.12 % Soln  Naloxone HCl 4 MG/0.1ML Liqd       Please  your prescriptions at the location directed by your doctor or nurse    Bring a paper prescription for each of these medications  amoxicillin clavulanate 875-125 MG Tabs  HYDROcodone-acetaminophen 7.5-325 MG Tabs         Follow-up appointment:   No follow-up provider specified.  Appointments for Next 30 Days 6/11/2025 - 7/11/2025        Date Arrival Time Visit Type Length Department Provider     6/16/2025  1:00 PM  EXAM - ESTABLISHED [5575] 15 min [facility], [facility] Mauro Castro MD    Patient Instructions:         Location Instructions:     Your appointment is located at [address] [facility] in [place], [place].  Please park in the Yellow lot and enter through the [facility] 1 entrance.  Then proceed to [address].  Masks are optional for all patients and visitors, unless otherwise indicated.                      Supplementary Documentation:   [place] reviewed[place] na    Vital signs:  Temp:  [97.7 °F (36.5 °C)-98.3 °F (36.8 °C)] 97.7 °F (36.5 °C)  Pulse:  [79-86] 84  Resp:  [14-18] 16  BP: (128-152)/(77-84) 129/77  SpO2:  [100 %] 100 %    Physical Exam:    General:  NAD  Cardiovascular:  S1,  S2    -----------------------------------------------------------------------------------------------  PATIENT DISCHARGE INSTRUCTIONS: See electronic chart    Tip: Documentation requirements: For split shared discharge, BOTH providers need to document specific floor, unit, and time spent on the discharge.  The note needs to be signed by the provider with > 50% of time and bill under their NPI.   Time spent:  45 min         Ritu Lozano MD

## 2025-06-11 NOTE — PAYOR COMM NOTE
--------------  CONTINUED STAY REVIEW---CLINICALS FOR 6/10      Payor: [employer]  Subscriber #:  [id number]  Authorization Number: [id number]    Admit date: 6/6/25  Admit time:  1:05 AM    REVIEW DOCUMENTATION:          Date of Service: 6/10/2025 11:43 AM     Signed         [facility]  part of [facility]     Progress Note        Subjective:   Joselin Graham is a(n) 75 year old male who feels ok. No fever. Is able to tolerate diet.     Objective:   Blood pressure 132/77, pulse 73, temperature 98.2 °F (36.8 °C), temperature source Oral, resp. rate 18, weight 122 lb 3.2 oz (55.4 kg), SpO2 100%.     Physical Exam:    General: No acute distress.   Respiratory: Clear to auscultation bilaterally. No wheezes. No rhonchi.  Cardiovascular: S1, S2. Regular rate and rhythm. No murmurs, rubs or gallops.   Abdomen: Soft, nontender, nondistended.  Positive bowel sounds. No rebound or guarding.  Neurologic: No focal neurological deficits.   Musculoskeletal: Moves all extremities.  Extremities: No edema.     Results:            Lab Results   Component Value Date     WBC 2.0 (L) 06/08/2025     HGB 8.8 (L) 06/08/2025     HCT 26.8 (L) 06/08/2025     .0 06/08/2025     CREATSERUM 1.09 06/08/2025     BUN 11 06/08/2025      06/08/2025     K 3.7 06/09/2025      06/08/2025     CO2 24.0 06/08/2025      (H) 06/08/2025     CA 8.3 (L) 06/08/2025     ALB 4.2 06/05/2025     ALKPHO 92 06/05/2025     BILT 0.5 06/05/2025     TP 7.5 06/05/2025     AST 47 (H) 06/05/2025     ALT 90 (H) 06/05/2025     T4F 1.1 07/28/2024     TSH 0.847 04/29/2025     MG 1.9 05/04/2025     PHOS 3.0 05/04/2025     B12 415 07/28/2024         No results found.            [Scheduled Medications]    [Scheduled Medications]   ampicillin-sulbactam  3 g Intravenous q6h    heparin  5,000 Units Subcutaneous Q8H ISHA    insulin aspart  1-7 Units Subcutaneous TID CC and HS    amLODIPine  10 mg Oral Daily    chlorhexidine gluconate  15 mL  Mouth/Throat BID    DULoxetine  60 mg Oral Daily    levothyroxine  25 mcg Oral Before breakfast    losartan  50 mg Oral Daily    memantine  10 mg Oral BID    potassium chloride  20 mEq Oral Daily           Assessment and Plan:       Dental abscess  - Per ED physician, discussed with oral surgery  - IV antibiotics with Unasyn  - Dr. Ha rec appreciated  - will need to eventually be seen at [facility] for mandible resection once medically stable  - might need rehab- PT/OT eval   - now on general diet   - added norco for pain     Diarrhea  Cdiff pending   Resolving      Hypoglycemia  - Added D5 in fluids  - advance diet as tolerated      Hyperkalemia  Mild hyponatremia  - Recheck and treat accordingly     DM2  - Insulin- hold due to hypoglycemia - stop tresiba   - Carb controlled diet     Leukopenia, chronic  Anemia  History of multiple myeloma  - Monitor  - Resume home meds     HTN  Dementia  - Resume home meds when reconciled     Acute urinary retention  - placed amador 6/9  - remove amador today- voiding trial     HAMMAD Craig MD                                MEDICATIONS ADMINISTERED IN LAST 1 DAY:  amLODIPine (Norvasc) tab 10 mg       Date Action Dose Route User    6/11/2025 1011 Given 10 mg Oral Leticia Avery RN          ampicillin-sulbactam (Unasyn) 3 g in sodium chloride 0.9% 100mL IVPB-TABBY       Date Action Dose Route User    6/11/2025 1234 New Bag 3 g Intravenous Leticia Avery RN    6/11/2025 0549 New Bag 3 g Intravenous Dodie Rhodes RN    6/10/2025 2312 New Bag 3 g Intravenous Dodie Rhodes RN    6/10/2025 1731 New Bag 3 g Intravenous Alok Rock RN          chlorhexidine gluconate (Peridex) 0.12 % oral solution 15 mL       Date Action Dose Route User    6/11/2025 1014 Given 15 mL Mouth/Throat Leticia Avery RN    6/10/2025 2141 Given 15 mL Mouth/Throat Dodie Rhodes RN          dextrose 5%-sodium chloride 0.9% infusion       Date Action Dose Route User    6/10/2025  1936 New Bag (none) Intravenous Jorge Mariano RN          DULoxetine (Cymbalta) DR cap 60 mg       Date Action Dose Route User    6/11/2025 1011 Given 60 mg Oral Leticia Avery RN          heparin (Porcine) 5000 UNIT/ML injection 5,000 Units       Date Action Dose Route User    6/11/2025 0549 Given 5,000 Units Subcutaneous (Left Upper Arm) Dodie Rhdoes RN    6/10/2025 2139 Given 5,000 Units Subcutaneous (Right Upper Arm) Dodie Rhodes RN          HYDROcodone-acetaminophen (Norco) 5-325 MG per tab 1 tablet       Date Action Dose Route User    6/10/2025 1731 Given 1 tablet Oral Alok Rcok RN          insulin aspart (NovoLOG) 100 Units/mL FlexPen 1-7 Units       Date Action Dose Route User    6/11/2025 1524 Given 2 Units Subcutaneous (Left Upper Arm) Leticia Avery RN    6/11/2025 1017 Given 2 Units Subcutaneous (Left Upper Arm) Leticia Avery RN    6/10/2025 2138 Given 2 Units Subcutaneous (Right Upper Arm) Dodie Rhodes RN          levothyroxine (Synthroid) tab 25 mcg       Date Action Dose Route User    6/11/2025 0549 Given 25 mcg Oral Dodie Rhodes RN          losartan (Cozaar) tab 50 mg       Date Action Dose Route User    6/11/2025 1011 Given 50 mg Oral Leticia Avery RN          memantine (Namenda) tab 10 mg       Date Action Dose Route User    6/11/2025 1011 Given 10 mg Oral Leticia Avery RN    6/10/2025 2144 Given 10 mg Oral Dodie Rhodes RN          potassium chloride (Klor-Con M20) tab 20 mEq       Date Action Dose Route User    6/11/2025 1011 Given 20 mEq Oral Leticia Avery RN            Vitals (last day)       Date/Time Temp Pulse Resp BP SpO2 Weight O2 Device O2 Flow Rate (L/min) [facility]    06/11/25 1526 -- 83 16 149/74 100 % -- None (Room air) --     06/11/25 1235 97.9 °F (36.6 °C) 71 18 140/69 100 % -- None (Room air) --     06/11/25 1008 -- 84 -- 129/77 -- -- -- --     06/11/25 0547 97.7 °F (36.5 °C) 80 16 150/80 100 % -- None (Room air) --     06/10/25  1914 98.3 °F (36.8 °C) 79 14 128/84 100 % -- None (Room air) -- KJ    06/10/25 1202 98.3 °F (36.8 °C) 86 18 152/79 100 % -- None (Room air) -- KA    06/10/25 1016 98.2 °F (36.8 °C) 73 18 132/77 100 % -- None (Room air) -- ESTUARDO    06/10/25 0413 98.1 °F (36.7 °C) 81 16 147/83 99 % -- None (Room air) -- KJ

## 2025-06-11 NOTE — CM/SW NOTE
Per Ins auth -sent to Medical Director for review and decision.      1100  CM rec'd call from ins offering the MD a P2P with their medical director.    MD to call [phone], option #5 by 3pm today.    Provide pts Name,  [birth year] and ID#   [id number]     Dr Lozano notified of above    1420  CM was notified that ins auth was approved for [employer].    CM notified facility liaison and POA of above.      / to remain available for support and/or discharge planning.     Jessica Mcgill, RN    Ext [phone]

## 2025-06-11 NOTE — PROGRESS NOTES
INFECTIOUS DISEASE PROGRESS NOTE  [facility]  part of [facility] ID PROGRESS NOTE    Joselin Graham Patient Status:  Inpatient    5/15/1950 MRN [id number]   Location [facility] 4W/SW/SE Attending Bonnie Craig MD   Hosp Day # 5 PCP No primary care provider on file.     Subjective:  ROS reviewed. No complaints. Got haldol yesterday but plans for likely DC today.    ASSESSMENT:    Antibiotics: Unasyn     # Concern for dental abscess   -OMFS following  # Recent shingles of V2/V3.  # Facial pain likely postherpetic neuralgia  # Medication related osteonecrosis of the jaw   -Plans for tertiary care follow up  # Multiple myeloma on immunotherapy        PLAN:  -Currently on unasyn. Will DC on five day course of PO augmentin.  -Follow fever curve, wbc.  -Reviewed labs, micro, imaging reports, available old records.  -Discussed with patient, RN.     History of Present Illness:  75 year old male known to myself admitted from  through  with facial cellulitis after a dental extraction.  During his hospitalization, he developed a vesicular rash in the V2/V3 distribution was diagnosed with shingles.  He completed 2 weeks of IV antibiotics as well as p.o. Valtrex.     He was seen in the outpatient setting by oral surgery and was sent to the emergency department to rule out a dental abscess as the patient was having facial pain.        In the ED, patient was afebrile hemodynamically stable.  WBC 2.6, hemoglobin 10.1, platelets 264, AST 47, ALT 90.  CT of the facial bones demonstrated postprocedural changes with extraction of the left mandibular 1st and 2nd premolars with a peripheral enhancing fluid collection adjacent to the sockets concerning for 3.1 cm dental abscess.  Lytic lesions throughout the osseous structures likely secondary to multiple myeloma.     Patient seen by Oral surgery.     ID consulted for assistance with antibiotic management.     Physical Exam:  /77 (BP  Location: Right arm)   Pulse 84   Temp 97.7 °F (36.5 °C) (Oral)   Resp 16   Wt 122 lb 3.2 oz (55.4 kg)   SpO2 100%   BMI 20.34 kg/m²     Gen:   Awake, in bed  HEENT:  EOMI, no open sores in mouth, neck supple  CV/lungs:  RRR, CTAB  Abdom:  Soft, no TTP  Skin/extrem:  No rashes, no c/c/e  Lines:  PIV+    Laboratory Data: Reviewed    Microbiology: Reviewed    Radiology: Reviewed      [facility]  [phone]  6/11/2025

## 2025-06-11 NOTE — CM/SW NOTE
Department  notified care team CM / MS of P2P offer via [employer] portal.      Offering the treating practitioner an option to speak with a Medical Director before final determination. Provider to call: [phone] Option 5. Deadline is: 6/11/25 3:00 PM CDT If no response, MD will render determination.    Assigned SW/CM to follow up with patient/family on discharge plan.     Lindy Weston, DSC

## 2025-06-11 NOTE — CM/SW NOTE
06/11/25 0907   Discharge disposition   Expected discharge disposition subacute   Post Acute Care Provider   ([facility])   Discharge transportation [facility]     Pt had no further use of IV Haldol  overnight, MD has cleared him for dc to SNF    Plan  [facility]  [facility]  Pcs done  RN report [phone]     RN to notify pt/POA with time    / to remain available for support and/or discharge planning.     Jessica Mcgill, RN    Ext [phone]

## 2025-06-12 NOTE — CONSULTS
[facility]  part of [facility]    Report of Consultation    Joselin Graham Patient Status:  Inpatient    5/15/1950 MRN [id number]   Location [facility] 4W/SW/SE Attending No att. providers found   Hosp Day # 5 PCP No primary care provider on file.     Date of Admission:  2025  Date of Consult:  2025   Reason for Consultation:   Patient presented with restlessness and agitation, Dr. Craig requested psychiatric consult for evaluation and advice.    Consult Duration     The patient seen for initial psychiatric consult evaluation.   Record reviewed, communication with attending, communication with RN and patient seen face to face evaluation.    History of Present Illness:   Patient is a 75 year old Eastern  male with history of HTN, DM2, and dementia who was sent from his oral surgeon to the ED for evaluation for dental abscess and uncontrolled pain.  Patient during hospitalization has been demonstrating increase in anxiety and restlessness.  Psych consult requested for evaluation and advise.    Amy from the staff that the patient was irritable and restless yesterday.    The patient is seen today in his room.  Patient presented calm and cooperative sitting in his recliner.  The patient have a Band-Aid over his left jaw.    The patient aware that he is in the hospital.  Patient reporting it is  but was not able to know the month.  Otherwise the patient reporting that he has been feeling fine.  Patient did not demonstrate response to internal stimuli but noticeably forgetful.  Patient denied being anxious or restless?  Patient reporting that he is not sleeping well here in the hospital because of the pain.    The patient denied any homicidal or suicidal ideation.  Patient denied any auditory or visual hallucination.  Patient denied history of depression, hopelessness, helplessness or any major history of psychiatric illnesses.    Otherwise the patient noticeably forgetful  and he is disoriented to the exact detail of his medical condition but aware that he has pain in left side of his face.    The patient with history of dementia who has been demonstrating delirious process due to infection, antibiotic or narcotic medication.  Otherwise the patient is going to rehab today.  Staff feel comfortable with his behavior and cooperation and they acknowledge that he is less agitated today.  Patient did not indicate any restraint, chemical restraint and appropriate to transfer to skilled nursing facility for rehab.    Past Psychiatric/Medication History:  1. Prior diagnoses: Dementia  2. Past psychiatric inpatient: Denied any  3. Past outpatient history: Patient follow-up with PCP  4. Past suicide history: Denied any  5. Medication history: Patient on duloxetine and Namenda    Social History:   Patient denies history of alcohol or substance abuse.  Patient reporting he is living with the family    Family History:  No psychiatric family history reported.  Medical History:   Past Medical History  Past Medical History[1]    Past Surgical History  Past Surgical History[2]    Family History  Family History[3]    Social History  Short Social Hx on File[4]        Current Medications:  Current Hospital Medications[5]  Prescriptions Prior to Admission[6]    Allergies  Allergies[7]    Review of Systems:   As by Admitting/Attending    Results:   Laboratory Data:  Lab Results   Component Value Date    WBC 2.4 (L) 06/11/2025    HGB 9.5 (L) 06/11/2025    HCT 28.9 (L) 06/11/2025    .0 06/11/2025    CREATSERUM 1.07 06/11/2025    BUN 8 (L) 06/11/2025     06/11/2025    K 3.5 06/11/2025     06/11/2025    CO2 23.0 06/11/2025     (H) 06/11/2025    CA 8.5 (L) 06/11/2025    ALB 4.2 06/05/2025    ALKPHO 92 06/05/2025    TP 7.5 06/05/2025    AST 47 (H) 06/05/2025    ALT 90 (H) 06/05/2025    T4F 1.1 07/28/2024    TSH 0.847 04/29/2025    MG 1.9 05/04/2025    PHOS 3.0 05/04/2025    B12 415  07/28/2024         Imaging:  No results found.    Vital Signs:   Blood pressure 149/74, pulse 83, temperature 97.9 °F (36.6 °C), temperature source Oral, resp. rate 16, weight 55.4 kg (122 lb 3.2 oz), SpO2 100%.    Mental Status Exam:   Appearance: Stated age male, in hospital gown, laying down in hospital bed.  Psychomotor: No psychomotor agitation, or retardation.  Patient calm during evaluation otherwise reporting some restlessness.  Orientation: Alert and oriented to person, and place as a hospital but not the exact date or specific condition.  Gait: Intact  Attitude/Coorperation: Cooperative and attentive reasonably.  Behavior: Appropriate.  Occasional episode of restlessness reported  Speech: Regular rate and rhythm speech.  Mood: Patient reporting feeling fine  Affect: Anxious affect, congruent with the mood.  Thought process: Distracted thought process  Thought content: Patient denies any suicidal or homicidal ideation.  Perceptions: Patient denies any auditory or visual hallucinations.  Concentration: Grossly impaired  Memory: Grossly impaired mostly recently  Intellect: Average.  Judgment and Insight: Questionable.     Impression:     Episodic mood disorder reviewed  Delirium superimposed on dementia.  Mixed vascular and neurodegenerative dementia, moderate.  Dental abscess    The patient is a 75-year-old Eastern Banglades[place] male with history of HTN, DM2 and dementia who presented to the hospital with dental abscess.  Patient has been demonstrating increased restlessness and anxiety.  Patient on Cymbalta and memantine.  The patient today demonstrating residual delirium episode with no agitation and patient is appropriate to transfer to skilled nursing facility for rehab.    Discussed risk and benefit, acknowledging the current symptom and severity.  At this point, I would recommend the following approach:     Focus on safety  Focus on education and support.  Focus on insight orientation helping the patient  understand diagnosis and treatment plan.  Continue Cymbalta.  Continue Namenda.  App[facility]ate transfer to [facility]  Coordinate plan with team    Orders This Visit:  Orders Placed This Encounter   Procedures    CBC With Differential With Platelet    Comp Metabolic Panel (14)    Potassium    CBC, Platelet; No Differential    Basic Metabolic Panel (8)    CBC, Platelet; No Differential    Basic Metabolic Panel (8)    MD BLOOD SMEAR CONSULT    Potassium    CBC, Platelet; No Differential    Basic Metabolic Panel (8)    Clostridium difficile(toxigenic)PCR       Meds This Visit:  Requested Prescriptions     Signed Prescriptions Disp Refills    amoxicillin clavulanate 875-125 MG Oral Tab 10 tablet 0     Sig: Take 1 tablet by mouth 2 (two) times daily for 5 days.    chlorhexidine gluconate 0.12 % Mouth/Throat Solution 420 mL 0     Sig: Use as directed 15 mL in the mouth or throat 2 (two) times daily for 14 days.    HYDROcodone-acetaminophen 7.5-325 MG Oral Tab 28 tablet 0     Sig: Take 1-2 tablets by mouth every 6 (six) hours as needed for Pain.    Naloxone HCl 4 MG/0.1ML Nasal Liquid 56 Undefined 0     Si mg by Nasal route as needed. If patient remains unresponsive, repeat dose in other nostril 2-5 minutes after first dose.       Johnnie Herrera MD  2025    Note to Patient: The 21st Century Cures Act makes medical notes like these available to patients in the interest of transparency. However, be advised this is a medical document. It is intended as peer to peer communication. It is written in medical language and may contain abbreviations or verbiage that are unfamiliar. It may appear blunt or direct. Medical documents are intended to carry relevant information, facts as evident, and the clinical opinion of the practitioner. This note may have been transcribed using a voice dictation system. Voice recognition errors may occur. This should not be taken to alter the content or meaning of this note.          [1]   Past  Medical History:   Abnormal gait    Benign essential hypertension    Cancer (HCC)    Chest pain    Formatting of this note might be different from the original.   Normal stress test 11/2021      Last Assessment & Plan:    Formatting of this note might be different from the original.   Symptoms are atypical for ischemia   Chest pain is worse with change in position      Diabetes (HCC)    Diabetes mellitus (HCC)    Diabetes mellitus (HCC)    High blood pressure    Hyperglycemia    Hypertension associated with diabetes (HCC)    Last Assessment & Plan:    Formatting of this note might be different from the original.   Blood pressure mildly elevated   Continue same medications for now   Continue to monitor readings      Multiple myeloma (HCC)    Multiple premature ventricular complexes    Formatting of this note might be different from the original.   Normal stress test 11/2021      Echo 8/2021:    1. Left ventricular ejection fraction, by visual estimation, is 60 to 65%.    2. Mild concentric left ventricular hypertrophy.    3. Normal pattern of LV diastolic filling.    4. Mild aortic valve stenosis.         Last Assessment & Plan:    Formatting of this note might be different fro    Neuropathy    Nonrheumatic aortic valve stenosis    Formatting of this note might be different from the original.   Echo 8/2021:    1. Left ventricular ejection fraction, by visual estimation, is 60 to 65%.    2. Mild concentric left ventricular hypertrophy.    3. Normal pattern of LV diastolic filling.    4. Mild aortic valve stenosis.         Last Assessment & Plan:    Formatting of this note might be different from the original.   Mild aortic mu    Rash    Type 2 diabetes mellitus without complication, without long-term current use of insulin (HCC)    Vomiting   [2] History reviewed. No pertinent surgical history.  [3] History reviewed. No pertinent family history.  [4]   Social History  Socioeconomic History    Marital status: Single    Tobacco Use    Smoking status: Never    Smokeless tobacco: Never   Vaping Use    Vaping status: Never Used   Substance and Sexual Activity    Alcohol use: Never    Drug use: Never     Social Drivers of Health     Food Insecurity: No Food Insecurity (6/6/2025)    NCSS - Food Insecurity     Worried About Running Out of Food in the Last Year: No     Ran Out of Food in the Last Year: No   Transportation Needs: No Transportation Needs (6/6/2025)    NCSS - Transportation     Lack of Transportation: No   Housing Stability: Not At Risk (6/6/2025)    NCSS - Housing/Utilities     Has Housing: Yes     Worried About Losing Housing: No     Unable to Get Utilities: No   [5]   No current facility-administered medications for this encounter.   [6]   No medications prior to admission.   [7]   Allergies  Allergen Reactions    Pork UNKNOWN

## 2025-06-12 NOTE — PAYOR COMM NOTE
--------------  DISCHARGE REVIEW    Payor: [employer] WILKES [facility]  Subscriber #:  [id number]  Authorization Number: [id number]    Admit date: 25  Admit time:   1:05 AM  Discharge Date: 2025  4:40 PM     Admitting Physician: Dayana Galloway MD  Attending Physician:  No att. providers found  Primary Care Physician: No primary care provider on file.          Discharge Summary Notes        Discharge Summary signed by Ritu Lozano MD at 2025 10:43 AM       Author: Ritu Lozano MD Specialty: HOSPITALIST, Internal Medicine Author Type: Physician    Filed: 2025 10:43 AM Date of Service: 2025 10:34 AM Status: Addendum    : Ritu Lozano MD (Physician)    Related Notes: Original Note by Ritu Lozano MD (Physician) filed at 2025 10:38 AM            Discharge Summary     Joselin Graham Patient Status:  Inpatient    5/15/1950 MRN [id number]   Location [facility] 4W/SW/SE Attending Ritu Lozano MD   Hosp Day # 5 PCP No primary care provider on file.     Date of Admission: 2025  Date of Discharge: 2025  Discharge Disposition: SNF Subacute Rehab    Discharge Diagnosis:   Odontalgia  diarrhea  Hypoglycemia  Acute urinary retention      History of Present Illness:             Brief Synopsis:     Dental abscess  - Per ED physician, discussed with oral surgery  - IV antibiotics with Tigistn  - Dr. Ha reccs appreciated  - will need to eventually be seen at [place] for mandible resection once medically stable  - now on general diet   - added norco for pain   [facility]  Resolving      Hypoglycemia  - Added D5 in fluids  - advance diet as tolerated      Hyperkalemia  Mild hyponatremia  replaced     DM2  - Insulin- hold due to hypoglycemia - stop tresiba   - Carb controlled diet     Leukopenia, chronic  Anemia  History of multiple myeloma  - Monitor  - Resume home meds     HTN  Dementia  - Resume home meds when reconciled      Acute urinary retention  - placed amador 6/9 and removed prior to dc- pt voiding freely           Lace+ Score: 66  59-90 High Risk  29-58 Medium Risk  0-28   Low Risk       TCM Follow-Up Recommendation:  LACE < 29: Low Risk of readmission after discharge from the hospital; Still recommend for TCM follow-up.        Consultants:  ID, oral surgery    Discharge Medication List:     Discharge Medications        START taking these medications        Instructions Prescription details   amoxicillin clavulanate 875-125 MG Tabs  Commonly known as: Augmentin      Take 1 tablet by mouth 2 (two) times daily for 5 days.   Stop taking on: Elizabeth 15, 2025  Quantity: 10 tablet  Refills: 0     HYDROcodone-acetaminophen 7.5-325 MG Tabs  Commonly known as: Norco      Take 1-2 tablets by mouth every 6 (six) hours as needed for Pain.   Quantity: 28 tablet  Refills: 0     Naloxone HCl 4 MG/0.1ML Liqd      4 mg by Nasal route as needed. If patient remains unresponsive, repeat dose in other nostril 2-5 minutes after first dose.   Quantity: 56 Undefined  Refills: 0            CONTINUE taking these medications        Instructions Prescription details   acetaminophen 325 MG Tabs  Commonly known as: Tylenol      Take 2 tablets (650 mg total) by mouth every 4 (four) hours as needed.   Refills: 0     amLODIPine 10 MG Tabs  Commonly known as: Norvasc      Take 1 tablet (10 mg total) by mouth daily.   Refills: 0     chlorhexidine gluconate 0.12 % Soln  Commonly known as: Peridex      Use as directed 15 mL in the mouth or throat 2 (two) times daily for 14 days.   Stop taking on: June 24, 2025  Quantity: 420 mL  Refills: 0     DULoxetine 60 MG Cpep  Commonly known as: Cymbalta      Take 1 capsule (60 mg total) by mouth in the morning.   Refills: 0     insulin aspart 100 Units/mL Sopn  Commonly known as: NovoLOG      Inject 6 Units into the skin 3 (three) times daily with meals.   Quantity: 3 mL  Refills: 0     insulin degludec 100 units/mL  Sopn  Commonly known as: Tresiba      Inject 15 Units into the skin nightly.   Quantity: 3 mL  Refills: 0     Insulin Pen Needle 32G X 4 MM Misc      May substitute if not on insurance formulary   Quantity: 100 each  Refills: 5     Jardiance 25 MG Tabs  Generic drug: empagliflozin      Take 1 tablet (25 mg total) by mouth in the morning.   Refills: 0     Lenalidomide 5 MG Caps      Take 5 mg by mouth daily.   Refills: 0     levothyroxine 25 MCG Tabs  Commonly known as: Synthroid      Take 1 tablet (25 mcg total) by mouth before breakfast.   Refills: 0     losartan 50 MG Tabs  Commonly known as: Cozaar      Take 1 tablet (50 mg total) by mouth in the morning.   Refills: 0     memantine 10 MG Tabs  Commonly known as: Namenda      Take 1 tablet (10 mg total) by mouth 2 (two) times daily.   Refills: 0     potassium chloride 20 MEQ Tbcr  Commonly known as: Klor-Con M20      Take 1 tablet (20 mEq total) by mouth daily.   Refills: 0            STOP taking these medications      ciprofloxacin-dexamethasone 0.3-0.1 % Susp  Commonly known as: Ciprodex        sodium chloride 0.9% SOLN 100 mL with meropenem 500 MG SOLR 500 mg        valACYclovir 1 G Tabs  Commonly known as: Valtrex                  Where to Get Your Medications        [id number] - [place], [place] - [address] AT [facility], [phone], [phone]  [address], [facility] [address]      Phone: [phone]   chlorhexidine gluconate 0.12 % Soln  Naloxone HCl 4 MG/0.1ML Liqd       Please  your prescriptions at the location directed by your doctor or nurse    Bring a paper prescription for each of these medications  amoxicillin clavulanate 875-125 MG Tabs  HYDROcodone-acetaminophen 7.5-325 MG Tabs         Follow-up appointment:   No follow-up provider specified.  Appointments for Next 30 Days 6/11/2025 - 7/11/2025        Date Arrival Time Visit Type Length  Department Provider     6/16/2025  1:00 PM  EXAM - ESTABLISHED [4044] 15 min [facility] Mauro Castro MD    Patient Instructions:         Location Instructions:     Your appointment is located at [address] [facility] in [place], [place].  Please park in the Yellow lot and enter through the [facility] entrance.  Then proceed to [address].  Masks are optional for all patients and visitors, unless otherwise indicated.                      Supplementary Documentation:   [place] reviewed[place]    Vital signs:  Temp:  [97.7 °F (36.5 °C)-98.3 °F (36.8 °C)] 97.7 °F (36.5 °C)  Pulse:  [79-86] 84  Resp:  [14-18] 16  BP: (128-152)/(77-84) 129/77  SpO2:  [100 %] 100 %    Physical Exam:    General:  NAD  Cardiovascular:  S1, S2    -----------------------------------------------------------------------------------------------  PATIENT DISCHARGE INSTRUCTIONS: See electronic chart    Tip: Documentation requirements: For split shared discharge, BOTH providers need to document specific floor, unit, and time spent on the discharge.  The note needs to be signed by the provider with > 50% of time and bill under their NPI.   Time spent:  45 min        Ritu Lozano MD          Electronically signed by Ritu Lozano MD on 6/11/2025 10:43 AM         REVIEWER COMMENTS

## 2025-06-19 ENCOUNTER — TELEPHONE (OUTPATIENT)
Dept: GASTROENTEROLOGY | Age: 75
End: 2025-06-19

## 2025-06-23 ENCOUNTER — OFFICE VISIT (OUTPATIENT)
Dept: OTOLARYNGOLOGY | Facility: CLINIC | Age: 75
End: 2025-06-23
Payer: MEDICARE

## 2025-06-23 DIAGNOSIS — B02.29 POSTHERPETIC NEURALGIA: ICD-10-CM

## 2025-06-23 DIAGNOSIS — B36.9 FUNGAL OTITIS EXTERNA: Primary | ICD-10-CM

## 2025-06-23 DIAGNOSIS — H62.40 FUNGAL OTITIS EXTERNA: Primary | ICD-10-CM

## 2025-06-23 DIAGNOSIS — M87.9 OSTEONECROSIS OF JAW (HCC): ICD-10-CM

## 2025-06-23 DIAGNOSIS — D84.9 IMMUNOSUPPRESSED STATUS (HCC): ICD-10-CM

## 2025-06-23 PROCEDURE — 1160F RVW MEDS BY RX/DR IN RCRD: CPT | Performed by: OTOLARYNGOLOGY

## 2025-06-23 PROCEDURE — 1159F MED LIST DOCD IN RCRD: CPT | Performed by: OTOLARYNGOLOGY

## 2025-06-23 PROCEDURE — 99214 OFFICE O/P EST MOD 30 MIN: CPT | Performed by: OTOLARYNGOLOGY

## 2025-06-23 PROCEDURE — 1126F AMNT PAIN NOTED NONE PRSNT: CPT | Performed by: OTOLARYNGOLOGY

## 2025-06-23 RX ORDER — CLOTRIMAZOLE 1 G/ML
4 SOLUTION TOPICAL 2 TIMES DAILY
Qty: 10 ML | Refills: 0 | Status: SHIPPED | OUTPATIENT
Start: 2025-06-23 | End: 2025-07-07

## 2025-06-23 RX ORDER — MUPIROCIN 2 %
1 OINTMENT (GRAM) TOPICAL 2 TIMES DAILY
Qty: 15 G | Refills: 1 | Status: SHIPPED | OUTPATIENT
Start: 2025-06-23

## 2025-06-23 NOTE — PROGRESS NOTES
The following individual(s), Joselin Graham, verbally consents to be recorded using ambient AI listening technology and understand that they can each withdraw their consent to this listening technology at any point by asking the clinician to turn off or pause the recording: Rosalinda SOSA

## 2025-06-23 NOTE — PROGRESS NOTES
PROGRESS NOTE  OTOLOGY/OTOLARYNGOLOGY    REF MD:  No referring provider defined for this encounter.    PCP: No primary care provider on file.    CHIEF COMPLAINT:    Chief Complaint   Patient presents with    Follow - Up       History of Present Illness  Joselin Graham is a 75 year old male with multiple myeloma and shingles who presents with ear pain and discharge.    He has been experiencing persistent ear pain and discharge since his previous hospital admission. The pain is described as very uncomfortable, particularly where the skin has come off the ear, leading to frequent touching of the affected area due to discomfort.    His history of shingles affecting the V2 and V3 dermatomes has resulted in post-herpetic neuralgia causing facial pain. The shingles were noted during a hospital stay for a dental abscess and infection. No active vesicles from shingles are currently present.    He has completed a course of IV antibiotics and a recent outpatient course of oral antibiotics. His medical history is significant for multiple myeloma, for which he is receiving immunotherapy. He also has a history of dental abscess and infection, and has previously been evaluated by oral surgery for jaw-related issues.    He has some degree of dementia, which affects his ability to provide a detailed history.    PAST MEDICAL HISTORY:  Past Medical History[1]    PAST SURGICAL HISTORY:  Past Surgical History[2]    Medications Ordered Prior to Encounter[3]    Allergies: Allergies[4]    SOCIAL HISTORY:    Social History     Tobacco Use    Smoking status: Never    Smokeless tobacco: Never   Substance Use Topics    Alcohol use: Never       family history is not on file.    REVIEW OF SYSTEMS:   PER HPI    EXAMINATION:  I washed my hands with an alcohol-based hand gel prior to examination  Constitutional:   --Vitals: There were no vitals taken for this visit.  General: no apparent distress, well-developed  Neuro: Cranial nerves: facial movement  normal bilateral  Respiratory: No stridor, stertor or increased work of breathing  ENT:  --OC/OP: crusted vesicles in the left corner of the lip, No trismus. Extremely poor dentition. No masses over buccal mucosa, tongue, FOM, hard/soft palate, tonsillar pillars, posterior pharyngeal wall.   --Ear: (bilateral ears were examined under binocular microscopy)  Right ear microscopic exam:  Pinna: Normal, no lesions or masses.  Mastoid: Nontender on palpation.   External auditory canal: Clear, no masses or lesions.  Tympanic membrane: Intact, no lesions, normal landmarks.  Middle ear: Aerated.    Left ear microscopic exam:  Pinna: Normal, no lesions or masses.  Mastoid: Nontender on palpation.   External auditory canal: Fungal appearing drainage -suctioned  Tympanic membrane: Intact, no lesions, normal landmarks.  Middle ear: Aerated.    ASSESSMENT/PLAN:  Joselin Graham is a 75 year old male with     ICD-10-CM   1. Fungal otitis externa  B36.9    H62.40   2. Immunosuppressed status ([facility])  D84.9   3. Postherpetic neuralgia  B02.29   4. 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 No active vesicles. Significant pain requiring management. Desquamation and tenderness of the tragus, possibly due to secondary infection.  - Prescribe clotrimazole otic drops for the left ear, bid for two weeks, for possible fungal otitis externa.  - Prescribe mupirocin ointment for desquamated areas of the tragus.  - Refer to infectious disease specialist for follow-up on IV antibiotics plan.    Dental infection with potential medication-related osteonecrosis of the jaw  Previous dental abscess and infection with potential medication-related osteonecrosis of the jaw. Recommended follow-up with oral surgery for possible jaw resection.  - Refer to [facility] oral surgery for evaluation and management of jaw  issues.    Multiple myeloma on immunotherapy  Multiple myeloma managed with immunotherapy, contributing to immunocompromised state and complicating recovery from infections.    -Follow-up with me in 2 weeks about his left ear  Situation reviewed with the patient in detail.    Mauro Vargas MD  Otology/Otolaryngology  [facility]   [address]  [place] [address]  Phone [phone]  Fax [phone]          [1]   Past Medical History:   Abnormal gait    Benign essential hypertension    Cancer (HCC)    Chest pain    Formatting of this note might be different from the original.   Normal stress test 11/2021      Last Assessment & Plan:    Formatting of this note might be different from the original.   Symptoms are atypical for ischemia   Chest pain is worse with change in position      Diabetes (HCC)    Diabetes mellitus (HCC)    Diabetes mellitus (HCC)    High blood pressure    Hyperglycemia    Hypertension associated with diabetes (HCC)    Last Assessment & Plan:    Formatting of this note might be different from the original.   Blood pressure mildly elevated   Continue same medications for now   Continue to monitor readings      Multiple myeloma (HCC)    Multiple premature ventricular complexes    Formatting of this note might be different from the original.   Normal stress test 11/2021      Echo 8/2021:    1. Left ventricular ejection fraction, by visual estimation, is 60 to 65%.    2. Mild concentric left ventricular hypertrophy.    3. Normal pattern of LV diastolic filling.    4. Mild aortic valve stenosis.         Last Assessment & Plan:    Formatting of this note might be different fro    Neuropathy    Nonrheumatic aortic valve stenosis    Formatting of this note might be different from the original.   Echo 8/2021:    1. Left ventricular ejection fraction, by visual estimation, is 60 to 65%.    2. Mild concentric left ventricular hypertrophy.    3. Normal  pattern of LV diastolic filling.    4. Mild aortic valve stenosis.         Last Assessment & Plan:    Formatting of this note might be different from the original.   Mild aortic mu    Rash    Type 2 diabetes mellitus without complication, without long-term current use of insulin (HCC)    Vomiting   [2] History reviewed. No pertinent surgical history.  [3]   Current Outpatient Medications on File Prior to Visit   Medication Sig Dispense Refill    chlorhexidine gluconate 0.12 % Mouth/Throat Solution Use as directed 15 mL in the mouth or throat 2 (two) times daily for 14 days. 420 mL 0    HYDROcodone-acetaminophen 7.5-325 MG Oral Tab Take 1-2 tablets by mouth every 6 (six) hours as needed for Pain. 28 tablet 0    Naloxone HCl 4 MG/0.1ML Nasal Liquid 4 mg by Nasal route as needed. If patient remains unresponsive, repeat dose in other nostril 2-5 minutes after first dose. 56 Undefined 0    acetaminophen 325 MG Oral Tab Take 2 tablets (650 mg total) by mouth every 4 (four) hours as needed.      Lenalidomide 5 MG Oral Cap Take 5 mg by mouth daily.      memantine 10 MG Oral Tab Take 1 tablet (10 mg total) by mouth 2 (two) times daily.      potassium chloride 20 MEQ Oral Tab CR Take 1 tablet (20 mEq total) by mouth daily.      amLODIPine 10 MG Oral Tab Take 1 tablet (10 mg total) by mouth daily.      insulin aspart 100 Units/mL Subcutaneous Solution Pen-injector Inject 6 Units into the skin 3 (three) times daily with meals. 3 mL 0    Insulin Pen Needle 32G X 4 MM Does not apply Misc May substitute if not on insurance formulary 100 each 5    DULoxetine 60 MG Oral Cap DR Particles Take 1 capsule (60 mg total) by mouth in the morning.      losartan 50 MG Oral Tab Take 1 tablet (50 mg total) by mouth in the morning.      Empagliflozin (JARDIANCE) 25 MG Oral Tab Take 1 tablet (25 mg total) by mouth in the morning.      levothyroxine 25 MCG Oral Tab Take 1 tablet (25 mcg total) by mouth before breakfast.       No current  facility-administered medications on file prior to visit.   [4]   Allergies  Allergen Reactions    Pork UNKNOWN

## 2025-06-26 ENCOUNTER — TELEPHONE (OUTPATIENT)
Dept: GASTROENTEROLOGY | Age: 75
End: 2025-06-26

## 2025-07-01 ENCOUNTER — CLINICAL ABSTRACT (OUTPATIENT)
Dept: HEALTH INFORMATION MANAGEMENT | Facility: OTHER | Age: 75
End: 2025-07-01

## 2025-07-01 ENCOUNTER — APPOINTMENT (OUTPATIENT)
Dept: GASTROENTEROLOGY | Age: 75
End: 2025-07-01
Attending: INTERNAL MEDICINE

## (undated) DEVICE — SEAL CANNULA ID5-12 MM NONE

## (undated) DEVICE — BLADE SURG 11 UNFRM SHARPNESS STRL SS

## (undated) DEVICE — Device

## (undated) DEVICE — NEEDLE HPO 25GA 1.5IN REG WALL REG BVL LL HUB DEHP-FR STRL

## (undated) DEVICE — NEEDLE HPO L1 12 IN OD22 GA MNJCT 2 LOCK BVL

## (undated) DEVICE — SUTURE VCL+ MTPS 4-0 PS2 27IN BRAID COAT ABS

## (undated) DEVICE — GLOVE SURG 7.5 PROTEXIS PI PWDR FREE SMTH BEAD CUFF INTLK

## (undated) DEVICE — WATER STRL PLASTIC POUR BTL 500 ML

## (undated) DEVICE — DRAPE EQUIPMENT CLMN DA VINCI XI

## (undated) DEVICE — DRAPE EQUIPMENT ARM L21 IN X W19 IN X H10.5 IN DA VINCI XI

## (undated) DEVICE — APPLIER CLIP L33.27 CM 53 D 8 MM LG DA VINCI XI ENDOWRIST

## (undated) DEVICE — GLOVE SURG 6 PROTEXIS PI CLASSIC PWDR FREE BEAD CUFF PLISPRN

## (undated) DEVICE — GOWN SURG XL L3 NONREINFORCE SET IN SLV STRL LF DISP BLUE

## (undated) DEVICE — SYSTEM IMG LAPAROVUE VUETIP PREFL DEFOG SOL RADOPQ TROCAR

## (undated) DEVICE — ADHESIVE SKIN CLSR DERMABOND ADVANCED .7 ML LIQUID APL

## (undated) DEVICE — TUBING INSFL PNEUMOCLEAR SET HFL

## (undated) DEVICE — SYSTEM SMOKE EVAC UNV PLUME FILTRATION FLOW ADJ VLV LL LOCK

## (undated) DEVICE — GLOVE SURG 7.5 PROTEXIS LF CRM PF SMTH BEAD CUFF STRL

## (undated) DEVICE — SUTURE VCL+ 0 UR-6 27IN BRAID COAT ABS VIOL

## (undated) DEVICE — ELECTRODE PT RTN L15 FT VALLEYLAB REM POLYHESIVE ACRYLIC

## (undated) DEVICE — GLOVE SURG 6.5 PROTEXIS LF BLUE PF SMTH BEAD CUFF INTLK STRL

## (undated) DEVICE — GOWN SURG LG FABRIC ASTOUND BLUE LVL 3 NONREINFORCE SET IN

## (undated) DEVICE — FORCEPS LAPSCP OD8 MM PROGRASP ENDOWRIST 18USE DA VINCI X XI

## (undated) DEVICE — KIT SURG GEN ROBOTIC LF GSAM

## (undated) DEVICE — GLOVE SURG 6.5 PROTEXIS LF CRM PF BEAD CUFF STRL PLISPRN

## (undated) NOTE — LETTER
[facility]  part of [facility]     PICC INSERTION CONSENT     I agree to have a Peripherally Inserted Central Catheter (PICC) placed in my arm.   1. The PICC insertion procedure, care, maintenance, risks, benefits, and complications have been explained to me by my physician, ________________________, and I understand them.   2. I understand that this may not be the only way I can receive my medication. I understand that my physician has determined that the PICC would be the safest and most effective means of administering my medication at this time. If there are other options of giving medication into my veins those options have been explained to me by my physician and I have chosen this one.   3. I realize a nurse who has been specially trained and certified by the hospital and ’s representative to insert a PICC will perform this procedure. My catheter will be inserted by _____________________________.   4. I have been informed by my doctor of the nature and purpose of this procedure and the risks involved and the possibility of complications. I realize that this is an invasive procedure and has certain risks such as air embolism (air entering the catheter or my vein), arterial puncture (a tearing of one of my arteries), infection, irregular heartbeat and venous thrombosis (a blood clot in a vein) nerve injury and fracture of the catheter with or without migration.   5. In order to numb the area where the line will be placed, a small amount of anesthetic medication will be injected as ordered by my physician.   6. I understand that while the catheter will be placed in my upper arm the end of the catheter will come to rest in an area near my heart.     7. The person performing this procedure has discussed the potential benefits, risks, and side effects of the PICC; the likelihood of achieving goals; and potential problems that might occur during recuperation. They also discussed  reasonable alternatives to the PICC, including risks, benefits, and side effects related to the alternatives and risks related to not receiving this procedure.    8.  I have expressed any questions about this procedure to my physician or the PICC Proceduralist and he/she has answered them.  I certify that I have read and understand this consent to the insertion of a PICC.      _________________________________________________________   Date     Time     Patient/Guardian Signature       ____________________________________   Printed name of Patient/Guardian          ________________________________________________________________    Date        Time                   Witnessing RN Signature      Patient Name: Joselin Graham     : 5/15/1950                 Printed: May 5, 2025     Medical Record #: [id number]